# Patient Record
Sex: FEMALE | Race: WHITE | Employment: UNEMPLOYED | URBAN - METROPOLITAN AREA
[De-identification: names, ages, dates, MRNs, and addresses within clinical notes are randomized per-mention and may not be internally consistent; named-entity substitution may affect disease eponyms.]

---

## 2017-03-06 PROBLEM — E66.01 OBESITY, CLASS III, BMI 40-49.9 (MORBID OBESITY) (HCC): Status: ACTIVE | Noted: 2017-03-06

## 2017-03-06 PROBLEM — N92.1 MENORRHAGIA WITH IRREGULAR CYCLE: Status: ACTIVE | Noted: 2017-03-06

## 2017-03-06 PROBLEM — F41.9 ANXIETY: Status: ACTIVE | Noted: 2017-03-06

## 2017-03-06 PROBLEM — E66.813 OBESITY, CLASS III, BMI 40-49.9 (MORBID OBESITY) (HCC): Status: ACTIVE | Noted: 2017-03-06

## 2017-05-01 PROBLEM — F41.9 ANXIETY: Status: RESOLVED | Noted: 2017-03-06 | Resolved: 2017-05-01

## 2017-05-01 PROBLEM — F41.9 ANXIETY DISORDER: Status: ACTIVE | Noted: 2017-05-01

## 2018-03-19 ENCOUNTER — OFFICE VISIT (OUTPATIENT)
Dept: INTERNAL MEDICINE | Age: 48
End: 2018-03-19
Payer: COMMERCIAL

## 2018-03-19 VITALS
SYSTOLIC BLOOD PRESSURE: 127 MMHG | BODY MASS INDEX: 42.59 KG/M2 | RESPIRATION RATE: 14 BRPM | HEIGHT: 66 IN | WEIGHT: 265 LBS | DIASTOLIC BLOOD PRESSURE: 76 MMHG | TEMPERATURE: 97.7 F | HEART RATE: 83 BPM

## 2018-03-19 DIAGNOSIS — F41.9 ANXIETY DISORDER, UNSPECIFIED TYPE: ICD-10-CM

## 2018-03-19 DIAGNOSIS — Z12.39 SCREENING FOR BREAST CANCER: ICD-10-CM

## 2018-03-19 DIAGNOSIS — E66.01 OBESITY, CLASS III, BMI 40-49.9 (MORBID OBESITY) (HCC): ICD-10-CM

## 2018-03-19 DIAGNOSIS — R00.2 PALPITATIONS: ICD-10-CM

## 2018-03-19 DIAGNOSIS — E11.65 TYPE 2 DIABETES MELLITUS WITH HYPERGLYCEMIA, WITHOUT LONG-TERM CURRENT USE OF INSULIN (HCC): ICD-10-CM

## 2018-03-19 DIAGNOSIS — I10 ESSENTIAL HYPERTENSION: ICD-10-CM

## 2018-03-19 DIAGNOSIS — E78.2 MIXED HYPERLIPIDEMIA: Primary | ICD-10-CM

## 2018-03-19 PROCEDURE — G8427 DOCREV CUR MEDS BY ELIG CLIN: HCPCS | Performed by: INTERNAL MEDICINE

## 2018-03-19 PROCEDURE — G8482 FLU IMMUNIZE ORDER/ADMIN: HCPCS | Performed by: INTERNAL MEDICINE

## 2018-03-19 PROCEDURE — 99214 OFFICE O/P EST MOD 30 MIN: CPT | Performed by: INTERNAL MEDICINE

## 2018-03-19 PROCEDURE — 99212 OFFICE O/P EST SF 10 MIN: CPT | Performed by: INTERNAL MEDICINE

## 2018-03-19 PROCEDURE — 1036F TOBACCO NON-USER: CPT | Performed by: INTERNAL MEDICINE

## 2018-03-19 PROCEDURE — G8417 CALC BMI ABV UP PARAM F/U: HCPCS | Performed by: INTERNAL MEDICINE

## 2018-03-19 PROCEDURE — 3046F HEMOGLOBIN A1C LEVEL >9.0%: CPT | Performed by: INTERNAL MEDICINE

## 2018-03-19 RX ORDER — AMLODIPINE BESYLATE 5 MG/1
TABLET ORAL
Qty: 30 TABLET | Refills: 2 | Status: SHIPPED | OUTPATIENT
Start: 2018-03-19 | End: 2018-03-27 | Stop reason: SDUPTHER

## 2018-03-19 RX ORDER — PRAVASTATIN SODIUM 20 MG
TABLET ORAL
Qty: 30 TABLET | Refills: 2 | Status: SHIPPED | OUTPATIENT
Start: 2018-03-19 | End: 2018-06-11 | Stop reason: SDUPTHER

## 2018-03-19 RX ORDER — GLUCOSAMINE HCL/CHONDROITIN SU 500-400 MG
1 CAPSULE ORAL DAILY
Qty: 100 STRIP | Refills: 5 | Status: SHIPPED | OUTPATIENT
Start: 2018-03-19 | End: 2018-08-06 | Stop reason: SDUPTHER

## 2018-03-19 RX ORDER — CHLORAL HYDRATE 500 MG
1000 CAPSULE ORAL 2 TIMES DAILY
Qty: 180 CAPSULE | Refills: 2 | Status: SHIPPED | OUTPATIENT
Start: 2018-03-19 | End: 2018-08-06 | Stop reason: SDUPTHER

## 2018-03-19 RX ORDER — LANCETS 30 GAUGE
1 EACH MISCELLANEOUS DAILY
Qty: 100 EACH | Refills: 5 | Status: SHIPPED | OUTPATIENT
Start: 2018-03-19 | End: 2018-08-06 | Stop reason: SDUPTHER

## 2018-03-19 RX ORDER — M-VIT,TX,IRON,MINS/CALC/FOLIC 27MG-0.4MG
1 TABLET ORAL DAILY
Qty: 90 TABLET | Refills: 2 | Status: SHIPPED | OUTPATIENT
Start: 2018-03-19 | End: 2018-08-06 | Stop reason: SDUPTHER

## 2018-03-19 ASSESSMENT — ENCOUNTER SYMPTOMS
SHORTNESS OF BREATH: 0
ORTHOPNEA: 0
BLURRED VISION: 0
NAUSEA: 0
VOMITING: 0

## 2018-03-19 NOTE — PROGRESS NOTES
Psychiatric/Behavioral: The patient is nervous/anxious. Objective:  Vitals:    03/19/18 1038 03/19/18 1052   BP: (!) 161/99 (!) 153/84   Pulse: 83    Resp: 14    Temp: 97.7 °F (36.5 °C)    TempSrc: Oral    Weight: 265 lb (120.2 kg)    Height: 5' 6\" (1.676 m)       Physical Exam   Constitutional: She is oriented to person, place, and time. She appears well-developed and well-nourished. No distress. HENT:   Head: Normocephalic and atraumatic. Right Ear: Tympanic membrane normal.   Left Ear: Tympanic membrane normal.   Mouth/Throat: Uvula is midline and oropharynx is clear and moist.   Neck: Normal range of motion. Carotid bruit is not present. No thyromegaly present. Cardiovascular: Normal rate, regular rhythm, normal heart sounds and intact distal pulses. Exam reveals no gallop and no friction rub. No murmur heard. Pulmonary/Chest: Effort normal and breath sounds normal. She has no wheezes. She has no rales. Musculoskeletal: Normal range of motion. She exhibits no edema (no significant edema today on exam). Lymphadenopathy:     She has no cervical adenopathy. Neurological: She is alert and oriented to person, place, and time. Skin: She is not diaphoretic. Psychiatric: She has a normal mood and affect. Assessment/Plan:   Anna Staples was seen today for medication refill, hypertension, medication problem, other, other, other and abdominal pain. Diagnoses and all orders for this visit:    Mixed hyperlipidemia  -     pravastatin (PRAVACHOL) 20 MG tablet; TAKE ONE TABLET BY MOUTH EVERY EVENING  -     Omega-3 Fatty Acids (FISH OIL) 1000 MG CAPS; Take 1 capsule by mouth 2 times daily  -     LIPID PANEL; Future    Repeat lipid panel     Discussed re-evaluating TGs with Fish oil use     Type 2 diabetes mellitus with hyperglycemia, without long-term current use of insulin (HCC)  -     metFORMIN (GLUCOPHAGE) 1000 MG tablet;  Take 1 tablet by mouth 2 times daily (with meals)  -     Lancets MISC; Inject 1 each into the skin daily  -     Glucose Blood (BLOOD GLUCOSE TEST STRIPS) STRP; 1 each by In Vitro route daily  -     Basic Metabolic Panel; Future  -     HEMOGLOBIN A1C; Future    Repeat A1c     Essential hypertension- stable   -     amLODIPine (NORVASC) 5 MG tablet; TAKE ONE TABLET BY MOUTH EVERY DAY  -     Basic Metabolic Panel; Future    Repeat BP with resting at goal    Monitor continued    Weight loss strategies discussed    Monitor closely    Consider increasing CCB to 10 mg daily     Palpitations- recurrent     Recommend follow-up Cardiology assessment     Patient would be willing to consider ECHO at this time     Prior Holter Monitoring has been evaluated     Anxiety increased and has not followed with Behavioral Health and appt established on Thursday AM      Will continue to follow     Obesity, Class III, BMI 40-49.9 (morbid obesity) ([facility] [address]     BMI was elevated today, and weight loss plan recommended is : conventional weight loss. Consider Weight Loss referral     Anxiety disorder, unspecified type     Behavioral Health     Screening for breast cancer  -     SHASITA DIGITAL SCREEN W CAD BILATERAL; Future    Other orders  -     Multiple Vitamins-Minerals (THERAPEUTIC MULTIVITAMIN-MINERALS) tablet; Take 1 tablet by mouth daily Last dose 6/8/2016    Women's Health Exam requested by patient.

## 2018-03-22 ENCOUNTER — OFFICE VISIT (OUTPATIENT)
Dept: PSYCHOLOGY | Age: 48
End: 2018-03-22
Payer: COMMERCIAL

## 2018-03-22 DIAGNOSIS — F41.0 PANIC ATTACKS: ICD-10-CM

## 2018-03-22 DIAGNOSIS — F41.8 OTHER SPECIFIED ANXIETY DISORDERS: Primary | ICD-10-CM

## 2018-03-22 PROCEDURE — 90832 PSYTX W PT 30 MINUTES: CPT | Performed by: CLINICAL NEUROPSYCHOLOGIST

## 2018-03-22 NOTE — PATIENT INSTRUCTIONS
Improving Sleep Through Behavior Change   Stimulus Control Procedures     Go to Bed Only When You Are Sleepy   The longer you are in bed, the more the bed is associated with a place to be awake instead of asleep. Delay bedtime until sleepy. Get Out of Bed When You Cant Fall Asleep or Go Back to Sleep in About 15 Minutes   Get out of bed if you dont fall asleep fairly soon. Return to bed only when you are sleepy. When you feel sleepy, return to bed. The goal is to reconnect your bed with being asleep. Use the Bed for Sleep and Sex Only   Do not watch TV, listen to the radio, eat, or read in your bed or bedroom. Sleep Hygiene Guidelines   Caffeine   Avoid caffeine 6 to 8 hours before bedtime. Caffeine disturbs sleep. Thus, drinking caffeinated beverages should be avoided near bedtime. Nicotine   Avoid nicotine before bedtime. Nicotine can keep you awake. Avoid tobacco near bedtime and during the night. Alcohol   Avoid alcohol after dinner. Alcohol often promotes the onset of sleep, but interrupts your natural sleep pattern. Do not consume it any closer than 4 hours before going to bed. Sleeping Pills   Sleep medications are effective only temporarily. Sleep medications lose their effectiveness in about 2 to 4 weeks when taken regularly. Over time, sleeping pills actually can make sleep problems worse; withdrawal from the medication can lead to an insomnia rebound. Keep use of sleeping pills infrequent, but dont worry if you need to use one on an occasional basis. Regular Exercise   Do not exercise within 2 hours of bedtime as it may elevate nervous system activity and interfere with your ability to fall asleep   Bedroom Environment   Your bedroom should have a moderate temperature and be quiet and dark. Noises can be masked with background white noise (e.g., the noise of a fan) or with earplugs. Bedrooms may be darkened with blackout shades, or sleep masks can be worn.    Eating   A light bedtime improve sleep, and exercise within 2 hours of bedtime may elevate nervous system activity and interfere with sleep onset. Hot Baths  Spending 20 minutes in a tub of hot water an hour or two prior to bedtime may promote sleep and is strongly recommended. Bedroom Environment: Moderate Temperature, Quiet, and Dark       Extremes of heat or cold can disrupt sleep. A quiet environment is more sleep promoting than a noisy one. Noises can be masked with background white noise (such as the noise of a fan) or with earplugs. Bedrooms may be darkened with black-out shades or sleep masks can be worn. Position clocks out-of-sight since clock-watching can increase worry about the effects of lack of sleep. Be sure your mattress is not too soft or too firm and that your pillow is the right height and firmness. Eating       A light bedtime snack, such a glass of warm milk, cheese, or a bowl of cereal can promote sleep. You should avoid the following foods at bedtime:  any caffeinated foods (e.g., chocolate), peanuts, beans, most raw fruits and vegetables (since they may cause gas), and high-fat foods such as potato chips or corn chips. Avoid snacks in the middle of the nights since awakening may become associated with hunger. If you have trouble with regurgitation, be especially careful to avid heavy meals and spices in the evening. Do not go to bed too hungry or too full. It may help to elevate you head with some pillows. Avoid Naps       Avoid naps, the sleep you obtain during the day takes away from you sleep need that night resulting in lighter, more restless sleep, difficulty falling asleep or early morning awakening. If you must nap, keep it brief, and take the nap about 8 hours after arising. It is best to set an alarm to ensure you dont sleep more than 10-15 minutes.     Limit Your Time in Bed        Restrict your sleep period to the average number of hours you have actually slept per ____________________________________________________________________________    ______ Dru Dean Take a Hot Bath 1-2 Hours Prior to Bedtime. I will take a hot bath about ______ PM.    ______ Eat a Light Snack at Bedtime but Avoid Large or Problematic Foods. I will eat     __________________  or _____________________ or __________________ before bed.    ______ Avoid Naps. I try not to nap, if I must, I will limit it to _______ minutes, about 8 hours after I   awoke and will use alarm to limit my nap time. ______ Limit Time In Bed. I have been sleeping on average ______ hours per night, therefore I will   limit my time in bed to _____ hours (the same number). If Im not asleep in about 15 to 20   minutes I will get up and not return to bed until Im sleepy.    ______ Stay on a Regular Sleep Schedule  I will get up at _______ AM, 7 days a week, no matter   how poorly I slept that night. Sleep Restriction  One of the Keys to Changing Your Sleep Behavior        What is it? Sleep restriction involves restricting the amount of time you spend in bed to the amount of time that you currently spend asleep. Why would this be helpful? Research has demonstrated that sleep restriction is a powerful technique for improving sleep. Although it can be a bit of an adjustment at first, most people find that it is not much worse than their current difficulties with sleep. In general, most people notice that their sleep improves considerably within just a few weeks. Sleep restriction initially produces a mild state of sleep deprivation, which, after only a few weeks, helps people fall asleep faster, stay asleep longer and improve their overall quality of sleep. How do I do it? Example: Your usual bedtime is 10:00 PM and you get out of bed in the morning at 6:00 AM.  With this routine there is an 8-hour period during which you are in bed trying to sleep.       However, if it takes you 1 hour to fall asleep and you wake-up for 30 minutes during the middle of the night and 30 minutes before you get out of bed, you spend a total of 6 hours sleeping and 2 hours awake. Your sleep efficiency (the percent of time you are actually asleep during the time period you are trying to sleep) is 75%. Sleep Restriction in this case would mean decreasing the amount of time in bed (8 hours) to the estimated time actually spent sleeping (6 hours). In this example you would adjust either your bed-time or the time you get up in the morning so that the maximum amount of time you spend in bed is 6 hours. With this example you could go to bed at 12:00 (midnight) and get up at 6:00 AM, or continue to go to bed at 10:00 PM and get up at 4:00 AM.    After sleep efficiency reaches 85% or greater, time in bed can be increased in 15-20 minute blocks. Time in bed each week is increased if 85% sleep efficiency or greater until sleep efficiency starts to fall below 80% then time in bed is decreased by 15-20 minute blocks. This process of increasing or decreasing time in bed is done until sleep efficiency falls between 80-85% on a regular basis.

## 2018-03-22 NOTE — PROGRESS NOTES
amLODIPine (NORVASC) 5 MG tablet TAKE ONE TABLET BY MOUTH EVERY DAY 30 tablet 2    ibuprofen (ADVIL;MOTRIN) 200 MG tablet Take 400 mg by mouth every 6 hours as needed for Pain      glucose monitoring kit (FREESTYLE) monitoring kit 1 kit by Does not apply route daily as needed 1 kit 0    Misc. Devices KIT Blood pressure cuff- Ambulatory monitoring- check every other day and maintain blood pressure log 1 kit 0     No current facility-administered medications for this visit. Social History:   Social History     Social History    Marital status:      Spouse name: N/A    Number of children: N/A    Years of education: N/A     Occupational History    Not on file. Social History Main Topics    Smoking status: Former Smoker    Smokeless tobacco: Never Used      Comment: smoked for 3 yrs in early 20;s    Alcohol use No    Drug use: No    Sexual activity: Yes     Partners: Male     Other Topics Concern    Not on file     Social History Narrative    No narrative on file       TOBACCO:   reports that she has quit smoking. She has never used smokeless tobacco.  ETOH:   reports that she does not drink alcohol.     Family History:   Family History   Problem Relation Age of Onset    Rheum Arthritis Mother     Hypertension Mother    [place] Arthritis Mother     High Blood Pressure Mother     Obesity Mother     Other Father      Heart Disease    Hypertension Father     High Cholesterol Father     Thyroid Disease Father      Hyperthyroid s/p radiation    Arthritis Father     High Blood Pressure Father     Obesity Father     Obesity Sister     Cancer Paternal Grandmother      Colon Cancer    Other Paternal Grandmother      COPD    Cancer Paternal Grandfather      Lung Cancer    Obesity Maternal Aunt     High Blood Pressure Maternal Grandmother     Obesity Maternal Grandmother     Obesity Maternal Grandfather        [facility] Scores 8/22/2017 2/23/2017 1/25/2017 11/16/2016 10/19/2016   PHQ2 Score 4 2 2 3 1   PHQ9 Score 16 2 2 9 1     Interpretation of Total Score Depression Severity: 1-4 = Minimal depression, 5-9 = Mild depression, 10-14 = Moderate depression, 15-19 = Moderately severe depression, 20-27 = Severe depression    A:   Patient was on time for her scheduled appointment. Patient was last seen on October 24, 2017. She noted that since that time she has had difficulty with insurance that has prevented her from attending scheduled sessions. Today she described continued concern over her ex-'s control of their 3 daughters. She worries that his layoff may cause him to want his daughters to be with him more frequently which can reportedly help him with child support. The patient continues to use the anxiety and panic intervention strategies she has previously learned. However, the patient noted that her mind is preoccupied with worry about her daughter's which has in turn decreased her actual breath training and relaxation intervention time each day. When she uses the interventions she noted that her anxiety is lessened. We will continue to discuss her adjustment to this ongoing situation in our next session. She was apprised that this provider will be leaving this institution the 2nd week of May. She was afforded the opportunity for follow-up with this provider or referral to the community. The patient denied suicidal/homicidal ideation, plan, and/or intent. She was in no acute psychological distress today.      Diagnosis:  Other Specified Anxiety Disorder with (periodic) Panic Attacks         Patient Active Problem List   Diagnosis    Bone spur of ankle    DUB (dysfunctional uterine bleeding)    Cyst of ovary    Palpitations    Anxiety state    Essential hypertension    Abdominal pain, LLQ    Pharyngoesophageal dysphagia    Preop testing    Type 2 diabetes mellitus with hyperglycemia, without long-term current use of insulin ([facility])    Obesity, Class III, BMI 40-49.9 (morbid obesity) ([facility] [address]

## 2018-03-27 ENCOUNTER — TELEPHONE (OUTPATIENT)
Dept: INTERNAL MEDICINE CLINIC | Age: 48
End: 2018-03-27

## 2018-03-27 DIAGNOSIS — I10 ESSENTIAL HYPERTENSION: ICD-10-CM

## 2018-03-27 RX ORDER — AMLODIPINE BESYLATE 10 MG/1
10 TABLET ORAL DAILY
Qty: 30 TABLET | Refills: 2 | Status: SHIPPED | OUTPATIENT
Start: 2018-03-27 | End: 2018-06-11 | Stop reason: SDUPTHER

## 2018-03-27 NOTE — TELEPHONE ENCOUNTER
Home readings are not at goal. Increase Amlodipine to 10 mg daily and continue home monitoring. Monitor for additional symptoms. Orders adjusted.

## 2018-04-10 ENCOUNTER — OFFICE VISIT (OUTPATIENT)
Dept: PSYCHOLOGY | Age: 48
End: 2018-04-10
Payer: COMMERCIAL

## 2018-04-10 DIAGNOSIS — F41.0 PANIC ATTACKS: ICD-10-CM

## 2018-04-10 DIAGNOSIS — F41.8 OTHER SPECIFIED ANXIETY DISORDERS: Primary | ICD-10-CM

## 2018-04-10 DIAGNOSIS — F41.9 ANXIOUSNESS: ICD-10-CM

## 2018-04-10 PROCEDURE — 90832 PSYTX W PT 30 MINUTES: CPT | Performed by: CLINICAL NEUROPSYCHOLOGIST

## 2018-04-13 ENCOUNTER — OFFICE VISIT (OUTPATIENT)
Dept: PHYSICAL MEDICINE AND REHAB | Age: 48
End: 2018-04-13
Payer: COMMERCIAL

## 2018-04-13 VITALS
TEMPERATURE: 97.7 F | WEIGHT: 260 LBS | BODY MASS INDEX: 41.78 KG/M2 | DIASTOLIC BLOOD PRESSURE: 88 MMHG | SYSTOLIC BLOOD PRESSURE: 130 MMHG | HEIGHT: 66 IN

## 2018-04-13 DIAGNOSIS — G89.29 CHRONIC LEFT-SIDED LOW BACK PAIN WITHOUT SCIATICA: Primary | ICD-10-CM

## 2018-04-13 DIAGNOSIS — E66.01 OBESITY, CLASS III, BMI 40-49.9 (MORBID OBESITY) (HCC): ICD-10-CM

## 2018-04-13 DIAGNOSIS — M54.50 CHRONIC LEFT-SIDED LOW BACK PAIN WITHOUT SCIATICA: Primary | ICD-10-CM

## 2018-04-13 PROCEDURE — 99204 OFFICE O/P NEW MOD 45 MIN: CPT | Performed by: PHYSICAL MEDICINE & REHABILITATION

## 2018-04-13 PROCEDURE — 1036F TOBACCO NON-USER: CPT | Performed by: PHYSICAL MEDICINE & REHABILITATION

## 2018-04-13 PROCEDURE — G8427 DOCREV CUR MEDS BY ELIG CLIN: HCPCS | Performed by: PHYSICAL MEDICINE & REHABILITATION

## 2018-04-13 PROCEDURE — G8417 CALC BMI ABV UP PARAM F/U: HCPCS | Performed by: PHYSICAL MEDICINE & REHABILITATION

## 2018-04-19 ENCOUNTER — OFFICE VISIT (OUTPATIENT)
Dept: OBGYN | Age: 48
End: 2018-04-19
Payer: COMMERCIAL

## 2018-04-19 VITALS
HEART RATE: 84 BPM | SYSTOLIC BLOOD PRESSURE: 145 MMHG | HEIGHT: 66 IN | DIASTOLIC BLOOD PRESSURE: 81 MMHG | WEIGHT: 266 LBS | TEMPERATURE: 98.4 F | BODY MASS INDEX: 42.75 KG/M2 | RESPIRATION RATE: 16 BRPM

## 2018-04-19 DIAGNOSIS — R10.32 LEFT LOWER QUADRANT ABDOMINAL PAIN OF UNKNOWN ETIOLOGY: ICD-10-CM

## 2018-04-19 DIAGNOSIS — Z01.419 ENCNTR FOR GYN EXAM (GENERAL) (ROUTINE) W/O ABN FINDINGS: Primary | ICD-10-CM

## 2018-04-19 PROCEDURE — 99213 OFFICE O/P EST LOW 20 MIN: CPT | Performed by: OBSTETRICS & GYNECOLOGY

## 2018-04-19 PROCEDURE — 99396 PREV VISIT EST AGE 40-64: CPT | Performed by: OBSTETRICS & GYNECOLOGY

## 2018-04-24 ENCOUNTER — HOSPITAL ENCOUNTER (OUTPATIENT)
Dept: GENERAL RADIOLOGY | Age: 48
Discharge: HOME | End: 2018-04-26
Payer: COMMERCIAL

## 2018-04-24 ENCOUNTER — HOSPITAL ENCOUNTER (OUTPATIENT)
Age: 48
Discharge: HOME | End: 2018-04-26
Payer: COMMERCIAL

## 2018-04-24 DIAGNOSIS — Z12.39 SCREENING FOR BREAST CANCER: ICD-10-CM

## 2018-04-24 DIAGNOSIS — M54.50 CHRONIC LEFT-SIDED LOW BACK PAIN WITHOUT SCIATICA: ICD-10-CM

## 2018-04-24 DIAGNOSIS — G89.29 CHRONIC LEFT-SIDED LOW BACK PAIN WITHOUT SCIATICA: ICD-10-CM

## 2018-04-24 PROCEDURE — 72110 X-RAY EXAM L-2 SPINE 4/>VWS: CPT

## 2018-04-24 PROCEDURE — 77063 BREAST TOMOSYNTHESIS BI: CPT

## 2018-04-26 ENCOUNTER — OFFICE VISIT (OUTPATIENT)
Dept: PSYCHOLOGY | Age: 48
End: 2018-04-26
Payer: COMMERCIAL

## 2018-04-26 DIAGNOSIS — F41.0 PANIC ATTACKS: ICD-10-CM

## 2018-04-26 DIAGNOSIS — F32.89 OTHER SPECIFIED DEPRESSIVE EPISODES: Primary | ICD-10-CM

## 2018-04-26 PROCEDURE — 90832 PSYTX W PT 30 MINUTES: CPT | Performed by: CLINICAL NEUROPSYCHOLOGIST

## 2018-05-02 ENCOUNTER — HOSPITAL ENCOUNTER (OUTPATIENT)
Dept: ULTRASOUND IMAGING | Age: 48
Discharge: HOME | End: 2018-05-04
Payer: COMMERCIAL

## 2018-05-02 DIAGNOSIS — R10.32 LEFT LOWER QUADRANT ABDOMINAL PAIN OF UNKNOWN ETIOLOGY: ICD-10-CM

## 2018-05-02 DIAGNOSIS — Z01.419 ENCNTR FOR GYN EXAM (GENERAL) (ROUTINE) W/O ABN FINDINGS: ICD-10-CM

## 2018-05-02 PROCEDURE — 76830 TRANSVAGINAL US NON-OB: CPT

## 2018-05-03 ENCOUNTER — OFFICE VISIT (OUTPATIENT)
Dept: CARDIOLOGY CLINIC | Age: 48
End: 2018-05-03
Payer: COMMERCIAL

## 2018-05-03 VITALS
DIASTOLIC BLOOD PRESSURE: 88 MMHG | WEIGHT: 269.3 LBS | RESPIRATION RATE: 18 BRPM | HEART RATE: 84 BPM | HEIGHT: 66 IN | SYSTOLIC BLOOD PRESSURE: 134 MMHG | BODY MASS INDEX: 43.28 KG/M2

## 2018-05-03 DIAGNOSIS — R00.2 PALPITATIONS: Primary | ICD-10-CM

## 2018-05-03 DIAGNOSIS — F41.9 ANXIETY DISORDER, UNSPECIFIED TYPE: ICD-10-CM

## 2018-05-03 DIAGNOSIS — I10 ESSENTIAL HYPERTENSION: ICD-10-CM

## 2018-05-03 PROCEDURE — G8417 CALC BMI ABV UP PARAM F/U: HCPCS | Performed by: INTERNAL MEDICINE

## 2018-05-03 PROCEDURE — 1036F TOBACCO NON-USER: CPT | Performed by: INTERNAL MEDICINE

## 2018-05-03 PROCEDURE — 93000 ELECTROCARDIOGRAM COMPLETE: CPT | Performed by: INTERNAL MEDICINE

## 2018-05-03 PROCEDURE — G8427 DOCREV CUR MEDS BY ELIG CLIN: HCPCS | Performed by: INTERNAL MEDICINE

## 2018-05-03 PROCEDURE — 99214 OFFICE O/P EST MOD 30 MIN: CPT | Performed by: INTERNAL MEDICINE

## 2018-05-17 ENCOUNTER — OFFICE VISIT (OUTPATIENT)
Dept: OBGYN | Age: 48
End: 2018-05-17
Payer: COMMERCIAL

## 2018-05-17 VITALS
DIASTOLIC BLOOD PRESSURE: 85 MMHG | WEIGHT: 264 LBS | TEMPERATURE: 98.4 F | HEART RATE: 97 BPM | HEIGHT: 66 IN | RESPIRATION RATE: 18 BRPM | BODY MASS INDEX: 42.43 KG/M2 | SYSTOLIC BLOOD PRESSURE: 159 MMHG

## 2018-05-17 DIAGNOSIS — R10.32 LEFT LOWER QUADRANT ABDOMINAL PAIN OF UNKNOWN ETIOLOGY: Primary | ICD-10-CM

## 2018-05-17 PROCEDURE — G8417 CALC BMI ABV UP PARAM F/U: HCPCS | Performed by: OBSTETRICS & GYNECOLOGY

## 2018-05-17 PROCEDURE — 1036F TOBACCO NON-USER: CPT | Performed by: OBSTETRICS & GYNECOLOGY

## 2018-05-17 PROCEDURE — 99213 OFFICE O/P EST LOW 20 MIN: CPT | Performed by: OBSTETRICS & GYNECOLOGY

## 2018-05-17 PROCEDURE — 99212 OFFICE O/P EST SF 10 MIN: CPT | Performed by: OBSTETRICS & GYNECOLOGY

## 2018-05-17 PROCEDURE — G8427 DOCREV CUR MEDS BY ELIG CLIN: HCPCS | Performed by: OBSTETRICS & GYNECOLOGY

## 2018-06-09 ENCOUNTER — HOSPITAL ENCOUNTER (OUTPATIENT)
Age: 48
Discharge: HOME | End: 2018-06-09
Payer: COMMERCIAL

## 2018-06-09 DIAGNOSIS — E11.65 TYPE 2 DIABETES MELLITUS WITH HYPERGLYCEMIA, WITHOUT LONG-TERM CURRENT USE OF INSULIN (HCC): ICD-10-CM

## 2018-06-09 DIAGNOSIS — E78.2 MIXED HYPERLIPIDEMIA: ICD-10-CM

## 2018-06-09 DIAGNOSIS — I10 ESSENTIAL HYPERTENSION: ICD-10-CM

## 2018-06-09 LAB
ANION GAP SERPL CALCULATED.3IONS-SCNC: 13 MMOL/L (ref 7–16)
BUN BLDV-MCNC: 12 MG/DL (ref 6–20)
CALCIUM SERPL-MCNC: 9.2 MG/DL (ref 8.6–10.2)
CHLORIDE BLD-SCNC: 95 MMOL/L (ref 98–107)
CHOLESTEROL, TOTAL: 178 MG/DL (ref 0–199)
CO2: 26 MMOL/L (ref 22–29)
CREAT SERPL-MCNC: 0.6 MG/DL (ref 0.5–1)
GFR AFRICAN AMERICAN: >60
GFR NON-AFRICAN AMERICAN: >60 ML/MIN/1.73
GLUCOSE BLD-MCNC: 159 MG/DL (ref 74–109)
HBA1C MFR BLD: 8 % (ref 4.8–5.9)
HDLC SERPL-MCNC: 43 MG/DL
LDL CHOLESTEROL CALCULATED: 60 MG/DL (ref 0–99)
POTASSIUM SERPL-SCNC: 4.3 MMOL/L (ref 3.5–5)
SODIUM BLD-SCNC: 134 MMOL/L (ref 132–146)
TRIGL SERPL-MCNC: 376 MG/DL (ref 0–149)
VLDLC SERPL CALC-MCNC: 75 MG/DL

## 2018-06-09 PROCEDURE — 80061 LIPID PANEL: CPT

## 2018-06-09 PROCEDURE — 83036 HEMOGLOBIN GLYCOSYLATED A1C: CPT

## 2018-06-09 PROCEDURE — 80048 BASIC METABOLIC PNL TOTAL CA: CPT

## 2018-06-09 PROCEDURE — 36415 COLL VENOUS BLD VENIPUNCTURE: CPT

## 2018-06-11 ENCOUNTER — OFFICE VISIT (OUTPATIENT)
Dept: INTERNAL MEDICINE | Age: 48
End: 2018-06-11
Payer: COMMERCIAL

## 2018-06-11 VITALS
BODY MASS INDEX: 41.73 KG/M2 | HEART RATE: 89 BPM | DIASTOLIC BLOOD PRESSURE: 80 MMHG | SYSTOLIC BLOOD PRESSURE: 150 MMHG | TEMPERATURE: 98.1 F | WEIGHT: 265.9 LBS | RESPIRATION RATE: 20 BRPM | HEIGHT: 67 IN

## 2018-06-11 DIAGNOSIS — E78.2 MIXED HYPERLIPIDEMIA: ICD-10-CM

## 2018-06-11 DIAGNOSIS — R60.0 LOWER EXTREMITY EDEMA: Primary | ICD-10-CM

## 2018-06-11 DIAGNOSIS — I10 ESSENTIAL HYPERTENSION: ICD-10-CM

## 2018-06-11 DIAGNOSIS — E11.65 TYPE 2 DIABETES MELLITUS WITH HYPERGLYCEMIA, WITHOUT LONG-TERM CURRENT USE OF INSULIN (HCC): ICD-10-CM

## 2018-06-11 PROCEDURE — 99212 OFFICE O/P EST SF 10 MIN: CPT | Performed by: INTERNAL MEDICINE

## 2018-06-11 PROCEDURE — 2022F DILAT RTA XM EVC RTNOPTHY: CPT | Performed by: INTERNAL MEDICINE

## 2018-06-11 PROCEDURE — 1036F TOBACCO NON-USER: CPT | Performed by: INTERNAL MEDICINE

## 2018-06-11 PROCEDURE — 99214 OFFICE O/P EST MOD 30 MIN: CPT | Performed by: INTERNAL MEDICINE

## 2018-06-11 PROCEDURE — G8417 CALC BMI ABV UP PARAM F/U: HCPCS | Performed by: INTERNAL MEDICINE

## 2018-06-11 PROCEDURE — G8427 DOCREV CUR MEDS BY ELIG CLIN: HCPCS | Performed by: INTERNAL MEDICINE

## 2018-06-11 PROCEDURE — 3045F PR MOST RECENT HEMOGLOBIN A1C LEVEL 7.0-9.0%: CPT | Performed by: INTERNAL MEDICINE

## 2018-06-11 RX ORDER — M-VIT,TX,IRON,MINS/CALC/FOLIC 27MG-0.4MG
1 TABLET ORAL DAILY
Qty: 90 TABLET | Status: CANCELLED | OUTPATIENT
Start: 2018-06-11

## 2018-06-11 RX ORDER — AMLODIPINE BESYLATE 5 MG/1
5 TABLET ORAL DAILY
Qty: 30 TABLET | Refills: 2 | Status: SHIPPED | OUTPATIENT
Start: 2018-06-11 | End: 2018-08-06 | Stop reason: SDUPTHER

## 2018-06-11 RX ORDER — PRAVASTATIN SODIUM 20 MG
TABLET ORAL
Qty: 30 TABLET | Refills: 2 | Status: SHIPPED | OUTPATIENT
Start: 2018-06-11 | End: 2018-08-06 | Stop reason: SDUPTHER

## 2018-06-11 RX ORDER — GLUCOSAMINE HCL/CHONDROITIN SU 500-400 MG
1 CAPSULE ORAL DAILY
Qty: 100 STRIP | Status: CANCELLED | OUTPATIENT
Start: 2018-06-11

## 2018-06-11 RX ORDER — CHLORAL HYDRATE 500 MG
1000 CAPSULE ORAL 2 TIMES DAILY
Qty: 180 CAPSULE | Status: CANCELLED | OUTPATIENT
Start: 2018-06-11

## 2018-06-11 RX ORDER — LANCETS 30 GAUGE
1 EACH MISCELLANEOUS DAILY
Qty: 100 EACH | Status: CANCELLED | OUTPATIENT
Start: 2018-06-11

## 2018-06-11 ASSESSMENT — ENCOUNTER SYMPTOMS
SHORTNESS OF BREATH: 0
BLURRED VISION: 0
NAUSEA: 0
HEARTBURN: 0
ORTHOPNEA: 0
DIARRHEA: 0
VOMITING: 0
CONSTIPATION: 0
ABDOMINAL PAIN: 0
BLOOD IN STOOL: 0

## 2018-06-12 ENCOUNTER — HOSPITAL ENCOUNTER (OUTPATIENT)
Age: 48
Discharge: HOME | End: 2018-06-12
Payer: COMMERCIAL

## 2018-06-12 ENCOUNTER — TELEPHONE (OUTPATIENT)
Dept: INTERNAL MEDICINE | Age: 48
End: 2018-06-12

## 2018-06-12 DIAGNOSIS — R60.0 LOWER EXTREMITY EDEMA: ICD-10-CM

## 2018-06-12 LAB — PRO-BNP: 23 PG/ML (ref 0–125)

## 2018-06-12 PROCEDURE — 36415 COLL VENOUS BLD VENIPUNCTURE: CPT

## 2018-06-12 PROCEDURE — 83880 ASSAY OF NATRIURETIC PEPTIDE: CPT

## 2018-07-27 ENCOUNTER — HOSPITAL ENCOUNTER (OUTPATIENT)
Dept: NON INVASIVE DIAGNOSTICS | Age: 48
Discharge: HOME | End: 2018-07-27
Payer: COMMERCIAL

## 2018-07-27 DIAGNOSIS — R60.0 LOWER EXTREMITY EDEMA: ICD-10-CM

## 2018-07-27 LAB
LV EF: 65 %
LVEF MODALITY: NORMAL

## 2018-07-27 PROCEDURE — 93306 TTE W/DOPPLER COMPLETE: CPT

## 2018-08-06 ENCOUNTER — OFFICE VISIT (OUTPATIENT)
Dept: INTERNAL MEDICINE | Age: 48
End: 2018-08-06
Payer: COMMERCIAL

## 2018-08-06 VITALS
RESPIRATION RATE: 14 BRPM | HEART RATE: 80 BPM | WEIGHT: 253 LBS | TEMPERATURE: 98 F | DIASTOLIC BLOOD PRESSURE: 85 MMHG | HEIGHT: 67 IN | SYSTOLIC BLOOD PRESSURE: 131 MMHG | BODY MASS INDEX: 39.71 KG/M2

## 2018-08-06 DIAGNOSIS — E11.65 TYPE 2 DIABETES MELLITUS WITH HYPERGLYCEMIA, WITHOUT LONG-TERM CURRENT USE OF INSULIN (HCC): Primary | ICD-10-CM

## 2018-08-06 DIAGNOSIS — E78.2 MIXED HYPERLIPIDEMIA: ICD-10-CM

## 2018-08-06 DIAGNOSIS — I10 ESSENTIAL HYPERTENSION: ICD-10-CM

## 2018-08-06 DIAGNOSIS — G89.29 CHRONIC BILATERAL LOW BACK PAIN WITH LEFT-SIDED SCIATICA: ICD-10-CM

## 2018-08-06 DIAGNOSIS — M54.42 CHRONIC BILATERAL LOW BACK PAIN WITH LEFT-SIDED SCIATICA: ICD-10-CM

## 2018-08-06 PROCEDURE — 99214 OFFICE O/P EST MOD 30 MIN: CPT | Performed by: INTERNAL MEDICINE

## 2018-08-06 PROCEDURE — G8417 CALC BMI ABV UP PARAM F/U: HCPCS | Performed by: INTERNAL MEDICINE

## 2018-08-06 PROCEDURE — 3045F PR MOST RECENT HEMOGLOBIN A1C LEVEL 7.0-9.0%: CPT | Performed by: INTERNAL MEDICINE

## 2018-08-06 PROCEDURE — 99212 OFFICE O/P EST SF 10 MIN: CPT | Performed by: INTERNAL MEDICINE

## 2018-08-06 PROCEDURE — 1036F TOBACCO NON-USER: CPT | Performed by: INTERNAL MEDICINE

## 2018-08-06 PROCEDURE — 2022F DILAT RTA XM EVC RTNOPTHY: CPT | Performed by: INTERNAL MEDICINE

## 2018-08-06 PROCEDURE — G8427 DOCREV CUR MEDS BY ELIG CLIN: HCPCS | Performed by: INTERNAL MEDICINE

## 2018-08-06 RX ORDER — AMLODIPINE BESYLATE 5 MG/1
5 TABLET ORAL DAILY
Qty: 30 TABLET | Refills: 2 | Status: SHIPPED | OUTPATIENT
Start: 2018-08-06 | End: 2018-11-12 | Stop reason: SDUPTHER

## 2018-08-06 RX ORDER — M-VIT,TX,IRON,MINS/CALC/FOLIC 27MG-0.4MG
1 TABLET ORAL DAILY
Qty: 90 TABLET | Refills: 2 | Status: SHIPPED | OUTPATIENT
Start: 2018-08-06 | End: 2018-11-12 | Stop reason: SDUPTHER

## 2018-08-06 RX ORDER — LANCETS 30 GAUGE
1 EACH MISCELLANEOUS DAILY
Qty: 100 EACH | Refills: 5 | Status: SHIPPED | OUTPATIENT
Start: 2018-08-06 | End: 2018-11-12 | Stop reason: SDUPTHER

## 2018-08-06 RX ORDER — GLUCOSAMINE HCL/CHONDROITIN SU 500-400 MG
CAPSULE ORAL DAILY
Qty: 100 STRIP | Refills: 5 | Status: SHIPPED | OUTPATIENT
Start: 2018-08-06 | End: 2018-11-12 | Stop reason: SDUPTHER

## 2018-08-06 RX ORDER — CHLORAL HYDRATE 500 MG
1000 CAPSULE ORAL 2 TIMES DAILY
Qty: 180 CAPSULE | Refills: 2 | Status: SHIPPED | OUTPATIENT
Start: 2018-08-06 | End: 2018-11-12 | Stop reason: SDUPTHER

## 2018-08-06 RX ORDER — PRAVASTATIN SODIUM 20 MG
TABLET ORAL
Qty: 30 TABLET | Refills: 2 | Status: SHIPPED | OUTPATIENT
Start: 2018-08-06 | End: 2018-11-12 | Stop reason: SDUPTHER

## 2018-08-06 ASSESSMENT — ENCOUNTER SYMPTOMS
SHORTNESS OF BREATH: 0
BACK PAIN: 1
ORTHOPNEA: 0

## 2018-08-06 NOTE — PATIENT INSTRUCTIONS
tablespoon of peanut butter, ½ ounce nuts or seeds, or ¼ cup of cooked beans equals 1 ounce of meat. · Learn how to read food labels for serving sizes and ingredients. Fast-food and convenience-food meals often contain few or no fruits or vegetables. Make sure you eat some fruits and vegetables to make the meal more nutritious. · Look at your food diary. For each food group, add up what you have eaten and then divide the total by the number of days. This will give you an idea of how much you are eating from each food group. See if you can find some ways to change your diet to make it more healthy. Start small  · Do not try to make dramatic changes to your diet all at once. You might feel that you are missing out on your favorite foods and then be more likely to fail. · Start slowly, and gradually change your habits. Try some of the following:  ¨ Use whole wheat bread instead of white bread. ¨ Use nonfat or low-fat milk instead of whole milk. ¨ Eat brown rice instead of white rice, and eat whole wheat pasta instead of white-flour pasta. ¨ Try low-fat cheeses and low-fat yogurt. ¨ Add more fruits and vegetables to meals and have them for snacks. ¨ Add lettuce, tomato, cucumber, and onion to sandwiches. ¨ Add fruit to yogurt and cereal.  Enjoy food  · You can still eat your favorite foods. You just may need to eat less of them. If your favorite foods are high in fat, salt, and sugar, limit how often you eat them, but do not cut them out entirely. · Eat a wide variety of foods. Make healthy choices when eating out  · The type of restaurant you choose can help you make healthy choices. Even fast-food chains are now offering more low-fat or healthier choices on the menu. · Choose smaller portions, or take half of your meal home. · When eating out, try:  ¨ A veggie pizza with a whole wheat crust or grilled chicken (instead of sausage or pepperoni).   ¨ Pasta with roasted vegetables, grilled chicken, or marinara sauce instead of cream sauce. ¨ A vegetable wrap or grilled chicken wrap. ¨ Broiled or poached food instead of fried or breaded items. Make healthy choices easy  · Buy packaged, prewashed, ready-to-eat fresh vegetables and fruits, such as baby carrots, salad mixes, and chopped or shredded broccoli and cauliflower. · Buy packaged, presliced fruits, such as melon or pineapple. · Choose 100% fruit or vegetable juice instead of soda. Limit juice intake to 4 to 6 oz (½ to ¾ cup) a day. · Blend low-fat yogurt, fruit juice, and canned or frozen fruit to make a smoothie for breakfast or a snack. Where can you learn more? Go to https://Visual Unity.Punch Entertainment. org and sign in to your [employer]ik account. Enter W161 in the COMPS.com box to learn more about \"Eating Healthy Foods: Care Instructions. \"     If you do not have an account, please click on the \"Sign Up Now\" link. Current as of: May 12, 2017  Content Version: 11.6  © 9335-7756 [employer]. Care instructions adapted under license by [facility] ([facility]). If you have questions about a medical condition or this instruction, always ask your healthcare professional. [place] [address] any warranty or liability for your use of this information. Patient Education        How to Read a Food Label to Limit Sodium: Care Instructions  Your Care Instructions  Sodium causes your body to hold on to extra water. This can raise your blood pressure and force your heart and kidneys to work harder. In very serious cases, this could cause you to be put in the hospital. It might even be life-threatening. By limiting sodium, you will feel better and lower your risk of serious problems. Processed foods, fast food, and restaurant foods are the major sources of dietary sodium. The most common name for sodium is salt. Try to limit how much sodium you eat to less than 2,300 milligrams (mg) a day.  If you limit your sodium to 1,500 mg a the recommended amount of sodium a serving contains. The daily value for sodium is less than 2,300 mg. So if the Percent Daily Value says 50%, this means one serving is giving you half of this, or 1,150 mg. Buy low-sodium foods  · Look for foods that are made with less sodium. Watch for the following words on the label. ¨ \"Unsalted\" means there is no sodium added to the food. But there may be sodium already in the food naturally. ¨ \"Sodium-free\" means a serving has less than 5 mg of sodium. ¨ \"Very low sodium\" means a serving has 35 mg or less of sodium. ¨ \"Low-sodium\" means a serving has 140 mg or less of sodium. · \"Reduced-sodium\" means that there is 25% less sodium than what the food normally has. This is still usually too much sodium. Try not to buy foods with this on the label. · Buy fresh vegetables, or frozen vegetables without added sauces. Buy low-sodium versions of canned vegetables, soups, and other canned goods. Where can you learn more? Go to https://SearchdaimonpeCapt'nSocial.Peecho. org and sign in to your Vers[employer]is account. Enter 26 [id number] in the [facility] box to learn more about \"How to Read a Food Label to Limit Sodium: Care Instructions. \"     If you do not have an account, please click on the \"Sign Up Now\" link. Current as of: May 12, 2017  Content Version: 11.6  © 2276-6352 [employer]. Care instructions adapted under license by [facility] ([facility]). If you have questions about a medical condition or this instruction, always ask your healthcare professional. [place] [address] any warranty or liability for your use of this information.      MEDICINES AS ORDERED  GET BLOOD WORK BEFORE NEXT VISIT

## 2018-08-06 NOTE — PROGRESS NOTES
[address] [address]  Internal Medicine Residency Program  [facility] Note      SUBJECTIVE:  CC: had concerns including Diabetes; Hypertension; Medication Refill; and Back Pain (current physical therapy). Dakotah Dixon presented to the [facility] for a routine visit. Presents for follow-up appointment. No acute complaints. Lower extremity edema is significantly improved/resolved. No acute symptoms. Tolerating all medications including new medication. Diabetes   She presents for her follow-up diabetic visit. She has type 2 diabetes mellitus. Her disease course has been stable. There are no hypoglycemic associated symptoms. Associated symptoms include weight loss (intentional). Pertinent negatives for diabetes include no chest pain, no foot ulcerations, no polydipsia, no polyphagia and no polyuria. There are no hypoglycemic complications. Risk factors for coronary artery disease include obesity, hypertension and dyslipidemia. Current diabetic treatment includes oral agent (dual therapy) and diet. She is compliant with treatment most of the time. Her weight is decreasing steadily (12 lb intentional weight loss). Her breakfast blood glucose range is generally 130-140 mg/dl. Hypertension   This is a chronic problem. The problem is controlled (Significantly improved values). Associated symptoms include palpitations. Pertinent negatives include no chest pain, malaise/fatigue, orthopnea, peripheral edema (resolved), PND or shortness of breath. The current treatment provides significant improvement. Palpitations    This is a recurrent problem. The problem has been waxing and waning. The symptoms are aggravated by stress. Pertinent negatives include no chest fullness, chest pain, diaphoresis, fever, malaise/fatigue, shortness of breath or syncope. Back Pain   This is a chronic problem. The problem has been gradually improving (Therapy continued) since onset. The pain is present in the lumbar spine.  The pain -     amLODIPine (NORVASC) 5 MG tablet; Take 1 tablet by mouth daily TAKE ONE TABLET BY MOUTH EVERY DAY    Improved readings on current therapy    Continue current management     Chronic Bilateral Low Back Pain with Left-Sided Sciatica   Improved symptoms   No gait disturbance   Pain improved   Continue PT and follow   Self-management goal for continued weight loss as discussed at length. Mixed hyperlipidemia- stable   -     Omega-3 Fatty Acids (FISH OIL) 1000 MG CAPS; Take 1 capsule by mouth 2 times daily  -     pravastatin (PRAVACHOL) 20 MG tablet; TAKE ONE TABLET BY MOUTH EVERY EVENING    TGs were elevated with elevated glucose levels  Continue statins and Omega 3 Fatty Acids  Lipids will need repeated with improved glycemic control as discussed. Other orders  -     Multiple Vitamins-Minerals (THERAPEUTIC MULTIVITAMIN-MINERALS) tablet; Take 1 tablet by mouth daily Last dose 6/8/2016    Reviewed ECHO results at length including prior labs   ECHO unremarkable in nature with stable EF   Tolerating therapy at this time   NO med changes today     RTC: 3 months. I have reviewed my findings and recommendations with Jessica Gray.     Flora Garay MD, [address]   8/6/2018 11:23 AM

## 2018-11-06 ENCOUNTER — TELEPHONE (OUTPATIENT)
Dept: ADMINISTRATIVE | Age: 48
End: 2018-11-06

## 2018-11-06 NOTE — TELEPHONE ENCOUNTER
Mother-in-law passed away, not able to come to appt; will call back to reschedule. Tried calling office, not able to reach anyone so she called pre-service.

## 2018-11-12 ENCOUNTER — OFFICE VISIT (OUTPATIENT)
Dept: INTERNAL MEDICINE | Age: 48
End: 2018-11-12
Payer: COMMERCIAL

## 2018-11-12 VITALS
RESPIRATION RATE: 14 BRPM | TEMPERATURE: 98.1 F | HEART RATE: 75 BPM | WEIGHT: 252 LBS | BODY MASS INDEX: 39.55 KG/M2 | HEIGHT: 67 IN | SYSTOLIC BLOOD PRESSURE: 137 MMHG | DIASTOLIC BLOOD PRESSURE: 89 MMHG

## 2018-11-12 DIAGNOSIS — R25.2 BILATERAL LEG CRAMPS: ICD-10-CM

## 2018-11-12 DIAGNOSIS — Z23 NEED FOR INFLUENZA VACCINATION: Primary | ICD-10-CM

## 2018-11-12 DIAGNOSIS — E78.2 MIXED HYPERLIPIDEMIA: ICD-10-CM

## 2018-11-12 DIAGNOSIS — E11.65 TYPE 2 DIABETES MELLITUS WITH HYPERGLYCEMIA, WITHOUT LONG-TERM CURRENT USE OF INSULIN (HCC): ICD-10-CM

## 2018-11-12 DIAGNOSIS — I10 ESSENTIAL HYPERTENSION: ICD-10-CM

## 2018-11-12 PROCEDURE — 2022F DILAT RTA XM EVC RTNOPTHY: CPT | Performed by: INTERNAL MEDICINE

## 2018-11-12 PROCEDURE — G8482 FLU IMMUNIZE ORDER/ADMIN: HCPCS | Performed by: INTERNAL MEDICINE

## 2018-11-12 PROCEDURE — G8417 CALC BMI ABV UP PARAM F/U: HCPCS | Performed by: INTERNAL MEDICINE

## 2018-11-12 PROCEDURE — 99214 OFFICE O/P EST MOD 30 MIN: CPT | Performed by: INTERNAL MEDICINE

## 2018-11-12 PROCEDURE — 6360000002 HC RX W HCPCS

## 2018-11-12 PROCEDURE — 1036F TOBACCO NON-USER: CPT | Performed by: INTERNAL MEDICINE

## 2018-11-12 PROCEDURE — 99212 OFFICE O/P EST SF 10 MIN: CPT | Performed by: INTERNAL MEDICINE

## 2018-11-12 PROCEDURE — G0008 ADMIN INFLUENZA VIRUS VAC: HCPCS

## 2018-11-12 PROCEDURE — 3045F PR MOST RECENT HEMOGLOBIN A1C LEVEL 7.0-9.0%: CPT | Performed by: INTERNAL MEDICINE

## 2018-11-12 PROCEDURE — 90686 IIV4 VACC NO PRSV 0.5 ML IM: CPT

## 2018-11-12 PROCEDURE — G8427 DOCREV CUR MEDS BY ELIG CLIN: HCPCS | Performed by: INTERNAL MEDICINE

## 2018-11-12 RX ORDER — LANCETS 30 GAUGE
1 EACH MISCELLANEOUS DAILY
Qty: 100 EACH | Refills: 5 | Status: SHIPPED | OUTPATIENT
Start: 2018-11-12 | End: 2019-02-22 | Stop reason: SDUPTHER

## 2018-11-12 RX ORDER — M-VIT,TX,IRON,MINS/CALC/FOLIC 27MG-0.4MG
1 TABLET ORAL DAILY
Qty: 90 TABLET | Refills: 1 | Status: SHIPPED | OUTPATIENT
Start: 2018-11-12 | End: 2019-02-22 | Stop reason: SDUPTHER

## 2018-11-12 RX ORDER — CHLORAL HYDRATE 500 MG
1000 CAPSULE ORAL 2 TIMES DAILY
Qty: 180 CAPSULE | Refills: 1 | Status: SHIPPED | OUTPATIENT
Start: 2018-11-12 | End: 2019-02-22 | Stop reason: ALTCHOICE

## 2018-11-12 RX ORDER — PRAVASTATIN SODIUM 20 MG
TABLET ORAL
Qty: 30 TABLET | Refills: 2 | Status: SHIPPED | OUTPATIENT
Start: 2018-11-12 | End: 2019-02-04 | Stop reason: SDUPTHER

## 2018-11-12 RX ORDER — AMLODIPINE BESYLATE 5 MG/1
5 TABLET ORAL DAILY
Qty: 30 TABLET | Refills: 2 | Status: SHIPPED | OUTPATIENT
Start: 2018-11-12 | End: 2019-02-04 | Stop reason: SDUPTHER

## 2018-11-12 RX ORDER — GLUCOSAMINE HCL/CHONDROITIN SU 500-400 MG
CAPSULE ORAL DAILY
Qty: 100 STRIP | Refills: 5 | Status: SHIPPED | OUTPATIENT
Start: 2018-11-12 | End: 2019-02-22 | Stop reason: SDUPTHER

## 2018-11-12 ASSESSMENT — ENCOUNTER SYMPTOMS
COUGH: 0
NAUSEA: 0
DIARRHEA: 0
BLURRED VISION: 0
ORTHOPNEA: 0
SHORTNESS OF BREATH: 0
VISUAL CHANGE: 0
SINUS PAIN: 0
SINUS PRESSURE: 0
VOMITING: 0

## 2018-11-12 NOTE — PATIENT INSTRUCTIONS
you can lose your balance and fall. · Talk to your doctor if you have numbness in your feet. Preventing falls at home  · Remove raised doorway thresholds, throw rugs, and clutter. Repair loose carpet or raised areas in the floor. · Move furniture and electrical cords to keep them out of walking paths. · Use nonskid floor wax, and wipe up spills right away, especially on ceramic tile floors. · If you use a walker or cane, put rubber tips on it. If you use crutches, clean the bottoms of them regularly with an abrasive pad, such as steel wool. · Keep your house well lit, especially [facility], and outside walkways. Use night-lights in areas such as hallways and bathrooms. Add extra light switches or use remote switches (such as switches that go on or off when you clap your hands) to make it easier to turn lights on if you have to get up during the night. · Install sturdy handrails on stairways. · Move items in your cabinets so that the things you use a lot are on the lower shelves (about waist level). · Keep a cordless phone and a flashlight with new batteries by your bed. If possible, put a phone in each of the main rooms of your house, or carry a cell phone in case you fall and cannot reach a phone. Or, you can wear a device around your neck or wrist. You push a button that sends a signal for help. · Wear low-heeled shoes that fit well and give your feet good support. Use footwear with nonskid soles. Check the heels and soles of your shoes for wear. Repair or replace worn heels or soles. · Do not wear socks without shoes on wood floors. · Walk on the grass when the sidewalks are slippery. If you live in an area that gets snow and ice in the winter, sprinkle salt on slippery steps and sidewalks. Preventing falls in the bath  · Install grab bars and nonskid mats inside and outside your shower or tub and near the toilet and sinks. · Use shower chairs and bath benches.   · Use a hand-held shower head

## 2018-12-14 ENCOUNTER — HOSPITAL ENCOUNTER (OUTPATIENT)
Age: 48
Discharge: HOME | End: 2018-12-14
Payer: COMMERCIAL

## 2018-12-14 DIAGNOSIS — E11.65 TYPE 2 DIABETES MELLITUS WITH HYPERGLYCEMIA, WITHOUT LONG-TERM CURRENT USE OF INSULIN (HCC): ICD-10-CM

## 2018-12-14 DIAGNOSIS — R25.2 BILATERAL LEG CRAMPS: ICD-10-CM

## 2018-12-14 LAB
ANION GAP SERPL CALCULATED.3IONS-SCNC: 16 MMOL/L (ref 7–16)
BUN BLDV-MCNC: 15 MG/DL (ref 6–20)
CALCIUM SERPL-MCNC: 10 MG/DL (ref 8.6–10.2)
CHLORIDE BLD-SCNC: 97 MMOL/L (ref 98–107)
CO2: 24 MMOL/L (ref 22–29)
CREAT SERPL-MCNC: 0.6 MG/DL (ref 0.5–1)
CREATININE URINE: 8 MG/DL (ref 29–226)
FERRITIN: 82 NG/ML
GFR AFRICAN AMERICAN: >60
GFR NON-AFRICAN AMERICAN: >60 ML/MIN/1.73
GLUCOSE BLD-MCNC: 139 MG/DL (ref 74–99)
HBA1C MFR BLD: 7 % (ref 4–5.6)
HCT VFR BLD CALC: 47.5 % (ref 34–48)
HEMOGLOBIN: 15.4 G/DL (ref 11.5–15.5)
IRON SATURATION: 28 % (ref 15–50)
IRON: 94 MCG/DL (ref 37–145)
MAGNESIUM: 2.2 MG/DL (ref 1.6–2.6)
MCH RBC QN AUTO: 28.7 PG (ref 26–35)
MCHC RBC AUTO-ENTMCNC: 32.4 % (ref 32–34.5)
MCV RBC AUTO: 88.5 FL (ref 80–99.9)
MICROALBUMIN UR-MCNC: <12 MG/L
MICROALBUMIN/CREAT UR-RTO: ABNORMAL (ref 0–30)
PDW BLD-RTO: 12.8 FL (ref 11.5–15)
PLATELET # BLD: 415 E9/L (ref 130–450)
PMV BLD AUTO: 9.4 FL (ref 7–12)
POTASSIUM SERPL-SCNC: 4.7 MMOL/L (ref 3.5–5)
RBC # BLD: 5.37 E12/L (ref 3.5–5.5)
SODIUM BLD-SCNC: 137 MMOL/L (ref 132–146)
TOTAL IRON BINDING CAPACITY: 334 MCG/DL (ref 250–450)
WBC # BLD: 9.9 E9/L (ref 4.5–11.5)

## 2018-12-14 PROCEDURE — 82044 UR ALBUMIN SEMIQUANTITATIVE: CPT

## 2018-12-14 PROCEDURE — 83735 ASSAY OF MAGNESIUM: CPT

## 2018-12-14 PROCEDURE — 83550 IRON BINDING TEST: CPT

## 2018-12-14 PROCEDURE — 82570 ASSAY OF URINE CREATININE: CPT

## 2018-12-14 PROCEDURE — 36415 COLL VENOUS BLD VENIPUNCTURE: CPT

## 2018-12-14 PROCEDURE — 85027 COMPLETE CBC AUTOMATED: CPT

## 2018-12-14 PROCEDURE — 80048 BASIC METABOLIC PNL TOTAL CA: CPT

## 2018-12-14 PROCEDURE — 83036 HEMOGLOBIN GLYCOSYLATED A1C: CPT

## 2018-12-14 PROCEDURE — 83540 ASSAY OF IRON: CPT

## 2018-12-14 PROCEDURE — 82728 ASSAY OF FERRITIN: CPT

## 2019-01-07 ENCOUNTER — TELEPHONE (OUTPATIENT)
Dept: INTERNAL MEDICINE | Age: 49
End: 2019-01-07

## 2019-01-07 DIAGNOSIS — L98.9 LESION OF SKIN OF SCALP: Primary | ICD-10-CM

## 2019-02-04 DIAGNOSIS — E78.2 MIXED HYPERLIPIDEMIA: ICD-10-CM

## 2019-02-04 DIAGNOSIS — I10 ESSENTIAL HYPERTENSION: ICD-10-CM

## 2019-02-04 RX ORDER — PRAVASTATIN SODIUM 20 MG
TABLET ORAL
Qty: 30 TABLET | Refills: 0 | Status: SHIPPED | OUTPATIENT
Start: 2019-02-04 | End: 2019-02-22 | Stop reason: SDUPTHER

## 2019-02-04 RX ORDER — AMLODIPINE BESYLATE 5 MG/1
TABLET ORAL
Qty: 30 TABLET | Refills: 0 | Status: SHIPPED | OUTPATIENT
Start: 2019-02-04 | End: 2019-02-22 | Stop reason: SDUPTHER

## 2019-02-08 DIAGNOSIS — E11.65 TYPE 2 DIABETES MELLITUS WITH HYPERGLYCEMIA, WITHOUT LONG-TERM CURRENT USE OF INSULIN (HCC): ICD-10-CM

## 2019-02-15 ENCOUNTER — TELEPHONE (OUTPATIENT)
Dept: INTERNAL MEDICINE | Age: 49
End: 2019-02-15

## 2019-02-22 ENCOUNTER — OFFICE VISIT (OUTPATIENT)
Dept: INTERNAL MEDICINE | Age: 49
End: 2019-02-22
Payer: COMMERCIAL

## 2019-02-22 VITALS
DIASTOLIC BLOOD PRESSURE: 79 MMHG | BODY MASS INDEX: 39.55 KG/M2 | SYSTOLIC BLOOD PRESSURE: 138 MMHG | RESPIRATION RATE: 18 BRPM | TEMPERATURE: 98 F | HEIGHT: 67 IN | WEIGHT: 252 LBS | HEART RATE: 84 BPM

## 2019-02-22 DIAGNOSIS — E78.2 MIXED HYPERLIPIDEMIA: ICD-10-CM

## 2019-02-22 DIAGNOSIS — R20.2 TINGLING OF BOTH FEET: ICD-10-CM

## 2019-02-22 DIAGNOSIS — E11.65 TYPE 2 DIABETES MELLITUS WITH HYPERGLYCEMIA, WITHOUT LONG-TERM CURRENT USE OF INSULIN (HCC): Primary | ICD-10-CM

## 2019-02-22 DIAGNOSIS — I10 ESSENTIAL HYPERTENSION: ICD-10-CM

## 2019-02-22 PROCEDURE — 99212 OFFICE O/P EST SF 10 MIN: CPT | Performed by: INTERNAL MEDICINE

## 2019-02-22 PROCEDURE — G8427 DOCREV CUR MEDS BY ELIG CLIN: HCPCS | Performed by: INTERNAL MEDICINE

## 2019-02-22 PROCEDURE — G8417 CALC BMI ABV UP PARAM F/U: HCPCS | Performed by: INTERNAL MEDICINE

## 2019-02-22 PROCEDURE — G8482 FLU IMMUNIZE ORDER/ADMIN: HCPCS | Performed by: INTERNAL MEDICINE

## 2019-02-22 PROCEDURE — 2022F DILAT RTA XM EVC RTNOPTHY: CPT | Performed by: INTERNAL MEDICINE

## 2019-02-22 PROCEDURE — 1036F TOBACCO NON-USER: CPT | Performed by: INTERNAL MEDICINE

## 2019-02-22 PROCEDURE — 3046F HEMOGLOBIN A1C LEVEL >9.0%: CPT | Performed by: INTERNAL MEDICINE

## 2019-02-22 PROCEDURE — 99214 OFFICE O/P EST MOD 30 MIN: CPT | Performed by: INTERNAL MEDICINE

## 2019-02-22 RX ORDER — M-VIT,TX,IRON,MINS/CALC/FOLIC 27MG-0.4MG
1 TABLET ORAL DAILY
Qty: 90 TABLET | Refills: 1 | Status: SHIPPED | OUTPATIENT
Start: 2019-02-22 | End: 2019-09-11

## 2019-02-22 RX ORDER — FENOFIBRATE 48 MG/1
48 TABLET, COATED ORAL DAILY
Qty: 90 TABLET | Refills: 1 | Status: SHIPPED | OUTPATIENT
Start: 2019-02-22 | End: 2019-07-08 | Stop reason: SDUPTHER

## 2019-02-22 RX ORDER — CHLORAL HYDRATE 500 MG
1000 CAPSULE ORAL 2 TIMES DAILY
Qty: 180 CAPSULE | Status: CANCELLED | OUTPATIENT
Start: 2019-02-22

## 2019-02-22 RX ORDER — PRAVASTATIN SODIUM 20 MG
TABLET ORAL
Qty: 90 TABLET | Refills: 1 | Status: SHIPPED | OUTPATIENT
Start: 2019-02-22 | End: 2019-07-08 | Stop reason: SDUPTHER

## 2019-02-22 RX ORDER — LANCETS 30 GAUGE
1 EACH MISCELLANEOUS DAILY
Qty: 100 EACH | Refills: 5 | Status: SHIPPED | OUTPATIENT
Start: 2019-02-22 | End: 2019-10-14 | Stop reason: SDUPTHER

## 2019-02-22 RX ORDER — AMLODIPINE BESYLATE 5 MG/1
5 TABLET ORAL DAILY
Qty: 90 TABLET | Refills: 1 | Status: SHIPPED | OUTPATIENT
Start: 2019-02-22 | End: 2019-07-08 | Stop reason: SDUPTHER

## 2019-02-22 RX ORDER — GLUCOSAMINE HCL/CHONDROITIN SU 500-400 MG
CAPSULE ORAL DAILY
Qty: 100 STRIP | Refills: 5 | Status: SHIPPED | OUTPATIENT
Start: 2019-02-22 | End: 2019-10-14 | Stop reason: SDUPTHER

## 2019-02-22 ASSESSMENT — ENCOUNTER SYMPTOMS
ABDOMINAL PAIN: 0
WHEEZING: 0
COUGH: 0
ORTHOPNEA: 0
VOMITING: 0
TROUBLE SWALLOWING: 0
NAUSEA: 1
CONSTIPATION: 0
DIARRHEA: 0
SHORTNESS OF BREATH: 0
BLURRED VISION: 0

## 2019-02-22 ASSESSMENT — PATIENT HEALTH QUESTIONNAIRE - PHQ9
SUM OF ALL RESPONSES TO PHQ QUESTIONS 1-9: 0
2. FEELING DOWN, DEPRESSED OR HOPELESS: 0
SUM OF ALL RESPONSES TO PHQ QUESTIONS 1-9: 0
1. LITTLE INTEREST OR PLEASURE IN DOING THINGS: 0
SUM OF ALL RESPONSES TO PHQ9 QUESTIONS 1 & 2: 0

## 2019-03-04 ENCOUNTER — TELEPHONE (OUTPATIENT)
Dept: INTERNAL MEDICINE | Age: 49
End: 2019-03-04

## 2019-03-15 ENCOUNTER — TELEPHONE (OUTPATIENT)
Dept: INTERNAL MEDICINE | Age: 49
End: 2019-03-15

## 2019-03-15 ENCOUNTER — APPOINTMENT (OUTPATIENT)
Dept: CT IMAGING | Age: 49
DRG: 364 | End: 2019-03-15
Payer: COMMERCIAL

## 2019-03-15 ENCOUNTER — ANESTHESIA EVENT (OUTPATIENT)
Dept: OPERATING ROOM | Age: 49
DRG: 364 | End: 2019-03-15
Payer: COMMERCIAL

## 2019-03-15 ENCOUNTER — HOSPITAL ENCOUNTER (INPATIENT)
Age: 49
LOS: 4 days | Discharge: HOME | DRG: 364 | End: 2019-03-19
Attending: EMERGENCY MEDICINE | Admitting: STUDENT IN AN ORGANIZED HEALTH CARE EDUCATION/TRAINING PROGRAM
Payer: COMMERCIAL

## 2019-03-15 ENCOUNTER — ANESTHESIA (OUTPATIENT)
Dept: OPERATING ROOM | Age: 49
DRG: 364 | End: 2019-03-15
Payer: COMMERCIAL

## 2019-03-15 VITALS
SYSTOLIC BLOOD PRESSURE: 158 MMHG | RESPIRATION RATE: 18 BRPM | TEMPERATURE: 101.3 F | DIASTOLIC BLOOD PRESSURE: 73 MMHG | OXYGEN SATURATION: 93 %

## 2019-03-15 DIAGNOSIS — L03.119 CELLULITIS AND ABSCESS OF LEG: Primary | ICD-10-CM

## 2019-03-15 DIAGNOSIS — M79.89 NECROTIZING SOFT TISSUE INFECTION: ICD-10-CM

## 2019-03-15 DIAGNOSIS — L02.419 CELLULITIS AND ABSCESS OF LEG: Primary | ICD-10-CM

## 2019-03-15 PROBLEM — L02.416 ABSCESS OF LEFT THIGH: Status: ACTIVE | Noted: 2019-03-15

## 2019-03-15 LAB
ANION GAP SERPL CALCULATED.3IONS-SCNC: 14 MMOL/L (ref 7–16)
BACTERIA: ABNORMAL /HPF
BASOPHILS ABSOLUTE: 0.04 E9/L (ref 0–0.2)
BASOPHILS RELATIVE PERCENT: 0.2 % (ref 0–2)
BILIRUBIN URINE: NEGATIVE
BLOOD, URINE: NEGATIVE
BUN BLDV-MCNC: 10 MG/DL (ref 6–20)
CALCIUM SERPL-MCNC: 9.4 MG/DL (ref 8.6–10.2)
CHLORIDE BLD-SCNC: 93 MMOL/L (ref 98–107)
CLARITY: CLEAR
CO2: 25 MMOL/L (ref 22–29)
COLOR: YELLOW
CREAT SERPL-MCNC: 0.7 MG/DL (ref 0.5–1)
EOSINOPHILS ABSOLUTE: 0.07 E9/L (ref 0.05–0.5)
EOSINOPHILS RELATIVE PERCENT: 0.4 % (ref 0–6)
GFR AFRICAN AMERICAN: >60
GFR NON-AFRICAN AMERICAN: >60 ML/MIN/1.73
GLUCOSE BLD-MCNC: 155 MG/DL (ref 74–99)
GLUCOSE URINE: >=1000 MG/DL
HCG(URINE) PREGNANCY TEST: NEGATIVE
HCT VFR BLD CALC: 45.6 % (ref 34–48)
HEMOGLOBIN: 15 G/DL (ref 11.5–15.5)
IMMATURE GRANULOCYTES #: 0.11 E9/L
IMMATURE GRANULOCYTES %: 0.7 % (ref 0–5)
KETONES, URINE: NEGATIVE MG/DL
LACTIC ACID: 1.1 MMOL/L (ref 0.5–2.2)
LEUKOCYTE ESTERASE, URINE: NEGATIVE
LYMPHOCYTES ABSOLUTE: 1.46 E9/L (ref 1.5–4)
LYMPHOCYTES RELATIVE PERCENT: 8.7 % (ref 20–42)
MCH RBC QN AUTO: 29.1 PG (ref 26–35)
MCHC RBC AUTO-ENTMCNC: 32.9 % (ref 32–34.5)
MCV RBC AUTO: 88.4 FL (ref 80–99.9)
METER GLUCOSE: 154 MG/DL (ref 74–99)
MONOCYTES ABSOLUTE: 1.27 E9/L (ref 0.1–0.95)
MONOCYTES RELATIVE PERCENT: 7.6 % (ref 2–12)
NEUTROPHILS ABSOLUTE: 13.85 E9/L (ref 1.8–7.3)
NEUTROPHILS RELATIVE PERCENT: 82.4 % (ref 43–80)
NITRITE, URINE: NEGATIVE
PDW BLD-RTO: 13 FL (ref 11.5–15)
PH UA: 5.5 (ref 5–9)
PLATELET # BLD: 385 E9/L (ref 130–450)
PMV BLD AUTO: 9.2 FL (ref 7–12)
POTASSIUM SERPL-SCNC: 4 MMOL/L (ref 3.5–5)
PROTEIN UA: NEGATIVE MG/DL
RBC # BLD: 5.16 E12/L (ref 3.5–5.5)
RBC UA: ABNORMAL /HPF (ref 0–2)
REASON FOR REJECTION: NORMAL
REJECTED TEST: NORMAL
SODIUM BLD-SCNC: 132 MMOL/L (ref 132–146)
SPECIFIC GRAVITY UA: <=1.005 (ref 1–1.03)
UROBILINOGEN, URINE: 0.2 E.U./DL
WBC # BLD: 16.8 E9/L (ref 4.5–11.5)
WBC UA: ABNORMAL /HPF (ref 0–5)

## 2019-03-15 PROCEDURE — 6360000002 HC RX W HCPCS: Performed by: NURSE ANESTHETIST, CERTIFIED REGISTERED

## 2019-03-15 PROCEDURE — 74177 CT ABD & PELVIS W/CONTRAST: CPT

## 2019-03-15 PROCEDURE — 6360000002 HC RX W HCPCS: Performed by: STUDENT IN AN ORGANIZED HEALTH CARE EDUCATION/TRAINING PROGRAM

## 2019-03-15 PROCEDURE — 87186 SC STD MICRODIL/AGAR DIL: CPT

## 2019-03-15 PROCEDURE — 99284 EMERGENCY DEPT VISIT MOD MDM: CPT

## 2019-03-15 PROCEDURE — 87205 SMEAR GRAM STAIN: CPT

## 2019-03-15 PROCEDURE — 6360000004 HC RX CONTRAST MEDICATION: Performed by: RADIOLOGY

## 2019-03-15 PROCEDURE — 7100000001 HC PACU RECOVERY - ADDTL 15 MIN: Performed by: SURGERY

## 2019-03-15 PROCEDURE — 2500000003 HC RX 250 WO HCPCS: Performed by: STUDENT IN AN ORGANIZED HEALTH CARE EDUCATION/TRAINING PROGRAM

## 2019-03-15 PROCEDURE — 2580000003 HC RX 258: Performed by: RADIOLOGY

## 2019-03-15 PROCEDURE — 2580000003 HC RX 258: Performed by: STUDENT IN AN ORGANIZED HEALTH CARE EDUCATION/TRAINING PROGRAM

## 2019-03-15 PROCEDURE — 2700000000 HC OXYGEN THERAPY PER DAY

## 2019-03-15 PROCEDURE — 3600000013 HC SURGERY LEVEL 3 ADDTL 15MIN: Performed by: SURGERY

## 2019-03-15 PROCEDURE — 83605 ASSAY OF LACTIC ACID: CPT

## 2019-03-15 PROCEDURE — G0378 HOSPITAL OBSERVATION PER HR: HCPCS

## 2019-03-15 PROCEDURE — 2709999900 HC NON-CHARGEABLE SUPPLY: Performed by: SURGERY

## 2019-03-15 PROCEDURE — 87070 CULTURE OTHR SPECIMN AEROBIC: CPT

## 2019-03-15 PROCEDURE — 96367 TX/PROPH/DG ADDL SEQ IV INF: CPT

## 2019-03-15 PROCEDURE — 87040 BLOOD CULTURE FOR BACTERIA: CPT

## 2019-03-15 PROCEDURE — 2580000003 HC RX 258: Performed by: NURSE ANESTHETIST, CERTIFIED REGISTERED

## 2019-03-15 PROCEDURE — 2500000003 HC RX 250 WO HCPCS: Performed by: NURSE ANESTHETIST, CERTIFIED REGISTERED

## 2019-03-15 PROCEDURE — 3600000003 HC SURGERY LEVEL 3 BASE: Performed by: SURGERY

## 2019-03-15 PROCEDURE — 82962 GLUCOSE BLOOD TEST: CPT

## 2019-03-15 PROCEDURE — 1200000000 HC SEMI PRIVATE

## 2019-03-15 PROCEDURE — 87075 CULTR BACTERIA EXCEPT BLOOD: CPT

## 2019-03-15 PROCEDURE — 3700000000 HC ANESTHESIA ATTENDED CARE: Performed by: SURGERY

## 2019-03-15 PROCEDURE — 3700000001 HC ADD 15 MINUTES (ANESTHESIA): Performed by: SURGERY

## 2019-03-15 PROCEDURE — 7100000000 HC PACU RECOVERY - FIRST 15 MIN: Performed by: SURGERY

## 2019-03-15 PROCEDURE — 36415 COLL VENOUS BLD VENIPUNCTURE: CPT

## 2019-03-15 PROCEDURE — 81001 URINALYSIS AUTO W/SCOPE: CPT

## 2019-03-15 PROCEDURE — 81025 URINE PREGNANCY TEST: CPT

## 2019-03-15 PROCEDURE — 80048 BASIC METABOLIC PNL TOTAL CA: CPT

## 2019-03-15 PROCEDURE — 85025 COMPLETE CBC W/AUTO DIFF WBC: CPT

## 2019-03-15 PROCEDURE — 96365 THER/PROPH/DIAG IV INF INIT: CPT

## 2019-03-15 PROCEDURE — 87077 CULTURE AEROBIC IDENTIFY: CPT

## 2019-03-15 PROCEDURE — 0J9M0ZZ DRAINAGE OF LEFT UPPER LEG SUBCUTANEOUS TISSUE AND FASCIA, OPEN APPROACH: ICD-10-PCS | Performed by: STUDENT IN AN ORGANIZED HEALTH CARE EDUCATION/TRAINING PROGRAM

## 2019-03-15 PROCEDURE — 6370000000 HC RX 637 (ALT 250 FOR IP): Performed by: STUDENT IN AN ORGANIZED HEALTH CARE EDUCATION/TRAINING PROGRAM

## 2019-03-15 RX ORDER — CLINDAMYCIN PHOSPHATE 900 MG/50ML
900 INJECTION INTRAVENOUS EVERY 6 HOURS
Status: DISCONTINUED | OUTPATIENT
Start: 2019-03-15 | End: 2019-03-15

## 2019-03-15 RX ORDER — GLYCOPYRROLATE 0.2 MG/ML
INJECTION INTRAMUSCULAR; INTRAVENOUS PRN
Status: DISCONTINUED | OUTPATIENT
Start: 2019-03-15 | End: 2019-03-15 | Stop reason: SDUPTHER

## 2019-03-15 RX ORDER — ONDANSETRON 2 MG/ML
INJECTION INTRAMUSCULAR; INTRAVENOUS PRN
Status: DISCONTINUED | OUTPATIENT
Start: 2019-03-15 | End: 2019-03-15 | Stop reason: SDUPTHER

## 2019-03-15 RX ORDER — OXYCODONE HYDROCHLORIDE AND ACETAMINOPHEN 5; 325 MG/1; MG/1
2 TABLET ORAL EVERY 4 HOURS PRN
Status: DISCONTINUED | OUTPATIENT
Start: 2019-03-15 | End: 2019-03-19 | Stop reason: HOSPADM

## 2019-03-15 RX ORDER — GLUCOSAMINE HCL/CHONDROITIN SU 500-400 MG
1 CAPSULE ORAL DAILY
Status: DISCONTINUED | OUTPATIENT
Start: 2019-03-16 | End: 2019-03-15 | Stop reason: CLARIF

## 2019-03-15 RX ORDER — PRAVASTATIN SODIUM 20 MG
20 TABLET ORAL NIGHTLY
Status: DISCONTINUED | OUTPATIENT
Start: 2019-03-15 | End: 2019-03-19 | Stop reason: HOSPADM

## 2019-03-15 RX ORDER — MEPERIDINE HYDROCHLORIDE 25 MG/ML
12.5 INJECTION INTRAMUSCULAR; INTRAVENOUS; SUBCUTANEOUS EVERY 10 MIN PRN
Status: CANCELLED | OUTPATIENT
Start: 2019-03-15

## 2019-03-15 RX ORDER — M-VIT,TX,IRON,MINS/CALC/FOLIC 27MG-0.4MG
1 TABLET ORAL EVERY MORNING
Status: DISCONTINUED | OUTPATIENT
Start: 2019-03-16 | End: 2019-03-19 | Stop reason: HOSPADM

## 2019-03-15 RX ORDER — CLINDAMYCIN PHOSPHATE 900 MG/50ML
900 INJECTION INTRAVENOUS ONCE
Status: COMPLETED | OUTPATIENT
Start: 2019-03-15 | End: 2019-03-15

## 2019-03-15 RX ORDER — FENOFIBRATE 48 MG/1
48 TABLET, COATED ORAL NIGHTLY
Status: DISCONTINUED | OUTPATIENT
Start: 2019-03-16 | End: 2019-03-15 | Stop reason: CLARIF

## 2019-03-15 RX ORDER — SODIUM CHLORIDE 0.9 % (FLUSH) 0.9 %
10 SYRINGE (ML) INJECTION EVERY 12 HOURS SCHEDULED
Status: DISCONTINUED | OUTPATIENT
Start: 2019-03-15 | End: 2019-03-19 | Stop reason: HOSPADM

## 2019-03-15 RX ORDER — ROCURONIUM BROMIDE 10 MG/ML
INJECTION, SOLUTION INTRAVENOUS PRN
Status: DISCONTINUED | OUTPATIENT
Start: 2019-03-15 | End: 2019-03-15 | Stop reason: SDUPTHER

## 2019-03-15 RX ORDER — AMLODIPINE BESYLATE 5 MG/1
5 TABLET ORAL EVERY MORNING
Status: DISCONTINUED | OUTPATIENT
Start: 2019-03-16 | End: 2019-03-19 | Stop reason: HOSPADM

## 2019-03-15 RX ORDER — FENTANYL CITRATE 50 UG/ML
50 INJECTION, SOLUTION INTRAMUSCULAR; INTRAVENOUS EVERY 5 MIN PRN
Status: CANCELLED | OUTPATIENT
Start: 2019-03-15

## 2019-03-15 RX ORDER — SODIUM CHLORIDE, SODIUM LACTATE, POTASSIUM CHLORIDE, CALCIUM CHLORIDE 600; 310; 30; 20 MG/100ML; MG/100ML; MG/100ML; MG/100ML
INJECTION, SOLUTION INTRAVENOUS CONTINUOUS
Status: DISCONTINUED | OUTPATIENT
Start: 2019-03-15 | End: 2019-03-19

## 2019-03-15 RX ORDER — DEXAMETHASONE SODIUM PHOSPHATE 4 MG/ML
INJECTION, SOLUTION INTRA-ARTICULAR; INTRALESIONAL; INTRAMUSCULAR; INTRAVENOUS; SOFT TISSUE PRN
Status: DISCONTINUED | OUTPATIENT
Start: 2019-03-15 | End: 2019-03-15 | Stop reason: SDUPTHER

## 2019-03-15 RX ORDER — OXYCODONE HYDROCHLORIDE AND ACETAMINOPHEN 5; 325 MG/1; MG/1
1 TABLET ORAL
Status: CANCELLED | OUTPATIENT
Start: 2019-03-15 | End: 2019-03-15

## 2019-03-15 RX ORDER — SODIUM CHLORIDE 9 MG/ML
INJECTION, SOLUTION INTRAVENOUS CONTINUOUS PRN
Status: DISCONTINUED | OUTPATIENT
Start: 2019-03-15 | End: 2019-03-15 | Stop reason: SDUPTHER

## 2019-03-15 RX ORDER — SODIUM CHLORIDE 0.9 % (FLUSH) 0.9 %
10 SYRINGE (ML) INJECTION PRN
Status: DISCONTINUED | OUTPATIENT
Start: 2019-03-15 | End: 2019-03-19 | Stop reason: HOSPADM

## 2019-03-15 RX ORDER — ONDANSETRON 2 MG/ML
4 INJECTION INTRAMUSCULAR; INTRAVENOUS EVERY 6 HOURS PRN
Status: DISCONTINUED | OUTPATIENT
Start: 2019-03-15 | End: 2019-03-19 | Stop reason: HOSPADM

## 2019-03-15 RX ORDER — BLOOD-GLUCOSE METER
1 KIT MISCELLANEOUS DAILY PRN
Status: DISCONTINUED | OUTPATIENT
Start: 2019-03-15 | End: 2019-03-15 | Stop reason: CLARIF

## 2019-03-15 RX ORDER — ACETAMINOPHEN 325 MG/1
650 TABLET ORAL EVERY 4 HOURS PRN
Status: DISCONTINUED | OUTPATIENT
Start: 2019-03-15 | End: 2019-03-19 | Stop reason: HOSPADM

## 2019-03-15 RX ORDER — 0.9 % SODIUM CHLORIDE 0.9 %
1000 INTRAVENOUS SOLUTION INTRAVENOUS ONCE
Status: COMPLETED | OUTPATIENT
Start: 2019-03-15 | End: 2019-03-15

## 2019-03-15 RX ORDER — FENTANYL CITRATE 50 UG/ML
INJECTION, SOLUTION INTRAMUSCULAR; INTRAVENOUS PRN
Status: DISCONTINUED | OUTPATIENT
Start: 2019-03-15 | End: 2019-03-15 | Stop reason: SDUPTHER

## 2019-03-15 RX ORDER — MIDAZOLAM HYDROCHLORIDE 1 MG/ML
INJECTION INTRAMUSCULAR; INTRAVENOUS PRN
Status: DISCONTINUED | OUTPATIENT
Start: 2019-03-15 | End: 2019-03-15 | Stop reason: SDUPTHER

## 2019-03-15 RX ORDER — ACETAMINOPHEN 500 MG
1000 TABLET ORAL ONCE
Status: COMPLETED | OUTPATIENT
Start: 2019-03-15 | End: 2019-03-15

## 2019-03-15 RX ORDER — LANCETS 30 GAUGE
1 EACH MISCELLANEOUS DAILY
Status: DISCONTINUED | OUTPATIENT
Start: 2019-03-16 | End: 2019-03-15 | Stop reason: CLARIF

## 2019-03-15 RX ORDER — NEOSTIGMINE METHYLSULFATE 1 MG/ML
INJECTION, SOLUTION INTRAVENOUS PRN
Status: DISCONTINUED | OUTPATIENT
Start: 2019-03-15 | End: 2019-03-15 | Stop reason: SDUPTHER

## 2019-03-15 RX ORDER — PIPERACILLIN SODIUM, TAZOBACTAM SODIUM 4; .5 G/20ML; G/20ML
4.5 INJECTION, POWDER, LYOPHILIZED, FOR SOLUTION INTRAVENOUS EVERY 6 HOURS
Status: DISCONTINUED | OUTPATIENT
Start: 2019-03-15 | End: 2019-03-15

## 2019-03-15 RX ORDER — SODIUM CHLORIDE 0.9 % (FLUSH) 0.9 %
10 SYRINGE (ML) INJECTION PRN
Status: COMPLETED | OUTPATIENT
Start: 2019-03-15 | End: 2019-03-15

## 2019-03-15 RX ORDER — FENOFIBRATE 54 MG/1
54 TABLET ORAL NIGHTLY
Status: DISCONTINUED | OUTPATIENT
Start: 2019-03-16 | End: 2019-03-19 | Stop reason: HOSPADM

## 2019-03-15 RX ORDER — PROMETHAZINE HYDROCHLORIDE 25 MG/ML
6.25 INJECTION, SOLUTION INTRAMUSCULAR; INTRAVENOUS
Status: CANCELLED | OUTPATIENT
Start: 2019-03-15 | End: 2019-03-15

## 2019-03-15 RX ORDER — SODIUM CHLORIDE 0.9 % (FLUSH) 0.9 %
10 SYRINGE (ML) INJECTION EVERY 12 HOURS SCHEDULED
Status: DISCONTINUED | OUTPATIENT
Start: 2019-03-15 | End: 2019-03-15 | Stop reason: SDUPTHER

## 2019-03-15 RX ORDER — OXYCODONE HYDROCHLORIDE AND ACETAMINOPHEN 5; 325 MG/1; MG/1
1 TABLET ORAL EVERY 4 HOURS PRN
Status: DISCONTINUED | OUTPATIENT
Start: 2019-03-15 | End: 2019-03-19 | Stop reason: HOSPADM

## 2019-03-15 RX ORDER — CLINDAMYCIN PHOSPHATE 900 MG/50ML
900 INJECTION INTRAVENOUS EVERY 8 HOURS
Status: DISCONTINUED | OUTPATIENT
Start: 2019-03-16 | End: 2019-03-19

## 2019-03-15 RX ORDER — LIDOCAINE HYDROCHLORIDE 20 MG/ML
INJECTION, SOLUTION INFILTRATION; PERINEURAL PRN
Status: DISCONTINUED | OUTPATIENT
Start: 2019-03-15 | End: 2019-03-15 | Stop reason: SDUPTHER

## 2019-03-15 RX ORDER — SODIUM CHLORIDE 0.9 % (FLUSH) 0.9 %
10 SYRINGE (ML) INJECTION PRN
Status: DISCONTINUED | OUTPATIENT
Start: 2019-03-15 | End: 2019-03-15 | Stop reason: SDUPTHER

## 2019-03-15 RX ORDER — PROPOFOL 10 MG/ML
INJECTION, EMULSION INTRAVENOUS PRN
Status: DISCONTINUED | OUTPATIENT
Start: 2019-03-15 | End: 2019-03-15 | Stop reason: SDUPTHER

## 2019-03-15 RX ORDER — SUCCINYLCHOLINE/SOD CL,ISO/PF 200MG/10ML
SYRINGE (ML) INTRAVENOUS PRN
Status: DISCONTINUED | OUTPATIENT
Start: 2019-03-15 | End: 2019-03-15 | Stop reason: SDUPTHER

## 2019-03-15 RX ORDER — IBUPROFEN 400 MG/1
400 TABLET ORAL EVERY 6 HOURS PRN
Status: DISCONTINUED | OUTPATIENT
Start: 2019-03-15 | End: 2019-03-19 | Stop reason: HOSPADM

## 2019-03-15 RX ADMIN — FENTANYL CITRATE 50 MCG: 50 INJECTION, SOLUTION INTRAMUSCULAR; INTRAVENOUS at 20:51

## 2019-03-15 RX ADMIN — PIPERACILLIN AND TAZOBACTAM 4.5 G: 4; .5 INJECTION, POWDER, LYOPHILIZED, FOR SOLUTION INTRAVENOUS; PARENTERAL at 16:08

## 2019-03-15 RX ADMIN — SODIUM CHLORIDE: 9 INJECTION, SOLUTION INTRAVENOUS at 20:15

## 2019-03-15 RX ADMIN — SODIUM CHLORIDE 1000 ML: 9 INJECTION, SOLUTION INTRAVENOUS at 15:08

## 2019-03-15 RX ADMIN — Medication 100 MG: at 20:02

## 2019-03-15 RX ADMIN — FENTANYL CITRATE 50 MCG: 50 INJECTION, SOLUTION INTRAMUSCULAR; INTRAVENOUS at 20:48

## 2019-03-15 RX ADMIN — VANCOMYCIN HYDROCHLORIDE 2000 MG: 10 INJECTION, POWDER, LYOPHILIZED, FOR SOLUTION INTRAVENOUS at 18:43

## 2019-03-15 RX ADMIN — LIDOCAINE HYDROCHLORIDE 60 MG: 20 INJECTION, SOLUTION INFILTRATION; PERINEURAL at 20:02

## 2019-03-15 RX ADMIN — IOPAMIDOL 110 ML: 755 INJECTION, SOLUTION INTRAVENOUS at 14:51

## 2019-03-15 RX ADMIN — SODIUM CHLORIDE, POTASSIUM CHLORIDE, SODIUM LACTATE AND CALCIUM CHLORIDE: 600; 310; 30; 20 INJECTION, SOLUTION INTRAVENOUS at 21:02

## 2019-03-15 RX ADMIN — CLINDAMYCIN PHOSPHATE 900 MG: 900 INJECTION, SOLUTION INTRAVENOUS at 17:16

## 2019-03-15 RX ADMIN — FENTANYL CITRATE 50 MCG: 50 INJECTION, SOLUTION INTRAMUSCULAR; INTRAVENOUS at 20:09

## 2019-03-15 RX ADMIN — PROPOFOL 200 MG: 10 INJECTION, EMULSION INTRAVENOUS at 20:02

## 2019-03-15 RX ADMIN — ONDANSETRON HYDROCHLORIDE 4 MG: 2 INJECTION, SOLUTION INTRAMUSCULAR; INTRAVENOUS at 20:09

## 2019-03-15 RX ADMIN — FENTANYL CITRATE 50 MCG: 50 INJECTION, SOLUTION INTRAMUSCULAR; INTRAVENOUS at 20:02

## 2019-03-15 RX ADMIN — GLYCOPYRROLATE 0.6 MG: 0.2 INJECTION, SOLUTION INTRAMUSCULAR; INTRAVENOUS at 20:23

## 2019-03-15 RX ADMIN — DEXAMETHASONE SODIUM PHOSPHATE 10 MG: 4 INJECTION, SOLUTION INTRAMUSCULAR; INTRAVENOUS at 20:09

## 2019-03-15 RX ADMIN — Medication 10 ML: at 14:48

## 2019-03-15 RX ADMIN — HYDROMORPHONE HYDROCHLORIDE 0.5 MG: 1 INJECTION, SOLUTION INTRAMUSCULAR; INTRAVENOUS; SUBCUTANEOUS at 21:21

## 2019-03-15 RX ADMIN — FENTANYL CITRATE 50 MCG: 50 INJECTION, SOLUTION INTRAMUSCULAR; INTRAVENOUS at 20:44

## 2019-03-15 RX ADMIN — MIDAZOLAM HYDROCHLORIDE 2 MG: 1 INJECTION, SOLUTION INTRAMUSCULAR; INTRAVENOUS at 19:49

## 2019-03-15 RX ADMIN — ACETAMINOPHEN 1000 MG: 500 TABLET ORAL at 13:21

## 2019-03-15 RX ADMIN — Medication 3 MG: at 20:23

## 2019-03-15 RX ADMIN — ROCURONIUM BROMIDE 10 MG: 10 SOLUTION INTRAVENOUS at 20:07

## 2019-03-15 RX ADMIN — SODIUM CHLORIDE 1000 ML: 9 INJECTION, SOLUTION INTRAVENOUS at 13:15

## 2019-03-15 ASSESSMENT — PAIN SCALES - GENERAL
PAINLEVEL_OUTOF10: 3
PAINLEVEL_OUTOF10: 2
PAINLEVEL_OUTOF10: 8
PAINLEVEL_OUTOF10: 7
PAINLEVEL_OUTOF10: 8
PAINLEVEL_OUTOF10: 3
PAINLEVEL_OUTOF10: 8

## 2019-03-15 ASSESSMENT — PULMONARY FUNCTION TESTS
PIF_VALUE: 31
PIF_VALUE: 44
PIF_VALUE: 35
PIF_VALUE: 30
PIF_VALUE: 30
PIF_VALUE: 39
PIF_VALUE: 1
PIF_VALUE: 41
PIF_VALUE: 14
PIF_VALUE: 39
PIF_VALUE: 31
PIF_VALUE: 40
PIF_VALUE: 33
PIF_VALUE: 32
PIF_VALUE: 33
PIF_VALUE: 31
PIF_VALUE: 28
PIF_VALUE: 1
PIF_VALUE: 49
PIF_VALUE: 37
PIF_VALUE: 32
PIF_VALUE: 38
PIF_VALUE: 25
PIF_VALUE: 41
PIF_VALUE: 33
PIF_VALUE: 36
PIF_VALUE: 39
PIF_VALUE: 30
PIF_VALUE: 40
PIF_VALUE: 40
PIF_VALUE: 0
PIF_VALUE: 1
PIF_VALUE: 39
PIF_VALUE: 42
PIF_VALUE: 0
PIF_VALUE: 1
PIF_VALUE: 16
PIF_VALUE: 2
PIF_VALUE: 42
PIF_VALUE: 4
PIF_VALUE: 39
PIF_VALUE: 36
PIF_VALUE: 4
PIF_VALUE: 2
PIF_VALUE: 37
PIF_VALUE: 38
PIF_VALUE: 0

## 2019-03-15 ASSESSMENT — PAIN DESCRIPTION - PAIN TYPE
TYPE: SURGICAL PAIN
TYPE: ACUTE PAIN
TYPE: ACUTE PAIN
TYPE: SURGICAL PAIN
TYPE: SURGICAL PAIN;ACUTE PAIN

## 2019-03-15 ASSESSMENT — PAIN DESCRIPTION - ORIENTATION
ORIENTATION: LEFT

## 2019-03-15 ASSESSMENT — PAIN DESCRIPTION - DESCRIPTORS
DESCRIPTORS: ACHING;BURNING
DESCRIPTORS: ACHING
DESCRIPTORS: DISCOMFORT;BURNING;ACHING

## 2019-03-15 ASSESSMENT — ENCOUNTER SYMPTOMS
EYE REDNESS: 0
ABDOMINAL PAIN: 0
CHEST TIGHTNESS: 0
TROUBLE SWALLOWING: 0
SHORTNESS OF BREATH: 0
RHINORRHEA: 0
SORE THROAT: 0
COUGH: 0
BACK PAIN: 0
DIARRHEA: 0
ABDOMINAL DISTENTION: 0
BLOOD IN STOOL: 0
PHOTOPHOBIA: 0
WHEEZING: 0
NAUSEA: 0
EYE PAIN: 0
CONSTIPATION: 0
VOMITING: 0
SINUS PRESSURE: 0
ROS SKIN COMMENTS: ABSCESS

## 2019-03-15 ASSESSMENT — PAIN DESCRIPTION - ONSET: ONSET: GRADUAL

## 2019-03-15 ASSESSMENT — PAIN DESCRIPTION - FREQUENCY: FREQUENCY: CONTINUOUS

## 2019-03-15 ASSESSMENT — PAIN DESCRIPTION - LOCATION
LOCATION: GROIN
LOCATION: LABIA
LOCATION: GROIN
LOCATION: PERINEUM
LOCATION: GROIN;HEAD

## 2019-03-15 ASSESSMENT — PAIN - FUNCTIONAL ASSESSMENT
PAIN_FUNCTIONAL_ASSESSMENT: PREVENTS OR INTERFERES SOME ACTIVE ACTIVITIES AND ADLS
PAIN_FUNCTIONAL_ASSESSMENT: 0-10

## 2019-03-15 ASSESSMENT — LIFESTYLE VARIABLES: SMOKING_STATUS: 0

## 2019-03-15 NOTE — ED PROVIDER NOTES
Patient is a 66-year-old female who presented to ED for evaluation of abscess to vaginal area. Patient with associated headache, and chills. Patient locates the abscess to the left proximal inner thigh/buttock. Patient notes onset of symptoms 2 nights prior to arrival, increasing in size during interim. Patient notes associated fever, chills. Denies associated nausea, vomiting, or shortness of breath. Patient with significant past history of non-insulin-dependent diabetes mellitus-- most recent A1c 7.0 (12/2018). Review of Systems   Constitutional: Positive for appetite change, chills and fever. Negative for diaphoresis and fatigue. HENT: Negative for congestion, ear pain, rhinorrhea, sinus pressure, sneezing, sore throat and trouble swallowing. Eyes: Negative for photophobia, pain and redness. Respiratory: Negative for cough, chest tightness, shortness of breath and wheezing. Cardiovascular: Negative for chest pain and palpitations. Gastrointestinal: Negative for abdominal distention, abdominal pain, blood in stool, constipation, diarrhea, nausea and vomiting. Endocrine: Negative for polydipsia and polyuria. Genitourinary: Negative for difficulty urinating, dysuria, flank pain, hematuria and urgency. Musculoskeletal: Negative for arthralgias, back pain, myalgias and neck pain. Skin: Positive for rash. Negative for wound. abscess   Neurological: Negative for weakness, light-headedness, numbness and headaches. Psychiatric/Behavioral: Negative for agitation and confusion. The patient is not nervous/anxious and is not hyperactive. Physical Exam   Constitutional: She is oriented to person, place, and time. She appears well-developed and well-nourished. No distress. HENT:   Head: Normocephalic and atraumatic. Right Ear: External ear normal.   Left Ear: External ear normal.   Mouth/Throat: Oropharynx is clear and moist. No oropharyngeal exudate.    Eyes: Pupils are equal, 10:58 AM  ED Bed Assignment:  13/13    The nursing notes within the ED encounter and vital signs as below have been reviewed. Patient Vitals for the past 24 hrs:   BP Temp Temp src Pulse Resp SpO2 Height Weight   03/15/19 1719 (!) 105/59 -- -- 102 16 95 % -- --   03/15/19 1508 (!) 123/55 99.2 °F (37.3 °C) -- 108 16 96 % -- --   03/15/19 1315 120/61 100.7 °F (38.2 °C) -- 106 -- 97 % -- --   03/15/19 1057 -- 99 °F (37.2 °C) Oral -- -- -- -- --   03/15/19 1056 139/78 -- -- 112 -- 98 % 5' 7\" (1.702 m) 252 lb (114.3 kg)       Oxygen Saturation Interpretation: Normal    ------------------------------------------ PROGRESS NOTES ------------------------------------------  Re-evaluation(s):  Time: 5:55 PM  Patients symptoms show no change  Repeat physical examination is not changed    Counseling:  I have spoken with the patient and discussed todays results, in addition to providing specific details for the plan of care and counseling regarding the diagnosis and prognosis. Their questions are answered at this time and they are agreeable with the plan of admission.    --------------------------------- ADDITIONAL PROVIDER NOTES ---------------------------------  Consultations:  Time: 5:55 PM. Spoke with Dr. Carlota Riedel. Discussed case. They will take patient to the OR. This patient's ED course included: a personal history and physicial examination, re-evaluation prior to disposition, multiple bedside re-evaluations, IV medications, cardiac monitoring and continuous pulse oximetry    This patient has remained hemodynamically stable during their ED course. Diagnosis:  1. Cellulitis and abscess of leg    2. Necrotizing soft tissue infection        Disposition:  Patient's disposition: Admit to OR  Patient's condition is stable.            Erick Paulson DO  Resident  03/15/19 0637

## 2019-03-15 NOTE — H&P
GENERAL SURGERY  HISTORY AND PHYSICAL  3/15/2019    Chief Complaint   Patient presents with    Abscess     in vaginal area    Headache    Chills       HPI  Boone Leon is a 50 y.o. female who presents for evaluation of left labial swelling and pain for the last 3 days worsening quite a bit over the last night with fevers up to 101. She states she's had smaller abscesses that have spontaneously drained with warm compresses at home before but never anything that has required drainage. She does have a history of diabetes that is well controlled with pills and no insulin. Her last hemoglobin A1c was 7.7. She has no sick contacts, no trauma to the area, no drainage, change in bowel function blood in the urine or stool. She has no antibiotic allergies. She still has intact movement and sensation of the left leg and thigh with no pain out of proportion to exam.      Past Medical History:   Diagnosis Date    Abdominal pain     Anxiety state, unspecified 3/8/2016    Asthma     no inhalers    Breast fibrocystic disorder     Diabetes mellitus ([address]     Essential hypertension 2016    Hyperlipidemia     Irritable bowel syndrome     Left ovarian cyst     Morbid obesity ([address] 2016    Osteoarthritis     Pharyngoesophageal dysphagia 2016       Past Surgical History:   Procedure Laterality Date    APPENDECTOMY      Age 15     SECTION  2012    3 total    COLONOSCOPY  16    [facility]    COLONOSCOPY  2016    TONSILLECTOMY      Age 5    TUBAL LIGATION      UPPER GASTROINTESTINAL ENDOSCOPY      Negative for Hpylori    UPPER GASTROINTESTINAL ENDOSCOPY  16    [facility]    UPPER GASTROINTESTINAL ENDOSCOPY  2016       Prior to Admission medications    Medication Sig Start Date End Date Taking?  Authorizing Provider   pravastatin (PRAVACHOL) 20 MG tablet TAKE ONE TABLET BY MOUTH EVERY DAY IN THE EVENING  Patient taking differently: Take 20 mg by mouth nightly 2/22/19  Yes Shonna Aviles MD   amLODIPine (NORVASC) 5 MG tablet Take 1 tablet by mouth daily  Patient taking differently: Take 5 mg by mouth every morning  2/22/19  Yes Shonna Aviles MD   Multiple Vitamins-Minerals (THERAPEUTIC MULTIVITAMIN-MINERALS) tablet Take 1 tablet by mouth daily Last dose 6/8/2016  Patient taking differently: Take 1 tablet by mouth every morning  2/22/19  Yes Shonna Aviles MD   metFORMIN (GLUCOPHAGE) 1000 MG tablet Take 1 tablet by mouth 2 times daily (with meals) 2/22/19  Yes Shonna Aviles MD   fenofibrate (TRICOR) 48 MG tablet Take 1 tablet by mouth daily  Patient taking differently: Take 48 mg by mouth nightly  2/22/19  Yes Shonna Aviles MD   empagliflozin (JARDIANCE) 10 MG tablet Take 1 tablet by mouth daily  Patient taking differently: Take 10 mg by mouth every morning  2/8/19  Yes Shonna Aviles MD   ibuprofen (ADVIL;MOTRIN) 200 MG tablet Take 400 mg by mouth every 6 hours as needed for Pain   Yes Historical Provider, MD   Lancets MISC Inject 1 each into the skin daily 2/22/19   Shonna Aviles MD   blood glucose monitor strips by Other route daily 2/22/19   Shonna Aviles MD   glucose monitoring kit (FREESTYLE) monitoring kit 1 kit by Does not apply route daily as needed 4/26/16   MD Samson Talavera.  Devices KIT Blood pressure cuff- Ambulatory monitoring- check every other day and maintain blood pressure log 4/25/16   Shonna Aviles MD       No Known Allergies    Family History   Problem Relation Age of Onset    Rheum Arthritis Mother     Hypertension Mother    [facility] Arthritis Mother     High Blood Pressure Mother     Obesity Mother     Other Father         Heart Disease    Hypertension Father     High Cholesterol Father     Thyroid Disease Father         Hyperthyroid s/p radiation    Arthritis Father     High Blood Pressure Father     Obesity Father     Obesity Sister     Cancer Paternal Grandmother Colon Cancer    Other Paternal Grandmother         COPD    Cancer Paternal Grandfather         Lung Cancer    Obesity Maternal Aunt     High Blood Pressure Maternal Grandmother     Obesity Maternal Grandmother     Obesity Maternal Grandfather        Social History     Tobacco Use    Smoking status: Former Smoker    Smokeless tobacco: Never Used    Tobacco comment: smoked for 3 yrs in early 20;s   Substance Use Topics    Alcohol use: No     Alcohol/week: 0.0 oz    Drug use: No           General ROS: positive for  - fever, malaise and night sweats  Hematological and Lymphatic ROS: negative  Endocrine ROS: negative  Respiratory ROS: no cough, shortness of breath, or wheezing  Cardiovascular ROS: no chest pain or dyspnea on exertion  Gastrointestinal ROS: no abdominal pain, change in bowel habits, or black or bloody stools  Genito-Urinary ROS: no dysuria, trouble voiding, or hematuria  Musculoskeletal ROS: negative  Neurological ROS: no TIA or stroke symptoms  Dermatological ROS: positive for - skin lesion changes      PHYSICAL EXAM:    Vitals:    03/15/19 1719   BP: (!) 105/59   Pulse: 102   Resp: 16   Temp:    SpO2: 95%       PHYSICAL EXAM  General: No apparent distress, comfortable   HEENT: Trachea midline, no masses, Pupils equal round   Chest: Respiratory effort was normal with no retractions or use of accessory muscles. Cardiovascular: Heart sounds were normal with a regular rate and rhythm. Abdomen:  Soft and non distended. No tenderness, guarding, rebound, or rigidity. Extremities: Moves all 4 extremeties,           LABS:  CBC  Recent Labs     03/15/19  1259   WBC 16.8*   HGB 15.0   HCT 45.6        BMP  Recent Labs     03/15/19  1259      K 4.0   CL 93*   CO2 25   BUN 10   CREATININE 0.7   CALCIUM 9.4     Liver Function  No results for input(s): AMYLASE, LIPASE, BILITOT, BILIDIR, AST, ALT, ALKPHOS, PROT, LABALBU in the last 72 hours.   No results for input(s): LACTATE in the last 72 hours. No results for input(s): INR, PTT in the last 72 hours. Invalid input(s): PT    RADIOLOGY  Ct Abdomen Pelvis W Iv Contrast Additional Contrast? None    Result Date: 3/15/2019  Patient MRN: [id number] : 1970 Age:  50 years Gender: Female Order Date: 3/15/2019 11:45 AM Exam: CT ABDOMEN PELVIS W IV CONTRAST Number of Images: 771 views Indication:   ro necrotizing infection  Comparison: None. Findings: Scans are obtained from domes of diaphragms through pubic symphysis during infusion 110 mL Isovue. In the visualized chest, no acute process evident. There is mild fatty infiltration of the liver. No hepatic mass or ductal dilatation identified. The gallbladder is normal. Spleen, pancreas, adrenals, and kidneys are normal. No retroperitoneal mass or adenopathy identified. No free fluid evident. Imaging through the pelvis demonstrates no intrapelvic mass, adenopathy, or fluid. Below the pelvis in the medial subcutaneous region of the uppermost left thigh there is a 4.2 x 10 mm gas collection with some associated inflammatory change likely representing abscess. This does not appear to communicate to any pelvic structures. There is no bony involvement. No other inflammatory changes evident.      Subcutaneous upper medial left thigh inflammatory changes as noted but does not appear to communicate with any adjacent structures         ASSESSMENT:  50 y.o. female with a left labial wound    PLAN:  -Continue antibiotics  -OR tonight for debridement  -IV fluids  -Nothing by mouth for procedure, may have diet postprocedure  -Will need home health care for wound care    Discussed with Dr. Ayala Lockett       Electronically signed by Harlan Lowe MD on 3/15/19 at 7:02 PM

## 2019-03-15 NOTE — ED NOTES
Radiology Procedure Waiver   Name: Treasure Schirmer  : 1970  MRN: [id number]    Date:  3/15/19    Time: 1:57 PM    Benefits of immediately proceeding with Radiology exam(s) without pre-testing outweigh the risks or are not indicated as specified below and therefore the following is/are being waived:    [x] Pregnancy test   [] Patients LMP on-time and regular. [x] Patient had Tubal Ligation or has other Contraception Device. [] Patient  is Menopausal or Premenarcheal.    [] Patient had Full or Partial Hysterectomy. [] Protocol for Iodine allergy    [] MRI Questionnaire     [] BUN/Creatinine   [] Patient age w/no hx of renal dysfunction. [] Patient on Dialysis. [] Recent Normal Labs.   Electronically signed by Antoinette Nice DO on 3/15/19 at 1:57 PM               Antoinette Nice DO  Resident  03/15/19 2342

## 2019-03-16 LAB
ALBUMIN SERPL-MCNC: 3.4 G/DL (ref 3.5–5.2)
ALP BLD-CCNC: 72 U/L (ref 35–104)
ALT SERPL-CCNC: 16 U/L (ref 0–32)
ANION GAP SERPL CALCULATED.3IONS-SCNC: 15 MMOL/L (ref 7–16)
AST SERPL-CCNC: 10 U/L (ref 0–31)
BASOPHILS ABSOLUTE: 0.03 E9/L (ref 0–0.2)
BASOPHILS RELATIVE PERCENT: 0.2 % (ref 0–2)
BILIRUB SERPL-MCNC: 0.4 MG/DL (ref 0–1.2)
BUN BLDV-MCNC: 10 MG/DL (ref 6–20)
CALCIUM SERPL-MCNC: 9.1 MG/DL (ref 8.6–10.2)
CHLORIDE BLD-SCNC: 99 MMOL/L (ref 98–107)
CO2: 21 MMOL/L (ref 22–29)
CREAT SERPL-MCNC: 0.7 MG/DL (ref 0.5–1)
EOSINOPHILS ABSOLUTE: 0 E9/L (ref 0.05–0.5)
EOSINOPHILS RELATIVE PERCENT: 0 % (ref 0–6)
GFR AFRICAN AMERICAN: >60
GFR NON-AFRICAN AMERICAN: >60 ML/MIN/1.73
GLUCOSE BLD-MCNC: 227 MG/DL (ref 74–99)
HCT VFR BLD CALC: 43.6 % (ref 34–48)
HEMOGLOBIN: 14.5 G/DL (ref 11.5–15.5)
IMMATURE GRANULOCYTES #: 0.25 E9/L
IMMATURE GRANULOCYTES %: 1.5 % (ref 0–5)
LYMPHOCYTES ABSOLUTE: 0.84 E9/L (ref 1.5–4)
LYMPHOCYTES RELATIVE PERCENT: 4.9 % (ref 20–42)
MCH RBC QN AUTO: 29.8 PG (ref 26–35)
MCHC RBC AUTO-ENTMCNC: 33.3 % (ref 32–34.5)
MCV RBC AUTO: 89.7 FL (ref 80–99.9)
METER GLUCOSE: 207 MG/DL (ref 74–99)
METER GLUCOSE: 207 MG/DL (ref 74–99)
METER GLUCOSE: 254 MG/DL (ref 74–99)
METER GLUCOSE: 274 MG/DL (ref 74–99)
MONOCYTES ABSOLUTE: 0.32 E9/L (ref 0.1–0.95)
MONOCYTES RELATIVE PERCENT: 1.9 % (ref 2–12)
NEUTROPHILS ABSOLUTE: 15.8 E9/L (ref 1.8–7.3)
NEUTROPHILS RELATIVE PERCENT: 91.5 % (ref 43–80)
PDW BLD-RTO: 13.2 FL (ref 11.5–15)
PLATELET # BLD: 379 E9/L (ref 130–450)
PMV BLD AUTO: 9.4 FL (ref 7–12)
POTASSIUM REFLEX MAGNESIUM: 3.9 MMOL/L (ref 3.5–5)
RBC # BLD: 4.86 E12/L (ref 3.5–5.5)
SODIUM BLD-SCNC: 135 MMOL/L (ref 132–146)
TOTAL PROTEIN: 6.8 G/DL (ref 6.4–8.3)
WBC # BLD: 17.2 E9/L (ref 4.5–11.5)

## 2019-03-16 PROCEDURE — 2580000003 HC RX 258: Performed by: STUDENT IN AN ORGANIZED HEALTH CARE EDUCATION/TRAINING PROGRAM

## 2019-03-16 PROCEDURE — 82962 GLUCOSE BLOOD TEST: CPT

## 2019-03-16 PROCEDURE — 80053 COMPREHEN METABOLIC PANEL: CPT

## 2019-03-16 PROCEDURE — 85025 COMPLETE CBC W/AUTO DIFF WBC: CPT

## 2019-03-16 PROCEDURE — 2580000003 HC RX 258: Performed by: SURGERY

## 2019-03-16 PROCEDURE — 2500000003 HC RX 250 WO HCPCS: Performed by: STUDENT IN AN ORGANIZED HEALTH CARE EDUCATION/TRAINING PROGRAM

## 2019-03-16 PROCEDURE — 6370000000 HC RX 637 (ALT 250 FOR IP): Performed by: SURGERY

## 2019-03-16 PROCEDURE — 6360000002 HC RX W HCPCS: Performed by: SURGERY

## 2019-03-16 PROCEDURE — 6360000002 HC RX W HCPCS: Performed by: STUDENT IN AN ORGANIZED HEALTH CARE EDUCATION/TRAINING PROGRAM

## 2019-03-16 PROCEDURE — 36415 COLL VENOUS BLD VENIPUNCTURE: CPT

## 2019-03-16 PROCEDURE — 1200000000 HC SEMI PRIVATE

## 2019-03-16 PROCEDURE — 2700000000 HC OXYGEN THERAPY PER DAY

## 2019-03-16 RX ADMIN — FENOFIBRATE 54 MG: 54 TABLET ORAL at 20:19

## 2019-03-16 RX ADMIN — Medication 10 ML: at 10:59

## 2019-03-16 RX ADMIN — OXYCODONE AND ACETAMINOPHEN 2 TABLET: 5; 325 TABLET ORAL at 13:23

## 2019-03-16 RX ADMIN — FENOFIBRATE 54 MG: 54 TABLET ORAL at 00:46

## 2019-03-16 RX ADMIN — Medication 10 ML: at 09:21

## 2019-03-16 RX ADMIN — Medication 10 ML: at 00:28

## 2019-03-16 RX ADMIN — PIPERACILLIN SODIUM AND TAZOBACTAM SODIUM 3.38 G: 3; .375 INJECTION, POWDER, LYOPHILIZED, FOR SOLUTION INTRAVENOUS at 16:43

## 2019-03-16 RX ADMIN — PRAVASTATIN SODIUM 20 MG: 20 TABLET ORAL at 00:28

## 2019-03-16 RX ADMIN — CLINDAMYCIN IN 5 PERCENT DEXTROSE 900 MG: 18 INJECTION, SOLUTION INTRAVENOUS at 00:28

## 2019-03-16 RX ADMIN — OXYCODONE AND ACETAMINOPHEN 2 TABLET: 5; 325 TABLET ORAL at 09:21

## 2019-03-16 RX ADMIN — CLINDAMYCIN IN 5 PERCENT DEXTROSE 900 MG: 18 INJECTION, SOLUTION INTRAVENOUS at 16:46

## 2019-03-16 RX ADMIN — HYDROMORPHONE HYDROCHLORIDE 0.5 MG: 1 INJECTION, SOLUTION INTRAMUSCULAR; INTRAVENOUS; SUBCUTANEOUS at 04:32

## 2019-03-16 RX ADMIN — CLINDAMYCIN IN 5 PERCENT DEXTROSE 900 MG: 18 INJECTION, SOLUTION INTRAVENOUS at 09:18

## 2019-03-16 RX ADMIN — VANCOMYCIN HYDROCHLORIDE 1750 MG: 1 INJECTION, POWDER, LYOPHILIZED, FOR SOLUTION INTRAVENOUS at 18:23

## 2019-03-16 RX ADMIN — PRAVASTATIN SODIUM 20 MG: 20 TABLET ORAL at 20:19

## 2019-03-16 RX ADMIN — SODIUM CHLORIDE, POTASSIUM CHLORIDE, SODIUM LACTATE AND CALCIUM CHLORIDE: 600; 310; 30; 20 INJECTION, SOLUTION INTRAVENOUS at 19:50

## 2019-03-16 RX ADMIN — OXYCODONE AND ACETAMINOPHEN 2 TABLET: 5; 325 TABLET ORAL at 22:49

## 2019-03-16 RX ADMIN — Medication 1 TABLET: at 09:21

## 2019-03-16 RX ADMIN — ENOXAPARIN SODIUM 40 MG: 40 INJECTION SUBCUTANEOUS at 09:22

## 2019-03-16 RX ADMIN — PIPERACILLIN SODIUM AND TAZOBACTAM SODIUM 3.38 G: 3; .375 INJECTION, POWDER, LYOPHILIZED, FOR SOLUTION INTRAVENOUS at 23:39

## 2019-03-16 RX ADMIN — SODIUM CHLORIDE, POTASSIUM CHLORIDE, SODIUM LACTATE AND CALCIUM CHLORIDE: 600; 310; 30; 20 INJECTION, SOLUTION INTRAVENOUS at 11:36

## 2019-03-16 RX ADMIN — PIPERACILLIN SODIUM AND TAZOBACTAM SODIUM 3.38 G: 3; .375 INJECTION, POWDER, LYOPHILIZED, FOR SOLUTION INTRAVENOUS at 10:17

## 2019-03-16 RX ADMIN — VANCOMYCIN HYDROCHLORIDE 1750 MG: 1 INJECTION, POWDER, LYOPHILIZED, FOR SOLUTION INTRAVENOUS at 06:14

## 2019-03-16 RX ADMIN — PIPERACILLIN SODIUM AND TAZOBACTAM SODIUM 3.38 G: 3; .375 INJECTION, POWDER, LYOPHILIZED, FOR SOLUTION INTRAVENOUS at 01:32

## 2019-03-16 RX ADMIN — AMLODIPINE BESYLATE 5 MG: 5 TABLET ORAL at 09:21

## 2019-03-16 ASSESSMENT — PAIN SCALES - GENERAL
PAINLEVEL_OUTOF10: 0
PAINLEVEL_OUTOF10: 3
PAINLEVEL_OUTOF10: 7
PAINLEVEL_OUTOF10: 5
PAINLEVEL_OUTOF10: 4
PAINLEVEL_OUTOF10: 4
PAINLEVEL_OUTOF10: 8
PAINLEVEL_OUTOF10: 8
PAINLEVEL_OUTOF10: 3

## 2019-03-16 ASSESSMENT — PAIN DESCRIPTION - FREQUENCY
FREQUENCY: CONTINUOUS
FREQUENCY: CONTINUOUS

## 2019-03-16 ASSESSMENT — PAIN DESCRIPTION - ORIENTATION
ORIENTATION: LEFT

## 2019-03-16 ASSESSMENT — PAIN DESCRIPTION - PAIN TYPE
TYPE: SURGICAL PAIN
TYPE: ACUTE PAIN;SURGICAL PAIN
TYPE: SURGICAL PAIN
TYPE: SURGICAL PAIN

## 2019-03-16 ASSESSMENT — PAIN DESCRIPTION - PROGRESSION
CLINICAL_PROGRESSION: NOT CHANGED

## 2019-03-16 ASSESSMENT — PAIN DESCRIPTION - LOCATION
LOCATION: LABIA
LOCATION: LABIA

## 2019-03-16 ASSESSMENT — PAIN DESCRIPTION - DESCRIPTORS
DESCRIPTORS: ACHING;DISCOMFORT;BURNING
DESCRIPTORS: ACHING;DISCOMFORT;BURNING
DESCRIPTORS: ACHING;DISCOMFORT
DESCRIPTORS: ACHING;BURNING;DISCOMFORT

## 2019-03-16 ASSESSMENT — PAIN - FUNCTIONAL ASSESSMENT
PAIN_FUNCTIONAL_ASSESSMENT: PREVENTS OR INTERFERES WITH MANY ACTIVE NOT PASSIVE ACTIVITIES
PAIN_FUNCTIONAL_ASSESSMENT: PREVENTS OR INTERFERES SOME ACTIVE ACTIVITIES AND ADLS

## 2019-03-16 ASSESSMENT — PAIN DESCRIPTION - ONSET
ONSET: GRADUAL

## 2019-03-16 NOTE — PROGRESS NOTES
Pharmacy Consultation Note  (Antibiotic Dosing and Monitoring)    Initial consult date: 3/15/19  Consulting physician: Dr. Ever Desai  Drug(s): Vancomycin  Indication: Cellulitis/abscess    Ht Readings from Last 1 Encounters:   03/15/19 5' 7\" (1.702 m)       Age/Gender IBW DW  Allergy Information   50 y.o.  female 61.6kg 80.7kg  Patient has no known allergies. Date  WBC BUN/sCr UOP (mL/kg/hr) Drug/Dose Time   Given Level(s)   (Time) Comments   3/15  (#1) 16.8 10/0.7  Vancomycin 2g IV x1 1843       (#2)            (#3)            (#4)            (#5)            (#6)            Other Antibiotics/Antifungals/Antivirals Start Date Stop Date Comments   Zosyn 4.5g IV x1, then zosyn 3.375g IV q8h 3/15     Clindamycin 900mg IV x1,   then clindamycin 900mg IV Q8h 3/15                     Estimated CrCL: 100-120 mL/min      Intake/Output Summary (Last 24 hours) at 3/15/2019 2128  Last data filed at 3/15/2019 204  Gross per 24 hour   Intake 2750 ml   Output 110 ml   Net 2640 ml       Temp max: Temp (24hrs), Av.1 °F (38.4 °C), Min:99 °F (37.2 °C), Max:101.5 °F (38.6 °C)      Cultures:    Culture Date Result    Blood Cxs x2 3/15 Pending                        Assessment:  · AV is a 50year old female starting on empiric antibiotics due to concern for labial abscess/cellulitis/nec fasc, now s/p I&D   · Consulted by Dr. Ever Desai to dose/monitor vancomycin  · Goal trough level:  15-20 mcg/mL  · Patient received vancomycin 2g IV x1, clindamycin 900mg IV x1 and zosyn 4.5g IV x1 in the ED    Plan:  · Will start vancomycin 1.75g IV Q12h  · Will consider checking a vancomycin trough level prior to the 3rd/4th dose pending clinical course/culture data  · Pharmacist will follow and monitor/adjust dosing as necessary    Thank you for this consult, please page with questions.     Jerrod Ave, PharmD, BCPS, BCCCP 3/15/2019 9:28 PM  Pager: [phone]

## 2019-03-16 NOTE — PROGRESS NOTES
Patient has received IV contrast. Metformin will be held for 48 hours and restarted if serum creatinine is within FDA approved guidelines. If patient is discharged prior to 48 hours, MetFORMIN will need to be addressed as part of the med reconciliation process.

## 2019-03-16 NOTE — PROGRESS NOTES
General Surgery Progress Note    Surgeon:  Dr. Elis Spencer  Subjective:   Pain:    Improved since admission  Diet:    Tolerated    /67   Pulse 78   Temp 98.1 °F (36.7 °C) (Oral)   Resp 18   Ht 5' 6\" (1.676 m)   Wt 254 lb (115.2 kg)   SpO2 93%   BMI 41.00 kg/m²      General:  no acute distress  Lungs:  non-labored breathing  Heart:   RRR  Abdomen:  soft, NT, ND  Extremities: Left inner upper thigh wounds: packing removed, no further purulence expressed, less TTP than yesterday. Loop drain intact. ASSESSMENT / PLAN:   · POD1 I&D left thigh abscess  · Change packing daily. Await cultures. Continue antibiotics.     Breanna Giles MD  3/16/2019  1:23 PM

## 2019-03-16 NOTE — OP NOTE
Operative Note    Preop Dx: Left upper medial thigh abscess    Postop Dx: same    Procedure: Incision and drainage of left upper medial thigh abscess with loop drain placement    Surgeon: Aron Marcus MD    EBL: 5 mL    IVF: 500 mL    UOP: 064 mL    Complications: None    Findings: muscle and fascia intact    Disposition: PACU, stable    Indications:    50year old female with history, physical, laboratory and imaging findings consistent with left thigh abscess. Incision and drainage was recommended. Risks, benefits, alternatives (and risks of alternatives) of surgery were discussed with the patient, which include but are not limited to, bleeding, infection, risk of further debridement, risk of cosmetic deformity, nerve injury, risk of anesthesia, blood clots, pneumonia, heart attack and stroke. Patient understands these risk and agrees to proceed. Details of Procedure:    Patient was taken to the operating room and placed in the lithotomy position. General anesthesia was induced. Left thigh and genitalia were prepped and draped in the sterile fashion. A 3 cm incision was made at the area of induration in the left upper medial thigh. Minimal purulence was expressed in this area, however did track anteriorly towards the left groin where an area of purulence was identified. A 3 cm counterincision was made at this area and a moderate amount purulence was expressed and sent for culture. This area was then explored with my finger and was found to undermine further cranially but fascia and muscle were intact and viable. Next the wound was irrigated with sterile saline until the irrigation being suction was clear. Next a Penrose loop drain was placed and was secured with a 2-0 nylon suture. Hemostasis was checked and was excellent. Both wound openings were then packed with Aquacel packing and a sterile dressing was applied. All sponge, needle and instrumentation counts were correct at the end of the procedure.

## 2019-03-16 NOTE — PLAN OF CARE
Problem: Pain:  Goal: Pain level will decrease  Description  Pain level will decrease  Outcome: Met This Shift     Problem: Pain:  Goal: Control of acute pain  Description  Control of acute pain  Outcome: Met This Shift

## 2019-03-17 LAB
ALBUMIN SERPL-MCNC: 3.4 G/DL (ref 3.5–5.2)
ALP BLD-CCNC: 65 U/L (ref 35–104)
ALT SERPL-CCNC: 15 U/L (ref 0–32)
ANION GAP SERPL CALCULATED.3IONS-SCNC: 10 MMOL/L (ref 7–16)
AST SERPL-CCNC: 8 U/L (ref 0–31)
BASOPHILS ABSOLUTE: 0.04 E9/L (ref 0–0.2)
BASOPHILS RELATIVE PERCENT: 0.3 % (ref 0–2)
BILIRUB SERPL-MCNC: <0.2 MG/DL (ref 0–1.2)
BUN BLDV-MCNC: 13 MG/DL (ref 6–20)
CALCIUM SERPL-MCNC: 9.1 MG/DL (ref 8.6–10.2)
CHLORIDE BLD-SCNC: 101 MMOL/L (ref 98–107)
CO2: 26 MMOL/L (ref 22–29)
CREAT SERPL-MCNC: 0.8 MG/DL (ref 0.5–1)
EOSINOPHILS ABSOLUTE: 0.08 E9/L (ref 0.05–0.5)
EOSINOPHILS RELATIVE PERCENT: 0.5 % (ref 0–6)
GFR AFRICAN AMERICAN: >60
GFR NON-AFRICAN AMERICAN: >60 ML/MIN/1.73
GLUCOSE BLD-MCNC: 162 MG/DL (ref 74–99)
GRAM STAIN ORDERABLE: NORMAL
HCT VFR BLD CALC: 40.4 % (ref 34–48)
HEMOGLOBIN: 13.1 G/DL (ref 11.5–15.5)
IMMATURE GRANULOCYTES #: 0.14 E9/L
IMMATURE GRANULOCYTES %: 0.9 % (ref 0–5)
LYMPHOCYTES ABSOLUTE: 2.62 E9/L (ref 1.5–4)
LYMPHOCYTES RELATIVE PERCENT: 16.6 % (ref 20–42)
MAGNESIUM: 2.3 MG/DL (ref 1.6–2.6)
MCH RBC QN AUTO: 29 PG (ref 26–35)
MCHC RBC AUTO-ENTMCNC: 32.4 % (ref 32–34.5)
MCV RBC AUTO: 89.6 FL (ref 80–99.9)
METER GLUCOSE: 149 MG/DL (ref 74–99)
METER GLUCOSE: 165 MG/DL (ref 74–99)
METER GLUCOSE: 171 MG/DL (ref 74–99)
MONOCYTES ABSOLUTE: 0.79 E9/L (ref 0.1–0.95)
MONOCYTES RELATIVE PERCENT: 5 % (ref 2–12)
NEUTROPHILS ABSOLUTE: 12.11 E9/L (ref 1.8–7.3)
NEUTROPHILS RELATIVE PERCENT: 76.7 % (ref 43–80)
PDW BLD-RTO: 13.1 FL (ref 11.5–15)
PLATELET # BLD: 431 E9/L (ref 130–450)
PMV BLD AUTO: 9.5 FL (ref 7–12)
POTASSIUM REFLEX MAGNESIUM: 3.5 MMOL/L (ref 3.5–5)
RBC # BLD: 4.51 E12/L (ref 3.5–5.5)
SODIUM BLD-SCNC: 137 MMOL/L (ref 132–146)
TOTAL PROTEIN: 6.4 G/DL (ref 6.4–8.3)
VANCOMYCIN TROUGH: 10.7 MCG/ML (ref 5–16)
WBC # BLD: 15.8 E9/L (ref 4.5–11.5)

## 2019-03-17 PROCEDURE — 85025 COMPLETE CBC W/AUTO DIFF WBC: CPT

## 2019-03-17 PROCEDURE — 2500000003 HC RX 250 WO HCPCS: Performed by: STUDENT IN AN ORGANIZED HEALTH CARE EDUCATION/TRAINING PROGRAM

## 2019-03-17 PROCEDURE — 2580000003 HC RX 258: Performed by: SURGERY

## 2019-03-17 PROCEDURE — 82962 GLUCOSE BLOOD TEST: CPT

## 2019-03-17 PROCEDURE — 6360000002 HC RX W HCPCS: Performed by: SURGERY

## 2019-03-17 PROCEDURE — 6360000002 HC RX W HCPCS: Performed by: STUDENT IN AN ORGANIZED HEALTH CARE EDUCATION/TRAINING PROGRAM

## 2019-03-17 PROCEDURE — 6370000000 HC RX 637 (ALT 250 FOR IP): Performed by: SURGERY

## 2019-03-17 PROCEDURE — 1200000000 HC SEMI PRIVATE

## 2019-03-17 PROCEDURE — 2580000003 HC RX 258: Performed by: STUDENT IN AN ORGANIZED HEALTH CARE EDUCATION/TRAINING PROGRAM

## 2019-03-17 PROCEDURE — 80202 ASSAY OF VANCOMYCIN: CPT

## 2019-03-17 PROCEDURE — 83735 ASSAY OF MAGNESIUM: CPT

## 2019-03-17 PROCEDURE — 36415 COLL VENOUS BLD VENIPUNCTURE: CPT

## 2019-03-17 PROCEDURE — 80053 COMPREHEN METABOLIC PANEL: CPT

## 2019-03-17 RX ADMIN — ENOXAPARIN SODIUM 40 MG: 40 INJECTION SUBCUTANEOUS at 08:48

## 2019-03-17 RX ADMIN — PIPERACILLIN SODIUM AND TAZOBACTAM SODIUM 3.38 G: 3; .375 INJECTION, POWDER, LYOPHILIZED, FOR SOLUTION INTRAVENOUS at 10:18

## 2019-03-17 RX ADMIN — Medication 10 ML: at 10:19

## 2019-03-17 RX ADMIN — SODIUM CHLORIDE, POTASSIUM CHLORIDE, SODIUM LACTATE AND CALCIUM CHLORIDE: 600; 310; 30; 20 INJECTION, SOLUTION INTRAVENOUS at 12:42

## 2019-03-17 RX ADMIN — OXYCODONE AND ACETAMINOPHEN 2 TABLET: 5; 325 TABLET ORAL at 15:40

## 2019-03-17 RX ADMIN — OXYCODONE AND ACETAMINOPHEN 2 TABLET: 5; 325 TABLET ORAL at 08:49

## 2019-03-17 RX ADMIN — SODIUM CHLORIDE, POTASSIUM CHLORIDE, SODIUM LACTATE AND CALCIUM CHLORIDE: 600; 310; 30; 20 INJECTION, SOLUTION INTRAVENOUS at 03:04

## 2019-03-17 RX ADMIN — FENOFIBRATE 54 MG: 54 TABLET ORAL at 20:57

## 2019-03-17 RX ADMIN — CLINDAMYCIN IN 5 PERCENT DEXTROSE 900 MG: 18 INJECTION, SOLUTION INTRAVENOUS at 17:16

## 2019-03-17 RX ADMIN — OXYCODONE AND ACETAMINOPHEN 1 TABLET: 5; 325 TABLET ORAL at 20:57

## 2019-03-17 RX ADMIN — AMLODIPINE BESYLATE 5 MG: 5 TABLET ORAL at 08:48

## 2019-03-17 RX ADMIN — VANCOMYCIN HYDROCHLORIDE 2000 MG: 10 INJECTION, POWDER, LYOPHILIZED, FOR SOLUTION INTRAVENOUS at 18:03

## 2019-03-17 RX ADMIN — Medication 10 ML: at 20:58

## 2019-03-17 RX ADMIN — VANCOMYCIN HYDROCHLORIDE 1750 MG: 1 INJECTION, POWDER, LYOPHILIZED, FOR SOLUTION INTRAVENOUS at 06:27

## 2019-03-17 RX ADMIN — PIPERACILLIN SODIUM AND TAZOBACTAM SODIUM 3.38 G: 3; .375 INJECTION, POWDER, LYOPHILIZED, FOR SOLUTION INTRAVENOUS at 20:37

## 2019-03-17 RX ADMIN — METFORMIN HYDROCHLORIDE 1000 MG: 1000 TABLET ORAL at 18:01

## 2019-03-17 RX ADMIN — CLINDAMYCIN IN 5 PERCENT DEXTROSE 900 MG: 18 INJECTION, SOLUTION INTRAVENOUS at 00:42

## 2019-03-17 RX ADMIN — CLINDAMYCIN IN 5 PERCENT DEXTROSE 900 MG: 18 INJECTION, SOLUTION INTRAVENOUS at 09:29

## 2019-03-17 RX ADMIN — PRAVASTATIN SODIUM 20 MG: 20 TABLET ORAL at 20:58

## 2019-03-17 RX ADMIN — Medication 1 TABLET: at 08:48

## 2019-03-17 ASSESSMENT — PAIN DESCRIPTION - FREQUENCY
FREQUENCY: CONTINUOUS
FREQUENCY: CONTINUOUS

## 2019-03-17 ASSESSMENT — PAIN DESCRIPTION - DESCRIPTORS
DESCRIPTORS: ACHING;DISCOMFORT;SORE
DESCRIPTORS: ACHING;DISCOMFORT

## 2019-03-17 ASSESSMENT — PAIN SCALES - GENERAL
PAINLEVEL_OUTOF10: 6
PAINLEVEL_OUTOF10: 7
PAINLEVEL_OUTOF10: 6
PAINLEVEL_OUTOF10: 7
PAINLEVEL_OUTOF10: 5
PAINLEVEL_OUTOF10: 0
PAINLEVEL_OUTOF10: 4

## 2019-03-17 ASSESSMENT — PAIN DESCRIPTION - ORIENTATION
ORIENTATION: LEFT
ORIENTATION: LEFT

## 2019-03-17 ASSESSMENT — PAIN DESCRIPTION - LOCATION
LOCATION: LABIA
LOCATION: LABIA

## 2019-03-17 ASSESSMENT — PAIN DESCRIPTION - PAIN TYPE
TYPE: SURGICAL PAIN
TYPE: SURGICAL PAIN

## 2019-03-17 ASSESSMENT — PAIN DESCRIPTION - ONSET
ONSET: ON-GOING
ONSET: GRADUAL

## 2019-03-17 ASSESSMENT — PAIN DESCRIPTION - PROGRESSION
CLINICAL_PROGRESSION: NOT CHANGED
CLINICAL_PROGRESSION: GRADUALLY IMPROVING

## 2019-03-17 ASSESSMENT — PAIN - FUNCTIONAL ASSESSMENT
PAIN_FUNCTIONAL_ASSESSMENT: PREVENTS OR INTERFERES SOME ACTIVE ACTIVITIES AND ADLS
PAIN_FUNCTIONAL_ASSESSMENT: ACTIVITIES ARE NOT PREVENTED

## 2019-03-17 NOTE — PROGRESS NOTES
Pharmacy Consultation Note  (Antibiotic Dosing and Monitoring)    Initial consult date: 3/15/19  Consulting physician: Dr. Ever Desai  Drug(s): Vancomycin  Indication: Cellulitis/abscess    Ht Readings from Last 1 Encounters:   03/15/19 5' 6\" (1.676 m)       Age/Gender IBW DW  Allergy Information   50 y.o.  female 61.6kg 80.7kg  Patient has no known allergies.                Date  WBC BUN/sCr UOP (mL/kg/hr) Drug/Dose Time   Given Level(s)   (Time) Comments   3/15  (#1) 16.8 10/0.7  Vancomycin 2g IV x1 1843     3/16  (#2) 17.2 10/0.7  Vancomycin 1750 mg Q12h 0614  1823     3/17  (#3) 15.2 13/0.8  Vancomycin 1750 mg   Vancomycin 2000 mg 0627  <1800> 10.7 (0530)      (#4)            (#5)            (#6)            Other Antibiotics/Antifungals/Antivirals Start Date Stop Date Comments   Zosyn 4.5g IV x1, then zosyn 3.375g IV q8h 3/15     Clindamycin 900mg IV x1,   then clindamycin 900mg IV Q8h 3/15                     Estimated CrCL: 100-120 mL/min      Intake/Output Summary (Last 24 hours) at 3/17/2019 1028  Last data filed at 3/17/2019 0542  Gross per 24 hour   Intake 1702.86 ml   Output 1000 ml   Net 702.86 ml       Temp max: Temp (24hrs), Av.1 °F (36.7 °C), Min:97.5 °F (36.4 °C), Max:99 °F (37.2 °C)      Cultures:    Culture Date Result    Blood Cxs x2 3/15 NGTD   Anaerobic culture 3/15 Pending   Surgical culture 3/15 Abundant Gram negative rods   Abundant Gram positive rods   Abundant Gram positive cocci            Assessment:  · AV is a 50year old female starting on empiric antibiotics due to concern for labial abscess/cellulitis/nec fasc, now s/p I&D   · Consulted by Dr. Ever Desai to dose/monitor vancomycin  · Trough drawn before 4th dose = 10.7 mcg/mL  · Goal trough level: 10-20 mcg/mL   · Patient received vancomycin 2g IV x1, clindamycin 900mg IV x1 and zosyn 4.5g IV x1 in the ED    Plan:  · Increase dose to Vancomycin 2000 mg Q12h  · Also receiving Zosyn 3.375 Q8H and Clindamycin 900 mg Q8H  · Pharmacist will follow and monitor/adjust dosing as necessary    Thank you for this consult, please page with questions.     Tushar Randhawa, PharmD Candidate 2019  3/17/2019 10:28 AM

## 2019-03-17 NOTE — PROGRESS NOTES
Surgery Progress Note POD2    Surgeon:  Dr. Consuelo Fuentes  Subjective:   Pain:    Improved  Diet:    Tolerated    BP (!) 116/58   Pulse 80   Temp 99 °F (37.2 °C) (Oral)   Resp 18   Ht 5' 6\" (1.676 m)   Wt 253 lb 7 oz (115 kg)   SpO2 94%   BMI 40.91 kg/m²      General:  no acute distress  Lungs:  non-labored  Extremities: Left upper inner thigh: wounds packed, drain in place, erythema much improved    ASSESSMENT / PLAN:   · S/p I&D left thigh abscess  · Await culture and sensitivity  · Consult  for home health care    Argentina Riley MD  3/17/2019  1:50 PM

## 2019-03-17 NOTE — PLAN OF CARE
Problem: Pain:  Goal: Pain level will decrease  Description  Pain level will decrease  Outcome: Met This Shift     Problem: Falls - Risk of:  Goal: Absence of physical injury  Description  Absence of physical injury  Outcome: Met This Shift

## 2019-03-17 NOTE — PROGRESS NOTES
Patient has received IV contrast within the last 48 hours. Metformin has been restarted based on serum creatinine per package insert.

## 2019-03-17 NOTE — PLAN OF CARE
Problem: Pain:  Goal: Pain level will decrease  Description  Pain level will decrease  3/17/2019 1859 by Pako Mahoney RN  Outcome: Met This Shift  3/17/2019 1849 by Pako Mahoney RN  Outcome: Met This Shift  Goal: Control of acute pain  Description  Control of acute pain  3/17/2019 1849 by Pako Mahoney RN  Outcome: Met This Shift  3/17/2019 0642 by Bria Mckenzie RN  Outcome: Met This Shift  Goal: Control of chronic pain  Description  Control of chronic pain  Outcome: Met This Shift     Problem: Falls - Risk of:  Goal: Will remain free from falls  Description  Will remain free from falls  3/17/2019 1859 by Pako Mahoney RN  Outcome: Met This Shift  3/17/2019 0642 by Bria Mckenzie RN  Outcome: Met This Shift

## 2019-03-18 LAB
ALBUMIN SERPL-MCNC: 3.5 G/DL (ref 3.5–5.2)
ALP BLD-CCNC: 70 U/L (ref 35–104)
ALT SERPL-CCNC: 17 U/L (ref 0–32)
ANAEROBIC CULTURE: NORMAL
ANION GAP SERPL CALCULATED.3IONS-SCNC: 11 MMOL/L (ref 7–16)
AST SERPL-CCNC: 12 U/L (ref 0–31)
BASOPHILS ABSOLUTE: 0.08 E9/L (ref 0–0.2)
BASOPHILS RELATIVE PERCENT: 0.8 % (ref 0–2)
BILIRUB SERPL-MCNC: 0.2 MG/DL (ref 0–1.2)
BUN BLDV-MCNC: 12 MG/DL (ref 6–20)
CALCIUM SERPL-MCNC: 9 MG/DL (ref 8.6–10.2)
CHLORIDE BLD-SCNC: 101 MMOL/L (ref 98–107)
CO2: 25 MMOL/L (ref 22–29)
CREAT SERPL-MCNC: 0.7 MG/DL (ref 0.5–1)
EOSINOPHILS ABSOLUTE: 0.33 E9/L (ref 0.05–0.5)
EOSINOPHILS RELATIVE PERCENT: 3.2 % (ref 0–6)
GFR AFRICAN AMERICAN: >60
GFR NON-AFRICAN AMERICAN: >60 ML/MIN/1.73
GLUCOSE BLD-MCNC: 140 MG/DL (ref 74–99)
HCT VFR BLD CALC: 42.7 % (ref 34–48)
HEMOGLOBIN: 13.5 G/DL (ref 11.5–15.5)
IMMATURE GRANULOCYTES #: 0.18 E9/L
IMMATURE GRANULOCYTES %: 1.7 % (ref 0–5)
LYMPHOCYTES ABSOLUTE: 2.82 E9/L (ref 1.5–4)
LYMPHOCYTES RELATIVE PERCENT: 27.1 % (ref 20–42)
MCH RBC QN AUTO: 28.8 PG (ref 26–35)
MCHC RBC AUTO-ENTMCNC: 31.6 % (ref 32–34.5)
MCV RBC AUTO: 91 FL (ref 80–99.9)
METER GLUCOSE: 105 MG/DL (ref 74–99)
METER GLUCOSE: 131 MG/DL (ref 74–99)
METER GLUCOSE: 139 MG/DL (ref 74–99)
MONOCYTES ABSOLUTE: 0.71 E9/L (ref 0.1–0.95)
MONOCYTES RELATIVE PERCENT: 6.8 % (ref 2–12)
NEUTROPHILS ABSOLUTE: 6.27 E9/L (ref 1.8–7.3)
NEUTROPHILS RELATIVE PERCENT: 60.4 % (ref 43–80)
PDW BLD-RTO: 13.3 FL (ref 11.5–15)
PLATELET # BLD: 530 E9/L (ref 130–450)
PMV BLD AUTO: 9 FL (ref 7–12)
POTASSIUM REFLEX MAGNESIUM: 3.8 MMOL/L (ref 3.5–5)
RBC # BLD: 4.69 E12/L (ref 3.5–5.5)
SODIUM BLD-SCNC: 137 MMOL/L (ref 132–146)
TOTAL PROTEIN: 6.9 G/DL (ref 6.4–8.3)
WBC # BLD: 10.4 E9/L (ref 4.5–11.5)

## 2019-03-18 PROCEDURE — 85025 COMPLETE CBC W/AUTO DIFF WBC: CPT

## 2019-03-18 PROCEDURE — 80053 COMPREHEN METABOLIC PANEL: CPT

## 2019-03-18 PROCEDURE — 6360000002 HC RX W HCPCS: Performed by: SURGERY

## 2019-03-18 PROCEDURE — 2580000003 HC RX 258: Performed by: STUDENT IN AN ORGANIZED HEALTH CARE EDUCATION/TRAINING PROGRAM

## 2019-03-18 PROCEDURE — 2500000003 HC RX 250 WO HCPCS: Performed by: STUDENT IN AN ORGANIZED HEALTH CARE EDUCATION/TRAINING PROGRAM

## 2019-03-18 PROCEDURE — 82962 GLUCOSE BLOOD TEST: CPT

## 2019-03-18 PROCEDURE — 6370000000 HC RX 637 (ALT 250 FOR IP): Performed by: SURGERY

## 2019-03-18 PROCEDURE — 36415 COLL VENOUS BLD VENIPUNCTURE: CPT

## 2019-03-18 PROCEDURE — 2580000003 HC RX 258: Performed by: SURGERY

## 2019-03-18 PROCEDURE — 6360000002 HC RX W HCPCS: Performed by: STUDENT IN AN ORGANIZED HEALTH CARE EDUCATION/TRAINING PROGRAM

## 2019-03-18 PROCEDURE — 1200000000 HC SEMI PRIVATE

## 2019-03-18 RX ORDER — ONDANSETRON 4 MG/1
4 TABLET, FILM COATED ORAL DAILY PRN
Qty: 12 TABLET | Refills: 0 | Status: SHIPPED | OUTPATIENT
Start: 2019-03-18 | End: 2019-03-30

## 2019-03-18 RX ORDER — DOCUSATE SODIUM 100 MG/1
100 CAPSULE, LIQUID FILLED ORAL 2 TIMES DAILY
Qty: 50 CAPSULE | Refills: 0 | Status: SHIPPED | OUTPATIENT
Start: 2019-03-18 | End: 2019-10-14

## 2019-03-18 RX ORDER — OXYCODONE HYDROCHLORIDE AND ACETAMINOPHEN 5; 325 MG/1; MG/1
1 TABLET ORAL EVERY 6 HOURS PRN
Qty: 28 TABLET | Refills: 0 | Status: SHIPPED | OUTPATIENT
Start: 2019-03-18 | End: 2019-03-25

## 2019-03-18 RX ADMIN — CLINDAMYCIN IN 5 PERCENT DEXTROSE 900 MG: 18 INJECTION, SOLUTION INTRAVENOUS at 20:20

## 2019-03-18 RX ADMIN — PRAVASTATIN SODIUM 20 MG: 20 TABLET ORAL at 20:20

## 2019-03-18 RX ADMIN — PIPERACILLIN SODIUM AND TAZOBACTAM SODIUM 3.38 G: 3; .375 INJECTION, POWDER, LYOPHILIZED, FOR SOLUTION INTRAVENOUS at 04:31

## 2019-03-18 RX ADMIN — HYDROMORPHONE HYDROCHLORIDE 0.5 MG: 1 INJECTION, SOLUTION INTRAMUSCULAR; INTRAVENOUS; SUBCUTANEOUS at 21:41

## 2019-03-18 RX ADMIN — OXYCODONE AND ACETAMINOPHEN 1 TABLET: 5; 325 TABLET ORAL at 11:54

## 2019-03-18 RX ADMIN — CLINDAMYCIN IN 5 PERCENT DEXTROSE 900 MG: 18 INJECTION, SOLUTION INTRAVENOUS at 13:54

## 2019-03-18 RX ADMIN — FENOFIBRATE 54 MG: 54 TABLET ORAL at 21:41

## 2019-03-18 RX ADMIN — CLINDAMYCIN IN 5 PERCENT DEXTROSE 900 MG: 18 INJECTION, SOLUTION INTRAVENOUS at 01:20

## 2019-03-18 RX ADMIN — OXYCODONE AND ACETAMINOPHEN 1 TABLET: 5; 325 TABLET ORAL at 17:10

## 2019-03-18 RX ADMIN — Medication 10 ML: at 20:21

## 2019-03-18 RX ADMIN — Medication 10 ML: at 09:29

## 2019-03-18 RX ADMIN — OXYCODONE AND ACETAMINOPHEN 1 TABLET: 5; 325 TABLET ORAL at 01:20

## 2019-03-18 RX ADMIN — VANCOMYCIN HYDROCHLORIDE 2000 MG: 10 INJECTION, POWDER, LYOPHILIZED, FOR SOLUTION INTRAVENOUS at 20:20

## 2019-03-18 RX ADMIN — METFORMIN HYDROCHLORIDE 1000 MG: 1000 TABLET ORAL at 09:29

## 2019-03-18 RX ADMIN — VANCOMYCIN HYDROCHLORIDE 2000 MG: 10 INJECTION, POWDER, LYOPHILIZED, FOR SOLUTION INTRAVENOUS at 09:28

## 2019-03-18 RX ADMIN — SODIUM CHLORIDE, POTASSIUM CHLORIDE, SODIUM LACTATE AND CALCIUM CHLORIDE: 600; 310; 30; 20 INJECTION, SOLUTION INTRAVENOUS at 01:22

## 2019-03-18 RX ADMIN — AMLODIPINE BESYLATE 5 MG: 5 TABLET ORAL at 09:29

## 2019-03-18 RX ADMIN — Medication 1 TABLET: at 09:28

## 2019-03-18 RX ADMIN — PIPERACILLIN SODIUM AND TAZOBACTAM SODIUM 3.38 G: 3; .375 INJECTION, POWDER, LYOPHILIZED, FOR SOLUTION INTRAVENOUS at 15:21

## 2019-03-18 RX ADMIN — HYDROMORPHONE HYDROCHLORIDE 0.5 MG: 1 INJECTION, SOLUTION INTRAMUSCULAR; INTRAVENOUS; SUBCUTANEOUS at 13:54

## 2019-03-18 RX ADMIN — METFORMIN HYDROCHLORIDE 1000 MG: 1000 TABLET ORAL at 17:10

## 2019-03-18 RX ADMIN — OXYCODONE AND ACETAMINOPHEN 2 TABLET: 5; 325 TABLET ORAL at 05:55

## 2019-03-18 ASSESSMENT — PAIN DESCRIPTION - FREQUENCY
FREQUENCY: CONTINUOUS

## 2019-03-18 ASSESSMENT — PAIN - FUNCTIONAL ASSESSMENT
PAIN_FUNCTIONAL_ASSESSMENT: PREVENTS OR INTERFERES SOME ACTIVE ACTIVITIES AND ADLS

## 2019-03-18 ASSESSMENT — PAIN DESCRIPTION - PROGRESSION
CLINICAL_PROGRESSION: NOT CHANGED

## 2019-03-18 ASSESSMENT — PAIN DESCRIPTION - PAIN TYPE
TYPE: SURGICAL PAIN

## 2019-03-18 ASSESSMENT — PAIN DESCRIPTION - DESCRIPTORS
DESCRIPTORS: ACHING;DISCOMFORT;SORE
DESCRIPTORS: ACHING;DISCOMFORT;SORE
DESCRIPTORS: BURNING;DISCOMFORT
DESCRIPTORS: BURNING;DISCOMFORT;THROBBING

## 2019-03-18 ASSESSMENT — PAIN DESCRIPTION - ORIENTATION
ORIENTATION: LEFT

## 2019-03-18 ASSESSMENT — PAIN SCALES - GENERAL
PAINLEVEL_OUTOF10: 5
PAINLEVEL_OUTOF10: 3
PAINLEVEL_OUTOF10: 3
PAINLEVEL_OUTOF10: 7
PAINLEVEL_OUTOF10: 7
PAINLEVEL_OUTOF10: 6
PAINLEVEL_OUTOF10: 0
PAINLEVEL_OUTOF10: 2
PAINLEVEL_OUTOF10: 3
PAINLEVEL_OUTOF10: 6
PAINLEVEL_OUTOF10: 8

## 2019-03-18 ASSESSMENT — PAIN DESCRIPTION - LOCATION
LOCATION: LABIA

## 2019-03-18 ASSESSMENT — PAIN DESCRIPTION - ONSET
ONSET: ON-GOING

## 2019-03-18 NOTE — PROGRESS NOTES
Pharmacy Consultation Note  (Antibiotic Dosing and Monitoring)    Initial consult date: 3/15/19  Consulting physician: Dr. Talisha Ramirez  Drug(s): Vancomycin  Indication: Cellulitis/abscess    Ht Readings from Last 1 Encounters:   03/15/19 5' 6\" (1.676 m)       Age/Gender IBW DW  Allergy Information   50 y.o.  female 61.6kg 80.7kg  Patient has no known allergies.                Date  WBC BUN/sCr UOP (mL/kg/hr) Drug/Dose Time   Given Level(s)   (Time) Comments   3/15  (#1) 16.8 10/0.7  Vancomycin 2g IV x1 1843     3/16  (#2) 17.2 10/0.7  Vancomycin 1750 mg Q12h 0614  1823     3/17  (#3) 15.2 13/0.8  Vancomycin 1750 mg q12h  ------------   Vancomycin 2000 mg q12h 0627  -------  1803 10.7 (0530)    3/18  (#4) 10.4 12/0.7  Vancomycin 2000mg q12h 0928  <2030>       (#5)            (#6)            Other Antibiotics/Antifungals/Antivirals Start Date Stop Date Comments   Zosyn 4.5g IV x1, then zosyn 3.375g IV q8h 3/15     Clindamycin 900mg IV x1,   then clindamycin 900mg IV Q8h 3/15                     Estimated CrCL: 100-120 mL/min      Intake/Output Summary (Last 24 hours) at 3/18/2019 1324  Last data filed at 3/17/2019 1415  Gross per 24 hour   Intake 1100 ml   Output --   Net 1100 ml       Temp max: Temp (24hrs), Av.9 °F (36.6 °C), Min:97 °F (36.1 °C), Max:98.4 °F (36.9 °C)      Cultures:    Culture Date Result    Blood Cxs x2 3/15 NGTD   Anaerobic culture 3/15 Anaerobes not isolated   Surgical culture 3/15 Abundant Gram negative rods   Abundant Gram positive rods   Abundant Gram positive cocci            Assessment:  · AV is a 50year old female starting on empiric antibiotics due to concern for labial abscess/cellulitis/nec fasc, now s/p I&D   · Consulted by Dr. Talisha Ramirez to dose/monitor vancomycin  · Trough drawn before 4th dose = 10.7 mcg/mL  · Goal trough level: 10-20 mcg/mL   · Patient received vancomycin 2g IV x1, clindamycin 900mg IV x1 and zosyn 4.5g IV x1 in the ED followed by Vanco 1750mg IV q12h, Zosyn 3.375 q8, clinda 900mg q8h  · Vanco increased to 2000mg q12h on 3/17. Plan:  · Continue Vancomycin 2000 mg Q12h  · Will recheck a vancomycin trough when steady state attained if vanco continues. · Also receiving Zosyn 3.375 Q8H and Clindamycin 900 mg Q8H  · Pharmacist will follow and monitor/adjust dosing as necessary    Thank you for this consult, please page with questions.     El Mendoza, PharmD 3/18/2019 1:30 PM

## 2019-03-18 NOTE — PLAN OF CARE
Problem: Pain:  Goal: Pain level will decrease  Description  Pain level will decrease  3/18/2019 1304 by Lyubov Perez RN  Outcome: Met This Shift  3/18/2019 0008 by Riya Garcia RN  Outcome: Ongoing     Problem: Falls - Risk of:  Goal: Will remain free from falls  Description  Will remain free from falls  3/18/2019 1304 by Lyubov Perez RN  Outcome: Met This Shift  3/18/2019 0008 by Riya Garcia RN  Outcome: Ongoing  Goal: Absence of physical injury  Description  Absence of physical injury  3/18/2019 1304 by Lyubov Perez RN  Outcome: Met This Shift  3/18/2019 0008 by Riya Garcia RN  Outcome: Ongoing     Problem: Skin Integrity:  Goal: Will show no infection signs and symptoms  Description  Will show no infection signs and symptoms  3/18/2019 1304 by Lyubov Perez RN  Outcome: Met This Shift  3/18/2019 0008 by Riya Garcia RN  Outcome: Ongoing  Goal: Absence of new skin breakdown  Description  Absence of new skin breakdown  3/18/2019 1304 by Lyubov Perez RN  Outcome: Met This Shift  3/18/2019 0008 by Riya Garcia RN  Outcome: Ongoing

## 2019-03-18 NOTE — CONSULTS
Inpatient consult to Infectious Diseases  Consult performed by: Wiliam Harding MD  Consult ordered by: Oly Arroyo MD        [address] [place] [address] [facility]  [facility]  Consultation Note     Admit Date: 3/15/2019 10:58 AM    Reason for Consult:   Antibiotic recommendations home-going    Attending Physician:  Aristides Villarreal MD    HISTORY OF PRESENT ILLNESS:             The history is obtained from extensive review of available past medical records. The patient is a 50 y.o. female who presents on 3/15/19 with the wound a history of colon over the left peroneum. Associated to this she has had shaking chills and fever of over 101°F. She called her primary care physician who recommended she come to the ED because she was diabetic. She had elevated white count and was slightly tachycardic in the ED. She was afebrile. She was admitted with a diagnosis of cellulitis and abscess of leg and necrotizing soft tissue infection. He was admitted by general surgery and treated empirically with Vancomycin, Zosyn and Clindamycin. Pharmacy was asked to dose antibiotics. The patient underwent incision and drainage on 3/16/19. ID was asked to give recommendations on home-going antibiotics. Cultures are not completed yet.     Past Medical History:        Diagnosis Date    Abdominal pain     Anxiety state, unspecified 3/8/2016    Asthma     no inhalers    Breast fibrocystic disorder     Diabetes mellitus ([address]     Essential hypertension 2016    Hyperlipidemia     Irritable bowel syndrome     Left ovarian cyst     Morbid obesity ([address] 2016    Osteoarthritis     Pharyngoesophageal dysphagia 2016     Past Surgical History:        Procedure Laterality Date    APPENDECTOMY      Age 15     SECTION  2012    3 total    COLONOSCOPY  16    [facility]-SEB    COLONOSCOPY  2016    TONSILLECTOMY      Age 9    TUBAL LIGATION      UPPER GASTROINTESTINAL ENDOSCOPY      Negative partner violence:     Fear of current or ex partner: None     Emotionally abused: None     Physically abused: None     Forced sexual activity: None   Other Topics Concern    None   Social History Narrative    None      Family History:       Problem Relation Age of Onset    Rheum Arthritis Mother     Hypertension Mother     Arthritis Mother     High Blood Pressure Mother     Obesity Mother     Other Father         Heart Disease    Hypertension Father     High Cholesterol Father     Thyroid Disease Father         Hyperthyroid s/p radiation    Arthritis Father     High Blood Pressure Father     Obesity Father     Obesity Sister     Cancer Paternal Grandmother         Colon Cancer    Other Paternal Grandmother         COPD    Cancer Paternal Grandfather         Lung Cancer    Obesity Maternal Aunt     High Blood Pressure Maternal Grandmother     Obesity Maternal Grandmother     Obesity Maternal Grandfather    . Otherwise non-pertinent to the chief complaint. REVIEW OF SYSTEMS:    Constitutional: Positive for fevers, chills, diaphoresis  Neurologic: Negative   Psychiatric: Negative  Rheumatologic: Negative   Endocrine: Diabetes  Hematologic: Negative  Immunologic: Negative  ENT: Negative  Respiratory: Negative   Cardiovascular: Negative  GI: Negative  : Negative  Musculoskeletal: Negative  Skin: No rashes. PHYSICAL EXAM:    Vitals:   /85   Pulse 75   Temp 98.1 °F (36.7 °C) (Oral)   Resp 16   Ht 5' 6\" (1.676 m)   Wt 260 lb 6.4 oz (118.1 kg)   SpO2 93%   BMI 42.03 kg/m²   Constitutional: The patient is awake, alert, and oriented. Skin: Warm and dry. No rashes were noted. HEENT: Eyes show round, and reactive pupils. No jaundice. Moist mucous membranes, no ulcerations, no thrush. Neck: Supple to movements. No lymphadenopathy. Chest: No use of accessory muscles to breathe. Symmetrical expansion. Auscultation reveals no wheezing, crackles, or rhonchi.    Cardiovascular: S1 and results for input(s): ORG in the last 72 hours. Recent Labs     03/15/19  1257   BLOODCULT2 24 Hours- no growth     No results for input(s): STREPNEUMAGU in the last 72 hours. No results for input(s): LP1UAG in the last 72 hours. No results for input(s): ASO in the last 72 hours. No results for input(s): CULTRESP in the last 72 hours. No results for input(s): PROCAL in the last 72 hours. Assessment:  · Left perineal abscess. Status post incision and drainage. Cultures growing mixed gram-positive cocci  · Fever with leukocytosis associated to abscess, resolving    Plan:    · Await for final results of cultures and sensitivities. Once the cultures are out you can better decide on which antibiotics he can choose. She can probably be discharged on oral antibiotics  · I will be signing off the case. Thank you for having us see this patient in consultation.   Kirsten Vieira  8:51 AM  3/18/2019

## 2019-03-19 VITALS
HEIGHT: 66 IN | HEART RATE: 80 BPM | DIASTOLIC BLOOD PRESSURE: 73 MMHG | BODY MASS INDEX: 42.19 KG/M2 | RESPIRATION RATE: 14 BRPM | TEMPERATURE: 98.6 F | WEIGHT: 262.5 LBS | OXYGEN SATURATION: 97 % | SYSTOLIC BLOOD PRESSURE: 143 MMHG

## 2019-03-19 LAB
CULTURE SURGICAL: NORMAL
GRAM STAIN RESULT: NORMAL
METER GLUCOSE: 168 MG/DL (ref 74–99)

## 2019-03-19 PROCEDURE — 2580000003 HC RX 258: Performed by: SURGERY

## 2019-03-19 PROCEDURE — 2500000003 HC RX 250 WO HCPCS: Performed by: STUDENT IN AN ORGANIZED HEALTH CARE EDUCATION/TRAINING PROGRAM

## 2019-03-19 PROCEDURE — 6360000002 HC RX W HCPCS: Performed by: SURGERY

## 2019-03-19 PROCEDURE — 36415 COLL VENOUS BLD VENIPUNCTURE: CPT

## 2019-03-19 PROCEDURE — 82962 GLUCOSE BLOOD TEST: CPT

## 2019-03-19 PROCEDURE — 6370000000 HC RX 637 (ALT 250 FOR IP): Performed by: SURGERY

## 2019-03-19 PROCEDURE — 6370000000 HC RX 637 (ALT 250 FOR IP): Performed by: STUDENT IN AN ORGANIZED HEALTH CARE EDUCATION/TRAINING PROGRAM

## 2019-03-19 RX ORDER — SULFAMETHOXAZOLE AND TRIMETHOPRIM 800; 160 MG/1; MG/1
1 TABLET ORAL EVERY 12 HOURS SCHEDULED
Qty: 20 TABLET | Refills: 0 | Status: SHIPPED | OUTPATIENT
Start: 2019-03-19 | End: 2019-03-26 | Stop reason: SINTOL

## 2019-03-19 RX ORDER — SULFAMETHOXAZOLE AND TRIMETHOPRIM 800; 160 MG/1; MG/1
1 TABLET ORAL EVERY 12 HOURS SCHEDULED
Status: DISCONTINUED | OUTPATIENT
Start: 2019-03-19 | End: 2019-03-19 | Stop reason: HOSPADM

## 2019-03-19 RX ADMIN — PIPERACILLIN SODIUM AND TAZOBACTAM SODIUM 3.38 G: 3; .375 INJECTION, POWDER, LYOPHILIZED, FOR SOLUTION INTRAVENOUS at 00:30

## 2019-03-19 RX ADMIN — METFORMIN HYDROCHLORIDE 1000 MG: 1000 TABLET ORAL at 09:54

## 2019-03-19 RX ADMIN — AMLODIPINE BESYLATE 5 MG: 5 TABLET ORAL at 09:54

## 2019-03-19 RX ADMIN — CLINDAMYCIN IN 5 PERCENT DEXTROSE 900 MG: 18 INJECTION, SOLUTION INTRAVENOUS at 01:55

## 2019-03-19 RX ADMIN — SODIUM CHLORIDE, POTASSIUM CHLORIDE, SODIUM LACTATE AND CALCIUM CHLORIDE: 600; 310; 30; 20 INJECTION, SOLUTION INTRAVENOUS at 00:30

## 2019-03-19 RX ADMIN — Medication 1 TABLET: at 09:54

## 2019-03-19 RX ADMIN — SULFAMETHOXAZOLE AND TRIMETHOPRIM 1 TABLET: 800; 160 TABLET ORAL at 09:54

## 2019-03-19 RX ADMIN — Medication 10 ML: at 09:57

## 2019-03-19 RX ADMIN — HYDROMORPHONE HYDROCHLORIDE 0.5 MG: 1 INJECTION, SOLUTION INTRAMUSCULAR; INTRAVENOUS; SUBCUTANEOUS at 09:53

## 2019-03-19 RX ADMIN — OXYCODONE AND ACETAMINOPHEN 1 TABLET: 5; 325 TABLET ORAL at 04:50

## 2019-03-19 ASSESSMENT — PAIN DESCRIPTION - PAIN TYPE: TYPE: SURGICAL PAIN

## 2019-03-19 ASSESSMENT — PAIN DESCRIPTION - DESCRIPTORS: DESCRIPTORS: BURNING;DISCOMFORT

## 2019-03-19 ASSESSMENT — PAIN DESCRIPTION - FREQUENCY: FREQUENCY: CONTINUOUS

## 2019-03-19 ASSESSMENT — PAIN SCALES - GENERAL
PAINLEVEL_OUTOF10: 6
PAINLEVEL_OUTOF10: 0
PAINLEVEL_OUTOF10: 6
PAINLEVEL_OUTOF10: 3

## 2019-03-19 ASSESSMENT — PAIN DESCRIPTION - LOCATION: LOCATION: LABIA

## 2019-03-19 ASSESSMENT — PAIN DESCRIPTION - ORIENTATION: ORIENTATION: LEFT

## 2019-03-19 ASSESSMENT — PAIN DESCRIPTION - PROGRESSION: CLINICAL_PROGRESSION: NOT CHANGED

## 2019-03-19 ASSESSMENT — PAIN - FUNCTIONAL ASSESSMENT: PAIN_FUNCTIONAL_ASSESSMENT: PREVENTS OR INTERFERES SOME ACTIVE ACTIVITIES AND ADLS

## 2019-03-19 ASSESSMENT — PAIN DESCRIPTION - ONSET: ONSET: ON-GOING

## 2019-03-19 NOTE — PROGRESS NOTES
GENERAL SURGERY  DAILY PROGRESS NOTE  3/19/2019    Subjective:   no nausea or vomiting, tolerating diet, ambulating, +BM--loose stool, afebrile, pain well controlled with meds, c/o malar rash    Objective:  BP (!) 155/73   Pulse 80   Temp 97.9 °F (36.6 °C) (Oral)   Resp 16   Ht 5' 6\" (1.676 m)   Wt 262 lb 8 oz (119.1 kg)   SpO2 95%   BMI 42.37 kg/m²     GENERAL:  Laying in bed, no apparent distress  LUNGS:  No increased work of breathing, no cyanosis  CARDIOVASCULAR:  Extremities warm and well perfused,   ABDOMEN:  Soft, no tenderness, non distended  SKIN: Warm and dry, cellulitis resolved, serosang penrose drainage    Assessment/Plan:  50 y.o. female with NSTI sp debridement    -continue abx, await cx  -continue dressing changes  -pain control PRN  -HHC ordered  -DC planning    Electronically signed by Meeta Owens MD on 3/19/2019 at 5:45 AM       Attending Note:    Patient seen/examined. Agree with above assessment and plan. Cultures grew mixed sandra, coag neg staph. Ok to d/c on oral abx. Follow up in 1 week in my office.

## 2019-03-19 NOTE — PLAN OF CARE
Problem: Falls - Risk of:  Goal: Will remain free from falls  Description  Will remain free from falls  3/18/2019 1304 by Elliott Bianchi RN  Outcome: Met This Shift

## 2019-03-19 NOTE — PROGRESS NOTES
Pharmacy Consultation Note  (Antibiotic Dosing and Monitoring)    Initial consult date: 3/15/19  Consulting physician: Dr. Romero Posadas  Drug(s): Vancomycin    Note vancomycin discontinued. Clinical pharmacy will sign-off. Please re-consult if we can be of further assistance.     Thanks for this consult,  Katerine Gonzalez [facility], PharmD  3/19/2019  8:33 AM

## 2019-03-20 ENCOUNTER — TELEPHONE (OUTPATIENT)
Dept: INTERNAL MEDICINE | Age: 49
End: 2019-03-20

## 2019-03-20 LAB
BLOOD CULTURE, ROUTINE: NORMAL
CULTURE, BLOOD 2: NORMAL

## 2019-03-22 ENCOUNTER — TELEPHONE (OUTPATIENT)
Dept: INTERNAL MEDICINE | Age: 49
End: 2019-03-22

## 2019-03-23 NOTE — DISCHARGE SUMMARY
Physician Discharge Summary     Ofilia Sample  [id number]    Admit date: 3/15/2019    Discharge date and time: 3/19/2019 12:00 PM    Admitting Physician: Deana Jorgensen MD     Admission Diagnoses:   Patient Active Problem List   Diagnosis    Bone spur of ankle    DUB (dysfunctional uterine bleeding)    Cyst of ovary    Essential hypertension    Abdominal pain, LLQ    Pharyngoesophageal dysphagia    Type 2 diabetes mellitus with hyperglycemia, without long-term current use of insulin (HCC)    Obesity, Class III, BMI 40-49.9 (morbid obesity) ([address]    Menorrhagia with irregular cycle    Anxiety disorder    Mixed hyperlipidemia    Necrotizing soft tissue infection    Abscess of left thigh       Discharge Diagnoses:   Patient Active Problem List   Diagnosis    Bone spur of ankle    DUB (dysfunctional uterine bleeding)    Cyst of ovary    Essential hypertension    Abdominal pain, LLQ    Pharyngoesophageal dysphagia    Type 2 diabetes mellitus with hyperglycemia, without long-term current use of insulin (HCC)    Obesity, Class III, BMI 40-49.9 (morbid obesity) ([address]    Menorrhagia with irregular cycle    Anxiety disorder    Mixed hyperlipidemia    Necrotizing soft tissue infection    Abscess of left thigh         Hospital Course: Dominic Quinteros is a 50 y.o. female who presented for evaluation of a NSTI of her Left groin 3/15/2018. She had an otherwise uneventful course and progressed well. Pain was well controlled. She was tolerating a regular diet with no nausea or vomiting, was ambulating well, and was in a suitable condition for discharge to home.      Lab Results   Component Value Date    WBC 10.4 03/18/2019    HGB 13.5 03/18/2019     03/18/2019     03/18/2019     03/18/2019    K 3.8 03/18/2019    BUN 12 03/18/2019    CREATININE 0.7 03/18/2019    GLUCOSE 140 03/18/2019    GLUCOSE 105 05/18/2012    LABGLOM >60 03/18/2019    PROTIME 11.3 07/11/2011    INR 1.2 07/11/2011 instructions     therapeutic multivitamin-minerals tablet  Take 1 tablet by mouth daily Last dose 6/8/2016  What changed:    · when to take this  · additional instructions        CONTINUE taking these medications    blood glucose test strips  by Other route daily     glucose monitoring kit monitoring kit  1 kit by Does not apply route daily as needed     ibuprofen 200 MG tablet  Commonly known as:  ADVIL;MOTRIN     Lancets Misc  Inject 1 each into the skin daily     metFORMIN 1000 MG tablet  Commonly known as:  GLUCOPHAGE  Take 1 tablet by mouth 2 times daily (with meals)     Misc. Devices Kit  Blood pressure cuff- Ambulatory monitoring- check every other day and maintain blood pressure log           Where to Get Your Medications      These medications were sent to [facility], [address] [facility] [phone]  [place] [address], [address] [facility]    Phone:  [phone]   · docusate sodium 100 MG capsule  · ondansetron 4 MG tablet  · sulfamethoxazole-trimethoprim 800-160 MG per tablet     You can get these medications from any pharmacy    Bring a paper prescription for each of these medications  · oxyCODONE-acetaminophen 5-325 MG per tablet         Patient Instructions: Activity: activity as tolerated   Diet: regular diet  Wound Care: shower normally and leave the incisions open to air     Follow up:   With Dr. Rachel Ruvalcaba in 2 weeks    Signed:  Stephanie Mo  3/23/2019  5:59 PM

## 2019-03-26 ENCOUNTER — OFFICE VISIT (OUTPATIENT)
Dept: INTERNAL MEDICINE | Age: 49
End: 2019-03-26
Payer: COMMERCIAL

## 2019-03-26 VITALS
TEMPERATURE: 98.2 F | BODY MASS INDEX: 39.53 KG/M2 | WEIGHT: 246 LBS | RESPIRATION RATE: 12 BRPM | SYSTOLIC BLOOD PRESSURE: 139 MMHG | DIASTOLIC BLOOD PRESSURE: 72 MMHG | HEART RATE: 71 BPM | HEIGHT: 66 IN

## 2019-03-26 DIAGNOSIS — L27.0 DRUG RASH: ICD-10-CM

## 2019-03-26 DIAGNOSIS — B37.31 VAGINAL YEAST INFECTION: Primary | ICD-10-CM

## 2019-03-26 DIAGNOSIS — N76.4 LEFT GENITAL LABIAL ABSCESS: ICD-10-CM

## 2019-03-26 PROCEDURE — G8427 DOCREV CUR MEDS BY ELIG CLIN: HCPCS | Performed by: INTERNAL MEDICINE

## 2019-03-26 PROCEDURE — 1111F DSCHRG MED/CURRENT MED MERGE: CPT | Performed by: INTERNAL MEDICINE

## 2019-03-26 PROCEDURE — 1036F TOBACCO NON-USER: CPT | Performed by: INTERNAL MEDICINE

## 2019-03-26 PROCEDURE — G8417 CALC BMI ABV UP PARAM F/U: HCPCS | Performed by: INTERNAL MEDICINE

## 2019-03-26 PROCEDURE — 99213 OFFICE O/P EST LOW 20 MIN: CPT | Performed by: INTERNAL MEDICINE

## 2019-03-26 PROCEDURE — G8482 FLU IMMUNIZE ORDER/ADMIN: HCPCS | Performed by: INTERNAL MEDICINE

## 2019-03-26 RX ORDER — CLOBETASOL PROPIONATE 0.46 MG/ML
SOLUTION TOPICAL
Refills: 1 | COMMUNITY
Start: 2019-02-23 | End: 2022-03-03 | Stop reason: ALTCHOICE

## 2019-03-26 RX ORDER — FLUCONAZOLE 150 MG/1
150 TABLET ORAL ONCE
Qty: 1 TABLET | Refills: 1 | Status: SHIPPED | OUTPATIENT
Start: 2019-03-26 | End: 2019-04-08 | Stop reason: SDUPTHER

## 2019-03-26 ASSESSMENT — ENCOUNTER SYMPTOMS
SHORTNESS OF BREATH: 0
COUGH: 0
ABDOMINAL PAIN: 0
VOMITING: 0
CONSTIPATION: 0
NAUSEA: 0
DIARRHEA: 0

## 2019-04-08 ENCOUNTER — TELEPHONE (OUTPATIENT)
Dept: INTERNAL MEDICINE | Age: 49
End: 2019-04-08

## 2019-04-08 DIAGNOSIS — B35.6 TINEA CRURIS: Primary | ICD-10-CM

## 2019-04-08 DIAGNOSIS — B37.31 VAGINAL YEAST INFECTION: ICD-10-CM

## 2019-04-08 RX ORDER — FLUCONAZOLE 150 MG/1
150 TABLET ORAL WEEKLY
Qty: 1 TABLET | Refills: 1 | Status: SHIPPED | OUTPATIENT
Start: 2019-04-08 | End: 2019-04-16

## 2019-05-06 ENCOUNTER — TELEPHONE (OUTPATIENT)
Dept: INTERNAL MEDICINE | Age: 49
End: 2019-05-06

## 2019-05-06 NOTE — TELEPHONE ENCOUNTER
Received fax refill request from pharmacy. Pharmacy asking for a refill on lancets Spoke with pharmacy staff to confirm they have script on file from 02/22/2019 States script is on file and no new order needed.

## 2019-05-15 ENCOUNTER — OFFICE VISIT (OUTPATIENT)
Dept: CARDIOLOGY CLINIC | Age: 49
End: 2019-05-15
Payer: COMMERCIAL

## 2019-05-15 VITALS
HEIGHT: 66 IN | SYSTOLIC BLOOD PRESSURE: 128 MMHG | DIASTOLIC BLOOD PRESSURE: 84 MMHG | BODY MASS INDEX: 40.98 KG/M2 | RESPIRATION RATE: 16 BRPM | HEART RATE: 71 BPM | WEIGHT: 255 LBS

## 2019-05-15 DIAGNOSIS — R00.2 PALPITATIONS: Primary | ICD-10-CM

## 2019-05-15 DIAGNOSIS — F41.9 ANXIETY DISORDER, UNSPECIFIED TYPE: ICD-10-CM

## 2019-05-15 DIAGNOSIS — I10 ESSENTIAL HYPERTENSION: ICD-10-CM

## 2019-05-15 PROCEDURE — 99214 OFFICE O/P EST MOD 30 MIN: CPT | Performed by: INTERNAL MEDICINE

## 2019-05-15 PROCEDURE — 93000 ELECTROCARDIOGRAM COMPLETE: CPT | Performed by: INTERNAL MEDICINE

## 2019-05-15 PROCEDURE — 1036F TOBACCO NON-USER: CPT | Performed by: INTERNAL MEDICINE

## 2019-05-15 PROCEDURE — G8417 CALC BMI ABV UP PARAM F/U: HCPCS | Performed by: INTERNAL MEDICINE

## 2019-05-15 PROCEDURE — G8427 DOCREV CUR MEDS BY ELIG CLIN: HCPCS | Performed by: INTERNAL MEDICINE

## 2019-05-15 NOTE — PROGRESS NOTES
OUTPATIENT CARDIOLOGY FOLLOW-UP    Name: Isa aBr    Age: 52 y.o. Date of Service: 5/15/2019    Chief Complaint: Follow-up for palpitations, HTN    Referring Physician: Davon Walker MD    History of Present Illness:  Ms. Mona Singletary is a 52 y.o. female who presents today for follow-up evaluation of palpitations. Her PMH includes HTN, obesity, and remote tobacco abuse (x 3 years, quit > 20 years ago). She denies a history of CAD (negative exercise nuclear stress test with normal EF on 11/10/15). At the time of her last office visit, she reported a > 2 year history of palpitations -- occur multiple days/week, episodes last seconds, typically occur in the setting of social stressors, +associated atypical chest pain (no history of exertional chest pain), no history of syncope. She states that she's under considerable stress (chronic) at home stemming from a divorce and child custody issues (\"there are major issues going on with my family\" re: 2 older daughters/ex-spouse). Still with increased social stressors. Palpitations less frequent recently (\"doing better than before\"). She reports a long-standing history (\"years\") of dyspnea on exertion with moderate levels of activity (stable/improved recently). She denies a history of syncope, dizziness, PND, or orthopnea. She drinks 2-3 small cups of coffee per day. She is currently with no active cardiac complaints at rest. SR on EKG.     Review of Systems:  Cardiac: As per HPI  General: No fever, chills  Pulmonary: As per HPI  HEENT: No visual disturbances, difficult swallowing  GI: No nausea, vomiting, abdominal pain, GERD  Endocrine: No thyroid disease or DM  Musculoskeletal: WRAY x 4, no focal motor deficits  Skin: Intact, no rashes  Neuro/Psych: No headache or seizures    Past Medical History:  Past Medical History:   Diagnosis Date    Abdominal pain     Anxiety state, unspecified 3/8/2016    Asthma     no inhalers    Breast fibrocystic disorder  Diabetes mellitus ([facility] [address]     Essential hypertension 2016    Hyperlipidemia     Irritable bowel syndrome     Left ovarian cyst     Morbid obesity ([facility] [address] 2016    Osteoarthritis     Pharyngoesophageal dysphagia 2016       Past Surgical History:  Past Surgical History:   Procedure Laterality Date    ABDOMEN SURGERY Left 3/15/2019    INCISION AND DRAINAGE LEFT LABIAL ABSCESS performed by Katie Auhmada MD at [address]      Age 15     SECTION  2012    3 total    COLONOSCOPY  16    [facility]    COLONOSCOPY  2016    TONSILLECTOMY      Age 11    TUBAL LIGATION      UPPER GASTROINTESTINAL ENDOSCOPY      Negative for Hpylori    UPPER GASTROINTESTINAL ENDOSCOPY  16    [facility]    UPPER GASTROINTESTINAL ENDOSCOPY  2016       Family History:  Family History   Problem Relation Age of Onset    Rheum Arthritis Mother     Hypertension Mother     Arthritis Mother     High Blood Pressure Mother     Obesity Mother     Other Father         Heart Disease    Hypertension Father     High Cholesterol Father     Thyroid Disease Father         Hyperthyroid s/p radiation    Arthritis Father     High Blood Pressure Father     Obesity Father     Obesity Sister     Cancer Paternal Grandmother         Colon Cancer    Other Paternal Grandmother         COPD    Cancer Paternal Grandfather         Lung Cancer    Obesity Maternal Aunt     High Blood Pressure Maternal Grandmother     Obesity Maternal Grandmother     Obesity Maternal Grandfather        Social History:  Social History     Socioeconomic History    Marital status:      Spouse name: Not on file    Number of children: Not on file    Years of education: Not on file    Highest education level: Not on file   Occupational History    Not on file   Social Needs    Financial resource strain: Not on file    Food insecurity:     Worry: Not on file     Inability: Not on file   Root Transportation needs:     Medical: Not on file     Non-medical: Not on file   Tobacco Use    Smoking status: Former Smoker    Smokeless tobacco: Never Used    Tobacco comment: smoked for 3 yrs in early 20;s   Substance and Sexual Activity    Alcohol use: No     Alcohol/week: 0.0 oz    Drug use: No    Sexual activity: Yes     Partners: Male   Lifestyle    Physical activity:     Days per week: Not on file     Minutes per session: Not on file    Stress: Not on file   Relationships    Social connections:     Talks on phone: Not on file     Gets together: Not on file     Attends [employer] service: Not on file     Active member of club or organization: Not on file     Attends meetings of clubs or organizations: Not on file     Relationship status: Not on file    Intimate partner violence:     Fear of current or ex partner: Not on file     Emotionally abused: Not on file     Physically abused: Not on file     Forced sexual activity: Not on file   Other Topics Concern    Not on file   Social History Narrative    Not on file       Allergies:   Allergies   Allergen Reactions    Bactrim [Sulfamethoxazole-Trimethoprim] Rash       Current Medications:  Current Outpatient Medications   Medication Sig Dispense Refill    pravastatin (PRAVACHOL) 20 MG tablet TAKE ONE TABLET BY MOUTH EVERY DAY IN THE EVENING (Patient taking differently: Take 20 mg by mouth nightly ) 90 tablet 1    amLODIPine (NORVASC) 5 MG tablet Take 1 tablet by mouth daily (Patient taking differently: Take 5 mg by mouth every morning ) 90 tablet 1    Multiple Vitamins-Minerals (THERAPEUTIC MULTIVITAMIN-MINERALS) tablet Take 1 tablet by mouth daily Last dose 6/8/2016 (Patient taking differently: Take 1 tablet by mouth every morning ) 90 tablet 1    metFORMIN (GLUCOPHAGE) 1000 MG tablet Take 1 tablet by mouth 2 times daily (with meals) 180 tablet 1    Lancets MISC Inject 1 each into the skin daily 100 each 5    blood glucose monitor strips by Other route daily 100 strip 5    fenofibrate (TRICOR) 48 MG tablet Take 1 tablet by mouth daily (Patient taking differently: Take 48 mg by mouth nightly ) 90 tablet 1    empagliflozin (JARDIANCE) 10 MG tablet Take 1 tablet by mouth daily (Patient taking differently: Take 10 mg by mouth every morning ) 30 tablet 3    ibuprofen (ADVIL;MOTRIN) 200 MG tablet Take 400 mg by mouth every 6 hours as needed for Pain      glucose monitoring kit (FREESTYLE) monitoring kit 1 kit by Does not apply route daily as needed 1 kit 0    Misc. Devices KIT Blood pressure cuff- Ambulatory monitoring- check every other day and maintain blood pressure log 1 kit 0    clobetasol (TEMOVATE) 0.05 % external solution APPLY DAILY AS DIRECTED  1    docusate sodium (COLACE) 100 MG capsule Take 1 capsule by mouth 2 times daily 50 capsule 0     No current facility-administered medications for this visit. Physical Exam:  /84   Pulse 71   Resp 16   Ht 5' 6\" (1.676 m)   Wt 255 lb (115.7 kg)   BMI 41.16 kg/m²   Wt Readings from Last 3 Encounters:   05/15/19 255 lb (115.7 kg)   03/26/19 246 lb (111.6 kg)   03/19/19 262 lb 8 oz (119.1 kg)     Appearance: Awake, alert, no acute respiratory distress  Skin: Intact, no rash  Head: Normocephalic, atraumatic  Eyes: EOMI, no conjunctival erythema  ENMT: No pharyngeal erythema, MMM, no rhinorrhea  Neck: Supple, no elevated JVP, no carotid bruits  Lungs: Clear to auscultation bilaterally. No wheezes, rales, or rhonchi.   Cardiac: Regular rate and rhythm, +S1S2, no murmurs apparent  Abdomen: Soft, nontender, +bowel sounds  Extremities: Moves all extremities x 4, no lower extremity edema  Neurologic: No focal motor deficits apparent, normal mood and affect    Laboratory Tests:  Lab Results   Component Value Date    CREATININE 0.7 03/18/2019    BUN 12 03/18/2019     03/18/2019    K 3.8 03/18/2019     03/18/2019    CO2 25 03/18/2019     Lab Results   Component Value Date    WBC 10.4 03/18/2019    RBC 4.69 03/18/2019    HGB 13.5 03/18/2019    HCT 42.7 03/18/2019    MCV 91.0 03/18/2019    MCH 28.8 03/18/2019    MCHC 31.6 03/18/2019    RDW 13.3 03/18/2019     03/18/2019    MPV 9.0 03/18/2019     Lab Results   Component Value Date    PROTIME 11.3 07/11/2011    INR 1.2 07/11/2011     Lab Results   Component Value Date    TSH 1.710 06/29/2016     Lab Results   Component Value Date    LABA1C 7.0 (H) 12/14/2018     Lab Results   Component Value Date    CHOL 178 06/09/2018    CHOL 173 06/12/2017    CHOL 162 06/10/2016     Lab Results   Component Value Date    TRIG 376 (H) 06/09/2018    TRIG 297 (H) 06/12/2017    TRIG 264 (H) 06/10/2016     Lab Results   Component Value Date    HDL 43 06/09/2018    HDL 41 06/12/2017    HDL 38 06/10/2016     Lab Results   Component Value Date    LDLCALC 60 06/09/2018    [address] 73 06/12/2017    LDLCALC 71 06/10/2016     Lab Results   Component Value Date    LABVLDL 75 06/09/2018    LABVLDL 59 06/12/2017    LABVLDL 53 06/10/2016     Cardiac Tests:  ECG: SR, rate 71, PRWP, leftward axis    Echocardiogram: 7/27/18 (Dr. Tevin Varner)   Ejection fraction is visually estimated at 65%.   No regional wall motion abnormalities seen.   Normal left ventricular diastolic filling pattern for age.  Bushnell Goon right ventricle structure and function.   Trace aortic valve regurgitation.   Physiologic and/or trace mitral regurgitation is present. ASSESSMENT / PLAN:  1. Palpitations (in the setting of #2) -- normal TSH, improved recently  2. Significant social stressors / anxiety  3. HTN -- 's at prior office visits, BP today 128/84  4. Long-standing dyspnea on exertion  5. Obesity - BMI 44.9 --> 43.5 --> 41.2  6. Remote tobacco abuse (x 3 years, quit > 20 years ago)  7. Exercise nuclear stress test (11/10/15) - negative for stress induced ischemia, normal EF  8.  Holter monitoring (48 hours, 10/2015) - avg HR 91 BPM, no atrial atrial fibrillation, no ventricular ectopy, rare PAC's (3 isolated PAC's), \"palpitations\" did not correlate with any arrhythmias    - Results of 7/2018 echocardiogram reviewed with the patient today  - I, again, had a discussion with the patient regarding possible etiologies, diagnostic work-up, and treatment options for palpitations. Her palpitations typically correlate with her stressors at home/anxiety. Results of prior stress test and 48 hour Holter monitoring reviewed with the patient today. Options for further cardiac work-up discussed (including repeat cardiac monitoring) -- decision made to hold off with advanced cardiac work-up at this time given recent clinical improvement.  Will reassess for worsening of symptoms.  - Home BP monitoring --> continue current regimen  - Keep K > 4 and Mg > 2  - Aggressive risk factor modifications / continued weight loss  - Consider performing a sleep study if no prior study    Yolanda Ortega MD  [facility]) Cardiology

## 2019-06-19 RX ORDER — MULTIVIT,CALC,MINS/IRON/FOLIC 9MG-400MCG
TABLET ORAL
Qty: 30 TABLET | Refills: 2 | Status: SHIPPED | OUTPATIENT
Start: 2019-06-19 | End: 2019-07-08 | Stop reason: SDUPTHER

## 2019-07-05 ENCOUNTER — HOSPITAL ENCOUNTER (OUTPATIENT)
Age: 49
Discharge: HOME | End: 2019-07-05
Payer: COMMERCIAL

## 2019-07-05 DIAGNOSIS — R20.2 TINGLING OF BOTH FEET: ICD-10-CM

## 2019-07-05 DIAGNOSIS — E11.65 TYPE 2 DIABETES MELLITUS WITH HYPERGLYCEMIA, WITHOUT LONG-TERM CURRENT USE OF INSULIN (HCC): ICD-10-CM

## 2019-07-05 DIAGNOSIS — E78.2 MIXED HYPERLIPIDEMIA: ICD-10-CM

## 2019-07-05 LAB
CHOLESTEROL, TOTAL: 169 MG/DL (ref 0–199)
HBA1C MFR BLD: 7.4 % (ref 4–5.6)
HDLC SERPL-MCNC: 42 MG/DL
LDL CHOLESTEROL CALCULATED: 78 MG/DL (ref 0–99)
TRIGL SERPL-MCNC: 244 MG/DL (ref 0–149)
VITAMIN B-12: 181 PG/ML (ref 211–946)
VLDLC SERPL CALC-MCNC: 49 MG/DL

## 2019-07-05 PROCEDURE — 36415 COLL VENOUS BLD VENIPUNCTURE: CPT

## 2019-07-05 PROCEDURE — 80061 LIPID PANEL: CPT

## 2019-07-05 PROCEDURE — 83036 HEMOGLOBIN GLYCOSYLATED A1C: CPT

## 2019-07-05 PROCEDURE — 82607 VITAMIN B-12: CPT

## 2019-07-08 ENCOUNTER — OFFICE VISIT (OUTPATIENT)
Dept: INTERNAL MEDICINE | Age: 49
End: 2019-07-08
Payer: COMMERCIAL

## 2019-07-08 VITALS
BODY MASS INDEX: 41.3 KG/M2 | SYSTOLIC BLOOD PRESSURE: 132 MMHG | RESPIRATION RATE: 12 BRPM | HEART RATE: 78 BPM | WEIGHT: 257 LBS | HEIGHT: 66 IN | TEMPERATURE: 98.4 F | DIASTOLIC BLOOD PRESSURE: 75 MMHG

## 2019-07-08 DIAGNOSIS — R25.2 LEG CRAMPS: ICD-10-CM

## 2019-07-08 DIAGNOSIS — E11.65 TYPE 2 DIABETES MELLITUS WITH HYPERGLYCEMIA, WITHOUT LONG-TERM CURRENT USE OF INSULIN (HCC): ICD-10-CM

## 2019-07-08 DIAGNOSIS — E53.8 VITAMIN B12 DEFICIENCY: ICD-10-CM

## 2019-07-08 DIAGNOSIS — Z12.39 SCREENING FOR BREAST CANCER: Primary | ICD-10-CM

## 2019-07-08 DIAGNOSIS — I10 ESSENTIAL HYPERTENSION: ICD-10-CM

## 2019-07-08 DIAGNOSIS — E78.2 MIXED HYPERLIPIDEMIA: ICD-10-CM

## 2019-07-08 PROCEDURE — 2022F DILAT RTA XM EVC RTNOPTHY: CPT | Performed by: INTERNAL MEDICINE

## 2019-07-08 PROCEDURE — 1036F TOBACCO NON-USER: CPT | Performed by: INTERNAL MEDICINE

## 2019-07-08 PROCEDURE — 99212 OFFICE O/P EST SF 10 MIN: CPT | Performed by: INTERNAL MEDICINE

## 2019-07-08 PROCEDURE — G8417 CALC BMI ABV UP PARAM F/U: HCPCS | Performed by: INTERNAL MEDICINE

## 2019-07-08 PROCEDURE — 99214 OFFICE O/P EST MOD 30 MIN: CPT | Performed by: INTERNAL MEDICINE

## 2019-07-08 PROCEDURE — 3045F PR MOST RECENT HEMOGLOBIN A1C LEVEL 7.0-9.0%: CPT | Performed by: INTERNAL MEDICINE

## 2019-07-08 PROCEDURE — G8427 DOCREV CUR MEDS BY ELIG CLIN: HCPCS | Performed by: INTERNAL MEDICINE

## 2019-07-08 RX ORDER — MULTIVIT,CALC,MINS/IRON/FOLIC 9MG-400MCG
TABLET ORAL
Qty: 90 TABLET | Refills: 1 | Status: SHIPPED | OUTPATIENT
Start: 2019-07-08 | End: 2019-09-11

## 2019-07-08 RX ORDER — PRAVASTATIN SODIUM 20 MG
TABLET ORAL
Qty: 90 TABLET | Refills: 1 | Status: SHIPPED | OUTPATIENT
Start: 2019-07-08 | End: 2019-10-14 | Stop reason: SDUPTHER

## 2019-07-08 RX ORDER — AMLODIPINE BESYLATE 5 MG/1
5 TABLET ORAL DAILY
Qty: 90 TABLET | Refills: 1 | Status: SHIPPED | OUTPATIENT
Start: 2019-07-08 | End: 2019-10-14 | Stop reason: SDUPTHER

## 2019-07-08 RX ORDER — FENOFIBRATE 48 MG/1
48 TABLET, COATED ORAL DAILY
Qty: 90 TABLET | Refills: 1 | Status: SHIPPED | OUTPATIENT
Start: 2019-07-08 | End: 2019-10-14 | Stop reason: SDUPTHER

## 2019-07-08 ASSESSMENT — ENCOUNTER SYMPTOMS
NAUSEA: 0
BLURRED VISION: 0
COUGH: 0
VOMITING: 0
BLOOD IN STOOL: 0
ORTHOPNEA: 0
SHORTNESS OF BREATH: 0
DIARRHEA: 0
ABDOMINAL PAIN: 0
WHEEZING: 0
CONSTIPATION: 0
ABDOMINAL DISTENTION: 0

## 2019-07-08 NOTE — PATIENT INSTRUCTIONS
Patient Education        Preventing Falls: Care Instructions  Your Care Instructions    Getting around your home safely can be a challenge if you have injuries or health problems that make it easy for you to fall. Loose rugs and furniture in walkways are among the dangers for many older people who have problems walking or who have poor eyesight. People who have conditions such as arthritis, osteoporosis, or dementia also have to be careful not to fall. You can make your home safer with a few simple measures. Follow-up care is a key part of your treatment and safety. Be sure to make and go to all appointments, and call your doctor if you are having problems. It's also a good idea to know your test results and keep a list of the medicines you take. How can you care for yourself at home? Taking care of yourself  · You may get dizzy if you do not drink enough water. To prevent dehydration, drink plenty of fluids, enough so that your urine is light yellow or clear like water. Choose water and other caffeine-free clear liquids. If you have kidney, heart, or liver disease and have to limit fluids, talk with your doctor before you increase the amount of fluids you drink. · Exercise regularly to improve your strength, muscle tone, and balance. Walk if you can. Swimming may be a good choice if you cannot walk easily. · Have your vision and hearing checked each year or any time you notice a change. If you have trouble seeing and hearing, you might not be able to avoid objects and could lose your balance. · Know the side effects of the medicines you take. Ask your doctor or pharmacist whether the medicines you take can affect your balance. Sleeping pills or sedatives can affect your balance. · Limit the amount of alcohol you drink. Alcohol can impair your balance and other senses. · Ask your doctor whether calluses or corns on your feet need to be removed.  If you wear loose-fitting shoes because of calluses or corns, that will allow you to sit while showering. · Get into a tub or shower by putting the weaker leg in first. Get out of a tub or shower with your strong side first.  · Repair loose toilet seats and consider installing a raised toilet seat to make getting on and off the toilet easier. · Keep your bathroom door unlocked while you are in the shower. Where can you learn more? Go to https://AntavopeTaggoeb.Vidiowiki. org and sign in to your [employer] account. Enter [phone] [id number] in the [employer] box to learn more about \"Preventing Falls: Care Instructions. \"     If you do not have an account, please click on the \"Sign Up Now\" link. Current as of: November 7, 2018  Content Version: 12.0  © 3355-9877 Healthwise, Incorporated. Care instructions adapted under license by [facility] ([facility]). If you have questions about a medical condition or this instruction, always ask your healthcare professional. [address] any warranty or liability for your use of this information.        get blood work in 8 weeks  Mammogram ordered

## 2019-07-08 NOTE — PROGRESS NOTES
[facility]  InternalMedicine Residency Program  ACC Note      SUBJECTIVE:  CC: had concerns including Hypertension; Diabetes; Medication Refill; and Other (needs mammogram and PAP order). Carlene Gaxiola presented to the [facility] for a routine visit. Presents for follow-up appointment. No acute complaints since last visit. Abscess is resolved. No noted recurrent yeast infections. No noted UTI symptoms. Weights stable. Labs reviewed and discussed after completion on 7/5. Tolerating all medications. External yeast infection is resolved. Seen by Cardiology in May 2019- no new changes; Palpitations controlled. Hypertension   This is a chronic problem. The problem is controlled. Pertinent negatives include no blurred vision, chest pain, headaches, orthopnea, palpitations, peripheral edema, PND or shortness of breath. The current treatment provides moderate improvement. Diabetes   She presents for her follow-up diabetic visit. She has type 2 diabetes mellitus. Her disease course has been stable (A1c 7.4%; no weight change ). Pertinent negatives for hypoglycemia include no dizziness or headaches. Pertinent negatives for diabetes include no blurred vision, no chest pain, no polydipsia, no polyphagia and no polyuria. Review of Systems   Constitutional: Negative for chills and fever. Eyes: Negative for blurred vision. Respiratory: Negative for cough, shortness of breath and wheezing. Cardiovascular: Negative for chest pain, palpitations, orthopnea, leg swelling and PND. Gastrointestinal: Negative for abdominal distention, abdominal pain, blood in stool, constipation, diarrhea, nausea and vomiting. Endocrine: Negative for polydipsia, polyphagia and polyuria. Genitourinary: Negative for decreased urine volume, difficulty urinating, dysuria, urgency, vaginal bleeding and vaginal discharge.         No noted yeast infections or UTIs   Musculoskeletal: Positive for myalgias

## 2019-07-17 DIAGNOSIS — E11.65 TYPE 2 DIABETES MELLITUS WITH HYPERGLYCEMIA, WITHOUT LONG-TERM CURRENT USE OF INSULIN (HCC): ICD-10-CM

## 2019-07-19 ENCOUNTER — HOSPITAL ENCOUNTER (OUTPATIENT)
Dept: GENERAL RADIOLOGY | Age: 49
Discharge: HOME | End: 2019-07-21
Payer: COMMERCIAL

## 2019-07-19 DIAGNOSIS — Z12.39 SCREENING FOR BREAST CANCER: ICD-10-CM

## 2019-07-19 PROCEDURE — 77063 BREAST TOMOSYNTHESIS BI: CPT

## 2019-08-16 ENCOUNTER — TELEPHONE (OUTPATIENT)
Dept: INTERNAL MEDICINE | Age: 49
End: 2019-08-16

## 2019-08-16 ENCOUNTER — TELEPHONE (OUTPATIENT)
Dept: ADMINISTRATIVE | Age: 49
End: 2019-08-16

## 2019-08-16 NOTE — TELEPHONE ENCOUNTER
Pt called into [facility], states that her script for Ines Parkinson was sent to [facility], but was told that they could NOT fill script until PCP gets authorization from insurance company. Pt did not want to leave message on voice mail, states she has left 3messages this week. Pt states that she is totally out of her medication. Informed her that message will be forwarded to clinical staff.      .Electronically signed by Shira Sam on 8/16/19 at 3:36 PM

## 2019-08-26 ENCOUNTER — TELEPHONE (OUTPATIENT)
Dept: INTERNAL MEDICINE | Age: 49
End: 2019-08-26

## 2019-08-26 DIAGNOSIS — E11.65 TYPE 2 DIABETES MELLITUS WITH HYPERGLYCEMIA, WITHOUT LONG-TERM CURRENT USE OF INSULIN (HCC): Primary | ICD-10-CM

## 2019-08-26 NOTE — TELEPHONE ENCOUNTER
[facility] day trial of steglatro or a clinical reason why this trial would not be appropriate

## 2019-09-09 ENCOUNTER — HOSPITAL ENCOUNTER (OUTPATIENT)
Age: 49
Discharge: HOME | End: 2019-09-09
Payer: COMMERCIAL

## 2019-09-09 DIAGNOSIS — R25.2 LEG CRAMPS: ICD-10-CM

## 2019-09-09 DIAGNOSIS — E11.65 TYPE 2 DIABETES MELLITUS WITH HYPERGLYCEMIA, WITHOUT LONG-TERM CURRENT USE OF INSULIN (HCC): ICD-10-CM

## 2019-09-09 DIAGNOSIS — E53.8 VITAMIN B12 DEFICIENCY: ICD-10-CM

## 2019-09-09 LAB
BASOPHILS ABSOLUTE: 0.06 E9/L (ref 0–0.2)
BASOPHILS RELATIVE PERCENT: 0.8 % (ref 0–2)
CREATININE URINE: 12 MG/DL (ref 29–226)
EOSINOPHILS ABSOLUTE: 0.48 E9/L (ref 0.05–0.5)
EOSINOPHILS RELATIVE PERCENT: 6.2 % (ref 0–6)
HCT VFR BLD CALC: 45.3 % (ref 34–48)
HEMOGLOBIN: 15 G/DL (ref 11.5–15.5)
IMMATURE GRANULOCYTES #: 0.05 E9/L
IMMATURE GRANULOCYTES %: 0.7 % (ref 0–5)
LYMPHOCYTES ABSOLUTE: 2.56 E9/L (ref 1.5–4)
LYMPHOCYTES RELATIVE PERCENT: 33.3 % (ref 20–42)
MCH RBC QN AUTO: 29.2 PG (ref 26–35)
MCHC RBC AUTO-ENTMCNC: 33.1 % (ref 32–34.5)
MCV RBC AUTO: 88.1 FL (ref 80–99.9)
MICROALBUMIN UR-MCNC: <12 MG/L
MICROALBUMIN/CREAT UR-RTO: ABNORMAL (ref 0–30)
MONOCYTES ABSOLUTE: 0.76 E9/L (ref 0.1–0.95)
MONOCYTES RELATIVE PERCENT: 9.9 % (ref 2–12)
NEUTROPHILS ABSOLUTE: 3.78 E9/L (ref 1.8–7.3)
NEUTROPHILS RELATIVE PERCENT: 49.1 % (ref 43–80)
PDW BLD-RTO: 12.8 FL (ref 11.5–15)
PLATELET # BLD: 458 E9/L (ref 130–450)
PMV BLD AUTO: 9.8 FL (ref 7–12)
RBC # BLD: 5.14 E12/L (ref 3.5–5.5)
TOTAL CK: 127 U/L (ref 20–180)
VITAMIN B-12: 376 PG/ML (ref 211–946)
WBC # BLD: 7.7 E9/L (ref 4.5–11.5)

## 2019-09-09 PROCEDURE — 82570 ASSAY OF URINE CREATININE: CPT

## 2019-09-09 PROCEDURE — 82550 ASSAY OF CK (CPK): CPT

## 2019-09-09 PROCEDURE — 82607 VITAMIN B-12: CPT

## 2019-09-09 PROCEDURE — 82044 UR ALBUMIN SEMIQUANTITATIVE: CPT

## 2019-09-09 PROCEDURE — 85025 COMPLETE CBC W/AUTO DIFF WBC: CPT

## 2019-09-09 PROCEDURE — 36415 COLL VENOUS BLD VENIPUNCTURE: CPT

## 2019-09-11 RX ORDER — MULTIVIT,CALC,MINS/IRON/FOLIC 9MG-400MCG
TABLET ORAL
Qty: 30 TABLET | Refills: 1 | Status: SHIPPED | OUTPATIENT
Start: 2019-09-11 | End: 2019-10-14 | Stop reason: SDUPTHER

## 2019-10-14 ENCOUNTER — OFFICE VISIT (OUTPATIENT)
Dept: INTERNAL MEDICINE | Age: 49
End: 2019-10-14
Payer: COMMERCIAL

## 2019-10-14 ENCOUNTER — CARE COORDINATION (OUTPATIENT)
Dept: INTERNAL MEDICINE | Age: 49
End: 2019-10-14

## 2019-10-14 VITALS
RESPIRATION RATE: 18 BRPM | HEIGHT: 66 IN | SYSTOLIC BLOOD PRESSURE: 132 MMHG | HEART RATE: 74 BPM | DIASTOLIC BLOOD PRESSURE: 64 MMHG | BODY MASS INDEX: 41.33 KG/M2 | OXYGEN SATURATION: 98 % | WEIGHT: 257.2 LBS | TEMPERATURE: 98.3 F

## 2019-10-14 DIAGNOSIS — Z23 INFLUENZA VACCINE NEEDED: ICD-10-CM

## 2019-10-14 DIAGNOSIS — R25.2 LEG CRAMPS: Primary | ICD-10-CM

## 2019-10-14 DIAGNOSIS — E53.8 VITAMIN B12 DEFICIENCY: ICD-10-CM

## 2019-10-14 DIAGNOSIS — E11.65 TYPE 2 DIABETES MELLITUS WITH HYPERGLYCEMIA, WITHOUT LONG-TERM CURRENT USE OF INSULIN (HCC): ICD-10-CM

## 2019-10-14 DIAGNOSIS — D75.839 THROMBOCYTOSIS: ICD-10-CM

## 2019-10-14 DIAGNOSIS — I10 ESSENTIAL HYPERTENSION: ICD-10-CM

## 2019-10-14 DIAGNOSIS — E78.2 MIXED HYPERLIPIDEMIA: ICD-10-CM

## 2019-10-14 LAB — HBA1C MFR BLD: 7.4 %

## 2019-10-14 PROCEDURE — 99212 OFFICE O/P EST SF 10 MIN: CPT | Performed by: INTERNAL MEDICINE

## 2019-10-14 PROCEDURE — G8417 CALC BMI ABV UP PARAM F/U: HCPCS | Performed by: INTERNAL MEDICINE

## 2019-10-14 PROCEDURE — G0008 ADMIN INFLUENZA VIRUS VAC: HCPCS

## 2019-10-14 PROCEDURE — 2022F DILAT RTA XM EVC RTNOPTHY: CPT | Performed by: INTERNAL MEDICINE

## 2019-10-14 PROCEDURE — G8427 DOCREV CUR MEDS BY ELIG CLIN: HCPCS | Performed by: INTERNAL MEDICINE

## 2019-10-14 PROCEDURE — 99214 OFFICE O/P EST MOD 30 MIN: CPT | Performed by: INTERNAL MEDICINE

## 2019-10-14 PROCEDURE — 1036F TOBACCO NON-USER: CPT | Performed by: INTERNAL MEDICINE

## 2019-10-14 PROCEDURE — 6360000002 HC RX W HCPCS

## 2019-10-14 PROCEDURE — G8482 FLU IMMUNIZE ORDER/ADMIN: HCPCS | Performed by: INTERNAL MEDICINE

## 2019-10-14 PROCEDURE — 90686 IIV4 VACC NO PRSV 0.5 ML IM: CPT

## 2019-10-14 PROCEDURE — 83036 HEMOGLOBIN GLYCOSYLATED A1C: CPT | Performed by: INTERNAL MEDICINE

## 2019-10-14 RX ORDER — AMLODIPINE BESYLATE 5 MG/1
5 TABLET ORAL DAILY
Qty: 90 TABLET | Refills: 1 | Status: SHIPPED
Start: 2019-10-14 | End: 2020-06-22 | Stop reason: SDUPTHER

## 2019-10-14 RX ORDER — PRAVASTATIN SODIUM 20 MG
TABLET ORAL
Qty: 90 TABLET | Refills: 1 | Status: SHIPPED
Start: 2019-10-14 | End: 2020-06-22 | Stop reason: SDUPTHER

## 2019-10-14 RX ORDER — LANCETS 30 GAUGE
1 EACH MISCELLANEOUS DAILY
Qty: 100 EACH | Refills: 5 | Status: SHIPPED
Start: 2019-10-14 | End: 2020-09-28 | Stop reason: SDUPTHER

## 2019-10-14 RX ORDER — GLUCOSAMINE HCL/CHONDROITIN SU 500-400 MG
CAPSULE ORAL DAILY
Qty: 100 STRIP | Refills: 5 | Status: SHIPPED
Start: 2019-10-14 | End: 2020-06-22 | Stop reason: SDUPTHER

## 2019-10-14 RX ORDER — FENOFIBRATE 48 MG/1
48 TABLET, COATED ORAL DAILY
Qty: 90 TABLET | Refills: 1 | Status: SHIPPED
Start: 2019-10-14 | End: 2020-06-22 | Stop reason: SDUPTHER

## 2019-10-14 RX ORDER — CLOBETASOL PROPIONATE 0.46 MG/ML
SOLUTION TOPICAL
Refills: 1 | Status: CANCELLED | OUTPATIENT
Start: 2019-10-14

## 2019-10-14 RX ORDER — MULTIVIT,CALC,MINS/IRON/FOLIC 9MG-400MCG
1 TABLET ORAL DAILY
Qty: 90 TABLET | Refills: 1 | Status: SHIPPED | OUTPATIENT
Start: 2019-10-14 | End: 2019-11-11

## 2019-10-14 ASSESSMENT — ENCOUNTER SYMPTOMS
VOMITING: 0
WHEEZING: 0
CONSTIPATION: 0
ORTHOPNEA: 0
SHORTNESS OF BREATH: 0
BLURRED VISION: 0
COUGH: 0
DIARRHEA: 0
VISUAL CHANGE: 0
NAUSEA: 0

## 2019-11-05 ENCOUNTER — OFFICE VISIT (OUTPATIENT)
Dept: OBGYN | Age: 49
End: 2019-11-05
Payer: COMMERCIAL

## 2019-11-05 ENCOUNTER — HOSPITAL ENCOUNTER (OUTPATIENT)
Age: 49
Discharge: HOME | End: 2019-11-07
Payer: COMMERCIAL

## 2019-11-05 VITALS
TEMPERATURE: 98.4 F | RESPIRATION RATE: 16 BRPM | DIASTOLIC BLOOD PRESSURE: 84 MMHG | BODY MASS INDEX: 42.49 KG/M2 | HEART RATE: 82 BPM | WEIGHT: 255 LBS | HEIGHT: 65 IN | SYSTOLIC BLOOD PRESSURE: 148 MMHG

## 2019-11-05 DIAGNOSIS — Z12.4 PAP SMEAR FOR CERVICAL CANCER SCREENING: ICD-10-CM

## 2019-11-05 DIAGNOSIS — Z01.419 WOMEN'S ANNUAL ROUTINE GYNECOLOGICAL EXAMINATION: Primary | ICD-10-CM

## 2019-11-05 PROCEDURE — 99396 PREV VISIT EST AGE 40-64: CPT | Performed by: OBSTETRICS & GYNECOLOGY

## 2019-11-05 PROCEDURE — 99213 OFFICE O/P EST LOW 20 MIN: CPT | Performed by: OBSTETRICS & GYNECOLOGY

## 2019-11-05 PROCEDURE — G8482 FLU IMMUNIZE ORDER/ADMIN: HCPCS | Performed by: OBSTETRICS & GYNECOLOGY

## 2019-11-05 PROCEDURE — 88175 CYTOPATH C/V AUTO FLUID REDO: CPT

## 2019-11-09 DIAGNOSIS — E53.8 VITAMIN B12 DEFICIENCY: ICD-10-CM

## 2019-11-11 RX ORDER — LANOLIN ALCOHOL/MO/W.PET/CERES
CREAM (GRAM) TOPICAL
Qty: 30 TABLET | Refills: 2 | Status: SHIPPED | OUTPATIENT
Start: 2019-11-11 | End: 2020-01-27 | Stop reason: SDUPTHER

## 2019-11-11 RX ORDER — MULTIVIT,CALC,MINS/IRON/FOLIC 9MG-400MCG
TABLET ORAL
Qty: 30 TABLET | Refills: 2 | Status: SHIPPED | OUTPATIENT
Start: 2019-11-11 | End: 2020-01-27 | Stop reason: SDUPTHER

## 2019-11-21 ENCOUNTER — TELEPHONE (OUTPATIENT)
Dept: OBGYN | Age: 49
End: 2019-11-21

## 2019-12-10 DIAGNOSIS — R25.2 LEG CRAMPS: ICD-10-CM

## 2019-12-10 RX ORDER — MAGNESIUM CHLORIDE 71.5 MG
TABLET, DELAYED RELEASE (ENTERIC COATED) ORAL
Qty: 60 TABLET | Refills: 1 | Status: SHIPPED | OUTPATIENT
Start: 2019-12-10 | End: 2020-01-16

## 2019-12-18 ENCOUNTER — TELEPHONE (OUTPATIENT)
Dept: INTERNAL MEDICINE | Age: 49
End: 2019-12-18

## 2019-12-18 DIAGNOSIS — E11.65 TYPE 2 DIABETES MELLITUS WITH HYPERGLYCEMIA, WITHOUT LONG-TERM CURRENT USE OF INSULIN (HCC): ICD-10-CM

## 2019-12-21 ENCOUNTER — HOSPITAL ENCOUNTER (OUTPATIENT)
Age: 49
Discharge: HOME | End: 2019-12-21
Payer: COMMERCIAL

## 2019-12-21 DIAGNOSIS — D75.839 THROMBOCYTOSIS: ICD-10-CM

## 2019-12-21 DIAGNOSIS — E11.65 TYPE 2 DIABETES MELLITUS WITH HYPERGLYCEMIA, WITHOUT LONG-TERM CURRENT USE OF INSULIN (HCC): ICD-10-CM

## 2019-12-21 DIAGNOSIS — R25.2 LEG CRAMPS: ICD-10-CM

## 2019-12-21 LAB
ANION GAP SERPL CALCULATED.3IONS-SCNC: 11 MMOL/L (ref 7–16)
BASOPHILS ABSOLUTE: 0.03 E9/L (ref 0–0.2)
BASOPHILS RELATIVE PERCENT: 0.4 % (ref 0–2)
BUN BLDV-MCNC: 17 MG/DL (ref 6–20)
CALCIUM SERPL-MCNC: 9.6 MG/DL (ref 8.6–10.2)
CHLORIDE BLD-SCNC: 97 MMOL/L (ref 98–107)
CO2: 27 MMOL/L (ref 22–29)
CREAT SERPL-MCNC: 0.7 MG/DL (ref 0.5–1)
EOSINOPHILS ABSOLUTE: 0.39 E9/L (ref 0.05–0.5)
EOSINOPHILS RELATIVE PERCENT: 5.3 % (ref 0–6)
FERRITIN: 100 NG/ML
GFR AFRICAN AMERICAN: >60
GFR NON-AFRICAN AMERICAN: >60 ML/MIN/1.73
GLUCOSE BLD-MCNC: 143 MG/DL (ref 74–99)
HCT VFR BLD CALC: 46.9 % (ref 34–48)
HEMOGLOBIN: 15.5 G/DL (ref 11.5–15.5)
IMMATURE GRANULOCYTES #: 0.02 E9/L
IMMATURE GRANULOCYTES %: 0.3 % (ref 0–5)
IRON: 76 MCG/DL (ref 37–145)
LYMPHOCYTES ABSOLUTE: 2.44 E9/L (ref 1.5–4)
LYMPHOCYTES RELATIVE PERCENT: 32.9 % (ref 20–42)
MCH RBC QN AUTO: 30 PG (ref 26–35)
MCHC RBC AUTO-ENTMCNC: 33 % (ref 32–34.5)
MCV RBC AUTO: 90.7 FL (ref 80–99.9)
MONOCYTES ABSOLUTE: 0.57 E9/L (ref 0.1–0.95)
MONOCYTES RELATIVE PERCENT: 7.7 % (ref 2–12)
NEUTROPHILS ABSOLUTE: 3.96 E9/L (ref 1.8–7.3)
NEUTROPHILS RELATIVE PERCENT: 53.4 % (ref 43–80)
PDW BLD-RTO: 12.8 FL (ref 11.5–15)
PLATELET # BLD: 439 E9/L (ref 130–450)
PMV BLD AUTO: 9.5 FL (ref 7–12)
POTASSIUM SERPL-SCNC: 4.5 MMOL/L (ref 3.5–5)
RBC # BLD: 5.17 E12/L (ref 3.5–5.5)
SODIUM BLD-SCNC: 135 MMOL/L (ref 132–146)
WBC # BLD: 7.4 E9/L (ref 4.5–11.5)

## 2019-12-21 PROCEDURE — 80048 BASIC METABOLIC PNL TOTAL CA: CPT

## 2019-12-21 PROCEDURE — 82728 ASSAY OF FERRITIN: CPT

## 2019-12-21 PROCEDURE — 85025 COMPLETE CBC W/AUTO DIFF WBC: CPT

## 2019-12-21 PROCEDURE — 83540 ASSAY OF IRON: CPT

## 2019-12-21 PROCEDURE — 36415 COLL VENOUS BLD VENIPUNCTURE: CPT

## 2020-01-21 RX ORDER — MAGNESIUM CHLORIDE 71.5 MG
TABLET, DELAYED RELEASE (ENTERIC COATED) ORAL
Qty: 60 TABLET | Refills: 0 | Status: SHIPPED | OUTPATIENT
Start: 2020-01-21 | End: 2020-01-27 | Stop reason: SDUPTHER

## 2020-01-27 ENCOUNTER — OFFICE VISIT (OUTPATIENT)
Dept: INTERNAL MEDICINE | Age: 50
End: 2020-01-27
Payer: COMMERCIAL

## 2020-01-27 ENCOUNTER — CARE COORDINATION (OUTPATIENT)
Dept: INTERNAL MEDICINE | Age: 50
End: 2020-01-27

## 2020-01-27 VITALS
RESPIRATION RATE: 12 BRPM | BODY MASS INDEX: 40.98 KG/M2 | WEIGHT: 255 LBS | SYSTOLIC BLOOD PRESSURE: 134 MMHG | HEIGHT: 66 IN | HEART RATE: 75 BPM | DIASTOLIC BLOOD PRESSURE: 82 MMHG | TEMPERATURE: 98.1 F

## 2020-01-27 LAB — HBA1C MFR BLD: 7.3 %

## 2020-01-27 PROCEDURE — G8427 DOCREV CUR MEDS BY ELIG CLIN: HCPCS | Performed by: INTERNAL MEDICINE

## 2020-01-27 PROCEDURE — 99213 OFFICE O/P EST LOW 20 MIN: CPT | Performed by: INTERNAL MEDICINE

## 2020-01-27 PROCEDURE — G8482 FLU IMMUNIZE ORDER/ADMIN: HCPCS | Performed by: INTERNAL MEDICINE

## 2020-01-27 PROCEDURE — G8417 CALC BMI ABV UP PARAM F/U: HCPCS | Performed by: INTERNAL MEDICINE

## 2020-01-27 PROCEDURE — 2022F DILAT RTA XM EVC RTNOPTHY: CPT | Performed by: INTERNAL MEDICINE

## 2020-01-27 PROCEDURE — 83036 HEMOGLOBIN GLYCOSYLATED A1C: CPT | Performed by: INTERNAL MEDICINE

## 2020-01-27 PROCEDURE — 99214 OFFICE O/P EST MOD 30 MIN: CPT | Performed by: INTERNAL MEDICINE

## 2020-01-27 PROCEDURE — 1036F TOBACCO NON-USER: CPT | Performed by: INTERNAL MEDICINE

## 2020-01-27 RX ORDER — LANOLIN ALCOHOL/MO/W.PET/CERES
CREAM (GRAM) TOPICAL
Qty: 90 TABLET | Refills: 1 | Status: SHIPPED
Start: 2020-01-27 | End: 2020-06-22

## 2020-01-27 RX ORDER — MULTIVIT,CALC,MINS/IRON/FOLIC 9MG-400MCG
TABLET ORAL
Qty: 90 TABLET | Refills: 1 | Status: SHIPPED | OUTPATIENT
Start: 2020-01-27 | End: 2020-02-04

## 2020-01-27 SDOH — ECONOMIC STABILITY: FOOD INSECURITY: WITHIN THE PAST 12 MONTHS, THE FOOD YOU BOUGHT JUST DIDN'T LAST AND YOU DIDN'T HAVE MONEY TO GET MORE.: NEVER TRUE

## 2020-01-27 SDOH — ECONOMIC STABILITY: FOOD INSECURITY: WITHIN THE PAST 12 MONTHS, YOU WORRIED THAT YOUR FOOD WOULD RUN OUT BEFORE YOU GOT MONEY TO BUY MORE.: NEVER TRUE

## 2020-01-27 SDOH — ECONOMIC STABILITY: INCOME INSECURITY: HOW HARD IS IT FOR YOU TO PAY FOR THE VERY BASICS LIKE FOOD, HOUSING, MEDICAL CARE, AND HEATING?: NOT HARD AT ALL

## 2020-01-27 ASSESSMENT — ENCOUNTER SYMPTOMS
BOWEL INCONTINENCE: 0
BLURRED VISION: 0
VISUAL CHANGE: 0
NAUSEA: 0
WHEEZING: 0
ORTHOPNEA: 0
CONSTIPATION: 0
ABDOMINAL PAIN: 0
DIARRHEA: 0
SHORTNESS OF BREATH: 0
BACK PAIN: 1

## 2020-01-27 NOTE — CARE COORDINATION
Ambulatory Care Coordination Note  1/27/2020  CM Risk Score: 5  Charlson 10 Year Mortality Risk Score: 4%     ACC: Giovana Gupta RN    Summary Note:   Lab Results   Component Value Date    LABA1C 7.3 01/27/2020    LABA1C 7.4 10/14/2019    LABA1C 7.4 (H) 07/05/2019     Met with Chris Robert who admits she eats on the run and when stressed. She is aware that weight loss would be beneficial. . We discussed meal planning and meal prepping with cutting up vegetable to grab on the go. We reviewed cleaning out the cupboards and not having foods around that are unhealthy exp quick brownies and have healthy alternatively available. She has good understanding of carbohydrates and portion control. We reviewed the benefits of exercise. Goals Addressed    None         Prior to Admission medications    Medication Sig Start Date End Date Taking?  Authorizing Provider   magnesium cl-calcium carbonate (NU-MAG) 71.5-119 MG TBEC tablet TAKE TWO TABLETS BY MOUTH EVERY DAY 1/27/20   Metta Lesches, MD   vitamin B-12 (CYANOCOBALAMIN) 1000 MCG tablet TAKE ONE TABLET BY MOUTH DAILY 1/27/20   Metta Lesches, MD   Multiple Vitamins-Minerals (THEREMS-M) TABS TAKE ONE TABLET BY MOUTH EVERY DAY 1/27/20   Metta Lesches, MD   empagliflozin (JARDIANCE) 10 MG tablet Take 1 tablet by mouth daily 1/27/20   Metta Lesches, MD   pravastatin (PRAVACHOL) 20 MG tablet TAKE ONE TABLET BY MOUTH EVERY DAY IN THE EVENING 10/14/19   Metta Lesches, MD   amLODIPine (NORVASC) 5 MG tablet Take 1 tablet by mouth daily 10/14/19   Metta Lesches, MD   metFORMIN (GLUCOPHAGE) 1000 MG tablet Take 1 tablet by mouth 2 times daily (with meals) 10/14/19   Metta Lesches, MD   fenofibrate (TRICOR) 48 MG tablet Take 1 tablet by mouth daily 10/14/19   Metta Lesches, MD   Lancets MISC Inject 1 each into the skin daily 10/14/19   Metta Lesches, MD   blood glucose monitor strips by Other route daily 10/14/19   Jordyn  MD Shweta   clobetasol (TEMOVATE) 0.05 % external solution APPLY DAILY AS DIRECTED 2/23/19   Historical Provider, MD   ibuprofen (ADVIL;MOTRIN) 200 MG tablet Take 400 mg by mouth every 6 hours as needed for Pain    Historical Provider, MD   glucose monitoring kit (FREESTYLE) monitoring kit 1 kit by Does not apply route daily as needed 4/26/16   Alondra Tolliver MD   [facility]. Devices KIT Blood pressure cuff- Ambulatory monitoring- check every other day and maintain blood pressure log 4/25/16   Alondra Tolliver MD       Future Appointments   Date Time Provider Diane Florentino   4/27/2020  9:30 AM Alondra Tolliver MD [facility]      and   Diabetes Assessment    Medic Alert ID:  No  Meal Planning:  Avoidance of concentrated sweets   How often do you test your blood sugar?:  Daily (Comment: varies)   Do you have barriers with adherence to non-pharmacologic self-management interventions?  (Nutrition/Exercise/Self-Monitoring):  No   Have you ever had to go to the ED for symptoms of low blood sugar?:  No       No patient-reported symptoms

## 2020-01-27 NOTE — PATIENT INSTRUCTIONS
that will allow you to sit while showering. · Get into a tub or shower by putting the weaker leg in first. Get out of a tub or shower with your strong side first.  · Repair loose toilet seats and consider installing a raised toilet seat to make getting on and off the toilet easier. · Keep your bathroom door unlocked while you are in the shower. Where can you learn more? Go to https://SahareypepicFeideeeb.adflyer. org and sign in to your [employer] account. Enter [phone] in the [employer] box to learn more about \"Preventing Falls: Care Instructions. \"     If you do not have an account, please click on the \"Sign Up Now\" link. Current as of: August 6, 2019  Content Version: 12.3  © 1421-9171 Healthwise, Incorporated. Care instructions adapted under license by [facility] ([facility]). If you have questions about a medical condition or this instruction, always ask your healthcare professional. [address] any warranty or liability for your use of this information.        Podiatry referral

## 2020-01-27 NOTE — PROGRESS NOTES
[facility]  InternalMedicine Residency Program  ACC Note      SUBJECTIVE:  CC: had concerns including Back Pain (unchanged); Abdominal Pain (unchanged); Diabetes; Hypertension; and Medication Refill. Daniela Dempsey presented to the [facility] for a routine visit. Presents for follow-up appointment. Hx of chronic low back pain- unchanged in nature, chronic intermittent left lower quadrant pain thought to be related to fibroid disease- unchanged, DM, and HTN. Labs reviewed and discussed. No acute complaints today. Medication refills needed. Back Pain   This is a chronic problem. The problem is unchanged. The pain is present in the lumbar spine. The pain does not radiate. Associated symptoms include numbness (at feet bilaterally- unchanged ). Pertinent negatives include no abdominal pain, bladder incontinence, bowel incontinence, chest pain, dysuria, fever, headaches, perianal numbness, tingling or weight loss. Diabetes   She presents for her follow-up diabetic visit. She has type 2 diabetes mellitus. Her disease course has been stable (A1c 7.3% ). There are no hypoglycemic associated symptoms. Pertinent negatives for hypoglycemia include no dizziness or headaches. Associated symptoms include foot paresthesias (unchanged ). Pertinent negatives for diabetes include no blurred vision, no chest pain, no foot ulcerations, no polydipsia, no polyphagia, no polyuria, no visual change and no weight loss. Current diabetic treatment includes oral agent (dual therapy) and diet. She is compliant with treatment most of the time. Her weight is stable. Hypertension   This is a chronic problem. The problem is controlled. Associated symptoms include palpitations (unchanged) and peripheral edema (intermittent ). Pertinent negatives include no blurred vision, chest pain, headaches, neck pain, orthopnea, PND or shortness of breath.            Review of Systems   Constitutional: Negative for activity change, appetite change, chills, diaphoresis, fever and weight loss. Eyes: Negative for blurred vision. Respiratory: Negative for shortness of breath and wheezing. Cardiovascular: Positive for palpitations (unchanged). Negative for chest pain, orthopnea and PND. Gastrointestinal: Negative for abdominal pain, bowel incontinence, constipation, diarrhea and nausea. Endocrine: Negative for polydipsia, polyphagia and polyuria. Genitourinary: Negative for bladder incontinence, dysuria and hematuria. Musculoskeletal: Positive for back pain (unchanged- prn NSAIDs control symptoms). Negative for neck pain. Neurological: Positive for numbness (at feet bilaterally- unchanged ). Negative for dizziness, tingling, syncope, light-headedness and headaches. Hematological: Negative for adenopathy. Does not bruise/bleed easily.        Current Outpatient Medications on File Prior to Visit   Medication Sig Dispense Refill    NU-MAG 71.5-119 MG TBEC tablet TAKE TWO TABLETS BY MOUTH EVERY DAY 60 tablet 0    Ertugliflozin L-PyroglutamicAc (STEGLATRO) 15 MG TABS Take 15 mg by mouth daily 30 tablet 1    vitamin B-12 (CYANOCOBALAMIN) 1000 MCG tablet TAKE ONE TABLET BY MOUTH DAILY 30 tablet 2    Multiple Vitamins-Minerals (THEREMS-M) TABS TAKE ONE TABLET BY MOUTH EVERY DAY 30 tablet 2    pravastatin (PRAVACHOL) 20 MG tablet TAKE ONE TABLET BY MOUTH EVERY DAY IN THE EVENING 90 tablet 1    amLODIPine (NORVASC) 5 MG tablet Take 1 tablet by mouth daily 90 tablet 1    metFORMIN (GLUCOPHAGE) 1000 MG tablet Take 1 tablet by mouth 2 times daily (with meals) 180 tablet 1    fenofibrate (TRICOR) 48 MG tablet Take 1 tablet by mouth daily 90 tablet 1    Lancets MISC Inject 1 each into the skin daily 100 each 5    blood glucose monitor strips by Other route daily 100 strip 5    clobetasol (TEMOVATE) 0.05 % external solution APPLY DAILY AS DIRECTED  1    ibuprofen (ADVIL;MOTRIN) 200 MG tablet Take 400 mg by mouth every 6 hours as needed for Pain      glucose monitoring kit (FREESTYLE) monitoring kit 1 kit by Does not apply route daily as needed 1 kit 0    Misc. Devices KIT Blood pressure cuff- Ambulatory monitoring- check every other day and maintain blood pressure log 1 kit 0     No current facility-administered medications on file prior to visit. I have reviewed all pertinent PMHx, PSHx, FamHx, SocialHx,medications, and allergies and updated history as appropriate. OBJECTIVE:    VS: /82   Pulse 75   Temp 98.1 °F (36.7 °C) (Oral)   Resp 12   Ht 5' 5.5\" (1.664 m)   Wt 255 lb (115.7 kg)   BMI 41.79 kg/m²   Physical Exam  Vitals signs reviewed. Constitutional:       Appearance: Normal appearance. She is obese. She is not ill-appearing. HENT:      Head: Normocephalic and atraumatic. Mouth/Throat:      Mouth: Mucous membranes are moist.   Eyes:      General: No scleral icterus. Cardiovascular:      Rate and Rhythm: Normal rate and regular rhythm. Pulses: Normal pulses. Dorsalis pedis pulses are 2+ on the right side and 2+ on the left side. Heart sounds: Normal heart sounds. No murmur. Pulmonary:      Effort: Pulmonary effort is normal.      Breath sounds: No wheezing, rhonchi or rales. Abdominal:      Palpations: Abdomen is soft. Tenderness: There is no abdominal tenderness (none appreciated including LLQ today ). There is no guarding or rebound. Comments: obese   Musculoskeletal:      Lumbar back: She exhibits no bony tenderness. Right foot: Normal range of motion. No deformity or foot drop. Left foot: Normal range of motion. No deformity or foot drop. Feet:      Right foot:      Protective Sensation: 10 sites tested. 10 sites sensed. Skin integrity: Dry skin and fissure (plantar surface right 1st toe ) present. No ulcer. Left foot:      Protective Sensation: 10 sites tested. 10 sites sensed. Skin integrity: Dry skin present. No ulcer.       Comments: Sensation was noted but decreased in all fields; change in sensation to improved at ankle level bilaterally consistent with stocking/glove distribution   Skin:     General: Skin is warm and dry. Capillary Refill: Capillary refill takes less than 2 seconds. Neurological:      General: No focal deficit present. Mental Status: She is alert. Psychiatric:         Mood and Affect: Mood normal.         ASSESSMENT/PLAN:  Sarath Masterson was seen today for back pain, abdominal pain, diabetes, hypertension and medication refill. Diagnoses and all orders for this visit:    Neuropathy  -     External Referral To Podiatry    Consistent with Stocking/glove- ? Related to DM   No other microvascular disease noted- could be related but need further evaluation   Discussed options including EMG/NCT and patient electing for Podiatry assessment first     Will follow       Type 2 diabetes mellitus with hyperglycemia, without long-term current use of insulin (HCC)  -     Multiple Vitamins-Minerals (THEREMS-M) TABS; TAKE ONE TABLET BY MOUTH EVERY DAY  -     Cancel: POCT glycosylated hemoglobin (Hb A1C)  -     empagliflozin (JARDIANCE) 10 MG tablet; Take 1 tablet by mouth daily  -     POCT glycosylated hemoglobin (Hb A1C)    A1c 7.3%- will change to jardiance again due to prescription coverage. Will likely increase jardiance if continued toleration to 25 mg as discussed.      Await approval and follow    Encouraged to re-establish with [facility]     Leg cramps- improved   -     magnesium cl-calcium carbonate (NU-MAG) 71.5-119 MG TBEC tablet; TAKE TWO TABLETS BY MOUTH EVERY DAY    Vitamin B12 deficiency- stable   -     vitamin B-12 (CYANOCOBALAMIN) 1000 MCG tablet; TAKE ONE TABLET BY MOUTH DAILY    Essential hypertension- stable   Continue current management     Mixed hyperlipidemia- stable   Continue current management     Thrombocytosis ([facility] [address]- resolved    Discussed previously possible consideration of Hematology assessment- prior work-up unremarkable and Plts stable   Follow for now as discussed         RTC: 3 months     I have reviewed my findings and recommendations with Pierce Quintana.      nAa Tapia MD, Malorie Arboleda   1/27/2020 9:03 AM

## 2020-02-04 RX ORDER — MULTIVIT,CALC,MINS/IRON/FOLIC 9MG-400MCG
TABLET ORAL
Qty: 30 TABLET | Refills: 5 | Status: SHIPPED
Start: 2020-02-04 | End: 2020-12-28

## 2020-02-25 RX ORDER — EMPAGLIFLOZIN 10 MG/1
TABLET, FILM COATED ORAL
Qty: 30 TABLET | Refills: 1 | Status: SHIPPED
Start: 2020-02-25 | End: 2020-05-04

## 2020-04-21 ENCOUNTER — TELEPHONE (OUTPATIENT)
Dept: INTERNAL MEDICINE | Age: 50
End: 2020-04-21

## 2020-05-04 RX ORDER — EMPAGLIFLOZIN 10 MG/1
TABLET, FILM COATED ORAL
Qty: 30 TABLET | Refills: 1 | Status: SHIPPED
Start: 2020-05-04 | End: 2020-06-22 | Stop reason: SDUPTHER

## 2020-06-19 PROBLEM — L02.416 ABSCESS OF LEFT THIGH: Status: RESOLVED | Noted: 2019-03-15 | Resolved: 2020-06-19

## 2020-06-19 PROBLEM — G62.9 NEUROPATHY: Status: ACTIVE | Noted: 2020-06-19

## 2020-06-19 PROBLEM — M79.89 NECROTIZING SOFT TISSUE INFECTION: Status: RESOLVED | Noted: 2019-03-15 | Resolved: 2020-06-19

## 2020-06-19 PROBLEM — N92.1 MENORRHAGIA WITH IRREGULAR CYCLE: Status: RESOLVED | Noted: 2017-03-06 | Resolved: 2020-06-19

## 2020-06-19 NOTE — PROGRESS NOTES
day refills at this time. Diabetes   She presents for her follow-up diabetic visit. She has type 2 diabetes mellitus. Disease course: Last A1c 7.3% in January  There are no hypoglycemic associated symptoms. Pertinent negatives for hypoglycemia include no dizziness or headaches. Pertinent negatives for diabetes include no blurred vision, no chest pain, no foot ulcerations, no polydipsia, no polyphagia and no polyuria. There are no hypoglycemic complications. Current diabetic treatment includes diet and oral agent (dual therapy). She is compliant with treatment most of the time. Her weight is stable. Her breakfast blood glucose range is generally 130-140 mg/dl. Hypertension   This is a chronic problem. The problem is controlled. Pertinent negatives include no blurred vision, chest pain, headaches, palpitations, peripheral edema (denies any recurrence), PND or shortness of breath. The current treatment provides significant improvement. Review of Systems   Constitutional: Negative for activity change, appetite change, chills, diaphoresis and fever. Eyes: Negative for blurred vision. Respiratory: Negative for cough, shortness of breath and wheezing. Cardiovascular: Negative for chest pain, palpitations, leg swelling and PND. Gastrointestinal: Negative for abdominal pain, blood in stool, constipation, diarrhea, nausea and vomiting. Endocrine: Negative for polydipsia, polyphagia and polyuria. Genitourinary: Negative for difficulty urinating, dysuria and hematuria. Neurological: Negative for dizziness, light-headedness and headaches. Psychiatric/Behavioral: Negative for dysphoric mood. Prior to Visit Medications    Medication Sig Taking?  Authorizing Provider   JARDIANCE 10 MG tablet TAKE ONE TABLET BY MOUTH DAILY  Edwin Hutchinson MD   metFORMIN (GLUCOPHAGE) 1000 MG tablet TAKE ONE TABLET BY MOUTH TWICE A DAY WITH MEALS  Edwin Hutchinson MD   Multiple Vitamins-Minerals (THEREMS-M) TABS TAKE ONE TABLET BY MOUTH EVERY DAY  Guerita Guerrero MD   magnesium cl-calcium carbonate (NU-MAG) 71.5-119 MG TBEC tablet TAKE TWO TABLETS BY MOUTH EVERY DAY  Guerita Guerrero MD   vitamin B-12 (CYANOCOBALAMIN) 1000 MCG tablet TAKE ONE TABLET BY MOUTH DAILY  Guerita Guerrero MD   pravastatin (PRAVACHOL) 20 MG tablet TAKE ONE TABLET BY MOUTH EVERY DAY IN THE EVENING  Guerita Guerrero MD   amLODIPine (NORVASC) 5 MG tablet Take 1 tablet by mouth daily  Guerita Guerrero MD   fenofibrate (TRICOR) 48 MG tablet Take 1 tablet by mouth daily  Guerita Guerrero MD   Lancets MISC Inject 1 each into the skin daily  Guerita Guerrero MD   blood glucose monitor strips by Other route daily  Guerita Guerrero MD   clobetasol (TEMOVATE) 0.05 % external solution APPLY DAILY AS DIRECTED  Historical Provider, MD   ibuprofen (ADVIL;MOTRIN) 200 MG tablet Take 400 mg by mouth every 6 hours as needed for Pain  Historical Provider, MD   glucose monitoring kit (FREESTYLE) monitoring kit 1 kit by Does not apply route daily as needed  Guerita Guerrero MD   Misc.  Devices KIT Blood pressure cuff- Ambulatory monitoring- check every other day and maintain blood pressure log  Guerita Guerrero MD       Social History     Tobacco Use    Smoking status: Former Smoker    Smokeless tobacco: Never Used    Tobacco comment: smoked for 3 yrs in early 20;s   Substance Use Topics    Alcohol use: No     Alcohol/week: 0.0 standard drinks    Drug use: No          PHYSICAL EXAMINATION:  [ INSTRUCTIONS:  \"[x]\" Indicates a positive item  \"[]\" Indicates a negative item  -- DELETE ALL ITEMS NOT EXAMINED]  Vital Signs: (As obtained by patient/caregiver or practitioner observation)    Blood pressure- 134/83 Heart rate- 92   Respiratory rate-    Temperature- 97.0 Pulse oximetry-    REPORTED VALUES AT HOME   LIMITED VIDEO FEED DURING ENCOUNTER DESPITE MULTIPLE ATTEMPTS- ABLE TO ASSESS AT THE BEGINNING OF THE evaluation of the following organ systems was limited: Vitals/Constitutional/EENT/Resp/CV/GI//MS/Neuro/Skin/Heme-Lymph-Imm. Pursuant to the emergency declaration under the [facility], [address] [place] [address] authority and the [employer] Act, this Virtual Visit was conducted with patient's (and/or legal guardian's) consent, to reduce the patient's risk of exposure to COVID-19 and provide necessary medical care. The patient (and/or legal guardian) has also been advised to contact this office for worsening conditions or problems, and seek emergency medical treatment and/or call 911 if deemed necessary. Patient's location: home address in [facility]  Physician  location other address in [facility] other people involved in call- /daughter present during initial encounter for video phone set up. Time spent: Greater than 15    This visit was completed virtually using [email or web address] me

## 2020-06-22 ENCOUNTER — VIRTUAL VISIT (OUTPATIENT)
Dept: INTERNAL MEDICINE | Age: 50
End: 2020-06-22
Payer: COMMERCIAL

## 2020-06-22 PROCEDURE — G8427 DOCREV CUR MEDS BY ELIG CLIN: HCPCS | Performed by: INTERNAL MEDICINE

## 2020-06-22 PROCEDURE — 2022F DILAT RTA XM EVC RTNOPTHY: CPT | Performed by: INTERNAL MEDICINE

## 2020-06-22 PROCEDURE — 3051F HG A1C>EQUAL 7.0%<8.0%: CPT | Performed by: INTERNAL MEDICINE

## 2020-06-22 PROCEDURE — 99213 OFFICE O/P EST LOW 20 MIN: CPT | Performed by: INTERNAL MEDICINE

## 2020-06-22 PROCEDURE — 3017F COLORECTAL CA SCREEN DOC REV: CPT | Performed by: INTERNAL MEDICINE

## 2020-06-22 RX ORDER — EMPAGLIFLOZIN 10 MG/1
TABLET, FILM COATED ORAL
Qty: 90 TABLET | Refills: 1 | Status: SHIPPED
Start: 2020-06-22 | End: 2020-10-20

## 2020-06-22 RX ORDER — ZOSTER VACCINE RECOMBINANT, ADJUVANTED 50 MCG/0.5
0.5 KIT INTRAMUSCULAR SEE ADMIN INSTRUCTIONS
Qty: 0.5 ML | Refills: 0 | Status: SHIPPED | OUTPATIENT
Start: 2020-06-22 | End: 2020-12-19

## 2020-06-22 RX ORDER — PRAVASTATIN SODIUM 20 MG
TABLET ORAL
Qty: 90 TABLET | Refills: 1 | Status: SHIPPED
Start: 2020-06-22 | End: 2020-12-23

## 2020-06-22 RX ORDER — CHOLECALCIFEROL (VITAMIN D3) 125 MCG
500 CAPSULE ORAL DAILY
Qty: 90 TABLET | Refills: 1 | Status: SHIPPED
Start: 2020-06-22 | End: 2020-12-28

## 2020-06-22 RX ORDER — GLUCOSAMINE HCL/CHONDROITIN SU 500-400 MG
CAPSULE ORAL DAILY
Qty: 100 STRIP | Refills: 5 | Status: SHIPPED
Start: 2020-06-22 | End: 2020-09-28 | Stop reason: SDUPTHER

## 2020-06-22 RX ORDER — MAGNESIUM CHLORIDE 71.5 MG
TABLET, DELAYED RELEASE (ENTERIC COATED) ORAL
Qty: 180 TABLET | Refills: 1 | Status: SHIPPED
Start: 2020-06-22 | End: 2020-12-28 | Stop reason: SDUPTHER

## 2020-06-22 RX ORDER — AMLODIPINE BESYLATE 5 MG/1
5 TABLET ORAL DAILY
Qty: 90 TABLET | Refills: 1 | Status: SHIPPED
Start: 2020-06-22 | End: 2020-12-23

## 2020-06-22 RX ORDER — MULTIVIT,CALC,MINS/IRON/FOLIC 9MG-400MCG
1 TABLET ORAL DAILY
Qty: 90 TABLET | Refills: 1 | Status: CANCELLED | OUTPATIENT
Start: 2020-06-22

## 2020-06-22 RX ORDER — FENOFIBRATE 48 MG/1
48 TABLET, COATED ORAL DAILY
Qty: 90 TABLET | Refills: 1 | Status: SHIPPED
Start: 2020-06-22 | End: 2020-12-23

## 2020-06-22 RX ORDER — VITAMIN B COMPLEX
1 CAPSULE ORAL DAILY
COMMUNITY

## 2020-06-22 RX ORDER — LANOLIN ALCOHOL/MO/W.PET/CERES
CREAM (GRAM) TOPICAL
Qty: 90 TABLET | Refills: 1 | Status: CANCELLED | OUTPATIENT
Start: 2020-06-22

## 2020-06-22 ASSESSMENT — ENCOUNTER SYMPTOMS
NAUSEA: 0
DIARRHEA: 0
ABDOMINAL PAIN: 0
BLOOD IN STOOL: 0
CONSTIPATION: 0
VOMITING: 0
COUGH: 0
BLURRED VISION: 0
SHORTNESS OF BREATH: 0
WHEEZING: 0

## 2020-06-22 ASSESSMENT — PATIENT HEALTH QUESTIONNAIRE - PHQ9
SUM OF ALL RESPONSES TO PHQ QUESTIONS 1-9: 0
2. FEELING DOWN, DEPRESSED OR HOPELESS: 0
1. LITTLE INTEREST OR PLEASURE IN DOING THINGS: 0
SUM OF ALL RESPONSES TO PHQ QUESTIONS 1-9: 0
SUM OF ALL RESPONSES TO PHQ9 QUESTIONS 1 & 2: 0

## 2020-09-26 ENCOUNTER — HOSPITAL ENCOUNTER (OUTPATIENT)
Age: 50
Discharge: HOME | End: 2020-09-26
Payer: COMMERCIAL

## 2020-09-26 LAB
ALBUMIN SERPL-MCNC: 4.4 G/DL (ref 3.5–5.2)
ALP BLD-CCNC: 66 U/L (ref 35–104)
ALT SERPL-CCNC: 28 U/L (ref 0–32)
ANION GAP SERPL CALCULATED.3IONS-SCNC: 14 MMOL/L (ref 7–16)
AST SERPL-CCNC: 19 U/L (ref 0–31)
BILIRUB SERPL-MCNC: 0.3 MG/DL (ref 0–1.2)
BUN BLDV-MCNC: 14 MG/DL (ref 6–20)
CALCIUM SERPL-MCNC: 9.9 MG/DL (ref 8.6–10.2)
CHLORIDE BLD-SCNC: 100 MMOL/L (ref 98–107)
CHOLESTEROL, TOTAL: 163 MG/DL (ref 0–199)
CO2: 23 MMOL/L (ref 22–29)
CREAT SERPL-MCNC: 0.8 MG/DL (ref 0.5–1)
CREATININE URINE: 29 MG/DL (ref 29–226)
GFR AFRICAN AMERICAN: >60
GFR NON-AFRICAN AMERICAN: >60 ML/MIN/1.73
GLUCOSE BLD-MCNC: 162 MG/DL (ref 74–99)
HBA1C MFR BLD: 7.2 % (ref 4–5.6)
HDLC SERPL-MCNC: 42 MG/DL
LDL CHOLESTEROL CALCULATED: 72 MG/DL (ref 0–99)
MICROALBUMIN UR-MCNC: <12 MG/L
MICROALBUMIN/CREAT UR-RTO: ABNORMAL (ref 0–30)
POTASSIUM SERPL-SCNC: 4 MMOL/L (ref 3.5–5)
SODIUM BLD-SCNC: 137 MMOL/L (ref 132–146)
TOTAL PROTEIN: 7.1 G/DL (ref 6.4–8.3)
TRIGL SERPL-MCNC: 245 MG/DL (ref 0–149)
VLDLC SERPL CALC-MCNC: 49 MG/DL

## 2020-09-26 PROCEDURE — 82044 UR ALBUMIN SEMIQUANTITATIVE: CPT

## 2020-09-26 PROCEDURE — 80061 LIPID PANEL: CPT

## 2020-09-26 PROCEDURE — 83036 HEMOGLOBIN GLYCOSYLATED A1C: CPT

## 2020-09-26 PROCEDURE — 36415 COLL VENOUS BLD VENIPUNCTURE: CPT

## 2020-09-26 PROCEDURE — 80053 COMPREHEN METABOLIC PANEL: CPT

## 2020-09-26 PROCEDURE — 82570 ASSAY OF URINE CREATININE: CPT

## 2020-09-28 ENCOUNTER — VIRTUAL VISIT (OUTPATIENT)
Dept: INTERNAL MEDICINE | Age: 50
End: 2020-09-28
Payer: COMMERCIAL

## 2020-09-28 PROCEDURE — 99213 OFFICE O/P EST LOW 20 MIN: CPT | Performed by: INTERNAL MEDICINE

## 2020-09-28 RX ORDER — CLOBETASOL PROPIONATE 0.46 MG/ML
SOLUTION TOPICAL
Refills: 1 | Status: CANCELLED | OUTPATIENT
Start: 2020-09-28

## 2020-09-28 RX ORDER — GLUCOSAMINE HCL/CHONDROITIN SU 500-400 MG
CAPSULE ORAL DAILY
Qty: 100 STRIP | Refills: 5 | Status: SHIPPED
Start: 2020-09-28 | End: 2020-12-28 | Stop reason: SDUPTHER

## 2020-09-28 RX ORDER — LANCETS 30 GAUGE
1 EACH MISCELLANEOUS DAILY
Qty: 100 EACH | Refills: 5 | Status: SHIPPED
Start: 2020-09-28 | End: 2020-12-28 | Stop reason: SDUPTHER

## 2020-09-28 ASSESSMENT — ENCOUNTER SYMPTOMS
EYE DISCHARGE: 0
COUGH: 0
BLOOD IN STOOL: 0
EYE ITCHING: 0
NAUSEA: 0
BACK PAIN: 1
SHORTNESS OF BREATH: 0
EYE REDNESS: 0
WHEEZING: 0
CONSTIPATION: 0
DIARRHEA: 0
VOMITING: 0
PHOTOPHOBIA: 0
EYE PAIN: 0

## 2020-09-28 NOTE — PATIENT INSTRUCTIONS
Patient Education        dulaglutide  Pronunciation:  DOO la GLOO tide  Brand:  Trulicity Pen  What is the most important information I should know about dulaglutide? You should not use dulaglutide if you have Multiple Endocrine Neoplasia syndrome type 2 (MEN 2), or a personal or family history of medullary thyroid carcinoma (a type of thyroid cancer). Do not use dulaglutide if you are in a state of diabetic ketoacidosis (call your doctor for treatment). In animal studies, dulaglutide caused thyroid tumors or thyroid cancer. It is not known whether these effects would occur in people using regular doses. Ask your doctor about your risk. Call your doctor at once if you have signs of a thyroid tumor, such as swelling or a lump in your neck, trouble swallowing, a hoarse voice, or shortness of breath. What is dulaglutide? Dulaglutide is an injectable diabetes medicine that helps control blood sugar levels. Dulaglutide is used together with diet and exercise to improve blood sugar control in adults with type 2 diabetes mellitus. Dulaglutide is usually given after other diabetes medicines have been tried without success. This medicine is not for treating type 1 diabetes. Dulaglutide may also be used for purposes not listed in this medication guide. What should I discuss with my healthcare provider before using dulaglutide? You should not use dulaglutide if you are allergic to it, or if you have:  · multiple endocrine neoplasia type 2 (tumors in your glands);  · a personal or family history of medullary thyroid carcinoma (a type of thyroid cancer); or  · diabetic ketoacidosis (call your doctor for treatment). Tell your doctor if you have ever had:  · pancreatitis;  · a stomach or intestinal disorder;  · gastroesophageal reflux disease (GERD) or slow digestion;  · liver or kidney disease;  · if you also use insulin or oral diabetes medicine; or  · if you have been sick with vomiting or diarrhea.   In animal studies, dulaglutide caused thyroid tumors or thyroid cancer. It is not known whether these effects would occur in people using regular doses. Ask your doctor about your risk. It is not known whether this medicine will harm an unborn baby. Tell your doctor if you are pregnant or plan to become pregnant. It may not be safe to breast-feed while using this medicine. Ask your doctor about any risk. Dulaglutide is not approved for use by anyone younger than 25years old. How should I use dulaglutide? Follow all directions on your prescription label and read all medication guides or instruction sheets. Use the medicine exactly as directed. Dulaglutide is injected under the skin once per week. Use dulaglutide on the same day each week at the same time of day. If you change your dosing day, allow at least 3 days to pass between doses. You may use dulaglutide with or without food. Read and carefully follow any Instructions for Use provided with your medicine. Do not use dulaglutide if you don't understand all instructions for proper use. Ask your doctor or pharmacist if you have questions. Your care provider will show you where on your body to inject dulaglutide. Use a different place each time you give an injection. Do not inject into the same place two times in a row. Low blood sugar (hypoglycemia) can happen to everyone who has diabetes. Symptoms include headache, hunger, sweating, irritability, dizziness, nausea, fast heart rate, and feeling anxious or shaky. To quickly treat low blood sugar, always keep a fast-acting source of sugar with you such as fruit juice, hard candy, crackers, raisins, or non-diet soda. Your doctor can prescribe a glucagon emergency injection kit to use in case you have severe hypoglycemia and cannot eat or drink. Be sure your family and close friends know how to give you this injection in an emergency.   Also watch for signs of high blood sugar (hyperglycemia) such as increased thirst or urination, blurred vision, headache, and tiredness. Blood sugar levels can be affected by stress, illness, surgery, exercise, alcohol use, or skipping meals. Ask your doctor before changing your dose or medication schedule. Each injection pen or prefilled syringe is for one use only. Throw away after one use, even if there is still medicine left inside. Use a puncture-proof \"sharps\" container. Follow state or local laws about how to dispose of this container. Keep it out of the reach of children and pets. Store dulaglutide in the refrigerator, protected from light. Do not use past the expiration date on the medicine label. Do not freeze dulaglutide, and throw away the medicine if it has become frozen. You may also store dulaglutide at room temperature for up to 14 days before use. What happens if I miss a dose? Use the medicine as soon as you can, but skip the missed dose if your next dose is due in less than 3 days. Do not use two doses at one time. Do not use this medicine twice within a 72-hour period. What happens if I overdose? Seek emergency medical attention or call the Poison Help line at 1-790.203.1097. What should I avoid while using dulaglutide? Never share an injection pen or prefilled syringe with another person, even if the needle has been changed. Sharing these devices can allow infections or disease to pass from one person to another. What are the possible side effects of dulaglutide? Stop using dulaglutide and get emergency medical help if you have signs of an allergic reaction: hives; difficult breathing; feeling light-headed; swelling of your face, lips, tongue, or throat.   Call your doctor at once if you have:  · pancreatitis --severe pain in your upper stomach spreading to your back, nausea and vomiting;  · signs of a thyroid tumor --swelling or a lump in your neck, trouble swallowing, a hoarse voice, or if you feel short of breath;  · low blood sugar --headache, hunger, licensed healthcare practitioners in caring for their patients and/or to serve consumers viewing this service as a supplement to, and not a substitute for, the expertise, skill, knowledge and judgment of healthcare practitioners. The absence of a warning for a given drug or drug combination in no way should be construed to indicate that the drug or drug combination is safe, effective or appropriate for any given patient. [facility] does not assume any responsibility for any aspect of healthcare administered with the aid of information [facility] provides. The information contained herein is not intended to cover all possible uses, directions, precautions, warnings, drug interactions, allergic reactions, or adverse effects. If you have questions about the drugs you are taking, check with your doctor, nurse or pharmacist.  Copyright 3587-8636 [address]: 2.02. Revision date: 7/15/2019. Care instructions adapted under license by [facility] ([facility]). If you have questions about a medical condition or this instruction, always ask your healthcare professional. [place] [address] any warranty or liability for your use of this information. Patient Education        icosapent  Pronunciation:  eye KOE sa pent  Brand:  Vascepa  What is the most important information I should know about icosapent? Follow all directions on your medicine label and package. Tell each of your healthcare providers about all your medical conditions, allergies, and all medicines you use. What is icosapent? Icosapent is used together with other medicines (such as statins) to reduce the risk of heart attack, stroke, and some types of heart problems that need hospitalization in adults. Icosapent is used together with a low-fat and low-cholesterol diet to reduce triglycerides (fats) in adults with severely high triglycerides.   It is not known whether icosapent will lower your risk of pancreatitis (inflammation of your pancreas). Icosapent may also be used for purposes not listed in this medication guide. What should I discuss with my healthcare provider before taking icosapent? You should not use icosapent if you are allergic to it. Tell your doctor if you have ever had:  · liver disease;  · diabetes;  · a thyroid disorder;  · problems with your pancreas;  · a bleeding or blood-clotting disorder; or  · if you are allergic to fish or shellfish. It is not known whether this medicine will harm an unborn baby. Tell your doctor if you are pregnant or plan to become pregnant. Icosapent can pass into breast milk, and may harm your baby. Tell your doctor if you are breastfeeding. Icosapent is not approved for use by anyone younger than 25years old. How should I take icosapent? Follow all directions on your prescription label and read all medication guides or instruction sheets. Use the medicine exactly as directed. Take with food. Swallow the capsule whole and do not crush, chew, break, or open it. You will need frequent blood tests. Icosapent is only part of a complete treatment program that may also include diet, exercise, and weight control. Follow your diet, medication, and exercise routines very closely. Store at room temperature away from moisture and heat. What happens if I miss a dose? Take the medicine as soon as you can, but skip the missed dose if it is almost time for your next dose. Do not take two doses at one time. What happens if I overdose? Seek emergency medical attention or call the Poison Help line at 1-911.406.5612. What should I avoid while taking icosapent? Avoid eating foods that are high in fat or cholesterol. Icosapent will not be as effective in lowering your cholesterol if you do not follow a cholesterol lowering diet plan. What are the possible side effects of icosapent?   Get emergency medical help if you have signs of an allergic reaction: hives; difficult breathing; swelling of your their patients and/or to serve consumers viewing this service as a supplement to, and not a substitute for, the expertise, skill, knowledge and judgment of healthcare practitioners. The absence of a warning for a given drug or drug combination in no way should be construed to indicate that the drug or drug combination is safe, effective or appropriate for any given patient. [facility] does not assume any responsibility for any aspect of healthcare administered with the aid of information [facility] provides. The information contained herein is not intended to cover all possible uses, directions, precautions, warnings, drug interactions, allergic reactions, or adverse effects. If you have questions about the drugs you are taking, check with your doctor, nurse or pharmacist.  Copyright 4673-4601 [address] [place] [address] Dr LONG. Version: 3.01. Revision date: 2/13/2020. Care instructions adapted under license by [facility] ([facility]). If you have questions about a medical condition or this instruction, always ask your healthcare professional. [place] [address] any warranty or liability for your use of this information. 1) Please review both medications. We would need likely prior auth for both meds if willing to consider. 2) For Vascepa if accepted- we would likely stop Tricor and continue pravastatin and Vascepa. 3) For Trulicity if accepted- we would likely stop Jardiacne and trial Trulicity     4) We will follow the leg cramps for further ways to improve symptoms since still persistent despite Slow Mg use.      5) Please call with any questions

## 2020-09-28 NOTE — PROGRESS NOTES
TeleMedicine Video Visit    This visit was performed as a virtual video visit using a synchronous, two-way, audio-video telehealth technology platform. Patient identification was verified at the start of the visit, including the patient's telephone number and physical location. I discussed with the patient the nature of our telehealth visits, that:     1. Due to the nature of an audio- video modality, the only components of a physical exam that could be done are the elements supported by direct observation. 2. I would evaluate the patient and recommend diagnostics and treatments based on my assessment. 3. If it was felt that the patient should be evaluated in clinic or an emergency room setting, then they would be directed there. 4. Our sessions are not being recorded and that personal health information is protected. 5. Our team would provide follow up care in person if/when the patient needs it. Patient does agree to proceed with telemedicine consultation. 2020    TELEHEALTH EVALUATION -- Audio/Visual (During SBISV-62 public health emergency)    HPI:    Gill Olivierows (:  1970) has requested an audio/video evaluation for the following concern(s):      Presents for virtual visit follow-up visit. Only complaint is noted blood blister left plantar surface- states related to wearing older foot wear- small area- no noted drainage; no erythema, no additional findings. Follow-up appt with Podiatry is needed and she plans on scheduling an appt appropriately. No additional complaints at this time- connectivity concerns with video but able to complete encounter. Denies any recurrent internal vaginal yeast infections or recurrent UTIs on jardiance currently. Diabetes   She presents for her follow-up diabetic visit. She has type 2 diabetes mellitus. Her disease course has been stable (A1c 7.2% ). There are no hypoglycemic associated symptoms.  Pertinent negatives for hypoglycemia include no dizziness. Associated symptoms include foot paresthesias (unchanged- following with Podiatry ). Pertinent negatives for diabetes include no chest pain, no foot ulcerations (+ blister- no ulceration ), no polydipsia, no polyphagia, no polyuria, no weakness and no weight loss. There are no hypoglycemic complications. Risk factors for coronary artery disease include obesity, hypertension, dyslipidemia and diabetes mellitus. Current diabetic treatment includes diet and oral agent (dual therapy). She is compliant with treatment most of the time. Her weight is stable. There is no change in her home blood glucose trend. She sees a podiatrist (Need follow-up as discussed). Review of Systems   Constitutional: Negative for chills, fever and weight loss. Eyes: Negative for photophobia, pain, discharge, redness, itching and visual disturbance. Respiratory: Negative for cough, shortness of breath and wheezing. Cardiovascular: Negative for chest pain, palpitations and leg swelling. Gastrointestinal: Negative for blood in stool, constipation, diarrhea, nausea and vomiting. Endocrine: Negative for polydipsia, polyphagia and polyuria. Genitourinary: Negative for difficulty urinating, dysuria, genital sores, menstrual problem, vaginal bleeding, vaginal discharge and vaginal pain. Musculoskeletal: Positive for back pain (stable). Neurological: Negative for dizziness, weakness and light-headedness. Hematological: Negative for adenopathy. Does not bruise/bleed easily. Psychiatric/Behavioral: Negative for dysphoric mood. Prior to Visit Medications    Medication Sig Taking?  Authorizing Provider   metFORMIN (GLUCOPHAGE) 1000 MG tablet TAKE ONE TABLET BY MOUTH TWICE A DAY WITH MEALS Yes Rajni Orellana MD   Alpha-Lipoic Acid 600 MG TABS Take 600 mg by mouth daily Yes Historical Provider, MD   b complex vitamins capsule Take 1 capsule by mouth daily Yes Historical Provider, MD   empagliflozin (JARDIANCE) 10 MG positive item  \"[]\" Indicates a negative item     Vital Signs: (As obtained by patient/caregiver or practitioner observation) These were noted and reviewed as documented    Blood pressure- 135/83 Heart rate- 75    Temperature- 97.3    Blood glucose- 146 mg/dl     Constitutional: [x] Appears well-developed and well-nourished [x] No apparent distress      Mental status  [] Alert and awake  [x] Oriented to person/place/time [x]Able to follow commands      Eyes:  Sclera  [x]  Normal  [] Abnormal -        HENT:   [x] Normocephalic, atraumatic. [][x] Mouth/Throat: Mucous membranes are moist.         Neck: [x] No visualized mass     Pulmonary/Chest: [x] Respiratory effort normal.  [x] No visualized signs of difficulty breathing or respiratory distress     Neurological:        [x] No Facial Asymmetry (Cranial nerve 7 motor function) (limited exam to video visit)              Psychiatric:       [x] Normal Affect       Other pertinent observable physical exam findings-     ASSESSMENT/PLAN:  1. Type 2 diabetes mellitus with hyperglycemia, without long-term current use of insulin ([facility] [address]  Discussed options at length- concerns regarding continued use of SGLT2 which has been documented previously. No recurrent internal vaginal infections- but concern. Recommend GLP1 trial and stop SGLT2    Information given as patient remains pre-contemplative    Trulicity is Tier 2 on insurance and will require prior auth- await response     Weight loss goals reinforced     - blood glucose monitor strips; by Other route daily  Dispense: 100 strip; Refill: 5  - Lancets MISC; Inject 1 each into the skin daily  Dispense: 100 each; Refill: 5    2. Leg cramps  Stable but no improvement on Slow mag  Look toward other options for management     3. Essential hypertension- stable     4.  Mixed hyperlipidemia- stable    Discussed TGs remain high despite statin/fibrate therapy   Information given on Vascepa   If approved- would stop Fibrate and continue statin and Vascepa trial given recent trials for TG management     5. Neuropathy   Ongoing symptoms   Follow-up with Podiatry     6. Obesity, Class III, BMI 40-49.9 (morbid obesity) ([facility])   Weight loss goals     7. Foot blister- Podiatry follow-up   Anticipatory guidance discussed   Any signs of infection or changes- seek medical attention   Healing per patient but follow-up needed   Encouraged proper footwear at all times and daily foot checks       Return in about 3 months (around 12/28/2020). Earl Cortes is a 48 y.o. female being evaluated by a Virtual Visit (video visit) encounter to address concerns as mentioned above. A caregiver was present when appropriate. Due to this being a TeleHealth encounter (During [facility]- public health emergency), evaluation of the following organ systems was limited: Vitals/Constitutional/EENT/Resp/CV/GI//MS/Neuro/Skin/Heme-Lymph-Imm. Pursuant to the emergency declaration under the [address] [place] [address] authority and the [employer] Act, this Virtual Visit was conducted with patient's (and/or legal guardian's) consent, to reduce the patient's risk of exposure to COVID-19 and provide necessary medical care. The patient (and/or legal guardian) has also been advised to contact this office for worsening conditions or problems, and seek emergency medical treatment and/or call 911 if deemed necessary. Patient's location: home address in [facility]  Physician  location other address in [facility] other people involved in call- none. Time spent: Greater than 20    This visit was completed virtually using Doxy. me

## 2020-10-20 ENCOUNTER — TELEPHONE (OUTPATIENT)
Dept: INTERNAL MEDICINE | Age: 50
End: 2020-10-20

## 2020-10-20 NOTE — TELEPHONE ENCOUNTER
Erum Laird called to notify in agreement with discussed  medications changes and request mammogram screen (already has outstanding mammogram order Edita Parrish fax to [facility]).

## 2020-10-30 LAB — DIABETIC RETINOPATHY: NEGATIVE

## 2020-12-23 RX ORDER — FENOFIBRATE 48 MG/1
TABLET, COATED ORAL
Qty: 90 TABLET | Refills: 0 | Status: SHIPPED
Start: 2020-12-23 | End: 2021-04-28 | Stop reason: SDUPTHER

## 2020-12-23 RX ORDER — PRAVASTATIN SODIUM 20 MG
TABLET ORAL
Qty: 90 TABLET | Refills: 0 | Status: SHIPPED
Start: 2020-12-23 | End: 2020-12-28

## 2020-12-23 RX ORDER — AMLODIPINE BESYLATE 5 MG/1
TABLET ORAL
Qty: 90 TABLET | Refills: 0 | Status: SHIPPED
Start: 2020-12-23 | End: 2021-04-12

## 2020-12-28 ENCOUNTER — VIRTUAL VISIT (OUTPATIENT)
Dept: INTERNAL MEDICINE | Age: 50
End: 2020-12-28
Payer: COMMERCIAL

## 2020-12-28 PROCEDURE — 99213 OFFICE O/P EST LOW 20 MIN: CPT | Performed by: INTERNAL MEDICINE

## 2020-12-28 RX ORDER — MAGNESIUM CHLORIDE 71.5 MG
TABLET, DELAYED RELEASE (ENTERIC COATED) ORAL
Qty: 180 TABLET | Refills: 1 | Status: SHIPPED
Start: 2020-12-28 | End: 2021-12-02 | Stop reason: SDUPTHER

## 2020-12-28 RX ORDER — GLUCOSAMINE HCL/CHONDROITIN SU 500-400 MG
CAPSULE ORAL DAILY
Qty: 100 STRIP | Refills: 5 | Status: SHIPPED
Start: 2020-12-28 | End: 2022-09-29 | Stop reason: SDUPTHER

## 2020-12-28 RX ORDER — PRAVASTATIN SODIUM 40 MG
TABLET ORAL
Qty: 90 TABLET | Refills: 0 | Status: SHIPPED
Start: 2020-12-28 | End: 2021-04-03 | Stop reason: SDUPTHER

## 2020-12-28 RX ORDER — LANCETS 30 GAUGE
1 EACH MISCELLANEOUS DAILY
Qty: 100 EACH | Refills: 5 | Status: SHIPPED
Start: 2020-12-28 | End: 2021-12-02 | Stop reason: SDUPTHER

## 2020-12-28 ASSESSMENT — ENCOUNTER SYMPTOMS
CONSTIPATION: 0
WHEEZING: 0
VOMITING: 0
NAUSEA: 0
DIARRHEA: 0
ABDOMINAL PAIN: 0
BLOOD IN STOOL: 0
SHORTNESS OF BREATH: 0

## 2020-12-28 NOTE — PROGRESS NOTES
TeleMedicine Video Visit    This visit was performed as a virtual video visit using a synchronous, two-way, audio-video telehealth technology platform. Patient identification was verified at the start of the visit, including the patient's telephone number and physical location. I discussed with the patient the nature of our telehealth visits, that:     1. Due to the nature of an audio- video modality, the only components of a physical exam that could be done are the elements supported by direct observation. 2. I would evaluate the patient and recommend diagnostics and treatments based on my assessment. 3. If it was felt that the patient should be evaluated in clinic or an emergency room setting, then they would be directed there. 4. Our sessions are not being recorded and that personal health information is protected. 5. Our team would provide follow up care in person if/when the patient needs it. Patient does agree to proceed with telemedicine consultation.     2020    TELEHEALTH EVALUATION -- Audio/Visual (During ICTDJ-23 public health emergency)    HPI:    Judy Walsh (:  1970) has requested an audio/video evaluation for the following concern(s): Presents for follow-up virtual visit via doxy. me with  present. Last seen on 9/28. At that visit, patient was stopped on jardiance and started on trulicity due to F3R above 7% in the presence of recurrent UTI/vaginal yeast infections with SGLT2 therapy. Since initiating jardiance, patient feeling significantly improved. Improved glucose readings- no significant hypoglycemia but some numbers in 80's initially feeling symptoms of hypoglycemia. States no available scale at home but feels clothes are \"looser\" fitting. No noted side effects on therapy. Also noted, despite statin/fibrate therapy- TGs are still not at goal. Diaz Armenshayna was discussed but will not be approved at this time. Will adjust statin and follow to ensure no noted significant myalgias. Will follow FLP and A1c given improvement in values which also may improve TG levels. Patient does note receiving influenza vaccine at pharmacy and will need record as this is not listed as completed. Diabetes  She presents for her follow-up diabetic visit. She has type 2 diabetes mellitus. Pertinent negatives for hypoglycemia include no dizziness. Pertinent negatives for diabetes include no chest pain, no polydipsia and no polyphagia. Her weight is decreasing steadily (Appears improved fitting clothing). Her breakfast blood glucose range is generally  mg/dl. Her overall blood glucose range is 110-130 mg/dl. Review of Systems   Constitutional: Negative for chills, diaphoresis and fever. Eyes: Negative for visual disturbance. Respiratory: Negative for shortness of breath and wheezing. Cardiovascular: Negative for chest pain, palpitations and leg swelling. Gastrointestinal: Negative for abdominal pain, blood in stool, constipation, diarrhea, nausea and vomiting. Endocrine: Negative for polydipsia and polyphagia. Genitourinary: Negative for dysuria, hematuria and vaginal discharge. Neurological: Negative for dizziness and light-headedness. Prior to Visit Medications    Medication Sig Taking? Authorizing Provider   pravastatin (PRAVACHOL) 20 MG tablet TAKE 1 TABLET BY MOUTH EVERY DAY IN THE EVENING Yes Ahmet Márquez MD   amLODIPine (NORVASC) 5 MG tablet TAKE ONE TABLET BY MOUTH EVERY DAY Yes Ahmet Márquez MD   fenofibrate (TRICOR) 48 MG tablet TAKE ONE TABLET BY MOUTH DAILY Yes Ahmet Márquez MD   Dulaglutide 0.75 MG/0.5ML SOPN Inject 0.75 mg into the skin once a week Yes Ahmet Márquez MD   metFORMIN (GLUCOPHAGE) 1000 MG tablet TAKE ONE TABLET BY MOUTH TWICE A DAY WITH MEALS Yes Suyapa Sheffield MD   Alpha-Lipoic Acid 600 MG TABS Take 600 mg by mouth daily Yes Historical Provider, MD   b complex vitamins capsule Take 1 capsule by mouth daily Yes Historical Provider, MD   magnesium cl-calcium carbonate (NU-MAG) 71.5-119 MG TBEC tablet TAKE TWO TABLETS BY MOUTH EVERY DAY Yes Ahmet Márquez MD   clobetasol (TEMOVATE) 0.05 % external solution APPLY DAILY AS DIRECTED Yes Historical Provider, MD   ibuprofen (ADVIL;MOTRIN) 200 MG tablet Take 400 mg by mouth every 6 hours as needed for Pain Yes Historical Provider, MD   blood glucose monitor strips by Other route daily  Ahmet Márquez MD   Lancets MISC Inject 1 each into the skin daily  Ahmet Márquez MD   vitamin B-12 (CYANOCOBALAMIN) 500 MCG tablet Take 1 tablet by mouth daily  Patient not taking: Reported on 9/28/2020  Ahmet Márquez MD   Multiple Vitamins-Minerals (THEREMS-M) TABS TAKE ONE TABLET BY MOUTH EVERY DAY  Patient not taking: Reported on 9/28/2020  Ahmet Márquez MD   glucose monitoring kit (FREESTYLE) monitoring kit 1 kit by Does not apply route daily as needed  Ahmet Márquez MD   Misc.  Devices KIT Blood pressure cuff- Ambulatory monitoring- check every other day and maintain blood pressure log  Ahmet Márquez MD       Social History     Tobacco Use  Smoking status: Former Smoker    Smokeless tobacco: Never Used    Tobacco comment: smoked for 3 yrs in early 20;s   Substance Use Topics    Alcohol use: No     Alcohol/week: 0.0 standard drinks    Drug use: No          PHYSICAL EXAMINATION:    Vital Signs: (As obtained by patient/caregiver or practitioner observation)    Blood pressure- 132/82 Heart rate- 81   Temperature- 97.2     Constitutional: [x] Appears well-developed and well-nourished [x] No apparent distress        Mental status  [x] Alert and awake  [x] Oriented to person/place/time [x]Able to follow commands        HENT:   [x] Normocephalic, atraumatic. [x] Mouth/Throat: Mucous membranes are moist.     Neck: [x] No visualized mass     Pulmonary/Chest: [x] Respiratory effort normal.  [x] No visualized signs of difficulty breathing or respiratory distress       Neurological:        [x] No Facial Asymmetry (Cranial nerve 7 motor function) (limited exam to video visit)     Psychiatric:       [x] Normal Affect        ASSESSMENT/PLAN:  1. Type 2 diabetes mellitus with hyperglycemia, without long-term current use of insulin (HCC)  Clinically improving   Influenza vaccine up to date per patient  Podiatry appt up to date due to peripheral neuropathy and stable  No microalbuminuria noted  Retinopathy exam is up to date  Will repeat A1c  Consider increasing GLP to full dose if A1c not at goal  Continue lifestyle changes given significant improvement in readings  Check FLP as TGs may improve with further improvement in glucose readings   - Dulaglutide 0.75 MG/0.5ML SOPN; Inject 0.75 mg into the skin once a week  Dispense: 4 pen; Refill: 2  - metFORMIN (GLUCOPHAGE) 1000 MG tablet; TAKE ONE TABLET BY MOUTH TWICE A DAY WITH MEALS  Dispense: 180 tablet; Refill: 0  - Lancets MISC; Inject 1 each into the skin daily  Dispense: 100 each; Refill: 5  - blood glucose monitor strips; by Other route daily  Dispense: 100 strip;  Refill: 5  - HEMOGLOBIN A1C; Future - Lipid Panel; Future    2. Leg cramps  stable  - magnesium cl-calcium carbonate (NU-MAG) 71.5-119 MG TBEC tablet; TAKE TWO TABLETS BY MOUTH EVERY DAY  Dispense: 180 tablet; Refill: 1    3. Mixed hyperlipidemia  Increase pravastatin as currently tolerating to 40 mg  Continue tricor  Follow-up FLP when able  - Lipid Panel; Future  - pravastatin (PRAVACHOL) 40 MG tablet; TAKE 1 TABLET BY MOUTH EVERY DAY IN THE EVENING  Dispense: 90 tablet; Refill: 0    Anticipatory counseling regarding the COVID 19 vaccine was completed including benefits to vaccination, current risk assessment, and future availability. Encouraged to follow ACIP and CDC recommendations at this time, as well as stay up to date to determine availability of vaccine for community dwellers in 2021. All questions answered. Return in about 3 months (around 3/28/2021). Messi Sherwood is a 48 y.o. female being evaluated by a Virtual Visit (video visit) encounter to address concerns as mentioned above. A caregiver was present when appropriate. Due to this being a TeleHealth encounter (During [facility] public health emergency), evaluation of the following organ systems was limited: Vitals/Constitutional/EENT/Resp/CV/GI//MS/Neuro/Skin/Heme-Lymph-Imm. Pursuant to the emergency declaration under the [address] [place] [address] authority and the [employer] General Act, this Virtual Visit was conducted with patient's (and/or legal guardian's) consent, to reduce the patient's risk of exposure to COVID-19 and provide necessary medical care. The patient (and/or legal guardian) has also been advised to contact this office for worsening conditions or problems, and seek emergency medical treatment and/or call 911 if deemed necessary.

## 2021-03-08 ENCOUNTER — TELEPHONE (OUTPATIENT)
Dept: INTERNAL MEDICINE | Age: 51
End: 2021-03-08

## 2021-03-08 NOTE — TELEPHONE ENCOUNTER
Patient called in at 3:30. She would like to know if it safe for her to receive the COVID vaccine? [facility] may [facility] next week. She stated this was not urgent to get back to her when yo have a minute.

## 2021-03-09 NOTE — TELEPHONE ENCOUNTER
Called patient and discussed vaccination eligibility for patient and . NO hx of severe allergies. Medications reviewed and no concerns noted. Encourage to progress with vaccination. [address] changes noted to eligible patients 48 and older as of this Thursday (3/11). All questions answered.

## 2021-03-16 ENCOUNTER — TELEPHONE (OUTPATIENT)
Dept: INTERNAL MEDICINE | Age: 51
End: 2021-03-16

## 2021-03-16 NOTE — TELEPHONE ENCOUNTER
Appt changed to 4/28/21 per [facility]. Spoke with patient and advised to hold dose today then resume normal dose tomorrow of 10 mg of Friday, 7 mg on Tuesday, and 5 mg the rest of the week. Repeat INR on 1/3/2018.

## 2021-03-16 NOTE — TELEPHONE ENCOUNTER
Called patient this morning to discuss follow-up appt needs. Patient has not had in-office appointment in 12 months. Discussed will need in-office appt and cannot proceed with TeleHealth until seen in office per guidelines. . Discussed options. Patient is scheduled for COVID vaccine 1 of 2 this Thursday. She would like to wait until 2 weeks after 2nd vaccine. She will received labwork this week prior to [place] vaccination. We will follow labs and refill meds as appropriate. [facility] follow-up appt the last week of April. Please cancel Monday 3/22 appt and reschedule for last week in April- Wednesday, April 28th at 8 am.    All questions answered.

## 2021-03-17 ENCOUNTER — HOSPITAL ENCOUNTER (OUTPATIENT)
Age: 51
Discharge: HOME | End: 2021-03-17
Payer: COMMERCIAL

## 2021-03-17 DIAGNOSIS — E11.65 TYPE 2 DIABETES MELLITUS WITH HYPERGLYCEMIA, WITHOUT LONG-TERM CURRENT USE OF INSULIN (HCC): ICD-10-CM

## 2021-03-17 DIAGNOSIS — E78.2 MIXED HYPERLIPIDEMIA: ICD-10-CM

## 2021-03-17 LAB
CHOLESTEROL, TOTAL: 159 MG/DL (ref 0–199)
HBA1C MFR BLD: 5.9 % (ref 4–5.6)
HDLC SERPL-MCNC: 46 MG/DL
LDL CHOLESTEROL CALCULATED: 64 MG/DL (ref 0–99)
TRIGL SERPL-MCNC: 246 MG/DL (ref 0–149)
VLDLC SERPL CALC-MCNC: 49 MG/DL

## 2021-03-17 PROCEDURE — 80061 LIPID PANEL: CPT

## 2021-03-17 PROCEDURE — 36415 COLL VENOUS BLD VENIPUNCTURE: CPT

## 2021-03-17 PROCEDURE — 83036 HEMOGLOBIN GLYCOSYLATED A1C: CPT

## 2021-03-18 ENCOUNTER — IMMUNIZATION (OUTPATIENT)
Dept: PRIMARY CARE CLINIC | Age: 51
End: 2021-03-18
Payer: COMMERCIAL

## 2021-03-18 PROCEDURE — 0011A COVID-19, MODERNA VACCINE 100MCG/0.5ML DOSE: CPT | Performed by: PHYSICIAN ASSISTANT

## 2021-03-18 PROCEDURE — 91301 COVID-19, MODERNA VACCINE 100MCG/0.5ML DOSE: CPT | Performed by: PHYSICIAN ASSISTANT

## 2021-04-02 ENCOUNTER — TELEPHONE (OUTPATIENT)
Dept: INTERNAL MEDICINE | Age: 51
End: 2021-04-02

## 2021-04-02 DIAGNOSIS — E78.2 MIXED HYPERLIPIDEMIA: ICD-10-CM

## 2021-04-03 RX ORDER — PRAVASTATIN SODIUM 40 MG
TABLET ORAL
Qty: 90 TABLET | Refills: 0 | Status: SHIPPED
Start: 2021-04-03 | End: 2021-04-12

## 2021-04-06 DIAGNOSIS — E11.65 TYPE 2 DIABETES MELLITUS WITH HYPERGLYCEMIA, WITHOUT LONG-TERM CURRENT USE OF INSULIN (HCC): ICD-10-CM

## 2021-04-06 RX ORDER — DULAGLUTIDE 0.75 MG/.5ML
INJECTION, SOLUTION SUBCUTANEOUS
Qty: 2 ML | Refills: 0 | Status: SHIPPED
Start: 2021-04-06 | End: 2021-04-28 | Stop reason: SDUPTHER

## 2021-04-06 NOTE — TELEPHONE ENCOUNTER
Last Appointment:  12/28/2020  Future Appointments   Date Time Provider Diane Florentino   4/15/2021  8:10 AM MHYX N JOSE J CHRISTIANSON [facility], MODERNA 28 DAY SECOND DOSE N [place] COVID [facility]   4/28/2021  8:00 AM Amalia Beavers MD [facility]

## 2021-04-12 DIAGNOSIS — E78.2 MIXED HYPERLIPIDEMIA: ICD-10-CM

## 2021-04-12 DIAGNOSIS — I10 ESSENTIAL HYPERTENSION: ICD-10-CM

## 2021-04-12 RX ORDER — AMLODIPINE BESYLATE 5 MG/1
TABLET ORAL
Qty: 90 TABLET | Refills: 0 | Status: SHIPPED
Start: 2021-04-12 | End: 2021-04-28 | Stop reason: SDUPTHER

## 2021-04-12 RX ORDER — PRAVASTATIN SODIUM 40 MG
TABLET ORAL
Qty: 90 TABLET | Refills: 0 | Status: SHIPPED
Start: 2021-04-12 | End: 2021-04-28 | Stop reason: SDUPTHER

## 2021-04-12 NOTE — TELEPHONE ENCOUNTER
Last Appointment:  12/28/2020  Future Appointments   Date Time Provider Diane Florentino   4/15/2021  8:10 AM MHYX N JOSE J CHRISTIANSON [facility], MODER[place] 28 DAY SECOND DOSE N [place] [facility]   4/28/2021  8:00 AM Mert Ortiz MD [facility]      Pt does not need refill on pravastatin

## 2021-04-15 ENCOUNTER — IMMUNIZATION (OUTPATIENT)
Dept: PRIMARY CARE CLINIC | Age: 51
End: 2021-04-15
Payer: COMMERCIAL

## 2021-04-15 PROCEDURE — 0012A COVID-19, MODERNA VACCINE 100MCG/0.5ML DOSE: CPT | Performed by: PHYSICIAN ASSISTANT

## 2021-04-15 PROCEDURE — 91301 COVID-19, MODERNA VACCINE 100MCG/0.5ML DOSE: CPT | Performed by: PHYSICIAN ASSISTANT

## 2021-04-28 ENCOUNTER — OFFICE VISIT (OUTPATIENT)
Dept: INTERNAL MEDICINE | Age: 51
End: 2021-04-28
Payer: COMMERCIAL

## 2021-04-28 ENCOUNTER — TELEPHONE (OUTPATIENT)
Dept: ADMINISTRATIVE | Age: 51
End: 2021-04-28

## 2021-04-28 VITALS
SYSTOLIC BLOOD PRESSURE: 132 MMHG | HEIGHT: 66 IN | DIASTOLIC BLOOD PRESSURE: 78 MMHG | WEIGHT: 238 LBS | OXYGEN SATURATION: 98 % | RESPIRATION RATE: 16 BRPM | TEMPERATURE: 98.4 F | BODY MASS INDEX: 38.25 KG/M2 | HEART RATE: 79 BPM

## 2021-04-28 DIAGNOSIS — G62.9 NEUROPATHY: ICD-10-CM

## 2021-04-28 DIAGNOSIS — E11.65 TYPE 2 DIABETES MELLITUS WITH HYPERGLYCEMIA, WITHOUT LONG-TERM CURRENT USE OF INSULIN (HCC): Primary | ICD-10-CM

## 2021-04-28 DIAGNOSIS — I10 ESSENTIAL HYPERTENSION: ICD-10-CM

## 2021-04-28 DIAGNOSIS — E78.2 MIXED HYPERLIPIDEMIA: ICD-10-CM

## 2021-04-28 PROCEDURE — 99214 OFFICE O/P EST MOD 30 MIN: CPT | Performed by: INTERNAL MEDICINE

## 2021-04-28 PROCEDURE — 99213 OFFICE O/P EST LOW 20 MIN: CPT | Performed by: INTERNAL MEDICINE

## 2021-04-28 PROCEDURE — G8427 DOCREV CUR MEDS BY ELIG CLIN: HCPCS | Performed by: INTERNAL MEDICINE

## 2021-04-28 PROCEDURE — G8417 CALC BMI ABV UP PARAM F/U: HCPCS | Performed by: INTERNAL MEDICINE

## 2021-04-28 PROCEDURE — 3017F COLORECTAL CA SCREEN DOC REV: CPT | Performed by: INTERNAL MEDICINE

## 2021-04-28 PROCEDURE — 3044F HG A1C LEVEL LT 7.0%: CPT | Performed by: INTERNAL MEDICINE

## 2021-04-28 PROCEDURE — 1036F TOBACCO NON-USER: CPT | Performed by: INTERNAL MEDICINE

## 2021-04-28 PROCEDURE — 2022F DILAT RTA XM EVC RTNOPTHY: CPT | Performed by: INTERNAL MEDICINE

## 2021-04-28 RX ORDER — AMLODIPINE BESYLATE 5 MG/1
5 TABLET ORAL DAILY
Qty: 90 TABLET | Refills: 0 | Status: SHIPPED
Start: 2021-04-28 | End: 2021-07-20 | Stop reason: SDUPTHER

## 2021-04-28 RX ORDER — FENOFIBRATE 48 MG/1
TABLET, COATED ORAL
Qty: 90 TABLET | Refills: 0 | Status: SHIPPED
Start: 2021-04-28 | End: 2021-07-20 | Stop reason: SDUPTHER

## 2021-04-28 RX ORDER — PRAVASTATIN SODIUM 80 MG/1
TABLET ORAL
Qty: 90 TABLET | Refills: 0 | Status: SHIPPED
Start: 2021-04-28 | End: 2021-07-20 | Stop reason: SDUPTHER

## 2021-04-28 RX ORDER — DULAGLUTIDE 0.75 MG/.5ML
INJECTION, SOLUTION SUBCUTANEOUS
Qty: 2 ML | Refills: 0 | Status: SHIPPED
Start: 2021-04-28 | End: 2021-05-24 | Stop reason: SDUPTHER

## 2021-04-28 RX ORDER — GLUCOSAMINE HCL/CHONDROITIN SU 500-400 MG
CAPSULE ORAL
Qty: 100 STRIP | Refills: 5 | Status: SHIPPED
Start: 2021-04-28 | End: 2021-12-02 | Stop reason: SDUPTHER

## 2021-04-28 RX ORDER — LANCETS 30 GAUGE
1 EACH MISCELLANEOUS 2 TIMES DAILY
Qty: 300 EACH | Refills: 1 | Status: SHIPPED | OUTPATIENT
Start: 2021-04-28

## 2021-04-28 ASSESSMENT — ENCOUNTER SYMPTOMS
VISUAL CHANGE: 0
NAUSEA: 0
DIARRHEA: 0
SHORTNESS OF BREATH: 0
WHEEZING: 0
VOMITING: 0
BLOOD IN STOOL: 0

## 2021-04-28 NOTE — PROGRESS NOTES
[place] [facility]      SUBJECTIVE:  Vani Nguyen (:  1970) is a 48 y.o. female here for evaluation of the following chief complaint(s):  Hyperlipidemia (was told med was to be adjusted today. ), Medication Refill (will need new meter and testing supplies), Results, and Discuss Medications (mag cost went up. now buying OTC)    Presents for follow-up appointment- in office appointment. Denies any new complaints today. Feels doing well overall. Tolerating GLP1 therapy and noted significant reduction in weight and A1c results since last appt. Has not had any recurrent  infections since stopping SGLT2. Needs new glucometer at this time per insurance coverage. Plans on following with both Cardiology- repeat appt (previously cancelled) and Mammogram follow-up. Also requesting 2nd opinion of Podiatry assessment. Hyperlipidemia  This is a chronic problem. Lipid results: TGs remain uncontrolled. Exacerbating diseases include diabetes and obesity. Pertinent negatives include no chest pain, leg pain, myalgias or shortness of breath. Current antihyperlipidemic treatment includes statins and fibric acid derivatives. The current treatment provides moderate improvement of lipids. Diabetes  She presents for her follow-up diabetic visit. She has type 2 diabetes mellitus. Her disease course has been improving (A1c significantly improved). There are no hypoglycemic associated symptoms. Pertinent negatives for hypoglycemia include no dizziness. Associated symptoms include weight loss (intentional). Pertinent negatives for diabetes include no chest pain, no foot ulcerations, no polydipsia, no polyphagia, no polyuria and no visual change. There are no hypoglycemic complications. Current diabetic treatment includes oral agent (dual therapy). Her weight is decreasing steadily. Home blood sugar record trend: Glucose log reviewed.  Her overall blood glucose range is 110-130 mg/dl. She does not see a podiatrist (requesting 2nd opinion). Eye exam is current. Review of Systems   Constitutional: Positive for weight loss (intentional). Negative for chills, diaphoresis and fever. Respiratory: Negative for shortness of breath and wheezing. Cardiovascular: Negative for chest pain, palpitations and leg swelling. Gastrointestinal: Negative for blood in stool, diarrhea, nausea and vomiting. Endocrine: Negative for polydipsia, polyphagia and polyuria. Genitourinary: Negative for difficulty urinating and hematuria. Musculoskeletal: Negative for myalgias. Neurological: Negative for dizziness and light-headedness. Current Outpatient Medications on File Prior to Visit   Medication Sig Dispense Refill    amLODIPine (NORVASC) 5 MG tablet TAKE ONE TABLET BY MOUTH EVERY DAY 90 tablet 0    pravastatin (PRAVACHOL) 40 MG tablet TAKE ONE TABLET BY MOUTH EVERY DAY IN THE EVENING 90 tablet 0    TRULICITY 4.30 BR/5.9ZW SOPN INJECT 0.75MG SUBCUTANEOUSLY ONCE A WEEK 2 mL 0    metFORMIN (GLUCOPHAGE) 1000 MG tablet TAKE ONE TABLET BY MOUTH TWICE A DAY WITH MEALS 180 tablet 0    magnesium cl-calcium carbonate (NU-MAG) 71.5-119 MG TBEC tablet TAKE TWO TABLETS BY MOUTH EVERY  tablet 1    Lancets MISC Inject 1 each into the skin daily 100 each 5    blood glucose monitor strips by Other route daily 100 strip 5    fenofibrate (TRICOR) 48 MG tablet TAKE ONE TABLET BY MOUTH DAILY 90 tablet 0    Alpha-Lipoic Acid 600 MG TABS Take 600 mg by mouth daily      b complex vitamins capsule Take 1 capsule by mouth daily      clobetasol (TEMOVATE) 0.05 % external solution APPLY DAILY AS DIRECTED  1    ibuprofen (ADVIL;MOTRIN) 200 MG tablet Take 400 mg by mouth every 6 hours as needed for Pain      glucose monitoring kit (FREESTYLE) monitoring kit 1 kit by Does not apply route daily as needed 1 kit 0    Misc.  Devices KIT Blood pressure cuff- Ambulatory monitoring- check every other day and maintain blood pressure log 1 kit 0     No current facility-administered medications on file prior to visit. OBJECTIVE:    VS:   Vitals:    04/28/21 0824   BP: 132/78   Site: Right Upper Arm   Position: Sitting   Cuff Size: Medium Adult   Pulse: 79   Resp: 16   Temp: 98.4 °F (36.9 °C)   TempSrc: Oral   SpO2: 98%   Weight: 238 lb (108 kg)   Height: 5' 6\" (1.676 m)     Physical Exam  Vitals signs reviewed. Constitutional:       Appearance: Normal appearance. She is obese. She is not ill-appearing. HENT:      Head: Normocephalic and atraumatic. Eyes:      General: No scleral icterus. Neck:      Vascular: No carotid bruit. Cardiovascular:      Rate and Rhythm: Normal rate and regular rhythm. Pulses: Normal pulses. Dorsalis pedis pulses are 2+ on the right side and 2+ on the left side. Heart sounds: Normal heart sounds. No murmur. Pulmonary:      Effort: Pulmonary effort is normal.      Breath sounds: No wheezing, rhonchi or rales. Abdominal:      General: Bowel sounds are normal.      Palpations: Abdomen is soft. Tenderness: There is no abdominal tenderness. There is no guarding. Musculoskeletal:      Right lower leg: No edema. Left lower leg: No edema. Right foot: No foot drop. Left foot: No foot drop. Feet:    Feet:      Right foot:      Protective Sensation: 10 sites tested. 7 sites sensed. Skin integrity: Skin integrity normal. No ulcer. Left foot:      Protective Sensation: 10 sites tested. 7 sites sensed. Skin integrity: Skin integrity normal. No ulcer. Comments: Decreased sensation worse at 1st toe bilaterally and toes- improved at midfoot and heel. Normal proprioception  Skin:     General: Skin is warm and dry. Neurological:      General: No focal deficit present. Mental Status: She is alert. ASSESSMENT/PLAN:  1.  Type 2 diabetes mellitus with hyperglycemia, without long-term current use of insulin ([address]  Assessment & Plan:  A1c significantly improving   Continue metformin and GLP-1  Lifestyle modifications continued  Weight loss goals continued   Orders:  -      DIABETES FOOT EXAM  -     metFORMIN (GLUCOPHAGE) 1000 MG tablet; TAKE ONE TABLET BY MOUTH TWICE A DAY WITH MEALS, Disp-180 tablet, R-0Normal  -     Dulaglutide (TRULICITY) 5.63 IF/0.2DL SOPN; INJECT 0.75MG SUBCUTANEOUSLY ONCE A WEEK, Disp-2 mL, R-0Normal  -     Jeremias Encinas, DEAN, Podiatry, [facility]  -     Blood Glucose Monitoring Suppl (TRUE METRIX METER) w/Device KIT; 1 Device by Does not apply route daily, Disp-1 kit, R-0Normal  -     blood glucose monitor strips; Test 2 times a day & as needed for symptoms of irregular blood glucose. Dispense sufficient amount for indicated testing frequency plus additional to accommodate PRN testing needs. , Disp-100 strip, R-5, Normal  -     Lancets MISC; 2 TIMES DAILY Starting Wed 4/28/2021, Disp-300 each, R-1, Normal  -     Lipid Panel; Future  -     COMPREHENSIVE METABOLIC PANEL; Future  2. Essential hypertension  Assessment & Plan:  BP at goal  Continue current management   Orders:  -     amLODIPine (NORVASC) 5 MG tablet; Take 1 tablet by mouth daily, Disp-90 tablet, R-0Normal  -     COMPREHENSIVE METABOLIC PANEL; Future  3. Mixed hyperlipidemia  Assessment & Plan:  LDL and HDL at goal   However, continued elevated TGs despite combination statin/fibrate therapy  Will increase statin to full dose and monitor toleration   If still not at goal, will then trial Vascepa   Continue fibrate for now   Orders:  -     pravastatin (PRAVACHOL) 80 MG tablet; TAKE ONE TABLET BY MOUTH EVERY DAY IN THE EVENING, Disp-90 tablet, R-0Normal  -     fenofibrate (TRICOR) 48 MG tablet; TAKE ONE TABLET BY MOUTH DAILY, Disp-90 tablet, R-0Normal  -     Lipid Panel; Future  4.  Neuropathy  Assessment & Plan:  Foot exam re demonstrating significant neuropathy  Recommend Repeat podiatry follow-up  All questions answered  Orders:  -     Félix Torres DPM, Podiatry, [facility]       MAMMOGRAM ORDERED  CARDIOLOGY FOLLOW-UP  PODIATRY REFERRAL FOR 2nd OPINION    RTC:3 months    I have reviewed my findings and recommendations with Roslyn Strauss.   Aislinn Salomon MD   4/28/2021 8:34 AM

## 2021-04-28 NOTE — ASSESSMENT & PLAN NOTE
LDL and HDL at goal   However, continued elevated TGs despite combination statin/fibrate therapy  Will increase statin to full dose and monitor toleration   If still not at goal, will then trial Vascepa   Continue fibrate for now

## 2021-04-28 NOTE — TELEPHONE ENCOUNTER
Pt called to RS appt that the office had cancelled for Dr Glenn Harris, no availability on dr's schedule, please advise pt [phone]

## 2021-04-28 NOTE — ASSESSMENT & PLAN NOTE
A1c significantly improving   Continue metformin and GLP-1  Lifestyle modifications continued  Weight loss goals continued

## 2021-04-28 NOTE — PROGRESS NOTES
Discharge instructions reviewed with patient per Dr. Shree Gamez. Discharge instructions also reviewed with patient per nursing. Patient verbalizes understanding.  Pt given lab scripts and AVS.

## 2021-04-28 NOTE — PATIENT INSTRUCTIONS
Patient Education        icosapent  Pronunciation:  eye KOEDER gilman taiwo  Brand:  Vascepa  What is the most important information I should know about icosapent? Follow all directions on your medicine label and package. Tell each of your healthcare providers about all your medical conditions, allergies, and all medicines you use. What is icosapent? Icosapent is used together with other medicines (such as statins) to reduce the risk of heart attack, stroke, and some types of heart problems that need hospitalization in adults. Icosapent is used together with a low-fat and low-cholesterol diet to reduce triglycerides (fats) in adults with severely high triglycerides. It is not known whether icosapent will lower your risk of pancreatitis (inflammation of your pancreas). Icosapent may also be used for purposes not listed in this medication guide. What should I discuss with my healthcare provider before taking icosapent? You should not use icosapent if you are allergic to it. Tell your doctor if you have ever had:  · liver disease;  · diabetes;  · a thyroid disorder;  · problems with your pancreas;  · a bleeding or blood-clotting disorder; or  · if you are allergic to fish or shellfish. It is not known whether this medicine will harm an unborn baby. Tell your doctor if you are pregnant or plan to become pregnant. Icosapent can pass into breast milk, and may harm your baby. Tell your doctor if you are breastfeeding. Icosapent is not approved for use by anyone younger than 25years old. How should I take icosapent? Follow all directions on your prescription label and read all medication guides or instruction sheets. Use the medicine exactly as directed. Take with food. Swallow the capsule whole and do not crush, chew, break, or open it. You will need frequent blood tests. Icosapent is only part of a complete treatment program that may also include diet, exercise, and weight control.  Follow your diet, medication, and exercise routines very closely. Store at room temperature away from moisture and heat. What happens if I miss a dose? Take the medicine as soon as you can, but skip the missed dose if it is almost time for your next dose. Do not take two doses at one time. What happens if I overdose? Seek emergency medical attention or call the Poison Help line at 1-849.761.6560. What should I avoid while taking icosapent? Avoid eating foods that are high in fat or cholesterol. Icosapent will not be as effective in lowering your cholesterol if you do not follow a cholesterol lowering diet plan. What are the possible side effects of icosapent? Get emergency medical help if you have signs of an allergic reaction: hives; difficult breathing; swelling of your face, lips, tongue, or throat. Call your doctor at once if you have:  · fast or irregular heartbeats, dizziness, shortness of breath, chest discomfort, fainting, or if you feel light-headed; or  · serious bleeding. Common side effects may include:  · muscle or joint pain;  · constipation;  · swelling of the hands, legs, or feet. · gout; or  · chest pressure, racing or pounding heartbeats, fluttering in your chest, feeling short of breath. This is not a complete list of side effects and others may occur. Call your doctor for medical advice about side effects. You may report side effects to FDA at 0-935-UUY-2195. What other drugs will affect icosapent? Tell your doctor about all your other medicines, especially:  · a blood thinner --warfarin, Coumadin, Jantoven. This list is not complete. Other drugs may affect icosapent, including prescription and over-the-counter medicines, vitamins, and herbal products. Not all possible drug interactions are listed here. Where can I get more information? Your pharmacist can provide more information about icosapent.   Remember, keep this and all other medicines out of the reach of children, never share your medicines with others, and use this medication only for the indication prescribed. Every effort has been made to ensure that the information provided by [address] is accurate, up-to-date, and complete, but no guarantee is made to that effect. Drug information contained herein may be time sensitive. [facility] information has been compiled for use by healthcare practitioners and consumers in the [place] and therefore [facility] does not warrant that uses outside of the [place] are appropriate, unless specifically indicated otherwise. [facility]'s drug information does not endorse drugs, diagnose patients or recommend therapy. [facility]'s drug information is an informational resource designed to assist licensed healthcare practitioners in caring for their patients and/or to serve consumers viewing this service as a supplement to, and not a substitute for, the expertise, skill, knowledge and judgment of healthcare practitioners. The absence of a warning for a given drug or drug combination in no way should be construed to indicate that the drug or drug combination is safe, effective or appropriate for any given patient. [facility] does not assume any responsibility for any aspect of healthcare administered with the aid of information [facility] provides. The information contained herein is not intended to cover all possible uses, directions, precautions, warnings, drug interactions, allergic reactions, or adverse effects. If you have questions about the drugs you are taking, check with your doctor, nurse or pharmacist.  Copyright 9152-5902 [address]. Version: 3.01. Revision date: 2/13/2020. Care instructions adapted under license by [facility] ([facility] and the [facility]). If you have questions about a medical condition or this instruction, always ask your healthcare professional. [place] [address] any warranty or liability for your use of this information. 1) We plan to increase your statin to 80 mg. Determine if able to tolerate at this time. Continue tricor and will repeat lipid panel in 3 months- if not at goal- will discuss Vascepa trial.    2) New glucometer and test strips ordered. 3) Please reschedule with Cardiology as discussed. 4) Podiatry referral placed for 2nd opinion. 5) Please call with any questions.

## 2021-04-28 NOTE — ASSESSMENT & PLAN NOTE
Foot exam re demonstrating significant neuropathy  Recommend Repeat podiatry follow-up  All questions answered

## 2021-04-29 ENCOUNTER — HOSPITAL ENCOUNTER (OUTPATIENT)
Dept: GENERAL RADIOLOGY | Age: 51
Discharge: HOME | End: 2021-05-01
Payer: COMMERCIAL

## 2021-04-29 DIAGNOSIS — Z12.31 ENCOUNTER FOR SCREENING MAMMOGRAM FOR BREAST CANCER: ICD-10-CM

## 2021-04-29 PROCEDURE — 77063 BREAST TOMOSYNTHESIS BI: CPT

## 2021-05-14 ENCOUNTER — OFFICE VISIT (OUTPATIENT)
Dept: PODIATRY | Age: 51
End: 2021-05-14
Payer: COMMERCIAL

## 2021-05-14 VITALS — BODY MASS INDEX: 38.25 KG/M2 | WEIGHT: 238 LBS | HEIGHT: 66 IN

## 2021-05-14 DIAGNOSIS — L84 CORNS AND CALLOSITIES: ICD-10-CM

## 2021-05-14 DIAGNOSIS — G60.8 HEREDITARY SENSORY NEUROPATHY: ICD-10-CM

## 2021-05-14 DIAGNOSIS — M20.42 HAMMER TOES OF BOTH FEET: ICD-10-CM

## 2021-05-14 DIAGNOSIS — M20.41 HAMMER TOES OF BOTH FEET: ICD-10-CM

## 2021-05-14 DIAGNOSIS — L85.3 XEROSIS CUTIS: ICD-10-CM

## 2021-05-14 DIAGNOSIS — E11.9 TYPE 2 DIABETES MELLITUS WITHOUT COMPLICATION, UNSPECIFIED WHETHER LONG TERM INSULIN USE (HCC): ICD-10-CM

## 2021-05-14 DIAGNOSIS — B35.1 TINEA UNGUIUM: Primary | ICD-10-CM

## 2021-05-14 PROCEDURE — 1036F TOBACCO NON-USER: CPT | Performed by: PODIATRIST

## 2021-05-14 PROCEDURE — 99203 OFFICE O/P NEW LOW 30 MIN: CPT | Performed by: PODIATRIST

## 2021-05-14 PROCEDURE — G8417 CALC BMI ABV UP PARAM F/U: HCPCS | Performed by: PODIATRIST

## 2021-05-14 PROCEDURE — 11721 DEBRIDE NAIL 6 OR MORE: CPT | Performed by: PODIATRIST

## 2021-05-14 PROCEDURE — G8427 DOCREV CUR MEDS BY ELIG CLIN: HCPCS | Performed by: PODIATRIST

## 2021-05-14 PROCEDURE — 3044F HG A1C LEVEL LT 7.0%: CPT | Performed by: PODIATRIST

## 2021-05-14 PROCEDURE — 2022F DILAT RTA XM EVC RTNOPTHY: CPT | Performed by: PODIATRIST

## 2021-05-14 PROCEDURE — 3017F COLORECTAL CA SCREEN DOC REV: CPT | Performed by: PODIATRIST

## 2021-05-14 PROCEDURE — 11056 PARNG/CUTG B9 HYPRKR LES 2-4: CPT | Performed by: PODIATRIST

## 2021-05-14 RX ORDER — AMMONIUM LACTATE 12 G/100G
LOTION TOPICAL
Qty: 400 G | Refills: 2 | Status: SHIPPED
Start: 2021-05-14 | End: 2021-12-02 | Stop reason: SDUPTHER

## 2021-05-14 NOTE — LETTER
Davina Ramírez, [address] [place] [address]     Patient: Daphney Payan  YOB: 1970  Date of Visit: 5/14/2021     Dear Davina Ramírez MD    Thank you for referring Daphney Payan to me for evaluation. I did see Lino Pizarro today diabetic foot ankle exam.  I did perform formal debridement toenails and calluses today. Did recommend custom diabetic shoes and inserts. I will follow-up 3 months to monitor progression. I did also recommend ammonium lactate 12% lotion on bottom both feet. Once again, thank you very much for this kind referral and allowing me to participate in the care of this patient. If you have questions, please do not hesitate to call me.     Sincerely,        SONAL Calhoun [place] [id number]  Phone: [phone]  Fax: [phone]

## 2021-05-14 NOTE — PROGRESS NOTES
Emani Ward is here today for a diabetic foot exam. her PCP is Abelardo Santamaria MD last OV was 21. Emani Ward : 1970 Sex: female  Age: 46 y.o. Patient was referred by Abelardo Santamaria MD    CC:   Diabetic foot exam and painful elongated toenails 1-5 right and left    HPI:   This pleasant 55-year-old female diabetic patient referred me today diabetic foot ankle exam.  Does have callus tissue on the inside and bottom both great toes. Does have previous history from her other foot and ankle provider of having diabetic shoes in the past.  Does not have diabetic inserts or orthotics in today. Denies any history of open wounds or skin lesions. Denies any current calf pain. Does follow-up with primary care physician Dr. Quita Jean. No recent injuries or trauma foot or ankle related. Does continue oral metformin. Here today once again for diabetic foot exam and painful elongated toenails 1-5 right and left. No additional pedal complaints at this time. ROS:  Const: Denies constitutional symptoms  Musculo: Denies symptoms other than stated above  Skin: Denies symptoms other than stated above      Current Outpatient Medications:     ammonium lactate (LAC-HYDRIN) 12 % lotion, Apply topically twice daily, Disp: 400 g, Rfl: 2    metFORMIN (GLUCOPHAGE) 1000 MG tablet, TAKE ONE TABLET BY MOUTH TWICE A DAY WITH MEALS, Disp: 180 tablet, Rfl: 0    amLODIPine (NORVASC) 5 MG tablet, Take 1 tablet by mouth daily, Disp: 90 tablet, Rfl: 0    pravastatin (PRAVACHOL) 80 MG tablet, TAKE ONE TABLET BY MOUTH EVERY DAY IN THE EVENING, Disp: 90 tablet, Rfl: 0    Dulaglutide (TRULICITY) 0.37 MABEL/4.7FZ SOPN, INJECT 0.75MG SUBCUTANEOUSLY ONCE A WEEK, Disp: 2 mL, Rfl: 0    Blood Glucose Monitoring Suppl (TRUE METRIX METER) w/Device KIT, 1 Device by Does not apply route daily, Disp: 1 kit, Rfl: 0    blood glucose monitor strips, Test 2 times a day & as needed for symptoms of irregular blood glucose. tibialis palpable bilateral.    Capillary Refill Time:  Immediate return  Hair growth:  Symmetrical and bilateral   Skin:  Not atrophic  Edema: Mild edema bilateral feet. Mild edema bilateral ankles. Neurologic:  Light touch diminished bilateral.  Warm to coolness bilateral distal toes  Decreased epicritic sensation     Musculoskeletal/ Orthopedic examination:    Equinis: present bilateral  Dorsiflexion, plantarflexion, inversion, eversion bilateral 5 out of 5 muscle strength  Wiggling toes  Negative Homans  Flexible contracted hammertoes digits 2 through 5 bilateral    Dermatology examination:    Toenails 1 through 5 bilateral thickened, elongated, dystrophic, mycotic with subungual debris. Web spaces 1 through 4 bilateral clean dry and intact. Hyperkeratotic tissue noted medial IPJ great toe bilateral.  There is also hyperkeratotic tissue plantar first metatarsal head bilateral.  No open skin lesions or abrasions. Dry skin plantar heels bilateral.                Assessment and Plan:  Madison Khan was seen today for diabetes. Diagnoses and all orders for this visit:    Tinea unguium    Type 2 diabetes mellitus without complication, unspecified whether long term insulin use ([facility] [address]  -     DME Order for (Specify) as OP    Corns and callosities    Xerosis cutis    Hereditary sensory neuropathy    Hammer toes of both feet  -     DME Order for (Specify) as OP    Other orders  -     ammonium lactate (LAC-HYDRIN) 12 % lotion; Apply topically twice daily          Diabetic foot and ankle examination performed today  Formal debridement toenails 1 through 5 right and left with manual debridement for thickness and overall length. Paring hyperkeratotic tissue with a #15 blade plantar first metatarsal bilateral  I did also pare hyperkeratotic tissue medial IPJ great toe bilateral.  No open skin lesions. Ammonium lactate 12% twice daily both feet. Did write a DME order for diabetic shoes and inserts.   Did recommend [employer] for diabetic shoes and inserts. Avoid barefoot. Follow-up 3 months    Return in about 3 months (around 8/14/2021). Seen By:  Nikki Jordan DPM      Document was created using voice recognition software. Note was reviewed however may contain grammatical errors.

## 2021-05-24 ENCOUNTER — TELEPHONE (OUTPATIENT)
Dept: INTERNAL MEDICINE | Age: 51
End: 2021-05-24

## 2021-05-24 DIAGNOSIS — E11.65 TYPE 2 DIABETES MELLITUS WITH HYPERGLYCEMIA, WITHOUT LONG-TERM CURRENT USE OF INSULIN (HCC): ICD-10-CM

## 2021-05-24 RX ORDER — DULAGLUTIDE 0.75 MG/.5ML
INJECTION, SOLUTION SUBCUTANEOUS
Qty: 2 ML | Refills: 0 | Status: SHIPPED
Start: 2021-05-24 | End: 2021-07-06

## 2021-06-17 ENCOUNTER — OFFICE VISIT (OUTPATIENT)
Dept: CARDIOLOGY CLINIC | Age: 51
End: 2021-06-17
Payer: COMMERCIAL

## 2021-06-17 VITALS
SYSTOLIC BLOOD PRESSURE: 118 MMHG | WEIGHT: 239 LBS | HEIGHT: 66 IN | BODY MASS INDEX: 38.41 KG/M2 | RESPIRATION RATE: 14 BRPM | HEART RATE: 71 BPM | DIASTOLIC BLOOD PRESSURE: 62 MMHG

## 2021-06-17 DIAGNOSIS — E78.2 MIXED HYPERLIPIDEMIA: ICD-10-CM

## 2021-06-17 DIAGNOSIS — I10 ESSENTIAL HYPERTENSION: ICD-10-CM

## 2021-06-17 DIAGNOSIS — R00.2 PALPITATION: Primary | ICD-10-CM

## 2021-06-17 DIAGNOSIS — R07.2 PRECORDIAL PAIN: ICD-10-CM

## 2021-06-17 PROCEDURE — 3017F COLORECTAL CA SCREEN DOC REV: CPT | Performed by: INTERNAL MEDICINE

## 2021-06-17 PROCEDURE — 1036F TOBACCO NON-USER: CPT | Performed by: INTERNAL MEDICINE

## 2021-06-17 PROCEDURE — 93000 ELECTROCARDIOGRAM COMPLETE: CPT | Performed by: INTERNAL MEDICINE

## 2021-06-17 PROCEDURE — G8427 DOCREV CUR MEDS BY ELIG CLIN: HCPCS | Performed by: INTERNAL MEDICINE

## 2021-06-17 PROCEDURE — 99214 OFFICE O/P EST MOD 30 MIN: CPT | Performed by: INTERNAL MEDICINE

## 2021-06-17 PROCEDURE — G8417 CALC BMI ABV UP PARAM F/U: HCPCS | Performed by: INTERNAL MEDICINE

## 2021-06-17 NOTE — PROGRESS NOTES
OUTPATIENT CARDIOLOGY FOLLOW-UP    Name: Jorge Dupont    Age: 46 y.o. Date of Service: 6/17/2021    Chief Complaint: Follow-up for palpitations, HTN    Referring Physician: Noa Dinh MD    History of Present Illness:  Ms. Alem Ortega is a 46 y.o. female who presents today for follow-up evaluation of palpitations. Her PMH includes HTN, obesity, and remote tobacco abuse (x 3 years, quit > 20 years ago). She denies a history of CAD (negative exercise nuclear stress test with normal EF on 11/10/15). At the time of her prior office visit, she reported a > 2 year history of palpitations -- occur multiple days/week, episodes last seconds, typically occur in the setting of social stressors, +associated atypical chest pain (no history of exertional chest pain), no history of syncope. She states that she's under considerable stress (chronic) at home stemming from a divorce and child custody issues (\"there are major issues going on with my family\" re: 2 older daughters/ex-spouse). Still with increased social stressors. Palpitations less frequent recently (\"doing better than before\"). She reports a long-standing history (\"years\") of dyspnea on exertion with moderate levels of activity (stable/improved recently). She denies a history of syncope, dizziness, PND, or orthopnea. She drinks 2-3 small cups of coffee per day. +recent intermittent chest pain (\"burning\", mid-sternal, no relationship to exertion). She is currently with no active cardiac complaints at rest. SR on EKG.     Review of Systems:  Cardiac: As per HPI  General: No fever, chills  Pulmonary: As per HPI  HEENT: No visual disturbances, difficult swallowing  GI: No nausea, vomiting  : No dysuria, hematuria  Endocrine: No thyroid disease or DM  Musculoskeletal: WRAY x 4, no focal motor deficits  Skin: Intact, no rashes  Neuro: No headache, seizures  Psych: Currently with no depression, anxiety    Past Medical History:  Past Medical History: file   Tobacco Use    Smoking status: Former Smoker    Smokeless tobacco: Never Used    Tobacco comment: smoked for 3 yrs in early 20;s   Vaping Use    Vaping Use: Never used   Substance and Sexual Activity    Alcohol use: No     Alcohol/week: 0.0 standard drinks    Drug use: No    Sexual activity: Yes     Partners: Male   Other Topics Concern    Not on file   Social History Narrative    Not on file     Social Determinants of Health     Financial Resource Strain:     Difficulty of Paying Living Expenses:    Food Insecurity:     Worried About Running Out of Food in the Last Year:     Ran Out of Food in the Last Year:    Transportation Needs:     Lack of Transportation (Medical):  Lack of Transportation (Non-Medical):    Physical Activity:     Days of Exercise per Week:     Minutes of Exercise per Session:    Stress:     Feeling of Stress :    Social Connections:     Frequency of Communication with Friends and Family:     Frequency of Social Gatherings with Friends and Family:     Attends Islam Services:     Active Member of Clubs or Organizations:     Attends Club or Organization Meetings:     Marital Status:    Intimate Partner Violence:     Fear of Current or Ex-Partner:     Emotionally Abused:     Physically Abused:     Sexually Abused: Allergies:   Allergies   Allergen Reactions    Bactrim [Sulfamethoxazole-Trimethoprim] Rash       Current Medications:  Current Outpatient Medications   Medication Sig Dispense Refill    Dulaglutide (TRULICITY) 9.39 UB/1.9WW SOPN INJECT 0.75MG SUBCUTANEOUSLY ONCE A WEEK 2 mL 0    ammonium lactate (LAC-HYDRIN) 12 % lotion Apply topically twice daily 400 g 2    metFORMIN (GLUCOPHAGE) 1000 MG tablet TAKE ONE TABLET BY MOUTH TWICE A DAY WITH MEALS 180 tablet 0    amLODIPine (NORVASC) 5 MG tablet Take 1 tablet by mouth daily 90 tablet 0    pravastatin (PRAVACHOL) 80 MG tablet TAKE ONE TABLET BY MOUTH EVERY DAY IN THE EVENING 90 tablet 0    Blood Glucose Monitoring Suppl (TRUE METRIX METER) w/Device KIT 1 Device by Does not apply route daily 1 kit 0    blood glucose monitor strips Test 2 times a day & as needed for symptoms of irregular blood glucose. Dispense sufficient amount for indicated testing frequency plus additional to accommodate PRN testing needs. 100 strip 5    fenofibrate (TRICOR) 48 MG tablet TAKE ONE TABLET BY MOUTH DAILY 90 tablet 0    magnesium cl-calcium carbonate (NU-MAG) 71.5-119 MG TBEC tablet TAKE TWO TABLETS BY MOUTH EVERY  tablet 1    Lancets MISC Inject 1 each into the skin daily 100 each 5    blood glucose monitor strips by Other route daily 100 strip 5    Alpha-Lipoic Acid 600 MG TABS Take 600 mg by mouth daily      b complex vitamins capsule Take 1 capsule by mouth daily      clobetasol (TEMOVATE) 0.05 % external solution APPLY DAILY AS DIRECTED  1    ibuprofen (ADVIL;MOTRIN) 200 MG tablet Take 400 mg by mouth every 6 hours as needed for Pain      glucose monitoring kit (FREESTYLE) monitoring kit 1 kit by Does not apply route daily as needed 1 kit 0    Misc. Devices KIT Blood pressure cuff- Ambulatory monitoring- check every other day and maintain blood pressure log 1 kit 0    Lancets MISC 1 each by Does not apply route 2 times daily (Patient not taking: Reported on 6/17/2021) 300 each 1     No current facility-administered medications for this visit. Physical Exam:  /62   Pulse 71   Resp 14   Ht 5' 5.5\" (1.664 m)   Wt 239 lb (108.4 kg)   BMI 39.17 kg/m²   Wt Readings from Last 3 Encounters:   06/17/21 239 lb (108.4 kg)   05/14/21 238 lb (108 kg)   04/28/21 238 lb (108 kg)     Appearance: Awake, alert, no acute respiratory distress  Skin: Intact, no rash  Head: Normocephalic, atraumatic  Eyes: EOMI, no conjunctival erythema  ENMT: No pharyngeal erythema, MMM, no rhinorrhea  Neck: Supple, no elevated JVP, no carotid bruits  Lungs: Clear to auscultation bilaterally.  No wheezes, rales, or rhonchi. Cardiac: Regular rate and rhythm, +S1S2, no murmurs apparent  Abdomen: Soft, nontender, +bowel sounds  Extremities: Moves all extremities x 4, no lower extremity edema  Neurologic: No focal motor deficits apparent, normal mood and affect    Laboratory Tests:  Lab Results   Component Value Date    CREATININE 0.8 09/26/2020    BUN 14 09/26/2020     09/26/2020    K 4.0 09/26/2020     09/26/2020    CO2 23 09/26/2020     Lab Results   Component Value Date    WBC 7.4 12/21/2019    RBC 5.17 12/21/2019    HGB 15.5 12/21/2019    HCT 46.9 12/21/2019    MCV 90.7 12/21/2019    MCH 30.0 12/21/2019    MCHC 33.0 12/21/2019    RDW 12.8 12/21/2019     12/21/2019    MPV 9.5 12/21/2019     Lab Results   Component Value Date    PROTIME 11.3 07/11/2011    INR 1.2 07/11/2011     Lab Results   Component Value Date    TSH 1.710 06/29/2016     Lab Results   Component Value Date    LABA1C 5.9 (H) 03/17/2021     Lab Results   Component Value Date    CHOL 159 03/17/2021    CHOL 163 09/26/2020    CHOL 169 07/05/2019     Lab Results   Component Value Date    TRIG 246 (H) 03/17/2021    TRIG 245 (H) 09/26/2020    TRIG 244 (H) 07/05/2019     Lab Results   Component Value Date    HDL 46 03/17/2021    HDL 42 09/26/2020    HDL 42 07/05/2019     Lab Results   Component Value Date    LDLCALC 64 03/17/2021    LDLCALC 72 09/26/2020    LDLCALC 78 07/05/2019     Lab Results   Component Value Date    LABVLDL 49 03/17/2021    LABVLDL 49 09/26/2020    LABVLDL 49 07/05/2019     Cardiac Tests:  ECG: SR, rate 71, NSSTT changes    Echocardiogram: 7/27/18 (Dr. River Jensen)   Ejection fraction is visually estimated at 65%.   No regional wall motion abnormalities seen.   Normal left ventricular diastolic filling pattern for age.  Bernice Shook right ventricle structure and function.   Trace aortic valve regurgitation.   Physiologic and/or trace mitral regurgitation is present. ASSESSMENT / PLAN:  1.  Palpitations (in the setting of #2) -- normal TSH, improved recently  2. Significant social stressors / anxiety  3. HTN -- 's at prior office visits, BP today 118/62  4. Long-standing dyspnea on exertion  5. Obesity - BMI 44.9 --> 43.5 --> 41.2 --> 39.2  6. Remote tobacco abuse (x 3 years, quit > 20 years ago)  7. Exercise nuclear stress test (11/10/15) - negative for stress induced ischemia, normal EF  8. Holter monitoring (48 hours, 10/2015) - avg HR 91 BPM, no atrial atrial fibrillation, no ventricular ectopy, rare PAC's (3 isolated PAC's), \"palpitations\" did not correlate with any arrhythmias  9. Chest pain    - Recommended stress testing if chest pain episodes persist (she elects to monitor symptoms for now)  - I previously had a discussion with the patient regarding possible etiologies, diagnostic work-up, and treatment options for palpitations. Her palpitations typically correlate with her stressors at home/anxiety. Results of prior stress test and 48 hour Holter monitoring reviewed with the patient. Options for further cardiac work-up discussed (including repeat cardiac monitoring) -- decision made to hold off with advanced cardiac work-up at this time given recent clinical improvement.  Will reassess for worsening of symptoms.  - Home BP monitoring --> continue current regimen  - Keep K > 4 and Mg > 2  - Aggressive risk factor modifications / continued weight loss  - Consider performing a sleep study if no prior study    Lora Riley MD  [facility]) Cardiology

## 2021-07-03 DIAGNOSIS — E11.65 TYPE 2 DIABETES MELLITUS WITH HYPERGLYCEMIA, WITHOUT LONG-TERM CURRENT USE OF INSULIN (HCC): ICD-10-CM

## 2021-07-06 RX ORDER — DULAGLUTIDE 0.75 MG/.5ML
INJECTION, SOLUTION SUBCUTANEOUS
Qty: 2 ML | Refills: 0 | Status: SHIPPED
Start: 2021-07-06 | End: 2021-07-20 | Stop reason: SDUPTHER

## 2021-07-06 NOTE — TELEPHONE ENCOUNTER
Last Appointment:  4/28/2021  Future Appointments   Date Time Provider Diane Lizi   7/23/2021  8:00 AM Brian Ivory DPM Col Podiatry [facility]   8/12/2021  2:15 PM Farhat Tee MD [facility]

## 2021-07-09 DIAGNOSIS — E11.65 TYPE 2 DIABETES MELLITUS WITH HYPERGLYCEMIA, WITHOUT LONG-TERM CURRENT USE OF INSULIN (HCC): ICD-10-CM

## 2021-07-20 ENCOUNTER — TELEPHONE (OUTPATIENT)
Dept: INTERNAL MEDICINE | Age: 51
End: 2021-07-20

## 2021-07-20 DIAGNOSIS — I10 ESSENTIAL HYPERTENSION: ICD-10-CM

## 2021-07-20 DIAGNOSIS — E78.2 MIXED HYPERLIPIDEMIA: ICD-10-CM

## 2021-07-20 DIAGNOSIS — E11.65 TYPE 2 DIABETES MELLITUS WITH HYPERGLYCEMIA, WITHOUT LONG-TERM CURRENT USE OF INSULIN (HCC): ICD-10-CM

## 2021-07-20 RX ORDER — DULAGLUTIDE 0.75 MG/.5ML
INJECTION, SOLUTION SUBCUTANEOUS
Qty: 2 ML | Refills: 0 | Status: SHIPPED
Start: 2021-07-20 | End: 2021-08-12 | Stop reason: SDUPTHER

## 2021-07-20 RX ORDER — AMLODIPINE BESYLATE 5 MG/1
5 TABLET ORAL DAILY
Qty: 90 TABLET | Refills: 0 | Status: SHIPPED
Start: 2021-07-20 | End: 2021-08-12 | Stop reason: SDUPTHER

## 2021-07-20 RX ORDER — PRAVASTATIN SODIUM 80 MG/1
TABLET ORAL
Qty: 90 TABLET | Refills: 0 | Status: SHIPPED
Start: 2021-07-20 | End: 2021-08-12 | Stop reason: SDUPTHER

## 2021-07-20 RX ORDER — FENOFIBRATE 48 MG/1
TABLET, COATED ORAL
Qty: 90 TABLET | Refills: 0 | Status: SHIPPED
Start: 2021-07-20 | End: 2021-08-12 | Stop reason: SDUPTHER

## 2021-07-20 NOTE — TELEPHONE ENCOUNTER
Pt called and left message on need for some refills. Called and spoke with pharmacy to confirm no refills available for Trulcity, Norvasc, Pravastatin & tricor. States pt in need of refills for all.    Last Appointment:  4/28/2021  Future Appointments   Date Time Provider Diane Florentino   7/23/2021  8:00 AM Tony Poon DPM Col Podiatry [facility]   8/12/2021  2:15 PM Atul Singletary MD [facility]

## 2021-07-23 ENCOUNTER — OFFICE VISIT (OUTPATIENT)
Dept: PODIATRY | Age: 51
End: 2021-07-23
Payer: COMMERCIAL

## 2021-07-23 VITALS — WEIGHT: 239 LBS | BODY MASS INDEX: 38.41 KG/M2 | HEIGHT: 66 IN

## 2021-07-23 DIAGNOSIS — E11.9 TYPE 2 DIABETES MELLITUS WITHOUT COMPLICATION, UNSPECIFIED WHETHER LONG TERM INSULIN USE (HCC): ICD-10-CM

## 2021-07-23 DIAGNOSIS — L85.3 XEROSIS CUTIS: ICD-10-CM

## 2021-07-23 DIAGNOSIS — B35.1 TINEA UNGUIUM: Primary | ICD-10-CM

## 2021-07-23 DIAGNOSIS — G60.8 HEREDITARY SENSORY NEUROPATHY: ICD-10-CM

## 2021-07-23 DIAGNOSIS — M20.41 HAMMER TOES OF BOTH FEET: ICD-10-CM

## 2021-07-23 DIAGNOSIS — L84 CORNS AND CALLOSITIES: ICD-10-CM

## 2021-07-23 DIAGNOSIS — M20.42 HAMMER TOES OF BOTH FEET: ICD-10-CM

## 2021-07-23 PROCEDURE — 11056 PARNG/CUTG B9 HYPRKR LES 2-4: CPT | Performed by: PODIATRIST

## 2021-07-23 PROCEDURE — 11721 DEBRIDE NAIL 6 OR MORE: CPT | Performed by: PODIATRIST

## 2021-07-23 NOTE — PROGRESS NOTES
Allen Scott is here today for a diabetic foot exam and nail care. her PCP is Arcadio Kimble MD last OV was 21. Allen Scott : 1970 Sex: female  Age: 46 y.o. Patient was referred by Arcadio Kimble MD    CC:   Diabetic foot exam and painful elongated toenails 1-5 right and left    HPI:   Follow-up diabetic exam  Follow-up elongated toenails 1 through 5 right and left. No open skin lesion abrasions. Does have ammonium lactate 12% has not been using it every day but does have several refills. Pleased with overall progression. Did lose her prescription for diabetic shoes and inserts. No additional pedal complaints at this time. ROS:  Const: Denies constitutional symptoms  Musculo: Denies symptoms other than stated above  Skin: Denies symptoms other than stated above      Current Outpatient Medications:     pravastatin (PRAVACHOL) 80 MG tablet, TAKE ONE TABLET BY MOUTH EVERY DAY IN THE EVENING, Disp: 90 tablet, Rfl: 0    fenofibrate (TRICOR) 48 MG tablet, TAKE ONE TABLET BY MOUTH DAILY, Disp: 90 tablet, Rfl: 0    amLODIPine (NORVASC) 5 MG tablet, Take 1 tablet by mouth daily, Disp: 90 tablet, Rfl: 0    Dulaglutide (TRULICITY) 2.60 BU/8.3EL SOPN, INJECT 0.75MG SUBCUTANEOUSLY ONCE A WEEK, Disp: 2 mL, Rfl: 0    metFORMIN (GLUCOPHAGE) 1000 MG tablet, TAKE ONE TABLET BY MOUTH TWICE A DAY WITH MEALS, Disp: 60 tablet, Rfl: 0    ammonium lactate (LAC-HYDRIN) 12 % lotion, Apply topically twice daily, Disp: 400 g, Rfl: 2    Blood Glucose Monitoring Suppl (TRUE METRIX METER) w/Device KIT, 1 Device by Does not apply route daily, Disp: 1 kit, Rfl: 0    blood glucose monitor strips, Test 2 times a day & as needed for symptoms of irregular blood glucose. Dispense sufficient amount for indicated testing frequency plus additional to accommodate PRN testing needs. , Disp: 100 strip, Rfl: 5    Lancets MISC, 1 each by Does not apply route 2 times daily (Patient not taking: Reported on 6/17/2021), Disp: 300 each, Rfl: 1    magnesium cl-calcium carbonate (NU-MAG) 71.5-119 MG TBEC tablet, TAKE TWO TABLETS BY MOUTH EVERY DAY, Disp: 180 tablet, Rfl: 1    Lancets MISC, Inject 1 each into the skin daily, Disp: 100 each, Rfl: 5    blood glucose monitor strips, by Other route daily, Disp: 100 strip, Rfl: 5    Alpha-Lipoic Acid 600 MG TABS, Take 600 mg by mouth daily, Disp: , Rfl:     b complex vitamins capsule, Take 1 capsule by mouth daily, Disp: , Rfl:     clobetasol (TEMOVATE) 0.05 % external solution, APPLY DAILY AS DIRECTED, Disp: , Rfl: 1    ibuprofen (ADVIL;MOTRIN) 200 MG tablet, Take 400 mg by mouth every 6 hours as needed for Pain, Disp: , Rfl:     glucose monitoring kit (FREESTYLE) monitoring kit, 1 kit by Does not apply route daily as needed, Disp: 1 kit, Rfl: 0    Misc. Devices KIT, Blood pressure cuff- Ambulatory monitoring- check every other day and maintain blood pressure log, Disp: 1 kit, Rfl: 0  Allergies   Allergen Reactions    Bactrim [Sulfamethoxazole-Trimethoprim] Rash       Past Medical History:   Diagnosis Date    Abdominal pain     Anxiety state, unspecified 3/8/2016    Asthma     no inhalers    Breast fibrocystic disorder     Diabetes mellitus ([facility] [address]     Essential hypertension 4/25/2016    Hyperlipidemia     Irritable bowel syndrome     Left ovarian cyst     Morbid obesity ([facility] [address] 4/25/2016    Osteoarthritis     Pharyngoesophageal dysphagia 6/14/2016       There were no vitals filed for this visit. Work History/Social History: Foot and ankle history:     Focused Lower Extremity Physical Exam:      Neurovascular examination:    Dorsalis Pedis palpable bilateral.  Posterior tibialis palpable bilateral.    Capillary Refill Time:  Immediate return  Hair growth:  Symmetrical and bilateral   Skin:  Not atrophic  Edema: Mild edema bilateral feet. Mild edema bilateral ankles.   Neurologic:  Light touch diminished bilateral.  Warm to coolness bilateral distal toes  Decreased epicritic sensation     Musculoskeletal/ Orthopedic examination:    Equinis: present bilateral  Dorsiflexion, plantarflexion, inversion, eversion bilateral 5 out of 5 muscle strength  Wiggling toes  Negative Homans  Flexible contracted hammertoes digits 2 through 5 bilateral    Dermatology examination:    Toenails 1 through 5 bilateral thickened, elongated, dystrophic, mycotic with subungual debris. Web spaces 1 through 4 bilateral clean dry and intact. Hyperkeratotic tissue noted medial IPJ great toe bilateral.  There is also hyperkeratotic tissue plantar first metatarsal head bilateral.   Dry skin plantar heels. No open skin lesion abrasions. Assessment and Plan:  Stephen Dobson was seen today for callouses, nail problem and diabetes. Diagnoses and all orders for this visit:    Tinea unguium    Type 2 diabetes mellitus without complication, unspecified whether long term insulin use (HCC)    Corns and callosities    Xerosis cutis    Hereditary sensory neuropathy    Hammer toes of both feet          Follow-up diabetic exam    Formal debridement toenails 1 through 5 right and left with manual debridement for thickness and overall length. Paring hyperkeratotic tissue with a #15 blade plantar first metatarsal bilateral  Paring hyperkeratotic tissue IPJ great toe bilateral.  No open skin lesion abrasions. I did write a new order for [employer] for diabetic shoes and inserts. Importance of ammonium lactate 12% twice daily both feet. Avoid barefoot. Follow-up 2 months for diabetic exam.      No follow-ups on file. Seen By:  Joel Connolly DPM      Document was created using voice recognition software. Note was reviewed however may contain grammatical errors.

## 2021-08-11 ENCOUNTER — HOSPITAL ENCOUNTER (OUTPATIENT)
Age: 51
Discharge: HOME | End: 2021-08-11
Payer: COMMERCIAL

## 2021-08-11 DIAGNOSIS — I10 ESSENTIAL HYPERTENSION: ICD-10-CM

## 2021-08-11 DIAGNOSIS — E11.65 TYPE 2 DIABETES MELLITUS WITH HYPERGLYCEMIA, WITHOUT LONG-TERM CURRENT USE OF INSULIN (HCC): ICD-10-CM

## 2021-08-11 DIAGNOSIS — E78.2 MIXED HYPERLIPIDEMIA: ICD-10-CM

## 2021-08-11 LAB
ALBUMIN SERPL-MCNC: 4.2 G/DL (ref 3.5–5.2)
ALP BLD-CCNC: 54 U/L (ref 35–104)
ALT SERPL-CCNC: 23 U/L (ref 0–32)
ANION GAP SERPL CALCULATED.3IONS-SCNC: 9 MMOL/L (ref 7–16)
AST SERPL-CCNC: 16 U/L (ref 0–31)
BILIRUB SERPL-MCNC: 0.2 MG/DL (ref 0–1.2)
BUN BLDV-MCNC: 11 MG/DL (ref 6–20)
CALCIUM SERPL-MCNC: 9.2 MG/DL (ref 8.6–10.2)
CHLORIDE BLD-SCNC: 101 MMOL/L (ref 98–107)
CHOLESTEROL, TOTAL: 155 MG/DL (ref 0–199)
CO2: 27 MMOL/L (ref 22–29)
CREAT SERPL-MCNC: 0.9 MG/DL (ref 0.5–1)
GFR AFRICAN AMERICAN: >60
GFR NON-AFRICAN AMERICAN: >60 ML/MIN/1.73
GLUCOSE BLD-MCNC: 137 MG/DL (ref 74–99)
HDLC SERPL-MCNC: 44 MG/DL
LDL CHOLESTEROL CALCULATED: 77 MG/DL (ref 0–99)
POTASSIUM SERPL-SCNC: 4.3 MMOL/L (ref 3.5–5)
SODIUM BLD-SCNC: 137 MMOL/L (ref 132–146)
TOTAL PROTEIN: 6.8 G/DL (ref 6.4–8.3)
TRIGL SERPL-MCNC: 171 MG/DL (ref 0–149)
VLDLC SERPL CALC-MCNC: 34 MG/DL

## 2021-08-11 PROCEDURE — 36415 COLL VENOUS BLD VENIPUNCTURE: CPT

## 2021-08-11 PROCEDURE — 80061 LIPID PANEL: CPT

## 2021-08-11 PROCEDURE — 80053 COMPREHEN METABOLIC PANEL: CPT

## 2021-08-12 ENCOUNTER — HOSPITAL ENCOUNTER (OUTPATIENT)
Dept: GENERAL RADIOLOGY | Age: 51
Discharge: HOME | End: 2021-08-14
Payer: COMMERCIAL

## 2021-08-12 ENCOUNTER — OFFICE VISIT (OUTPATIENT)
Dept: INTERNAL MEDICINE | Age: 51
End: 2021-08-12
Payer: COMMERCIAL

## 2021-08-12 ENCOUNTER — HOSPITAL ENCOUNTER (OUTPATIENT)
Age: 51
Discharge: HOME | End: 2021-08-14
Payer: COMMERCIAL

## 2021-08-12 VITALS
DIASTOLIC BLOOD PRESSURE: 76 MMHG | SYSTOLIC BLOOD PRESSURE: 130 MMHG | HEART RATE: 76 BPM | HEIGHT: 66 IN | OXYGEN SATURATION: 98 % | WEIGHT: 247.9 LBS | TEMPERATURE: 97 F | RESPIRATION RATE: 18 BRPM | BODY MASS INDEX: 39.84 KG/M2

## 2021-08-12 DIAGNOSIS — M25.461 EFFUSION OF RIGHT KNEE: ICD-10-CM

## 2021-08-12 DIAGNOSIS — R00.2 PALPITATIONS: Primary | ICD-10-CM

## 2021-08-12 DIAGNOSIS — E11.65 TYPE 2 DIABETES MELLITUS WITH HYPERGLYCEMIA, WITHOUT LONG-TERM CURRENT USE OF INSULIN (HCC): ICD-10-CM

## 2021-08-12 DIAGNOSIS — I10 ESSENTIAL HYPERTENSION: ICD-10-CM

## 2021-08-12 DIAGNOSIS — E78.2 MIXED HYPERLIPIDEMIA: ICD-10-CM

## 2021-08-12 PROCEDURE — 99213 OFFICE O/P EST LOW 20 MIN: CPT | Performed by: INTERNAL MEDICINE

## 2021-08-12 PROCEDURE — G8417 CALC BMI ABV UP PARAM F/U: HCPCS | Performed by: INTERNAL MEDICINE

## 2021-08-12 PROCEDURE — 2022F DILAT RTA XM EVC RTNOPTHY: CPT | Performed by: INTERNAL MEDICINE

## 2021-08-12 PROCEDURE — 73562 X-RAY EXAM OF KNEE 3: CPT

## 2021-08-12 PROCEDURE — 93010 ELECTROCARDIOGRAM REPORT: CPT | Performed by: INTERNAL MEDICINE

## 2021-08-12 PROCEDURE — 3044F HG A1C LEVEL LT 7.0%: CPT | Performed by: INTERNAL MEDICINE

## 2021-08-12 PROCEDURE — 93005 ELECTROCARDIOGRAM TRACING: CPT | Performed by: INTERNAL MEDICINE

## 2021-08-12 PROCEDURE — G8427 DOCREV CUR MEDS BY ELIG CLIN: HCPCS | Performed by: INTERNAL MEDICINE

## 2021-08-12 PROCEDURE — 1036F TOBACCO NON-USER: CPT | Performed by: INTERNAL MEDICINE

## 2021-08-12 PROCEDURE — 3017F COLORECTAL CA SCREEN DOC REV: CPT | Performed by: INTERNAL MEDICINE

## 2021-08-12 RX ORDER — DULAGLUTIDE 0.75 MG/.5ML
INJECTION, SOLUTION SUBCUTANEOUS
Qty: 12 PEN | Refills: 0 | Status: SHIPPED
Start: 2021-08-12 | End: 2021-11-12

## 2021-08-12 RX ORDER — AMLODIPINE BESYLATE 5 MG/1
5 TABLET ORAL DAILY
Qty: 90 TABLET | Refills: 0 | Status: SHIPPED
Start: 2021-08-12 | End: 2021-12-02 | Stop reason: SDUPTHER

## 2021-08-12 RX ORDER — PRAVASTATIN SODIUM 80 MG/1
TABLET ORAL
Qty: 90 TABLET | Refills: 0 | Status: SHIPPED
Start: 2021-08-12 | End: 2021-12-02 | Stop reason: SDUPTHER

## 2021-08-12 RX ORDER — FENOFIBRATE 48 MG/1
TABLET, COATED ORAL
Qty: 90 TABLET | Refills: 0 | Status: SHIPPED
Start: 2021-08-12 | End: 2021-12-02 | Stop reason: SDUPTHER

## 2021-08-12 SDOH — ECONOMIC STABILITY: TRANSPORTATION INSECURITY
IN THE PAST 12 MONTHS, HAS LACK OF TRANSPORTATION KEPT YOU FROM MEETINGS, WORK, OR FROM GETTING THINGS NEEDED FOR DAILY LIVING?: NO

## 2021-08-12 SDOH — ECONOMIC STABILITY: TRANSPORTATION INSECURITY
IN THE PAST 12 MONTHS, HAS THE LACK OF TRANSPORTATION KEPT YOU FROM MEDICAL APPOINTMENTS OR FROM GETTING MEDICATIONS?: NO

## 2021-08-12 SDOH — ECONOMIC STABILITY: FOOD INSECURITY: WITHIN THE PAST 12 MONTHS, YOU WORRIED THAT YOUR FOOD WOULD RUN OUT BEFORE YOU GOT MONEY TO BUY MORE.: NEVER TRUE

## 2021-08-12 SDOH — ECONOMIC STABILITY: FOOD INSECURITY: WITHIN THE PAST 12 MONTHS, THE FOOD YOU BOUGHT JUST DIDN'T LAST AND YOU DIDN'T HAVE MONEY TO GET MORE.: NEVER TRUE

## 2021-08-12 ASSESSMENT — PATIENT HEALTH QUESTIONNAIRE - PHQ9
2. FEELING DOWN, DEPRESSED OR HOPELESS: 0
SUM OF ALL RESPONSES TO PHQ QUESTIONS 1-9: 0
1. LITTLE INTEREST OR PLEASURE IN DOING THINGS: 0
SUM OF ALL RESPONSES TO PHQ QUESTIONS 1-9: 0
SUM OF ALL RESPONSES TO PHQ QUESTIONS 1-9: 0
SUM OF ALL RESPONSES TO PHQ9 QUESTIONS 1 & 2: 0

## 2021-08-12 ASSESSMENT — ENCOUNTER SYMPTOMS
SHORTNESS OF BREATH: 0
NAUSEA: 0
VOMITING: 0

## 2021-08-12 ASSESSMENT — SOCIAL DETERMINANTS OF HEALTH (SDOH): HOW HARD IS IT FOR YOU TO PAY FOR THE VERY BASICS LIKE FOOD, HOUSING, MEDICAL CARE, AND HEATING?: NOT HARD AT ALL

## 2021-08-12 ASSESSMENT — LIFESTYLE VARIABLES: HOW OFTEN DO YOU HAVE A DRINK CONTAINING ALCOHOL: NEVER

## 2021-08-12 NOTE — PROGRESS NOTES
[facility]  InternalMedicine Residency Program  ACC Note      SUBJECTIVE:  CC: had concerns including Discuss Labs (discuss labs ), Knee Pain (right knee ), and Palpitations (happens through the day more with anxiety ). Carolyn Teran presented to the [facility] for a routine visit. Presents for follow-up with 2 acute concerns:     Knee Pain   There was no injury mechanism. The pain is present in the right knee. The pain is moderate. The pain has been constant since onset. Associated symptoms include an inability to bear weight (difficulty standing and walking- rotation of foot outward due to pain at the knee). Pertinent negatives include no loss of sensation, numbness or tingling. She has tried nothing for the symptoms. Palpitations   This is a recurrent problem. Episode onset: acute onset after episode of outside in the heat- improved with hydration, but remains persistent at this time and recurrent with prior work-up noted. The problem has been gradually improving (but remains persistent ). Associated symptoms include an irregular heartbeat. Pertinent negatives include no chest fullness, chest pain, diaphoresis, dizziness, fever, malaise/fatigue, nausea, near-syncope, numbness, shortness of breath, syncope or vomiting. Treatments tried: hydration  The treatment provided mild relief. There is no history of anemia, hyperthyroidism or a valve disorder. Review of Systems   Constitutional: Negative for diaphoresis, fever and malaise/fatigue. Respiratory: Negative for shortness of breath. Cardiovascular: Positive for palpitations. Negative for chest pain, syncope and near-syncope. Gastrointestinal: Negative for nausea and vomiting. Neurological: Negative for dizziness, tingling and numbness.        Current Outpatient Medications on File Prior to Visit   Medication Sig Dispense Refill    pravastatin (PRAVACHOL) 80 MG tablet TAKE ONE TABLET BY MOUTH EVERY DAY IN THE EVENING 90 tablet 0    fenofibrate (TRICOR) 48 MG tablet TAKE ONE TABLET BY MOUTH DAILY 90 tablet 0    amLODIPine (NORVASC) 5 MG tablet Take 1 tablet by mouth daily 90 tablet 0    Dulaglutide (TRULICITY) 2.41 GE/7.8OB SOPN INJECT 0.75MG SUBCUTANEOUSLY ONCE A WEEK 2 mL 0    metFORMIN (GLUCOPHAGE) 1000 MG tablet TAKE ONE TABLET BY MOUTH TWICE A DAY WITH MEALS 60 tablet 0    ammonium lactate (LAC-HYDRIN) 12 % lotion Apply topically twice daily 400 g 2    Blood Glucose Monitoring Suppl (TRUE METRIX METER) w/Device KIT 1 Device by Does not apply route daily 1 kit 0    blood glucose monitor strips Test 2 times a day & as needed for symptoms of irregular blood glucose. Dispense sufficient amount for indicated testing frequency plus additional to accommodate PRN testing needs. 100 strip 5    Lancets MISC 1 each by Does not apply route 2 times daily 300 each 1    magnesium cl-calcium carbonate (NU-MAG) 71.5-119 MG TBEC tablet TAKE TWO TABLETS BY MOUTH EVERY  tablet 1    Lancets MISC Inject 1 each into the skin daily 100 each 5    blood glucose monitor strips by Other route daily 100 strip 5    Alpha-Lipoic Acid 600 MG TABS Take 600 mg by mouth daily      b complex vitamins capsule Take 1 capsule by mouth daily      clobetasol (TEMOVATE) 0.05 % external solution APPLY DAILY AS DIRECTED  1    ibuprofen (ADVIL;MOTRIN) 200 MG tablet Take 400 mg by mouth every 6 hours as needed for Pain      glucose monitoring kit (FREESTYLE) monitoring kit 1 kit by Does not apply route daily as needed 1 kit 0    Misc. Devices KIT Blood pressure cuff- Ambulatory monitoring- check every other day and maintain blood pressure log 1 kit 0     No current facility-administered medications on file prior to visit. I have reviewed all pertinent PMHx, PSHx, FamHx, SocialHx,medications, and allergies and updated history as appropriate.     OBJECTIVE:    VS: /76   Pulse 76   Temp 97 °F (36.1 °C)   Resp 18   Ht 5' 5.5\" (1.664 m) Wt 247 lb 14.4 oz (112.4 kg)   SpO2 98%   Breastfeeding No   BMI 40.63 kg/m²   Physical Exam  Constitutional:       Appearance: She is obese. She is not ill-appearing. HENT:      Head: Normocephalic and atraumatic. Mouth/Throat:      Mouth: Mucous membranes are moist.   Cardiovascular:      Rate and Rhythm: Normal rate and regular rhythm. Pulses: Normal pulses. Pulmonary:      Effort: Pulmonary effort is normal.      Breath sounds: Normal breath sounds. No wheezing, rhonchi or rales. Musculoskeletal:      Right knee: Swelling and effusion present. No erythema. Decreased range of motion (secondary to pain). Tenderness (medial joint line) present. Right lower leg: Edema (pitting edema above ankle) present. Comments: Negative Alyce's Sign on right; no tenderness to palpation of calf   Skin:     General: Skin is warm and dry. Neurological:      General: No focal deficit present. Mental Status: She is alert. ASSESSMENT/PLAN:  Ino Geiger was seen today for discuss labs, knee pain and palpitations. Diagnoses and all orders for this visit:    Palpitations  -     EKG 12 Lead  Recurrent palpitations   Seen by Cards in the past  Prior Holter monitoring and ECHO completed  Encouraged repeat appt  EKG- NSR currently no ST-T changes; no PVCs noted   No significant changes on electrolytes on recent BMP     Type 2 diabetes mellitus with hyperglycemia, without long-term current use of insulin (HCC)- stable   -     metFORMIN (GLUCOPHAGE) 1000 MG tablet; TAKE ONE TABLET BY MOUTH TWICE A DAY WITH MEALS  -     Dulaglutide (TRULICITY) 6.34 LW/5.1DN SOPN; INJECT 0.75MG SUBCUTANEOUSLY ONCE A WEEK    Mixed hyperlipidemia- stable   -     pravastatin (PRAVACHOL) 80 MG tablet; TAKE ONE TABLET BY MOUTH EVERY DAY IN THE EVENING  -     fenofibrate (TRICOR) 48 MG tablet; TAKE ONE TABLET BY MOUTH DAILY    Essential hypertension- stable   -     amLODIPine (NORVASC) 5 MG tablet;  Take 1 tablet by mouth daily    Effusion of right knee- moderate  -     [facility] Marielena Hernandez MD, Orthopaedics, [facility]  -     XR KNEE RIGHT (3 VIEWS); Future   ? Meniscal injury   LE edema is likely from knee effusion   Low likelihood for concern for DVT    Pain is at knee effusion and causing pain to weight bear   Xray ordered   Ortho assessment  Follow recs. RTC: 3 months     I have reviewed my findings and recommendations with Concepción Peacock.      Kerry Casiano MD, MD, FACP   8/12/2021 3:22 PM

## 2021-08-12 NOTE — PROGRESS NOTES
Discharge instructions reviewed with patient. Patient verbalizes understanding. Xray script and AVS given to patient.

## 2021-08-17 ENCOUNTER — OFFICE VISIT (OUTPATIENT)
Dept: ORTHOPEDIC SURGERY | Age: 51
End: 2021-08-17
Payer: COMMERCIAL

## 2021-08-17 VITALS — BODY MASS INDEX: 38.57 KG/M2 | WEIGHT: 240 LBS | HEIGHT: 66 IN

## 2021-08-17 DIAGNOSIS — M25.561 RIGHT KNEE PAIN, UNSPECIFIED CHRONICITY: Primary | ICD-10-CM

## 2021-08-17 PROCEDURE — G8427 DOCREV CUR MEDS BY ELIG CLIN: HCPCS | Performed by: ORTHOPAEDIC SURGERY

## 2021-08-17 PROCEDURE — 99203 OFFICE O/P NEW LOW 30 MIN: CPT | Performed by: ORTHOPAEDIC SURGERY

## 2021-08-17 PROCEDURE — G8417 CALC BMI ABV UP PARAM F/U: HCPCS | Performed by: ORTHOPAEDIC SURGERY

## 2021-08-17 PROCEDURE — 1036F TOBACCO NON-USER: CPT | Performed by: ORTHOPAEDIC SURGERY

## 2021-08-17 PROCEDURE — 3017F COLORECTAL CA SCREEN DOC REV: CPT | Performed by: ORTHOPAEDIC SURGERY

## 2021-08-17 NOTE — PATIENT INSTRUCTIONS
Patient Education        Knee: Exercises  Introduction  Here are some examples of exercises for you to try. The exercises may be suggested for a condition or for rehabilitation. Start each exercise slowly. Ease off the exercises if you start to have pain. You will be told when to start these exercises and which ones will work best for you. How to do the exercises  Quad sets   1. Sit with your leg straight and supported on the floor or a firm bed. (If you feel discomfort in the front or back of your knee, place a small towel roll under your knee.)  2. Tighten the muscles on top of your thigh by pressing the back of your knee flat down to the floor. (If you feel discomfort under your kneecap, place a small towel roll under your knee.)  3. Hold for about 6 seconds, then rest for up to 10 seconds. 4. Do 8 to 12 repetitions several times a day. Straight-leg raises to the front   1. Lie on your back with your good knee bent so that your foot rests flat on the floor. Your injured leg should be straight. Make sure that your low back has a normal curve. You should be able to slip your flat hand in between the floor and the small of your back, with your palm touching the floor and your back touching the back of your hand. 2. Tighten the thigh muscles in the injured leg by pressing the back of your knee flat down to the floor. Hold your knee straight. 3. Keeping the thigh muscles tight, lift your injured leg up so that your heel is about 12 inches off the floor. Hold for about 6 seconds and then lower slowly. 4. Do 8 to 12 repetitions, 3 times a day. Straight-leg raises to the outside   1. Lie on your side, with your injured leg on top. 2. Tighten the front thigh muscles of your injured leg to keep your knee straight. 3. Keep your hip and your leg straight in line with the rest of your body, and keep your knee pointing forward. Do not drop your hip back.   4. Lift your injured leg straight up toward the ceiling, about 12 inches off the floor. Hold for about 6 seconds, then slowly lower your leg. 5. Do 8 to 12 repetitions. Straight-leg raises to the back   1. Lie on your stomach, and lift your leg straight up behind you (toward the ceiling). 2. Lift your toes about 6 inches off the floor, hold for about 6 seconds, then lower slowly. 3. Do 8 to 12 repetitions. Straight-leg raises to the inside   1. Lie on the side of your body with the injured leg. 2. You can either prop your other (good) leg up on a chair, or you can bend your good knee and put that foot in front of your injured knee. Do not drop your hip back. 3. Tighten the muscles on the front of your thigh to straighten your injured knee. 4. Keep your kneecap pointing forward, and lift your whole leg up toward the ceiling about 6 inches. Hold for about 6 seconds, then lower slowly. 5. Do 8 to 12 repetitions. Heel dig bridging   1. Lie on your back with both knees bent and your ankles bent so that only your heels are digging into the floor. Your knees should be bent about 90 degrees. 2. Then push your heels into the floor, squeeze your buttocks, and lift your hips off the floor until your shoulders, hips, and knees are all in a straight line. 3. Hold for about 6 seconds as you continue to breathe normally, and then slowly lower your hips back down to the floor and rest for up to 10 seconds. 4. Do 8 to 12 repetitions. Hamstring curls   1. Lie on your stomach with your knees straight. If your kneecap is uncomfortable, roll up a washcloth and put it under your leg just above your kneecap. 2. Lift the foot of your injured leg by bending the knee so that you bring the foot up toward your buttock. If this motion hurts, try it without bending your knee quite as far. This may help you avoid any painful motion. 3. Slowly lower your leg back to the floor. 4. Do 8 to 12 repetitions.   5. With permission from your doctor or physical therapist, you may also want to add a cuff weight to your ankle (not more than 5 pounds). With weight, you do not have to lift your leg more than 12 inches to get a hamstring workout. Shallow standing knee bends   Do this exercise only if you have very little pain; if you have no clicking, locking, or giving way if you have an injured knee; and if it does not hurt while you are doing 8 to 12 repetitions. 1. Stand with your hands lightly resting on a counter or chair in front of you. Put your feet shoulder-width apart. 2. Slowly bend your knees so that you squat down like you are going to sit in a chair. Make sure your knees do not go in front of your toes. 3. Lower yourself about 6 inches. Your heels should remain on the floor at all times. 4. Rise slowly to a standing position. Heel raises   1. Stand with your feet a few inches apart, with your hands lightly resting on a counter or chair in front of you. 2. Slowly raise your heels off the floor while keeping your knees straight. 3. Hold for about 6 seconds, then slowly lower your heels to the floor. 4. Do 8 to 12 repetitions several times during the day. Follow-up care is a key part of your treatment and safety. Be sure to make and go to all appointments, and call your doctor if you are having problems. It's also a good idea to know your test results and keep a list of the medicines you take. Where can you learn more? Go to https://[employer]gate5. org and sign in to your [employer] account. Enter D101 in the [facility] box to learn more about \"Knee: Exercises. \"     If you do not have an account, please click on the \"Sign Up Now\" link. Current as of: November 16, 2020               Content Version: 12.9  © 6708-1332 [employer]. Care instructions adapted under license by [facility] ([facility]).  If you have questions about a medical condition or this instruction, always ask your healthcare professional. [address] any warranty or liability for your use of this information.

## 2021-08-17 NOTE — PROGRESS NOTES
New Knee Patient     Referring Provider:   MD Chelsea Cutler 70  [place],  [address]    CHIEF COMPLAINT:   Chief Complaint   Patient presents with    X-ray      Obtained today     Knee Pain     (R) knee pain x 2 weeks. No injury. States getting up from a seated postion is difficult, steps are bothersome. C/o patella pain. Cracking/popping. No medial or lateral pain. States it feels unstable. HPI:    Zeinab Worrell is a 46y.o. year old female who is seen today for evaluation of right knee pain. She reports the pain has been ongoing for the past 2 weeks. She does not recall a specific injury which started the pain. Denies history of right knee pain. She reports the pain is worse with weightbearing, better with rest.  The patient does not have mechanical symptoms. She denies a feeling of instability. The prior treatments have been none. Reports that symptoms have been gradually improving. The patient is not working.      PAST MEDICAL HISTORY  Past Medical History:   Diagnosis Date    Abdominal pain     Anxiety state, unspecified 3/8/2016    Asthma     no inhalers    Breast fibrocystic disorder     Diabetes mellitus ([address]     Essential hypertension 2016    Hyperlipidemia     Irritable bowel syndrome     Left ovarian cyst     Morbid obesity ([address] 2016    Osteoarthritis     Pharyngoesophageal dysphagia 2016       PAST SURGICAL HISTORY  Past Surgical History:   Procedure Laterality Date    ABDOMEN SURGERY Left 3/15/2019    INCISION AND DRAINAGE LEFT LABIAL ABSCESS performed by Socorro Beckford MD at [address]      Age 15     SECTION  2012    3 total    COLONOSCOPY  16    [facility]    COLONOSCOPY  2016    TONSILLECTOMY      Age 9    TUBAL LIGATION      UPPER GASTROINTESTINAL ENDOSCOPY      Negative for Hpylori    UPPER GASTROINTESTINAL ENDOSCOPY  16    [facility]    UPPER GASTROINTESTINAL ENDOSCOPY 06/14/2016         FAMILY HISTORY   Family History   Problem Relation Age of Onset    Rheum Arthritis Mother     Hypertension Mother    [facility] Arthritis Mother     High Blood Pressure Mother     Obesity Mother     Other Father         Heart Disease    Hypertension Father     High Cholesterol Father     Thyroid Disease Father         Hyperthyroid s/p radiation    Arthritis Father     High Blood Pressure Father     Obesity Father     Obesity Sister     Cancer Paternal Grandmother         Colon Cancer    Other Paternal Grandmother         COPD    Cancer Paternal Grandfather         Lung Cancer    Obesity Maternal Aunt     High Blood Pressure Maternal Grandmother     Obesity Maternal Grandmother     Obesity Maternal Grandfather        SOCIAL HISTORY  Social History     Socioeconomic History    Marital status:      Spouse name: Not on file    Number of children: Not on file    Years of education: Not on file    Highest education level: Not on file   Occupational History    Not on file   Tobacco Use    Smoking status: Former Smoker     Years: 3.00    Smokeless tobacco: Never Used    Tobacco comment: smoked for 3 yrs in early 20;s   Vaping Use    Vaping Use: Never used   Substance and Sexual Activity    Alcohol use: No     Alcohol/week: 0.0 standard drinks    Drug use: No    Sexual activity: Yes     Partners: Male   Other Topics Concern    Not on file   Social History Narrative    Not on file     Social Determinants of Health     Financial Resource Strain: Low Risk     Difficulty of Paying Living Expenses: Not hard at all   Food Insecurity: No Food Insecurity    Worried About Running Out of Food in the Last Year: Never true    Ramonita of Food in the Last Year: Never true   Transportation Needs: No Transportation Needs    Lack of Transportation (Medical): No    Lack of Transportation (Non-Medical):  No   Physical Activity:     Days of Exercise per Week:     Minutes of Exercise per Session:    Stress:     Feeling of Stress :    Social Connections:     Frequency of Communication with Friends and Family:     Frequency of Social Gatherings with Friends and Family:     Attends Mandaeism Services:     Active Member of Clubs or Organizations:     Attends Club or Organization Meetings:     Marital Status:    Intimate Partner Violence:     Fear of Current or Ex-Partner:     Emotionally Abused:     Physically Abused:     Sexually Abused:      Social History     Occupational History    Not on file   Tobacco Use    Smoking status: Former Smoker     Years: 3.00    Smokeless tobacco: Never Used    Tobacco comment: smoked for 3 yrs in early 20;s   Vaping Use    Vaping Use: Never used   Substance and Sexual Activity    Alcohol use: No     Alcohol/week: 0.0 standard drinks    Drug use: No    Sexual activity: Yes     Partners: Male       CURRENT MEDICATIONS     Current Outpatient Medications:     metFORMIN (GLUCOPHAGE) 1000 MG tablet, TAKE ONE TABLET BY MOUTH TWICE A DAY WITH MEALS, Disp: 180 tablet, Rfl: 0    pravastatin (PRAVACHOL) 80 MG tablet, TAKE ONE TABLET BY MOUTH EVERY DAY IN THE EVENING, Disp: 90 tablet, Rfl: 0    amLODIPine (NORVASC) 5 MG tablet, Take 1 tablet by mouth daily, Disp: 90 tablet, Rfl: 0    fenofibrate (TRICOR) 48 MG tablet, TAKE ONE TABLET BY MOUTH DAILY, Disp: 90 tablet, Rfl: 0    Dulaglutide (TRULICITY) 8.40 AA/3.2HV SOPN, INJECT 0.75MG SUBCUTANEOUSLY ONCE A WEEK, Disp: 12 pen, Rfl: 0    ammonium lactate (LAC-HYDRIN) 12 % lotion, Apply topically twice daily, Disp: 400 g, Rfl: 2    Blood Glucose Monitoring Suppl (TRUE METRIX METER) w/Device KIT, 1 Device by Does not apply route daily, Disp: 1 kit, Rfl: 0    blood glucose monitor strips, Test 2 times a day & as needed for symptoms of irregular blood glucose. Dispense sufficient amount for indicated testing frequency plus additional to accommodate PRN testing needs. , Disp: 100 strip, Rfl: 5    Lancets MISC, 1 each by Does not apply route 2 times daily, Disp: 300 each, Rfl: 1    magnesium cl-calcium carbonate (NU-MAG) 71.5-119 MG TBEC tablet, TAKE TWO TABLETS BY MOUTH EVERY DAY, Disp: 180 tablet, Rfl: 1    Lancets MISC, Inject 1 each into the skin daily, Disp: 100 each, Rfl: 5    blood glucose monitor strips, by Other route daily, Disp: 100 strip, Rfl: 5    Alpha-Lipoic Acid 600 MG TABS, Take 600 mg by mouth daily, Disp: , Rfl:     b complex vitamins capsule, Take 1 capsule by mouth daily, Disp: , Rfl:     clobetasol (TEMOVATE) 0.05 % external solution, APPLY DAILY AS DIRECTED, Disp: , Rfl: 1    ibuprofen (ADVIL;MOTRIN) 200 MG tablet, Take 400 mg by mouth every 6 hours as needed for Pain, Disp: , Rfl:     glucose monitoring kit (FREESTYLE) monitoring kit, 1 kit by Does not apply route daily as needed, Disp: 1 kit, Rfl: 0    Misc. Devices KIT, Blood pressure cuff- Ambulatory monitoring- check every other day and maintain blood pressure log, Disp: 1 kit, Rfl: 0    ALLERGIES  Allergies   Allergen Reactions    Bactrim [Sulfamethoxazole-Trimethoprim] Rash       Controlled Substances Monitoring:          REVIEW OF SYSTEMS:     Constitutional:  Negative for weight loss, fevers, chills, fatigue  Cardiovascular: Negative for chest pain, palpitations  Pulmonary: Negative for shortness of breath, labored breathing, cough  GI: negative for abdominal pain, nausea, vomitting   MSK: per HPI  Skin: negative for rash, open wounds    All other systems reviewed and are negative         PHYSICAL EXAM     There were no vitals filed for this visit. Height:    Weight: [unfilled]  BMI:  There is no height or weight on file to calculate BMI. General: The patient is alert and oriented x 3, appears to be stated age and in no distress. HEENT: head is normocephalic, atraumatic. EOMI. Neck: supple, trachea midline, no thyromegaly   Cardiovascular: peripheral pulses palpable.   Normal Capillary refill   Respiratory: breathing unlabored, chest expansion symmetric   Skin: no rash, no open wounds, no erythema  Psych: normal affect; mood stable  Neurologic: gait normal, sensation grossly intact in extremities  MSK:        Lower Extremity:   Ipsilateral hip exam shows normal range of motion without pain with impingement testing. Right knee exam range of motion 0-120 with mild stiffness present. Medial joint line tenderness present on palpation. Mild swelling with small palpable effusion present on palpation. Stable patella tracking midline with mild crepitus. Valgus varus exams were intact at 0 and 30 degrees. IMAGING:    XR: 4 views of the right bilateral knee show mild degenerative changes mainly to the patellofemoral compartment    Impression: Patellofemoral osteoarthritis    ASSESSMENT  Right knee patellofemoral osteoarthritis    PLAN  Today we discussed her right knee. Reported onset of symptoms about 2 weeks ago without known injury. On exam patient has mild stiffness and a small effusion present. Imaging was reviewed with patient today that shows mild degenerative changes mainly to the patellofemoral compartment. Patient states that symptoms have been gradually improving on their own the last several days. We discussed continuing to observe at this time. We discussed conservative treatment options including physical therapy, over-the-counter medications, cortisone injection. Patient verbalized understanding and will follow up if symptoms worsen. Kyle Navarro, [address] [facility]  Orthopedic Surgery   08/17/21  10:41 AM    Staff Addendum    I have seen and evaluated the patient and agree with the assessment and plan as documented by Kyle Navarro CNP. I have performed the key components of the history and physical examination and concur with the findings and plan, and have made changes where appropriate/necessary.         Jayde Ayala MD  [email or web address]

## 2021-08-27 ENCOUNTER — TELEPHONE (OUTPATIENT)
Dept: CARDIOLOGY CLINIC | Age: 51
End: 2021-08-27

## 2021-08-27 NOTE — TELEPHONE ENCOUNTER
Patient called stating about (3) weeks ago she had an episode where she got overheated and starting having skipped beats. She was seen by Dr. Nona Briseno and per patient EKG was normal. Symptoms have settled down but patient was advised by Dr. Nona Briseno to let Dr. Xavier Atkinson know. Please advise.

## 2021-08-30 NOTE — TELEPHONE ENCOUNTER
Patient notified of Dr. Sam Navarro recommendation. Patient states she is improved since that one episode. She will call with any recurrence in symptoms.

## 2021-09-09 ENCOUNTER — TELEPHONE (OUTPATIENT)
Dept: INTERNAL MEDICINE | Age: 51
End: 2021-09-09

## 2021-09-09 ENCOUNTER — TELEPHONE (OUTPATIENT)
Dept: CARDIOLOGY CLINIC | Age: 51
End: 2021-09-09

## 2021-09-09 ENCOUNTER — OFFICE VISIT (OUTPATIENT)
Dept: ORTHOPEDIC SURGERY | Age: 51
End: 2021-09-09
Payer: COMMERCIAL

## 2021-09-09 VITALS — HEIGHT: 66 IN | BODY MASS INDEX: 38.57 KG/M2 | WEIGHT: 240 LBS

## 2021-09-09 DIAGNOSIS — R22.41 LOCALIZED SWELLING OF RIGHT LOWER EXTREMITY: Primary | ICD-10-CM

## 2021-09-09 DIAGNOSIS — R00.2 PALPITATIONS: Primary | ICD-10-CM

## 2021-09-09 DIAGNOSIS — M79.89 LEG SWELLING: ICD-10-CM

## 2021-09-09 DIAGNOSIS — M25.561 RIGHT KNEE PAIN, UNSPECIFIED CHRONICITY: Primary | ICD-10-CM

## 2021-09-09 PROCEDURE — 20610 DRAIN/INJ JOINT/BURSA W/O US: CPT | Performed by: NURSE PRACTITIONER

## 2021-09-09 RX ORDER — LIDOCAINE HYDROCHLORIDE 10 MG/ML
4 INJECTION, SOLUTION INFILTRATION; PERINEURAL ONCE
Status: COMPLETED | OUTPATIENT
Start: 2021-09-09 | End: 2021-09-09

## 2021-09-09 RX ORDER — TRIAMCINOLONE ACETONIDE 40 MG/ML
40 INJECTION, SUSPENSION INTRA-ARTICULAR; INTRAMUSCULAR ONCE
Status: COMPLETED | OUTPATIENT
Start: 2021-09-09 | End: 2021-09-09

## 2021-09-09 RX ADMIN — LIDOCAINE HYDROCHLORIDE 4 ML: 10 INJECTION, SOLUTION INFILTRATION; PERINEURAL at 13:36

## 2021-09-09 RX ADMIN — TRIAMCINOLONE ACETONIDE 40 MG: 40 INJECTION, SUSPENSION INTRA-ARTICULAR; INTRAMUSCULAR at 13:37

## 2021-09-09 NOTE — PROGRESS NOTES
Follow Up Visit     Ousmane Andrews returns today for follow up visit regarding right knee patellofemoral osteoarthritis. Treatment thus far has included over the counter anti-inflammatories, ice and elevation with no improvement. She reports symptoms are worsened. REVIEW OF SYSTEMS:     Constitutional:  Negative for weight loss, fevers, chills, fatigue  Cardiovascular: Negative for chest pain, palpitations  Pulmonary: Negative for shortness of breath, labored breathing, cough  GI: negative for abdominal pain, nausea, vomitting   MSK: per HPI  Skin: negative for rash, open wounds    All other systems reviewed and are negative       Physical Exam:     Height: 5' 6\" (1.676 m), Weight: 240 lb (108.9 kg)    General: Alert and oriented x3, no acute distress  Cardiovascular/pulmonary: No labored breathing, peripheral perfusion intact  Musculoskeletal:    Right knee exam moderate swelling with large effusion present, tenderness on palpation. Valgus varus exams are intact. Range of motion with stiffness was 0-90, stable patella tracked midline with crepitus. Stable knee on exam    Controlled Substances Monitoring:      Imaging:  No new imaging obtained today. Previous imaging reviewed. Procedure Note: Knee Aspirtation and Cortisone Injection    The right knee was identified as the injection site. The risk and benefits of a aspiration and cortisone injection were explained, the patient consented to both. Under sterile conditions, the joint was entered from a superolateral location with an 18g needle and 20 cc of yellow fluid was aspirated without difficulty. The fluid was discarded. The knee was injected with a mixture of 40 mg of Kenalog and 4 mL of 1% Lidocaine without complication. A sterile bandage was applied. The patient tolerated well.     Administrations This Visit     lidocaine 1 % injection 4 mL     Admin Date  09/09/2021 Action  Given Dose  4 mL Route  Intra-articular Administered By  Lesly Alexandre MA triamcinolone acetonide (KENALOG-40) injection 40 mg     Admin Date  09/09/2021 Action  Given Dose  40 mg Route  Intra-articular Administered By  Radha Mascorro MA                    Assessment: Right knee osteoarthritis with effusion      Plan: Today we discussed her right knee. Patient reports that symptoms have been worsening since previous visit about 3 weeks ago. On exam today she had a moderate sized effusion and joint line tenderness present. She was offered a cortisone injection and knee aspiration which she tolerated well. X-rays reviewed today show degenerative changes consistent with patellofemoral osteoarthritis. She will continue with home medications and activity modification for symptom relief. We will follow-up in 3 months. Patient verbalized understanding.         Eliza Bah, [address] [facility]  Orthopedic Surgery   09/09/21  1:41 PM

## 2021-09-09 NOTE — TELEPHONE ENCOUNTER
Called patient back regarding Orthopedic findings and recs. S/p re-evaluation and planned arthrocentesis due to large effusion seen- which was completed today. Advised given unilateral edema ankle/foot- new over last 3 days- U/S needed of RLE to rule out DVT. From there, if negative- we will discuss other measures including if med related ([facility]) vs. Need for element of diuresis. Patient has had swelling in the past improved with flozin therapy.

## 2021-09-09 NOTE — TELEPHONE ENCOUNTER
Patient seen today by Dr. Veronica Cast today. Had knee surgery and has 3(+) pitting edema. Dr. Miko Blanco does not believe it is ortho related. Please advise.

## 2021-09-10 ENCOUNTER — OFFICE VISIT (OUTPATIENT)
Dept: PODIATRY | Age: 51
End: 2021-09-10
Payer: COMMERCIAL

## 2021-09-10 ENCOUNTER — HOSPITAL ENCOUNTER (OUTPATIENT)
Dept: ULTRASOUND IMAGING | Age: 51
Discharge: HOME | End: 2021-09-12
Payer: COMMERCIAL

## 2021-09-10 ENCOUNTER — TELEPHONE (OUTPATIENT)
Dept: CARDIOLOGY CLINIC | Age: 51
End: 2021-09-10

## 2021-09-10 ENCOUNTER — HOSPITAL ENCOUNTER (OUTPATIENT)
Age: 51
Discharge: HOME | End: 2021-09-12
Payer: COMMERCIAL

## 2021-09-10 VITALS — HEIGHT: 66 IN | BODY MASS INDEX: 38.57 KG/M2 | WEIGHT: 240 LBS

## 2021-09-10 DIAGNOSIS — R22.41 LOCALIZED SWELLING OF RIGHT LOWER EXTREMITY: ICD-10-CM

## 2021-09-10 DIAGNOSIS — E11.621 TYPE 2 DIABETES MELLITUS WITH FOOT ULCER, UNSPECIFIED WHETHER LONG TERM INSULIN USE (HCC): ICD-10-CM

## 2021-09-10 DIAGNOSIS — M79.671 RIGHT FOOT PAIN: Primary | ICD-10-CM

## 2021-09-10 DIAGNOSIS — S90.851A FOREIGN BODY IN RIGHT FOOT, INITIAL ENCOUNTER: ICD-10-CM

## 2021-09-10 DIAGNOSIS — L03.115 CELLULITIS OF RIGHT FOOT: ICD-10-CM

## 2021-09-10 DIAGNOSIS — L97.509 TYPE 2 DIABETES MELLITUS WITH FOOT ULCER, UNSPECIFIED WHETHER LONG TERM INSULIN USE (HCC): ICD-10-CM

## 2021-09-10 PROCEDURE — 93971 EXTREMITY STUDY: CPT

## 2021-09-10 PROCEDURE — G8417 CALC BMI ABV UP PARAM F/U: HCPCS | Performed by: PODIATRIST

## 2021-09-10 PROCEDURE — 3017F COLORECTAL CA SCREEN DOC REV: CPT | Performed by: PODIATRIST

## 2021-09-10 PROCEDURE — 1036F TOBACCO NON-USER: CPT | Performed by: PODIATRIST

## 2021-09-10 PROCEDURE — 99214 OFFICE O/P EST MOD 30 MIN: CPT | Performed by: PODIATRIST

## 2021-09-10 PROCEDURE — 2022F DILAT RTA XM EVC RTNOPTHY: CPT | Performed by: PODIATRIST

## 2021-09-10 PROCEDURE — 3044F HG A1C LEVEL LT 7.0%: CPT | Performed by: PODIATRIST

## 2021-09-10 PROCEDURE — 10120 INC&RMVL FB SUBQ TISS SMPL: CPT | Performed by: PODIATRIST

## 2021-09-10 PROCEDURE — G8427 DOCREV CUR MEDS BY ELIG CLIN: HCPCS | Performed by: PODIATRIST

## 2021-09-10 RX ORDER — DOXYCYCLINE HYCLATE 100 MG/1
100 CAPSULE ORAL 2 TIMES DAILY
Qty: 14 CAPSULE | Refills: 0 | Status: SHIPPED | OUTPATIENT
Start: 2021-09-10 | End: 2021-09-17

## 2021-09-10 NOTE — TELEPHONE ENCOUNTER
Patient called stating Dr. Flaca Romero ordered an echo due to LE extremity edema. However, she saw Dr. Frank Singh because she developed blisters on her right great toe. She was found to have an injury with cellulitis. Per patient. Dr. Frank Singh feels this is the cause of the LE edema. Patient asking if she still needs echo. Please advise.

## 2021-09-10 NOTE — TELEPHONE ENCOUNTER
Patient notified (via voicemail) of Dr. Delvis Canales recommendation. Echo order cancelled for now. Will re-order if necessary.

## 2021-09-10 NOTE — PROGRESS NOTES
Patient in office with c/o edema and blisters to right great toe. Noticed blisters a few days ago. Ginger Medina : 1970 Sex: female  Age: 46 y.o. Patient was referred by Gloria Mantilla MD    CC:   Open wound right great toe    HPI:   Elizabeth Guerrero presents today open wound right great toe. History of diabetes. States [place] walking [place] and since then she has had some swelling and blister on the bottom of the right great toe with an open wound. Denies stepping on anything she is aware of but does have open wound on the bottom right great toe. Did have radiographs today. Denies current nausea vomiting fever chills shortness of breath. No current use of antibiotics. Was seen by her orthopedic nurse practitioner and did have her right knee drained on 2021. Still having some redness and swelling right foot.   Denies any calf pain      ROS:  Const: Denies constitutional symptoms  Musculo: Denies symptoms other than stated above  Skin: Denies symptoms other than stated above      Current Outpatient Medications:     doxycycline hyclate (VIBRAMYCIN) 100 MG capsule, Take 1 capsule by mouth 2 times daily for 7 days, Disp: 14 capsule, Rfl: 0    metFORMIN (GLUCOPHAGE) 1000 MG tablet, TAKE ONE TABLET BY MOUTH TWICE A DAY WITH MEALS, Disp: 180 tablet, Rfl: 0    pravastatin (PRAVACHOL) 80 MG tablet, TAKE ONE TABLET BY MOUTH EVERY DAY IN THE EVENING, Disp: 90 tablet, Rfl: 0    amLODIPine (NORVASC) 5 MG tablet, Take 1 tablet by mouth daily, Disp: 90 tablet, Rfl: 0    fenofibrate (TRICOR) 48 MG tablet, TAKE ONE TABLET BY MOUTH DAILY, Disp: 90 tablet, Rfl: 0    Dulaglutide (TRULICITY) 5.69 CW/8.9VG SOPN, INJECT 0.75MG SUBCUTANEOUSLY ONCE A WEEK, Disp: 12 pen, Rfl: 0    ammonium lactate (LAC-HYDRIN) 12 % lotion, Apply topically twice daily, Disp: 400 g, Rfl: 2    Blood Glucose Monitoring Suppl (TRUE METRIX METER) w/Device KIT, 1 Device by Does not apply route daily, Disp: 1 kit, Rfl: 0    blood glucose monitor strips, Test 2 times a day & as needed for symptoms of irregular blood glucose. Dispense sufficient amount for indicated testing frequency plus additional to accommodate PRN testing needs. , Disp: 100 strip, Rfl: 5    Lancets MISC, 1 each by Does not apply route 2 times daily, Disp: 300 each, Rfl: 1    magnesium cl-calcium carbonate (NU-MAG) 71.5-119 MG TBEC tablet, TAKE TWO TABLETS BY MOUTH EVERY DAY, Disp: 180 tablet, Rfl: 1    Lancets MISC, Inject 1 each into the skin daily, Disp: 100 each, Rfl: 5    blood glucose monitor strips, by Other route daily, Disp: 100 strip, Rfl: 5    Alpha-Lipoic Acid 600 MG TABS, Take 600 mg by mouth daily, Disp: , Rfl:     b complex vitamins capsule, Take 1 capsule by mouth daily, Disp: , Rfl:     clobetasol (TEMOVATE) 0.05 % external solution, APPLY DAILY AS DIRECTED, Disp: , Rfl: 1    ibuprofen (ADVIL;MOTRIN) 200 MG tablet, Take 400 mg by mouth every 6 hours as needed for Pain, Disp: , Rfl:     glucose monitoring kit (FREESTYLE) monitoring kit, 1 kit by Does not apply route daily as needed, Disp: 1 kit, Rfl: 0    Misc. Devices KIT, Blood pressure cuff- Ambulatory monitoring- check every other day and maintain blood pressure log, Disp: 1 kit, Rfl: 0  Allergies   Allergen Reactions    Bactrim [Sulfamethoxazole-Trimethoprim] Rash       Past Medical History:   Diagnosis Date    Abdominal pain     Anxiety state, unspecified 3/8/2016    Asthma     no inhalers    Breast fibrocystic disorder     Diabetes mellitus ([facility] [address] [facility]     Essential hypertension 4/25/2016    Hyperlipidemia     Irritable bowel syndrome     Left ovarian cyst     Morbid obesity ([address] 4/25/2016    Osteoarthritis     Pharyngoesophageal dysphagia 6/14/2016       There were no vitals filed for this visit. Work History/Social History:    Foot and ankle history:     Focused Lower Extremity Physical Exam:      Neurovascular examination:    Dorsalis Pedis palpable bilateral. Posterior tibialis palpable bilateral.    Capillary Refill Time:  Immediate return  Hair growth:  Symmetrical and bilateral   Skin:  Not atrophic  Edema: Mild edema bilateral feet. Mild edema bilateral ankles. Neurologic:  Light touch diminished bilateral.  Warm to coolness bilateral distal toes  Decreased epicritic sensation     Musculoskeletal/ Orthopedic examination:    Equinis: present bilateral  Dorsiflexion, plantarflexion, inversion, eversion bilateral 5 out of 5 muscle strength  Wiggling toes  Negative Homans  Flexible contracted hammertoes digits 2 through 5 bilateral    Dermatology examination:    Mild erythema plantar right great toe  Open wound plantar great toe measuring approximately 1 cm x 0.8 cm x 0.2 cm. 100% granulated wound with mild surrounding erythema. Foreign body noted which is a rock. Wound did not probe to bone. No opening noted distal phalanx great toe. Assessment and Plan:  Stephen Dobson was seen today for wound check, diabetes, foot swelling and leg swelling. Diagnoses and all orders for this visit:    Right foot pain  -     XR FOOT RIGHT (MIN 3 VIEWS); Future  -     Surgical Pathology; Future    Type 2 diabetes mellitus with foot ulcer, unspecified whether long term insulin use (HCC)  -     CBC Auto Differential; Future  -     C-Reactive Protein; Future  -     Sedimentation Rate; Future  -     Culture, Wound; Future  -     Surgical Pathology; Future    Foreign body in right foot, initial encounter  -     Surgical Pathology; Future    Cellulitis of right foot  -     Surgical Pathology; Future    Other orders  -     doxycycline hyclate (VIBRAMYCIN) 100 MG capsule; Take 1 capsule by mouth 2 times daily for 7 days        Stephen Dobson seen today foreign body right foot. I did obtain wound cultures today. Prophylactically placed on oral doxycycline 100 mg twice daily for the next week. Blood work was ordered. CBC with differential, CRP sedimentation rate.   45 total minute time.  Radiographs were ordered and reviewed. Did have foreign body noted on radiograph. Clinically does have an open wound which I did culture as well as pare hyperkeratotic tissue. Wound did not probe to bone. Wound is 100% granular after debridement. I did remove a rock from the plantar aspect of the right foot. I did send out surgical pathology for the rock. There was no distal wound noted. On radiograph did appear to have a second metallic fragment at the distal aspect of the toe but clinically there is no opening at this area. I did recommend surgical shoe to be worn during the day with once daily bandage change bacitracin and Band-Aid. She is a diabetic and is high risk for further infection. Any increase in redness drainage any nausea vomiting fever chills shortness of breath go to emergency room. I will follow closely 1 week. No follow-ups on file. Seen By:  Bertha Melo DPM      Document was created using voice recognition software. Note was reviewed however may contain grammatical errors.

## 2021-09-13 ENCOUNTER — TELEPHONE (OUTPATIENT)
Dept: ADMINISTRATIVE | Age: 51
End: 2021-09-13

## 2021-09-13 LAB
ORGANISM: ABNORMAL
ORGANISM: ABNORMAL
WOUND/ABSCESS: ABNORMAL
WOUND/ABSCESS: ABNORMAL

## 2021-09-13 NOTE — TELEPHONE ENCOUNTER
Patient wants Dr. Jere Hernandes to know that wound has signs of healing. She is taking Antibiotics and is seeing improvement. Patient says thank you very much. No call back requested.

## 2021-09-17 ENCOUNTER — OFFICE VISIT (OUTPATIENT)
Dept: PODIATRY | Age: 51
End: 2021-09-17
Payer: COMMERCIAL

## 2021-09-17 DIAGNOSIS — B35.1 TINEA UNGUIUM: ICD-10-CM

## 2021-09-17 DIAGNOSIS — S90.851D FOREIGN BODY IN RIGHT FOOT, SUBSEQUENT ENCOUNTER: ICD-10-CM

## 2021-09-17 DIAGNOSIS — L97.509 TYPE 2 DIABETES MELLITUS WITH FOOT ULCER, UNSPECIFIED WHETHER LONG TERM INSULIN USE (HCC): Primary | ICD-10-CM

## 2021-09-17 DIAGNOSIS — G60.8 HEREDITARY SENSORY NEUROPATHY: ICD-10-CM

## 2021-09-17 DIAGNOSIS — E11.621 TYPE 2 DIABETES MELLITUS WITH FOOT ULCER, UNSPECIFIED WHETHER LONG TERM INSULIN USE (HCC): Primary | ICD-10-CM

## 2021-09-17 PROCEDURE — G8417 CALC BMI ABV UP PARAM F/U: HCPCS | Performed by: PODIATRIST

## 2021-09-17 PROCEDURE — 3017F COLORECTAL CA SCREEN DOC REV: CPT | Performed by: PODIATRIST

## 2021-09-17 PROCEDURE — 2022F DILAT RTA XM EVC RTNOPTHY: CPT | Performed by: PODIATRIST

## 2021-09-17 PROCEDURE — 1036F TOBACCO NON-USER: CPT | Performed by: PODIATRIST

## 2021-09-17 PROCEDURE — 3044F HG A1C LEVEL LT 7.0%: CPT | Performed by: PODIATRIST

## 2021-09-17 PROCEDURE — G8427 DOCREV CUR MEDS BY ELIG CLIN: HCPCS | Performed by: PODIATRIST

## 2021-09-17 PROCEDURE — 99213 OFFICE O/P EST LOW 20 MIN: CPT | Performed by: PODIATRIST

## 2021-09-17 NOTE — PROGRESS NOTES
Patient in office to follow up with foreign body right foot. Completed abx and has no complaints of pain at this time. Currently wearing post op shoe. Mayra Sandoval : 1970 Sex: female  Age: 46 y.o. Patient was referred by Julio Weaver MD    CC:   Follow-up open wound right great toe with foreign body    HPI:   Kamini Mederos presents today 1 week follow-up open wound right great toe with foreign body. Tolerating antibiotics well. Continue surgical shoe. Denies any drainage or any pain right great toe or right foot. States she has no redness right foot today and is pleased with progression. Denies nausea vomiting fever chills shortness breath. No calf pain. No additional pedal complaints. ROS:  Const: Denies constitutional symptoms  Musculo: Denies symptoms other than stated above  Skin: Denies symptoms other than stated above      Current Outpatient Medications:     metFORMIN (GLUCOPHAGE) 1000 MG tablet, TAKE ONE TABLET BY MOUTH TWICE A DAY WITH MEALS, Disp: 180 tablet, Rfl: 0    pravastatin (PRAVACHOL) 80 MG tablet, TAKE ONE TABLET BY MOUTH EVERY DAY IN THE EVENING, Disp: 90 tablet, Rfl: 0    amLODIPine (NORVASC) 5 MG tablet, Take 1 tablet by mouth daily, Disp: 90 tablet, Rfl: 0    fenofibrate (TRICOR) 48 MG tablet, TAKE ONE TABLET BY MOUTH DAILY, Disp: 90 tablet, Rfl: 0    Dulaglutide (TRULICITY) 3.40 VK/7.0BT SOPN, INJECT 0.75MG SUBCUTANEOUSLY ONCE A WEEK, Disp: 12 pen, Rfl: 0    ammonium lactate (LAC-HYDRIN) 12 % lotion, Apply topically twice daily, Disp: 400 g, Rfl: 2    Blood Glucose Monitoring Suppl (TRUE METRIX METER) w/Device KIT, 1 Device by Does not apply route daily, Disp: 1 kit, Rfl: 0    blood glucose monitor strips, Test 2 times a day & as needed for symptoms of irregular blood glucose. Dispense sufficient amount for indicated testing frequency plus additional to accommodate PRN testing needs. , Disp: 100 strip, Rfl: 5    Lancets MISC, 1 each by Does not apply route 2 toes  Decreased epicritic sensation     Musculoskeletal/ Orthopedic examination:    Equinis: present bilateral  Dorsiflexion, plantarflexion, inversion, eversion bilateral 5 out of 5 muscle strength  Wiggling toes  Negative Homans  Flexible contracted hammertoes digits 2 through 5 bilateral    Dermatology examination:    Plantar right great toe wound at level of IPJ and proximal noted measuring approximate 0.2 cm x 0.2 cm x 0.1 cm.  100% granular. No surrounding erythema or edema. No drainage. Wound is superficial and does not probe to bone. No pain foot or ankle today. No erythema or edema right foot or right ankle today. Assessment and Plan:  Eliezer Howell was seen today for follow-up and wound check. Diagnoses and all orders for this visit:    Type 2 diabetes mellitus with foot ulcer, unspecified whether long term insulin use ([facility] [address]    Foreign body in right foot, subsequent encounter    Tinea unguium    Hereditary sensory neuropathy        1 week follow-up foreign body right foot  Blood work from 9/10/2020 reviewed in depth with patient today. Sedimentation rate 21, CRP 0.8, white blood cell count 12.0. Wound cultures from 9/10/2020 reviewed. Aeromonas hydrophila. Continue doxycycline 100 mg twice daily as directed. Significant improvement with no pain right foot. Still does have superficial wound plantar right foot. Did recommend offloading padding Medihoney and Band-Aid once daily with surgical shoe. Any increased redness or drainage go the emergency room. I will follow-up in 1 week to monitor progression. Formal diabetic debridement of toenails at this time as well. Return in about 1 week (around 9/24/2021). Seen By:  Lauri Akhtar DPM      Document was created using voice recognition software. Note was reviewed however may contain grammatical errors.

## 2021-09-24 ENCOUNTER — PROCEDURE VISIT (OUTPATIENT)
Dept: PODIATRY | Age: 51
End: 2021-09-24
Payer: COMMERCIAL

## 2021-09-24 VITALS
SYSTOLIC BLOOD PRESSURE: 130 MMHG | HEART RATE: 83 BPM | DIASTOLIC BLOOD PRESSURE: 80 MMHG | TEMPERATURE: 97.7 F | OXYGEN SATURATION: 96 %

## 2021-09-24 DIAGNOSIS — S90.851D FOREIGN BODY IN RIGHT FOOT, SUBSEQUENT ENCOUNTER: ICD-10-CM

## 2021-09-24 DIAGNOSIS — B35.1 TINEA UNGUIUM: ICD-10-CM

## 2021-09-24 DIAGNOSIS — L97.509 TYPE 2 DIABETES MELLITUS WITH FOOT ULCER, UNSPECIFIED WHETHER LONG TERM INSULIN USE (HCC): Primary | ICD-10-CM

## 2021-09-24 DIAGNOSIS — G60.8 HEREDITARY SENSORY NEUROPATHY: ICD-10-CM

## 2021-09-24 DIAGNOSIS — E11.621 TYPE 2 DIABETES MELLITUS WITH FOOT ULCER, UNSPECIFIED WHETHER LONG TERM INSULIN USE (HCC): Primary | ICD-10-CM

## 2021-09-24 PROCEDURE — 1036F TOBACCO NON-USER: CPT | Performed by: PODIATRIST

## 2021-09-24 PROCEDURE — 2022F DILAT RTA XM EVC RTNOPTHY: CPT | Performed by: PODIATRIST

## 2021-09-24 PROCEDURE — G8417 CALC BMI ABV UP PARAM F/U: HCPCS | Performed by: PODIATRIST

## 2021-09-24 PROCEDURE — 3017F COLORECTAL CA SCREEN DOC REV: CPT | Performed by: PODIATRIST

## 2021-09-24 PROCEDURE — 3044F HG A1C LEVEL LT 7.0%: CPT | Performed by: PODIATRIST

## 2021-09-24 PROCEDURE — G8427 DOCREV CUR MEDS BY ELIG CLIN: HCPCS | Performed by: PODIATRIST

## 2021-09-24 PROCEDURE — 99213 OFFICE O/P EST LOW 20 MIN: CPT | Performed by: PODIATRIST

## 2021-09-24 NOTE — PROGRESS NOTES
Patient in office to follow up with foreign body right foot. Completed abx and has no complaints of pain at this time. Currently wearing post op shoe. Allen Scott : 1970 Sex: female  Age: 46 y.o. Patient was referred by Arcadio Kimble MD    CC:   Follow-up foreign body right great toe with diabetic wound    HPI:   Follow-up foreign body right great toe. Tolerating Metformin well. Denies any redness or drainage right great toe. Denies any pain. No recent injuries. She is very pleased with overall progression. Denies nausea vomiting fever chills shortness breath. Has been changing Band-Aid with Neosporin and using surgical shoe once daily. No additional pedal complaints today.     ROS:  Const: Denies constitutional symptoms  Musculo: Denies symptoms other than stated above  Skin: Denies symptoms other than stated above      Current Outpatient Medications:     metFORMIN (GLUCOPHAGE) 1000 MG tablet, TAKE ONE TABLET BY MOUTH TWICE A DAY WITH MEALS, Disp: 180 tablet, Rfl: 0    pravastatin (PRAVACHOL) 80 MG tablet, TAKE ONE TABLET BY MOUTH EVERY DAY IN THE EVENING, Disp: 90 tablet, Rfl: 0    amLODIPine (NORVASC) 5 MG tablet, Take 1 tablet by mouth daily, Disp: 90 tablet, Rfl: 0    fenofibrate (TRICOR) 48 MG tablet, TAKE ONE TABLET BY MOUTH DAILY, Disp: 90 tablet, Rfl: 0    Dulaglutide (TRULICITY) 4.92 JT/8.6KH SOPN, INJECT 0.75MG SUBCUTANEOUSLY ONCE A WEEK, Disp: 12 pen, Rfl: 0    magnesium cl-calcium carbonate (NU-MAG) 71.5-119 MG TBEC tablet, TAKE TWO TABLETS BY MOUTH EVERY DAY, Disp: 180 tablet, Rfl: 1    Alpha-Lipoic Acid 600 MG TABS, Take 600 mg by mouth daily, Disp: , Rfl:     ammonium lactate (LAC-HYDRIN) 12 % lotion, Apply topically twice daily, Disp: 400 g, Rfl: 2    Blood Glucose Monitoring Suppl (TRUE METRIX METER) w/Device KIT, 1 Device by Does not apply route daily, Disp: 1 kit, Rfl: 0    blood glucose monitor strips, Test 2 times a day & as needed for symptoms of irregular blood glucose. Dispense sufficient amount for indicated testing frequency plus additional to accommodate PRN testing needs. , Disp: 100 strip, Rfl: 5    Lancets MISC, 1 each by Does not apply route 2 times daily, Disp: 300 each, Rfl: 1    Lancets MISC, Inject 1 each into the skin daily, Disp: 100 each, Rfl: 5    blood glucose monitor strips, by Other route daily, Disp: 100 strip, Rfl: 5    b complex vitamins capsule, Take 1 capsule by mouth daily, Disp: , Rfl:     clobetasol (TEMOVATE) 0.05 % external solution, APPLY DAILY AS DIRECTED (Patient not taking: Reported on 9/24/2021), Disp: , Rfl: 1    ibuprofen (ADVIL;MOTRIN) 200 MG tablet, Take 400 mg by mouth every 6 hours as needed for Pain, Disp: , Rfl:     glucose monitoring kit (FREESTYLE) monitoring kit, 1 kit by Does not apply route daily as needed, Disp: 1 kit, Rfl: 0    Misc. Devices KIT, Blood pressure cuff- Ambulatory monitoring- check every other day and maintain blood pressure log, Disp: 1 kit, Rfl: 0  Allergies   Allergen Reactions    Bactrim [Sulfamethoxazole-Trimethoprim] Rash       Past Medical History:   Diagnosis Date    Abdominal pain     Anxiety state, unspecified 3/8/2016    Asthma     no inhalers    Breast fibrocystic disorder     Diabetes mellitus ([facility] [address]     Essential hypertension 4/25/2016    Hyperlipidemia     Irritable bowel syndrome     Left ovarian cyst     Morbid obesity ([facility] [address] 4/25/2016    Osteoarthritis     Pharyngoesophageal dysphagia 6/14/2016       Vitals:    09/24/21 0754   BP: 130/80   Pulse: 83   Temp: 97.7 °F (36.5 °C)   SpO2: 96%          Work History/Social History: Foot and ankle history:     Focused Lower Extremity Physical Exam:      Neurovascular examination:    Dorsalis Pedis palpable bilateral.  Posterior tibialis palpable bilateral.    Capillary Refill Time:  Immediate return  Hair growth:  Symmetrical and bilateral   Skin:  Not atrophic  Edema: Mild edema bilateral feet.    Mild edema bilateral ankles. Neurologic:  Light touch diminished bilateral.  Warm to coolness bilateral distal toes  Decreased epicritic sensation     Musculoskeletal/ Orthopedic examination:    Equinis: present bilateral  Dorsiflexion, plantarflexion, inversion, eversion bilateral 5 out of 5 muscle strength  Wiggling toes  Negative Homans  Flexible contracted hammertoes digits 2 through 5 bilateral    Dermatology examination:    Plantar IPJ wound right great toe measure approximate 0.17 x 0.17 x 0.1 cm. Surrounding hyperkeratotic tissue noted. No drainage. Wound 1% granular. Wound is not track or probe to bone. No surrounding erythema or edema right foot. Assessment and Plan:  Lior Haas was seen today for wound infection. Diagnoses and all orders for this visit:    Type 2 diabetes mellitus with foot ulcer, unspecified whether long term insulin use ([facility] [address]    Foreign body in right foot, subsequent encounter    Tinea unguium    Hereditary sensory neuropathy        Blood work from 9/10/2020 Sedimentation rate 21, CRP 0.8, white blood cell count 12.0. Wound cultures from 9/10/2020 reviewed. Aeromonas hydrophila. Follow-up foreign body right foot  I did pare hyperkeratotic tissue surrounding wound today. Wound progressing well. Continue offloading padding bacitracin Band-Aid once daily. Continue surgical shoe with offloading padding. Any increased redness drainage any calf pain go directly to emergency room. I will follow-up 2 weeks to monitor progression wound. Return in about 2 weeks (around 10/8/2021). Seen By:  Venice Granado DPM      Document was created using voice recognition software. Note was reviewed however may contain grammatical errors.

## 2021-10-08 ENCOUNTER — OFFICE VISIT (OUTPATIENT)
Dept: PODIATRY | Age: 51
End: 2021-10-08
Payer: COMMERCIAL

## 2021-10-08 VITALS — WEIGHT: 240 LBS | BODY MASS INDEX: 38.57 KG/M2 | HEIGHT: 66 IN

## 2021-10-08 DIAGNOSIS — L84 CORNS AND CALLOSITIES: ICD-10-CM

## 2021-10-08 DIAGNOSIS — G60.8 HEREDITARY SENSORY NEUROPATHY: ICD-10-CM

## 2021-10-08 DIAGNOSIS — S90.851D FOREIGN BODY IN RIGHT FOOT, SUBSEQUENT ENCOUNTER: ICD-10-CM

## 2021-10-08 DIAGNOSIS — L97.509 TYPE 2 DIABETES MELLITUS WITH FOOT ULCER, UNSPECIFIED WHETHER LONG TERM INSULIN USE (HCC): Primary | ICD-10-CM

## 2021-10-08 DIAGNOSIS — B35.1 TINEA UNGUIUM: ICD-10-CM

## 2021-10-08 DIAGNOSIS — E11.621 TYPE 2 DIABETES MELLITUS WITH FOOT ULCER, UNSPECIFIED WHETHER LONG TERM INSULIN USE (HCC): Primary | ICD-10-CM

## 2021-10-08 PROCEDURE — 11042 DBRDMT SUBQ TIS 1ST 20SQCM/<: CPT | Performed by: PODIATRIST

## 2021-10-08 PROCEDURE — 11721 DEBRIDE NAIL 6 OR MORE: CPT | Performed by: PODIATRIST

## 2021-10-08 NOTE — PROGRESS NOTES
Patient is here for wound check right foot. Patient would like nail care. No concerns. Vanita Tran MD  Electronically signed by Katy Menjivar DPM on 10/8/2021 at 9:01 AM      Paul Garner : 1970 Sex: female  Age: 46 y.o. Patient was referred by Vanita Tran MD    CC:   Follow-up foreign body right great toe with diabetic wound  Diabetic exam    HPI:   Follow-up foreign body right great toe. Very pleased with progression. Denies any drainage or pain right great toe. Does present today wearing regular shoes. Continues Band-Aid and Neosporin once daily. Would also like to discuss debridement all toenails today as she does have long nails. No nausea vomiting fever chills shortness of breath. ROS:  Const: Denies constitutional symptoms  Musculo: Denies symptoms other than stated above  Skin: Denies symptoms other than stated above      Current Outpatient Medications:     metFORMIN (GLUCOPHAGE) 1000 MG tablet, TAKE ONE TABLET BY MOUTH TWICE A DAY WITH MEALS, Disp: 180 tablet, Rfl: 0    pravastatin (PRAVACHOL) 80 MG tablet, TAKE ONE TABLET BY MOUTH EVERY DAY IN THE EVENING, Disp: 90 tablet, Rfl: 0    amLODIPine (NORVASC) 5 MG tablet, Take 1 tablet by mouth daily, Disp: 90 tablet, Rfl: 0    fenofibrate (TRICOR) 48 MG tablet, TAKE ONE TABLET BY MOUTH DAILY, Disp: 90 tablet, Rfl: 0    Dulaglutide (TRULICITY) 0.60 JI/8.8YL SOPN, INJECT 0.75MG SUBCUTANEOUSLY ONCE A WEEK, Disp: 12 pen, Rfl: 0    ammonium lactate (LAC-HYDRIN) 12 % lotion, Apply topically twice daily, Disp: 400 g, Rfl: 2    Blood Glucose Monitoring Suppl (TRUE METRIX METER) w/Device KIT, 1 Device by Does not apply route daily, Disp: 1 kit, Rfl: 0    blood glucose monitor strips, Test 2 times a day & as needed for symptoms of irregular blood glucose. Dispense sufficient amount for indicated testing frequency plus additional to accommodate PRN testing needs. , Disp: 100 strip, Rfl: 5    Lancets MISC, 1 each by Does not apply route 2 times daily, Disp: 300 each, Rfl: 1    magnesium cl-calcium carbonate (NU-MAG) 71.5-119 MG TBEC tablet, TAKE TWO TABLETS BY MOUTH EVERY DAY, Disp: 180 tablet, Rfl: 1    Lancets MISC, Inject 1 each into the skin daily, Disp: 100 each, Rfl: 5    blood glucose monitor strips, by Other route daily, Disp: 100 strip, Rfl: 5    Alpha-Lipoic Acid 600 MG TABS, Take 600 mg by mouth daily, Disp: , Rfl:     b complex vitamins capsule, Take 1 capsule by mouth daily, Disp: , Rfl:     clobetasol (TEMOVATE) 0.05 % external solution, APPLY DAILY AS DIRECTED, Disp: , Rfl: 1    ibuprofen (ADVIL;MOTRIN) 200 MG tablet, Take 400 mg by mouth every 6 hours as needed for Pain, Disp: , Rfl:     glucose monitoring kit (FREESTYLE) monitoring kit, 1 kit by Does not apply route daily as needed, Disp: 1 kit, Rfl: 0    Misc. Devices KIT, Blood pressure cuff- Ambulatory monitoring- check every other day and maintain blood pressure log, Disp: 1 kit, Rfl: 0  Allergies   Allergen Reactions    Bactrim [Sulfamethoxazole-Trimethoprim] Rash       Past Medical History:   Diagnosis Date    Abdominal pain     Anxiety state, unspecified 3/8/2016    Asthma     no inhalers    Breast fibrocystic disorder     Diabetes mellitus ([facility] [address]     Essential hypertension 4/25/2016    Hyperlipidemia     Irritable bowel syndrome     Left ovarian cyst     Morbid obesity ([facility] [address] 4/25/2016    Osteoarthritis     Pharyngoesophageal dysphagia 6/14/2016       There were no vitals filed for this visit. Work History/Social History: Foot and ankle history:     Focused Lower Extremity Physical Exam:      Neurovascular examination:    Dorsalis Pedis palpable bilateral.  Posterior tibialis palpable bilateral.    Capillary Refill Time:  Immediate return  Hair growth:  Symmetrical and bilateral   Skin:  Not atrophic  Edema: Mild edema bilateral feet. Mild edema bilateral ankles.   Neurologic:  Light touch diminished bilateral.  Warm to coolness bilateral distal toes  Decreased epicritic sensation     Musculoskeletal/ Orthopedic examination:    Equinis: present bilateral  Dorsiflexion, plantarflexion, inversion, eversion bilateral 5 out of 5 muscle strength  Wiggling toes  Negative Homans  Flexible contracted hammertoes digits 2 through 5 bilateral  No pain right great toe    Dermatology examination:    Plantar IPJ wound right great toe measure approximate 0.1 cm x 0.1 cm x 0.1 cm. Wound is 100% granular. There is surrounding hyperkeratotic tissue. No drainage. Wound does not probe to bone. Toenails 1-5 right and left thickened, elongated, dystrophic, mycotic with subungual debris. Webspaces 1-4 bilateral clean, dry and intact. Assessment and Plan:  Nancy Mills was seen today for wound check. Diagnoses and all orders for this visit:    Type 2 diabetes mellitus with foot ulcer, unspecified whether long term insulin use ([facility] [address]    Foreign body in right foot, subsequent encounter    Corns and callosities    Tinea unguium    Hereditary sensory neuropathy        Blood work from 9/10/2020 Sedimentation rate 21, CRP 0.8, white blood cell count 12.0. Excisional debridement performed with sharp sterile #15 blade deep through subcutaneous tissue entire wound. Pre and post-wound measurements taken. Wound does not probe to bone. Wound is healing very well. No clinical signs of infection. Toenails 1-5 right and left thickened, elongated, dystrophic, mycotic with subungual debris. Webspaces 1-4 bilateral clean, dry and intact. I will follow-up 2 weeks to monitor progression right great toe wound. Any increase in redness or drainage go the emergency room. Did recommend continued use of offloading padding with bacitracin and Band-Aid once daily. We will follow 2 weeks for wound in 2 months for diabetic exam going forward. Return in about 2 weeks (around 10/22/2021).      Seen By:  Rahul Woods DPM      Document was created using voice recognition software. Note was reviewed however may contain grammatical errors.

## 2021-10-22 ENCOUNTER — OFFICE VISIT (OUTPATIENT)
Dept: PODIATRY | Age: 51
End: 2021-10-22
Payer: COMMERCIAL

## 2021-10-22 VITALS — WEIGHT: 240 LBS | BODY MASS INDEX: 38.57 KG/M2 | HEIGHT: 66 IN

## 2021-10-22 DIAGNOSIS — E11.9 TYPE 2 DIABETES MELLITUS WITHOUT COMPLICATION, UNSPECIFIED WHETHER LONG TERM INSULIN USE (HCC): ICD-10-CM

## 2021-10-22 DIAGNOSIS — L84 CORNS AND CALLOSITIES: ICD-10-CM

## 2021-10-22 DIAGNOSIS — S90.851D FOREIGN BODY IN RIGHT FOOT, SUBSEQUENT ENCOUNTER: Primary | ICD-10-CM

## 2021-10-22 PROCEDURE — 2022F DILAT RTA XM EVC RTNOPTHY: CPT | Performed by: PODIATRIST

## 2021-10-22 PROCEDURE — 3044F HG A1C LEVEL LT 7.0%: CPT | Performed by: PODIATRIST

## 2021-10-22 PROCEDURE — 99213 OFFICE O/P EST LOW 20 MIN: CPT | Performed by: PODIATRIST

## 2021-10-22 PROCEDURE — G8417 CALC BMI ABV UP PARAM F/U: HCPCS | Performed by: PODIATRIST

## 2021-10-22 PROCEDURE — G8427 DOCREV CUR MEDS BY ELIG CLIN: HCPCS | Performed by: PODIATRIST

## 2021-10-22 PROCEDURE — G8484 FLU IMMUNIZE NO ADMIN: HCPCS | Performed by: PODIATRIST

## 2021-10-22 PROCEDURE — 1036F TOBACCO NON-USER: CPT | Performed by: PODIATRIST

## 2021-10-22 PROCEDURE — 3017F COLORECTAL CA SCREEN DOC REV: CPT | Performed by: PODIATRIST

## 2021-10-22 NOTE — PROGRESS NOTES
Patient is here for wound check right foot. Wendy Rodas : 1970 Sex: female  Age: 46 y.o. Patient was referred by David James MD    CC:   Diabetic exam    HPI:   Ken Saavedra presents today diabetic exam.  No open wound right great toe today. Previously did have a foreign body and has responded very well to conservative treatment since removal of foreign body. No redness no open wounds. No pain foot or ankle. Denies calf pain. No additional pedal complaints today. ROS:  Const: Denies constitutional symptoms  Musculo: Denies symptoms other than stated above  Skin: Denies symptoms other than stated above      Current Outpatient Medications:     metFORMIN (GLUCOPHAGE) 1000 MG tablet, TAKE ONE TABLET BY MOUTH TWICE A DAY WITH MEALS, Disp: 180 tablet, Rfl: 0    pravastatin (PRAVACHOL) 80 MG tablet, TAKE ONE TABLET BY MOUTH EVERY DAY IN THE EVENING, Disp: 90 tablet, Rfl: 0    amLODIPine (NORVASC) 5 MG tablet, Take 1 tablet by mouth daily, Disp: 90 tablet, Rfl: 0    fenofibrate (TRICOR) 48 MG tablet, TAKE ONE TABLET BY MOUTH DAILY, Disp: 90 tablet, Rfl: 0    Dulaglutide (TRULICITY) 2.10 MK/8.3CY SOPN, INJECT 0.75MG SUBCUTANEOUSLY ONCE A WEEK, Disp: 12 pen, Rfl: 0    ammonium lactate (LAC-HYDRIN) 12 % lotion, Apply topically twice daily, Disp: 400 g, Rfl: 2    Blood Glucose Monitoring Suppl (TRUE METRIX METER) w/Device KIT, 1 Device by Does not apply route daily, Disp: 1 kit, Rfl: 0    blood glucose monitor strips, Test 2 times a day & as needed for symptoms of irregular blood glucose. Dispense sufficient amount for indicated testing frequency plus additional to accommodate PRN testing needs. , Disp: 100 strip, Rfl: 5    Lancets MISC, 1 each by Does not apply route 2 times daily, Disp: 300 each, Rfl: 1    magnesium cl-calcium carbonate (NU-MAG) 71.5-119 MG TBEC tablet, TAKE TWO TABLETS BY MOUTH EVERY DAY, Disp: 180 tablet, Rfl: 1    Lancets MISC, Inject 1 each into the skin daily, Disp: 100 each, Rfl: 5    blood glucose monitor strips, by Other route daily, Disp: 100 strip, Rfl: 5    Alpha-Lipoic Acid 600 MG TABS, Take 600 mg by mouth daily, Disp: , Rfl:     b complex vitamins capsule, Take 1 capsule by mouth daily, Disp: , Rfl:     clobetasol (TEMOVATE) 0.05 % external solution, APPLY DAILY AS DIRECTED, Disp: , Rfl: 1    ibuprofen (ADVIL;MOTRIN) 200 MG tablet, Take 400 mg by mouth every 6 hours as needed for Pain, Disp: , Rfl:     glucose monitoring kit (FREESTYLE) monitoring kit, 1 kit by Does not apply route daily as needed, Disp: 1 kit, Rfl: 0    Misc. Devices KIT, Blood pressure cuff- Ambulatory monitoring- check every other day and maintain blood pressure log, Disp: 1 kit, Rfl: 0  Allergies   Allergen Reactions    Bactrim [Sulfamethoxazole-Trimethoprim] Rash       Past Medical History:   Diagnosis Date    Abdominal pain     Anxiety state, unspecified 3/8/2016    Asthma     no inhalers    Breast fibrocystic disorder     Diabetes mellitus ([facility] [address]     Essential hypertension 4/25/2016    Hyperlipidemia     Irritable bowel syndrome     Left ovarian cyst     Morbid obesity ([facility] [address] 4/25/2016    Osteoarthritis     Pharyngoesophageal dysphagia 6/14/2016       There were no vitals filed for this visit. Work History/Social History: Foot and ankle history:     Focused Lower Extremity Physical Exam:      Neurovascular examination:    Dorsalis Pedis palpable bilateral.  Posterior tibialis palpable bilateral.    Capillary Refill Time:  Immediate return  Hair growth:  Symmetrical and bilateral   Skin:  Not atrophic  Edema: Mild edema bilateral feet. Mild edema bilateral ankles.   Neurologic:  Light touch diminished bilateral.  Warm to coolness bilateral distal toes  Decreased epicritic sensation     Musculoskeletal/ Orthopedic examination:    Equinis: present bilateral  Dorsiflexion, plantarflexion, inversion, eversion bilateral 5 out of 5 muscle strength  Wiggling toes  Negative Homans  Flexible contracted hammertoes digits 2 through 5 bilateral  No pain bilateral foot or ankle. Dermatology examination:    No open wound right great toe. No erythema. Assessment and Plan:  Elias Beck was seen today for follow-up, diabetes and wound check. Diagnoses and all orders for this visit:    Foreign body in right foot, subsequent encounter    Corns and callosities    Type 2 diabetes mellitus without complication, unspecified whether long term insulin use ([facility] [address]        Follow-up wound right great toe. No open wounds today. Continue ammonium lactate 12% twice daily both feet. Progressing well foot ankle standpoint. Avoid barefoot. Continue wide well supported walking shoes. Any new wounds come in sooner. We will follow-up 2 months diabetic exam.        Return in about 7 weeks (around 12/10/2021). Seen By:  Amber Gaviria DPM      Document was created using voice recognition software. Note was reviewed however may contain grammatical errors.

## 2021-11-09 ENCOUNTER — OFFICE VISIT (OUTPATIENT)
Dept: OBGYN | Age: 51
End: 2021-11-09
Payer: COMMERCIAL

## 2021-11-09 VITALS
BODY MASS INDEX: 39.86 KG/M2 | HEIGHT: 66 IN | HEART RATE: 89 BPM | DIASTOLIC BLOOD PRESSURE: 65 MMHG | WEIGHT: 248 LBS | SYSTOLIC BLOOD PRESSURE: 145 MMHG

## 2021-11-09 DIAGNOSIS — Z01.419 WELL WOMAN EXAM WITH ROUTINE GYNECOLOGICAL EXAM: Primary | ICD-10-CM

## 2021-11-09 PROCEDURE — 99213 OFFICE O/P EST LOW 20 MIN: CPT | Performed by: OBSTETRICS & GYNECOLOGY

## 2021-11-09 PROCEDURE — 99396 PREV VISIT EST AGE 40-64: CPT | Performed by: OBSTETRICS & GYNECOLOGY

## 2021-11-09 PROCEDURE — G8484 FLU IMMUNIZE NO ADMIN: HCPCS | Performed by: OBSTETRICS & GYNECOLOGY

## 2021-11-09 NOTE — PROGRESS NOTES
Patient alert and pleasant with no complaints  Here today for annual GYN exam  Assisted with pelvic exam, no specimens obtained. Discharge instructions have been discussed with the patient. Patient advised to call our office with any questions or concerns. Voiced understanding.

## 2021-11-09 NOTE — PROGRESS NOTES
Chief complaint : 46 year- old presents for well woman exam.     Present illness :    G4, P3,Ab1. Doing well. Periods:  Menopausal now. Sexually active : yes . No abdominal or pelvic pain. No dyspareunia. No abnormal vaginal  discharge. Previous Pap smears normal. Last Pap smear 2019, negative . Satish Nuñez No urinary or GI symptoms. Medical/ surgical history and medications reviewed. Denies bowel problems. Mammogram current and results are Negative. ROS: Negative    Examination :  Vital signs reviewed. GA : Healthy. Oriented x 3 no distress. HEENT : hearing grossly intact, no jaundice, no oral lesions or nasal discharge. Neck : Supple. Thyroid : normal, no goiter. Abdomen :Soft. No masses. No organomegaly. V&V : Normal female external genitalia. BUS: Normal.  PS : Adequate. Cervix : No lesions, pap smear not due until next year. Uterus : Normal size. Adnexa : Free, no masses. IMP: normal exam    Plan:RTC 1 year and pap will be due then.

## 2021-11-12 DIAGNOSIS — E11.65 TYPE 2 DIABETES MELLITUS WITH HYPERGLYCEMIA, WITHOUT LONG-TERM CURRENT USE OF INSULIN (HCC): ICD-10-CM

## 2021-11-12 RX ORDER — DULAGLUTIDE 0.75 MG/.5ML
INJECTION, SOLUTION SUBCUTANEOUS
Qty: 6 ML | Refills: 0 | Status: SHIPPED
Start: 2021-11-12 | End: 2021-12-02 | Stop reason: SDUPTHER

## 2021-11-22 ENCOUNTER — TELEPHONE (OUTPATIENT)
Dept: INTERNAL MEDICINE | Age: 51
End: 2021-11-22

## 2021-12-02 ENCOUNTER — OFFICE VISIT (OUTPATIENT)
Dept: INTERNAL MEDICINE | Age: 51
End: 2021-12-02
Payer: COMMERCIAL

## 2021-12-02 VITALS
HEART RATE: 86 BPM | RESPIRATION RATE: 16 BRPM | DIASTOLIC BLOOD PRESSURE: 85 MMHG | SYSTOLIC BLOOD PRESSURE: 142 MMHG | TEMPERATURE: 98.2 F | BODY MASS INDEX: 41.22 KG/M2 | HEIGHT: 66 IN | WEIGHT: 256.5 LBS

## 2021-12-02 DIAGNOSIS — I10 ESSENTIAL HYPERTENSION: ICD-10-CM

## 2021-12-02 DIAGNOSIS — E11.65 TYPE 2 DIABETES MELLITUS WITH HYPERGLYCEMIA, WITHOUT LONG-TERM CURRENT USE OF INSULIN (HCC): ICD-10-CM

## 2021-12-02 DIAGNOSIS — M17.11 LOCALIZED OSTEOARTHRITIS OF RIGHT KNEE: ICD-10-CM

## 2021-12-02 DIAGNOSIS — R25.2 LEG CRAMPS: ICD-10-CM

## 2021-12-02 DIAGNOSIS — E66.01 OBESITY, CLASS III, BMI 40-49.9 (MORBID OBESITY) (HCC): ICD-10-CM

## 2021-12-02 DIAGNOSIS — M79.604 RIGHT LEG PAIN: ICD-10-CM

## 2021-12-02 DIAGNOSIS — E78.2 MIXED HYPERLIPIDEMIA: ICD-10-CM

## 2021-12-02 DIAGNOSIS — Z11.59 NEED FOR HEPATITIS B SCREENING TEST: ICD-10-CM

## 2021-12-02 DIAGNOSIS — Z11.59 NEED FOR HEPATITIS C SCREENING TEST: ICD-10-CM

## 2021-12-02 DIAGNOSIS — D72.829 LEUKOCYTOSIS, UNSPECIFIED TYPE: Primary | ICD-10-CM

## 2021-12-02 LAB — HBA1C MFR BLD: 6.9 %

## 2021-12-02 PROCEDURE — 1036F TOBACCO NON-USER: CPT | Performed by: INTERNAL MEDICINE

## 2021-12-02 PROCEDURE — 2022F DILAT RTA XM EVC RTNOPTHY: CPT | Performed by: INTERNAL MEDICINE

## 2021-12-02 PROCEDURE — 3017F COLORECTAL CA SCREEN DOC REV: CPT | Performed by: INTERNAL MEDICINE

## 2021-12-02 PROCEDURE — 3044F HG A1C LEVEL LT 7.0%: CPT | Performed by: INTERNAL MEDICINE

## 2021-12-02 PROCEDURE — 99214 OFFICE O/P EST MOD 30 MIN: CPT | Performed by: INTERNAL MEDICINE

## 2021-12-02 PROCEDURE — 83036 HEMOGLOBIN GLYCOSYLATED A1C: CPT | Performed by: INTERNAL MEDICINE

## 2021-12-02 PROCEDURE — 99213 OFFICE O/P EST LOW 20 MIN: CPT | Performed by: INTERNAL MEDICINE

## 2021-12-02 PROCEDURE — G8482 FLU IMMUNIZE ORDER/ADMIN: HCPCS | Performed by: INTERNAL MEDICINE

## 2021-12-02 PROCEDURE — G8417 CALC BMI ABV UP PARAM F/U: HCPCS | Performed by: INTERNAL MEDICINE

## 2021-12-02 PROCEDURE — G8427 DOCREV CUR MEDS BY ELIG CLIN: HCPCS | Performed by: INTERNAL MEDICINE

## 2021-12-02 RX ORDER — AMMONIUM LACTATE 12 G/100G
LOTION TOPICAL
Qty: 400 G | Refills: 1 | Status: SHIPPED | OUTPATIENT
Start: 2021-12-02

## 2021-12-02 RX ORDER — FENOFIBRATE 48 MG/1
TABLET, COATED ORAL
Qty: 90 TABLET | Refills: 1 | Status: SHIPPED
Start: 2021-12-02 | End: 2022-07-25

## 2021-12-02 RX ORDER — LANCETS 30 GAUGE
1 EACH MISCELLANEOUS 2 TIMES DAILY
Qty: 300 EACH | Status: CANCELLED | OUTPATIENT
Start: 2021-12-02

## 2021-12-02 RX ORDER — MAGNESIUM CHLORIDE 71.5 MG
TABLET, DELAYED RELEASE (ENTERIC COATED) ORAL
Qty: 180 TABLET | Refills: 1 | Status: SHIPPED | OUTPATIENT
Start: 2021-12-02

## 2021-12-02 RX ORDER — GLUCOSAMINE HCL/CHONDROITIN SU 500-400 MG
CAPSULE ORAL
Qty: 100 STRIP | Refills: 5 | Status: SHIPPED | OUTPATIENT
Start: 2021-12-02

## 2021-12-02 RX ORDER — PRAVASTATIN SODIUM 80 MG/1
TABLET ORAL
Qty: 90 TABLET | Refills: 1 | Status: SHIPPED
Start: 2021-12-02 | End: 2022-05-31

## 2021-12-02 RX ORDER — DULAGLUTIDE 0.75 MG/.5ML
INJECTION, SOLUTION SUBCUTANEOUS
Qty: 6 ML | Refills: 1 | Status: SHIPPED
Start: 2021-12-02 | End: 2022-07-27

## 2021-12-02 RX ORDER — AMLODIPINE BESYLATE 5 MG/1
5 TABLET ORAL DAILY
Qty: 90 TABLET | Refills: 1 | Status: SHIPPED
Start: 2021-12-02 | End: 2022-06-16 | Stop reason: SDUPTHER

## 2021-12-02 RX ORDER — LANCETS 30 GAUGE
1 EACH MISCELLANEOUS DAILY
Qty: 100 EACH | Refills: 5 | Status: SHIPPED
Start: 2021-12-02 | End: 2022-09-29 | Stop reason: SDUPTHER

## 2021-12-02 ASSESSMENT — ENCOUNTER SYMPTOMS
NAUSEA: 0
WHEEZING: 0
SHORTNESS OF BREATH: 0
CONSTIPATION: 0
DIARRHEA: 0
VOMITING: 0
COUGH: 0
ABDOMINAL PAIN: 0

## 2021-12-02 NOTE — PATIENT INSTRUCTIONS
1) Referral sent for Physical therapy to [facility] at [facility] with attention for aquatic therapy if eligible. 2) You will receive contact to set up DAIJA assessment. 3) Labs have been ordered and please complete at your convenience. 4) Maintain Orthopedic and Podiatry follow-up. 5) Please call with any questions.

## 2021-12-02 NOTE — PROGRESS NOTES
Wendy Rodas (:  1970) is a 46 y.o. female,Established patient, here for evaluation of the following chief complaint(s):  Diabetes Mellitus, Medication Refill (rt knee pain ongoing), Knee Pain, and Ear Problem      1. Leukocytosis, unspecified type- likely secondary to prior infection- repeat   -     WBC; Future  2. Type 2 diabetes mellitus with hyperglycemia, without long-term current use of insulin ([facility] [address]  Assessment & Plan:   A1c today: 6.9% which is 1% higher than last check in 2021  Continue current management from a medication management standpoint   Lifestyle changes reinforced- 16 lb weight gain since Sept  Start home BP monitoring and follow with goals noted at <130/<80  Need to improve activity but concerns with knee pain  Will send for aquatic therapy assessment for knee pain rehab and to improve mobility   Monitor glucose  Repeat A1c will be needed at 3 months and following BP and glucose at home with home weight assessment  Orders:  -     MICROALBUMIN / CREATININE URINE RATIO; Future  -     POCT glycosylated hemoglobin (Hb A1C)  -     metFORMIN (GLUCOPHAGE) 1000 MG tablet; TAKE ONE TABLET BY MOUTH TWICE A DAY WITH MEALS, Disp-180 tablet, R-1Normal  -     Dulaglutide (TRULICITY) 0.59 SV/3.3XJ SOPN; INJECT 0.75MG SUBCUTANEOUSLY ONCE A WEEK, Disp-6 mL, R-1Normal  -     blood glucose monitor strips; Test 2 times a day & as needed for symptoms of irregular blood glucose. Dispense sufficient amount for indicated testing frequency plus additional to accommodate PRN testing needs. , Disp-100 strip, R-5, Normal  -     Lancets MISC; DAILY Starting Thu 2021, Disp-100 each, R-5, Normal  3. Essential hypertension  Assessment & Plan:   Management as above   Orders:  -     amLODIPine (NORVASC) 5 MG tablet; Take 1 tablet by mouth daily, Disp-90 tablet, R-1Normal  4.  Mixed hyperlipidemia  Assessment & Plan:   Improved lipid panel with improved TGs below 200  Continue current management plan  Orders:  -     pravastatin (PRAVACHOL) 80 MG tablet; TAKE ONE TABLET BY MOUTH EVERY DAY IN THE EVENING, Disp-90 tablet, R-1Normal  -     fenofibrate (TRICOR) 48 MG tablet; TAKE ONE TABLET BY MOUTH DAILY, Disp-90 tablet, R-1Normal  5. Obesity, Class III, BMI 40-49.9 (morbid obesity) (HCC)   Management plan as above   6. Leg cramps  -     magnesium cl-calcium carbonate (NU-MAG) 71.5-119 MG TBEC tablet; TAKE TWO TABLETS BY MOUTH EVERY DAY, Disp-180 tablet, R-1Normal  7. Need for hepatitis C screening test  -     HEPATITIS C ANTIBODY; Future  8. Need for hepatitis B screening test  -     Miscellaneous Sendout 1; Future  9. Right leg pain  -     VL DAIJA BILATERAL LIMITED 1-2 LEVELS; Future  -     [facility] - [facility], [place], [place]/Wellness  Concern for intermittent edema and pain  Underlying neuropathy  Continue Podiatry follow-up  Check DAIJA- although appear PAD risk less likely given strong pedal pulses   Foot care continued   Ortho follow-up for knee pain   Prior U/S of RLE negative for DVT   10. Localized osteoarthritis of right knee  -     [facility]   Maintain Ortho appt   Aquatic PT referral sent      The 10-year ASCVD risk score (Yuliya Burgos., et al., 2013) is: 4.1%    Values used to calculate the score:      Age: 46 years      Sex: Female      Is Non- : No      Diabetic: Yes      Tobacco smoker: No      Systolic Blood Pressure: 607 mmHg      Is BP treated: Yes      HDL Cholesterol: 44 mg/dL      Total Cholesterol: 155 mg/dL      Subjective   SUBJECTIVE/OBJECTIVE:  HPI    Presents for follow-up appointment. Recently seen in the Fall by Podiatry for foot swelling- foreign body in foot and secondary cellulitis. Followed regularly with Dr. Joey Aragon for improvement of wound and completed antibiotic- doxycycline. Need continued follow-up due to underlying neuropathy and diabetes.   Patient also has followed with [facility] and completed exam. COVID booster was completed as well. Labs noted mild CRP and leukocytosis likely related to foot infection at time of podiatry assessment. Today, patient states doing well overall. All concerns with foot infection have resolved. However, continued concerns with neuropathy, intermittent of right lower extremity and acute on chronic right knee pain complicated by recurrent effusion. Appts are currently scheduled with Podiatry on 12/10 and Orthopedics on 12/9. Notes reduced activity overall due to continued knee pain- has not resumed walking program. A1c is increased to 6.9% today and BP is elevated compared to previously controlled values. No glucose log present today. Review of Systems   Constitutional: Negative for chills and fever. HENT: Positive for tinnitus (intermittent- more on left ). Negative for ear discharge and ear pain. Respiratory: Negative for cough, shortness of breath and wheezing. Cardiovascular: Positive for leg swelling (Right- more). Negative for chest pain and palpitations. Gastrointestinal: Negative for abdominal pain, constipation, diarrhea, nausea and vomiting. Genitourinary: Negative for dysuria and hematuria. Neurological: Negative for dizziness, syncope and light-headedness. Objective    Vitals:    12/02/21 1319   BP: (!) 142/85   Site: Left Upper Arm   Position: Sitting   Cuff Size: Medium Adult   Pulse: 86   Resp: 16   Temp: 98.2 °F (36.8 °C)   TempSrc: Oral   Weight: 256 lb 8 oz (116.3 kg)   Height: 5' 6\" (1.676 m)       Physical Exam  Constitutional:       Appearance: Normal appearance. She is obese. HENT:      Head: Normocephalic and atraumatic. Right Ear: Tympanic membrane normal. There is no impacted cerumen. Left Ear: Tympanic membrane normal. There is no impacted cerumen. Cardiovascular:      Rate and Rhythm: Normal rate and regular rhythm. Pulses: Normal pulses. Dorsalis pedis pulses are 2+ on the right side. Heart sounds: Normal heart sounds, S1 normal and S2 normal.   Pulmonary:      Effort: Pulmonary effort is normal.      Breath sounds: Normal breath sounds. No decreased breath sounds, wheezing, rhonchi or rales. Musculoskeletal:      Right knee: Effusion present. No swelling or erythema. Feet:      Right foot:      Skin integrity: Callus (under 1st toe- no erythema) present. No ulcer. Neurological:      Mental Status: She is alert. An electronic signature was used to authenticate this note.     --Luzma De Dios MD , FACP

## 2021-12-02 NOTE — PROGRESS NOTES
All instructions reviewed with patient by Bernarda Ferrari  Printed lab scripts and printed avs given to pt with instructions   Understanding verbalized of all instructions

## 2021-12-03 ENCOUNTER — HOSPITAL ENCOUNTER (OUTPATIENT)
Dept: PHYSICAL THERAPY | Age: 51
Setting detail: THERAPIES SERIES
Discharge: HOME | End: 2021-12-03
Payer: COMMERCIAL

## 2021-12-03 PROCEDURE — 97161 PT EVAL LOW COMPLEX 20 MIN: CPT

## 2021-12-03 NOTE — PROGRESS NOTES
Physical Therapy  Initial Assessment  Date: 12/3/2021  Patient Name: Catalina Summers  MRN: [id number]  : 1970          Restrictions       Subjective   General  Chart Reviewed: Yes  Patient assessed for rehabilitation services?: Yes  Additional Pertinent Hx: Patient presents to PT to assess and treat issues of persistent pain and edema affecting the right knee and the Right LE. No specific GLADIS Pain has been persistent multiple mos. Recent orthopedic consult Dx of OA of the knee  Family / Caregiver Present: No  Referring Practitioner: Dr Sebastián Villatoro  Referral Date : 21  Diagnosis: Right Knee Pain  Follows Commands: Within Functional Limits  PT Visit Information  PT Insurance Information: [employer]  Subjective  Subjective: Pain right knee Pain noted with weight bearing pain with twisting movementsor prolonged knee extension Pain rated Constant edema  Pain Screening  Patient Currently in Pain: Yes  Vital Signs  Patient Currently in Pain: Yes    Vision/Hearing  Vision  Vision: Within Functional Limits  Hearing  Hearing: Within functional limits    Orientation  Orientation  Overall Orientation Status: Within Functional Limits    Social/Functional History  Social/Functional History  Lives With: Family  Type of Home: House  Home Layout: Two level; Laundry in basement  [place] Help From: Family  Homemaking Responsibilities: Yes  Active : Yes  Mode of Transportation: Car    Objective     Observation/Palpation  Posture: Fair  Palpation: Pain with palaption mdial aspect Right knee at the joint line Moderate palpable edema right knee posterior fossa  Observation: Patient maintains the right LE in ER in standing and with ambulation Reduced stance time on the right LE  Edema:  Moderate right klnee    AROM RLE (degrees)  RLE General AROM: Limited knee flexion Pain with knee flexion  AROM LLE (degrees)  LLE AROM : WFL    Strength RLE  Comment: Quad/HS 4-/5  Strength LLE  Comment: 4/5  Tone RLE  RLE Tone: Normotonic  Tone LLE  LLE Tone: Normotonic  Motor Control  Gross Motor?: WFL  Additional Measures  Flexibility: deficits of flexibility Lower trunk and LE's     Bed mobility  Bridging: Modified independent   Supine to Sit: Modified independent  Sit to Supine: Independent  Transfers  Sit to Stand: Independent  Stand to sit: Independent       Ambulation  Ambulation?: Yes  Ambulation 1  Surface: level tile  Device: No Device  Assistance: Modified Independent  Gait Deviations: Slow Meme; Increased SUKHJINDER; Decreased step length; Decreased step height  Stairs/Curb  Stairs?: No                            Assessment   Conditions Requiring Skilled Therapeutic Intervention  Body structures, Functions, Activity limitations: Decreased functional mobility ; Increased pain; Decreased posture; Decreased ROM;  Decreased strength; Decreased endurance  Prognosis: Fair  Decision Making: Low Complexity  REQUIRES PT FOLLOW UP: Yes         Plan   Plan  Times per week: 2  Plan weeks: 5  Current Treatment Recommendations: Strengthening, Neuromuscular Re-education, Home Exercise Program, ROM, Functional Mobility Training, Gait Training, Modalities, Aquatics    G-Code       OutComes Score                                                  AM-PAC Score             Goals          Therapy Time   Individual Concurrent Group Co-treatment   Time In 0900         Time Out 0940         Minutes 40         Timed Code Treatment Minutes: 27 Minutes       Luba Paul, PT

## 2021-12-09 ENCOUNTER — OFFICE VISIT (OUTPATIENT)
Dept: ORTHOPEDIC SURGERY | Age: 51
End: 2021-12-09
Payer: COMMERCIAL

## 2021-12-09 VITALS — HEIGHT: 66 IN | WEIGHT: 256 LBS | BODY MASS INDEX: 41.14 KG/M2

## 2021-12-09 DIAGNOSIS — M25.461 KNEE EFFUSION, RIGHT: Primary | ICD-10-CM

## 2021-12-09 PROCEDURE — 99213 OFFICE O/P EST LOW 20 MIN: CPT | Performed by: NURSE PRACTITIONER

## 2021-12-09 PROCEDURE — 1036F TOBACCO NON-USER: CPT | Performed by: NURSE PRACTITIONER

## 2021-12-09 PROCEDURE — G8427 DOCREV CUR MEDS BY ELIG CLIN: HCPCS | Performed by: NURSE PRACTITIONER

## 2021-12-09 PROCEDURE — 3017F COLORECTAL CA SCREEN DOC REV: CPT | Performed by: NURSE PRACTITIONER

## 2021-12-09 PROCEDURE — G8482 FLU IMMUNIZE ORDER/ADMIN: HCPCS | Performed by: NURSE PRACTITIONER

## 2021-12-09 PROCEDURE — G8417 CALC BMI ABV UP PARAM F/U: HCPCS | Performed by: NURSE PRACTITIONER

## 2021-12-09 NOTE — PROGRESS NOTES
Follow Up Visit     Sherly Morgan returns today for follow up visit regarding right knee osteoarthritis. Treatment thus far has included activity modification, cortisone injection and aspiration 9/9/2021. She reports symptoms are unchanged. REVIEW OF SYSTEMS:     Constitutional:  Negative for weight loss, fevers, chills, fatigue  Cardiovascular: Negative for chest pain, palpitations  Pulmonary: Negative for shortness of breath, labored breathing, cough  GI: negative for abdominal pain, nausea, vomitting   MSK: per HPI  Skin: negative for rash, open wounds    All other systems reviewed and are negative       Physical Exam:     No data recorded    General: Alert and oriented x3, no acute distress  Cardiovascular/pulmonary: No labored breathing, peripheral perfusion intact  Musculoskeletal:    Right knee exam range of motion 0-90, there is stiffness present with passive range of motion. There is medial joint line tenderness on palpation. Palpable effusion on exam.  Stable valgus and varus exams. Stable patella tracking midline. Controlled Substances Monitoring:      Imaging:  No new imaging was obtained today. Previous x-rays of the right knee were reviewed showing minimal degenerative changes with osteophyte formation to the patellofemoral compartment. Maintained joint space      Assessment: Right knee pain, mild degenerative changes      Plan: Today we discussed her right knee. Patient reports minimal pain relief with previous injection 3 months ago. Patient states that symptoms were improved for only a week and a half. On exam today she has a noticeable effusion present again. Her range of motion is limited 0-90. Pain is primarily medial.  Pain is worsened with ambulation and she is having difficulty with daily activities. With the very mild degenerative changes seen on her x-rays. We discussed obtaining an MRI of her right knee to further evaluate.   Patient was agreeable to plan of care and MRI was ordered today. Patient will continue with home exercise and aqua therapy for symptom relief. She will follow-up after MRI is obtained. Patient verbalized understanding.       Earl Prado, [facility]  Orthopedic Surgery   12/09/21  1:11 PM

## 2021-12-09 NOTE — PATIENT INSTRUCTIONS
MRI order was placed today, please call the office once you are scheduled for your MRI for a review with the physician. If you do not hear from radiology scheduling within 1 week, please call [phone] and press option #2.

## 2021-12-10 ENCOUNTER — PROCEDURE VISIT (OUTPATIENT)
Dept: PODIATRY | Age: 51
End: 2021-12-10
Payer: COMMERCIAL

## 2021-12-10 VITALS — BODY MASS INDEX: 41.14 KG/M2 | HEIGHT: 66 IN | WEIGHT: 256 LBS

## 2021-12-10 DIAGNOSIS — L84 CORNS AND CALLOSITIES: ICD-10-CM

## 2021-12-10 DIAGNOSIS — B35.1 TINEA UNGUIUM: Primary | ICD-10-CM

## 2021-12-10 DIAGNOSIS — G60.8 HEREDITARY SENSORY NEUROPATHY: ICD-10-CM

## 2021-12-10 DIAGNOSIS — E11.9 TYPE 2 DIABETES MELLITUS WITHOUT COMPLICATION, UNSPECIFIED WHETHER LONG TERM INSULIN USE (HCC): ICD-10-CM

## 2021-12-10 PROCEDURE — 11721 DEBRIDE NAIL 6 OR MORE: CPT | Performed by: PODIATRIST

## 2021-12-10 PROCEDURE — 11056 PARNG/CUTG B9 HYPRKR LES 2-4: CPT | Performed by: PODIATRIST

## 2021-12-10 NOTE — PROGRESS NOTES
Estee Somers is here today for a diabetic foot exam and nail care. her PCP is Sofía Antoine MD last OV was 2021. C/o callus to right great toe. Estee Somers : 1970 Sex: female  Age: 46 y.o. Patient was referred by Sofía Antoine MD    CC:   Follow-up diabetic exam      HPI:   Follow-up diabetic exam.  No open skin lesion abrasions. No recent injury. Has been using ammonium lactate lotion 12% daily. Has noticed improvement. Denies any open wounds. Continues diabetic inserts. No additional pedal complaints. ROS:  Const: Denies constitutional symptoms  Musculo: Denies symptoms other than stated above  Skin: Denies symptoms other than stated above      Current Outpatient Medications:     metFORMIN (GLUCOPHAGE) 1000 MG tablet, TAKE ONE TABLET BY MOUTH TWICE A DAY WITH MEALS, Disp: 180 tablet, Rfl: 1    Dulaglutide (TRULICITY) 0.51 SG/4.2TQ SOPN, INJECT 0.75MG SUBCUTANEOUSLY ONCE A WEEK, Disp: 6 mL, Rfl: 1    pravastatin (PRAVACHOL) 80 MG tablet, TAKE ONE TABLET BY MOUTH EVERY DAY IN THE EVENING, Disp: 90 tablet, Rfl: 1    amLODIPine (NORVASC) 5 MG tablet, Take 1 tablet by mouth daily, Disp: 90 tablet, Rfl: 1    fenofibrate (TRICOR) 48 MG tablet, TAKE ONE TABLET BY MOUTH DAILY, Disp: 90 tablet, Rfl: 1    ammonium lactate (LAC-HYDRIN) 12 % lotion, Apply topically twice daily, Disp: 400 g, Rfl: 1    blood glucose monitor strips, Test 2 times a day & as needed for symptoms of irregular blood glucose. Dispense sufficient amount for indicated testing frequency plus additional to accommodate PRN testing needs. , Disp: 100 strip, Rfl: 5    magnesium cl-calcium carbonate (NU-MAG) 71.5-119 MG TBEC tablet, TAKE TWO TABLETS BY MOUTH EVERY DAY, Disp: 180 tablet, Rfl: 1    Lancets MISC, Inject 1 each into the skin daily, Disp: 100 each, Rfl: 5    Blood Glucose Monitoring Suppl (TRUE METRIX METER) w/Device KIT, 1 Device by Does not apply route daily, Disp: 1 kit, Rfl: 0   Lancets MISC, 1 each by Does not apply route 2 times daily, Disp: 300 each, Rfl: 1    blood glucose monitor strips, by Other route daily, Disp: 100 strip, Rfl: 5    Alpha-Lipoic Acid 600 MG TABS, Take 600 mg by mouth daily, Disp: , Rfl:     b complex vitamins capsule, Take 1 capsule by mouth daily, Disp: , Rfl:     clobetasol (TEMOVATE) 0.05 % external solution, APPLY DAILY AS DIRECTED, Disp: , Rfl: 1    ibuprofen (ADVIL;MOTRIN) 200 MG tablet, Take 400 mg by mouth every 6 hours as needed for Pain, Disp: , Rfl:     Misc. Devices KIT, Blood pressure cuff- Ambulatory monitoring- check every other day and maintain blood pressure log, Disp: 1 kit, Rfl: 0  Allergies   Allergen Reactions    Bactrim [Sulfamethoxazole-Trimethoprim] Rash       Past Medical History:   Diagnosis Date    Abdominal pain     Anxiety state, unspecified 3/8/2016    Asthma     no inhalers    Breast fibrocystic disorder     Diabetes mellitus ([facility] [address]     Essential hypertension 4/25/2016    Hyperlipidemia     Irritable bowel syndrome     Left ovarian cyst     Morbid obesity ([facility] [address] 4/25/2016    Osteoarthritis     Pharyngoesophageal dysphagia 6/14/2016       There were no vitals filed for this visit. Focused Lower Extremity Physical Exam:      Neurovascular examination:    Dorsalis Pedis palpable bilateral.  Posterior tibialis palpable bilateral.    Capillary Refill Time:  Immediate return  Hair growth:  Symmetrical and bilateral   Skin:  Not atrophic  Edema: Mild edema bilateral feet. Mild edema bilateral ankles. Neurologic:  Light touch diminished bilateral.  Warm to coolness bilateral distal toes  Decreased epicritic sensation     Musculoskeletal/ Orthopedic examination:    Equinis: present bilateral  Dorsiflexion, plantarflexion, inversion, eversion bilateral 5 out of 5 muscle strength  Wiggling toes  Negative Homans  Flexible contracted hammertoes digits 2 through 5 bilateral  No pain foot or ankle.     Dermatology examination:    Toenails 1-5 right and left thickened, elongated, dystrophic, mycotic with subungual debris. Webspaces 1-4 bilateral clean, dry and intact. Hyperkeratotic tissue noted medial IPJ great toe bilateral and plantar MPJ great toe bilateral.  No open wounds. No erythema. Assessment and Plan:  FREDI was seen today for callouses, nail problem and diabetes. Diagnoses and all orders for this visit:    Tinea unguium    Corns and callosities    Type 2 diabetes mellitus without complication, unspecified whether long term insulin use (HCC)    Hereditary sensory neuropathy      Follow-up diabetic foot ankle exam.    Manual debridement with standard technique toenails 1 through 5 right and left in thickness and length. Patient tolerated procedure well. Paring of hyperkeratotic tissue with #15 blade plantar first metatarsal head bilateral and medial IPJ great toe bilateral.  Continue ammonium lactate 12% daily. Stressed importance once again avoiding barefoot. Continue diabetic inserts and diabetic shoes. Any new skin lesion abrasions come in sooner. Follow-up 2 months. Return in about 2 months (around 2/10/2022). Seen By:  George Pascual DPM      Document was created using voice recognition software. Note was reviewed however may contain grammatical errors.

## 2021-12-14 ENCOUNTER — PATIENT MESSAGE (OUTPATIENT)
Dept: INTERNAL MEDICINE | Age: 51
End: 2021-12-14

## 2021-12-14 DIAGNOSIS — I10 ESSENTIAL HYPERTENSION: ICD-10-CM

## 2021-12-14 RX ORDER — HYDROCHLOROTHIAZIDE 25 MG/1
25 TABLET ORAL EVERY MORNING
Qty: 90 TABLET | Refills: 1 | Status: SHIPPED
Start: 2021-12-14 | End: 2022-05-31

## 2021-12-14 NOTE — TELEPHONE ENCOUNTER
From: Adam Courtney  To: Dr. Freeman Peter: 12/14/2021 9:35 AM EST  Subject: Blood pressure    Dr. Jourdan Ying., Please give me a call when you can.  Elias Beck

## 2021-12-14 NOTE — TELEPHONE ENCOUNTER
Called patient back after receiving [employer] message. States felt an \"inside shakiness\" last night. Noted checked glucose and glucose was 140. Stated checked BP and BP was elevated to 170s/100's with pulse of 120. She took an extra dose of amlodipine on her own as she use to take 10 mg. She states BP improved and HR improved. All symptoms resolved. Home BP readings have remained above 130/80 as consistent with last office reading. Recommend adding 2nd agent as increase in amlodipine led to worsening LE edema in the past. Will add HCTZ- confirmed allergies with patient. Had stopped sglt2 therapy in the past due to recurrent infections. No prior usage of HCTZ and no noted allergy. Placed order. Repeat BMP in 2 weeks. Medication information sent via [employer] for review.

## 2021-12-15 ENCOUNTER — PATIENT MESSAGE (OUTPATIENT)
Dept: INTERNAL MEDICINE | Age: 51
End: 2021-12-15

## 2021-12-15 ENCOUNTER — TELEPHONE (OUTPATIENT)
Dept: INTERNAL MEDICINE | Age: 51
End: 2021-12-15

## 2021-12-15 NOTE — TELEPHONE ENCOUNTER
Called patient and discussed symptoms- currently asymptomatic. Similar episode- shorter- less intense- filling out [place] [employer]- describes abdominal \"jitteriness\" with feeling symptomatic Heart Rate. BP was checked and elevated initially. NO tachycardia on assessment- HR 94. Glucose in 140s. Improved with rest and taking BP med amlodipine. Denies any current symptoms- no CP, dyspnea, exertional dyspnea, abdominal pain, lightheadedness, or dizziness. States no prior exertional symptoms. Continues to deny abdominal pain. BP and HR currently are stable. Concerns regarding symptoms with unclear etiology. Recommend evaluation. Patient states feels well now. Does not want to go to ED. Understands that delay in assessment could lead to delay in care needs- but emphasizes feeling improved.  is home and will monitor. Recommend acute assessment as available- reviewing schedule in ambulatory clinic- resident opening noted at 8 AM which patient prefers morning appt. Asked patient to appear tomorrow for 8 AM appt. Routing note to schedulers as well. Recommend EKG in office. BP to be complete in both arms and with sitting/standing position. CT imaging reviewed from 2019 during hx of abscess- no noted vascular reading from radiology- no comment regarding size of aorta- re-review may be necessary. Patient follows with Dr. Torie Mcduffie- last stress test 2015- no ischemia; last echo in 2018- preserved EF with no significant valvular pathology- will discuss further coordination with Cardiology as appropriate.

## 2021-12-15 NOTE — TELEPHONE ENCOUNTER
From: Andres Bustillo  To: Dr. Dueñas Lowers: 12/15/2021 3:09 PM EST  Subject: BP    Dr. Sang Madrigal, I'm sorry but can you call, Ish Pedersen

## 2021-12-16 ENCOUNTER — HOSPITAL ENCOUNTER (OUTPATIENT)
Age: 51
Discharge: HOME | End: 2021-12-16
Payer: COMMERCIAL

## 2021-12-16 ENCOUNTER — CLINICAL DOCUMENTATION (OUTPATIENT)
Dept: INTERNAL MEDICINE | Age: 51
End: 2021-12-16

## 2021-12-16 ENCOUNTER — OFFICE VISIT (OUTPATIENT)
Dept: INTERNAL MEDICINE | Age: 51
End: 2021-12-16
Payer: COMMERCIAL

## 2021-12-16 VITALS
BODY MASS INDEX: 40.98 KG/M2 | SYSTOLIC BLOOD PRESSURE: 168 MMHG | WEIGHT: 255 LBS | HEIGHT: 66 IN | RESPIRATION RATE: 22 BRPM | OXYGEN SATURATION: 95 % | DIASTOLIC BLOOD PRESSURE: 97 MMHG | TEMPERATURE: 97.3 F | HEART RATE: 92 BPM

## 2021-12-16 DIAGNOSIS — I10 ESSENTIAL HYPERTENSION: ICD-10-CM

## 2021-12-16 DIAGNOSIS — R07.89 OTHER CHEST PAIN: ICD-10-CM

## 2021-12-16 DIAGNOSIS — R45.0 FEELING JITTERY: ICD-10-CM

## 2021-12-16 DIAGNOSIS — E78.2 MIXED HYPERLIPIDEMIA: ICD-10-CM

## 2021-12-16 DIAGNOSIS — F41.9 ANXIETY DISORDER, UNSPECIFIED TYPE: ICD-10-CM

## 2021-12-16 DIAGNOSIS — Z13.29 SCREENING FOR THYROID DISORDER: ICD-10-CM

## 2021-12-16 DIAGNOSIS — L29.9 ITCHING: ICD-10-CM

## 2021-12-16 DIAGNOSIS — G47.9 DIFFICULTY SLEEPING: Primary | ICD-10-CM

## 2021-12-16 DIAGNOSIS — N95.1 POST MENOPAUSAL SYNDROME: ICD-10-CM

## 2021-12-16 LAB — TSH SERPL DL<=0.05 MIU/L-ACNC: 1.37 UIU/ML (ref 0.27–4.2)

## 2021-12-16 PROCEDURE — 84443 ASSAY THYROID STIM HORMONE: CPT

## 2021-12-16 PROCEDURE — 3017F COLORECTAL CA SCREEN DOC REV: CPT | Performed by: INTERNAL MEDICINE

## 2021-12-16 PROCEDURE — 99214 OFFICE O/P EST MOD 30 MIN: CPT | Performed by: INTERNAL MEDICINE

## 2021-12-16 PROCEDURE — 99212 OFFICE O/P EST SF 10 MIN: CPT | Performed by: INTERNAL MEDICINE

## 2021-12-16 PROCEDURE — 93005 ELECTROCARDIOGRAM TRACING: CPT | Performed by: INTERNAL MEDICINE

## 2021-12-16 PROCEDURE — 93010 ELECTROCARDIOGRAM REPORT: CPT | Performed by: INTERNAL MEDICINE

## 2021-12-16 PROCEDURE — G8417 CALC BMI ABV UP PARAM F/U: HCPCS | Performed by: INTERNAL MEDICINE

## 2021-12-16 PROCEDURE — 36415 COLL VENOUS BLD VENIPUNCTURE: CPT

## 2021-12-16 PROCEDURE — 1036F TOBACCO NON-USER: CPT | Performed by: INTERNAL MEDICINE

## 2021-12-16 PROCEDURE — G8427 DOCREV CUR MEDS BY ELIG CLIN: HCPCS | Performed by: INTERNAL MEDICINE

## 2021-12-16 PROCEDURE — G8482 FLU IMMUNIZE ORDER/ADMIN: HCPCS | Performed by: INTERNAL MEDICINE

## 2021-12-16 RX ORDER — FLUOXETINE HYDROCHLORIDE 20 MG/1
20 CAPSULE ORAL DAILY
Qty: 90 CAPSULE | Refills: 1 | Status: SHIPPED
Start: 2021-12-16 | End: 2022-05-31

## 2021-12-16 RX ORDER — HYDROXYZINE HYDROCHLORIDE 25 MG/1
25 TABLET, FILM COATED ORAL EVERY 8 HOURS PRN
Qty: 30 TABLET | Refills: 1 | Status: SHIPPED
Start: 2021-12-16 | End: 2022-01-20 | Stop reason: SDUPTHER

## 2021-12-16 ASSESSMENT — ENCOUNTER SYMPTOMS
EYE DISCHARGE: 0
CHEST TIGHTNESS: 0
COLOR CHANGE: 0
APNEA: 0
BACK PAIN: 1
SHORTNESS OF BREATH: 0
ABDOMINAL DISTENTION: 0

## 2021-12-16 NOTE — PROGRESS NOTES
ANTONW met with pt, pt was cooperative and friendly and reported treating in the past for anxiety. Pt reported she would like to schedule appointment. ANTONW scheduled appt with pt for 1/4 at 9:00 am. Pt reported no other concerns to SW.

## 2021-12-16 NOTE — PROGRESS NOTES
[facility]    Attending Physician Statement  I have discussed the case, including pertinent history and exam findings with the resident physician. I agree with the assessment, plan and orders as documented by the resident. I have reviewed all pertinent PMHx, PSHx, FamHx, SocialHx, medications, and allergies and updated history as appropriate. Patient here for routine follow up of medical problems. HTN: controlled at time of original evaluation when patient arrived in office; became elevated as patient developed concerns during her evaluation and began changing positions; plan on continuing HCTZ and Amlodipine at current dosages and having the followup BMP as previously directed by PCP; consider polysomnography 2/2 patient having recurrent awakenings at night     STOP-BANG  Snore Yes   Tired, fatigued during the day No   Observed apnea  Yes   High blood pressure  Yes   BMI > 35 Yes   Age > 48 Yes   Neck circumference > 16in Yes   Gender Male  No   Total  6   Low risk 0-2  Intermediate risk 3-4  High risk 5-8    Anxiety: patient having recurrent anxiety; plan on having patient start taking SSRI and hydroxyzine for acute treatment; patient amenable    Remainder of medical problems as per resident note.     María Peters DO  12/16/2021 8:42 AM    Encounter time including independent chart review, discussion with patient, interpreting test results and/or external communications: 15'

## 2021-12-16 NOTE — PROGRESS NOTES
[facility] Internal Medicine      SUBJECTIVE:  Gerry Zhao (:  1970) is a 46 y.o. female here for evaluation of the following chief complaint(s): Other (ringing in head, high blood pressure & pulse readings)  Her PMH includes HTN, obesity, and remote tobacco abuse (x 3 years, quit > 20 years ago). First episode  -  (evening 6.30 pm, standing cooking) - Sudden episode of piercing headache(not typically on just one side), heart felt like it was jiggling (like feeling nervous), achy in chest or upper back occasionally(centre and upper back), feels tired especially after the episode, no shortness of breath but makes her gasp. Second episode - 12/15- 12 pm, sitting making remy cards) - started feeling jittery, headache hit- piercing - blood pressure - 172/94 Hr-94, dropped to 130/80 HR- 80 (after norvasc), 1.5 ours later 168/101, Hr- 90, 3pm 171/106 HR-86. Stayed high - 168/89 3 pm , 161/84. Blood sugar 150  6.50 pm -173/88   144/81, HR 84, BG -134   These episodes last for 30 mins - sits down, lays back and tries to relax. No outbursts of crying during any of these episodes. Patient has been following with Dr. Myles Zafar for palpitations. Holter monitoring (48 hours, 10/2015) - avg HR 91 BPM, no atrial atrial fibrillation, no ventricular ectopy, rare PAC's (3 isolated PAC's), \"palpitations\" did not correlate with any arrhythmias    She has also been having difficulty staying asleeping for a while ( noticed that she gasps when asleep, wakes up around 2-4 am). Review of Systems   Constitutional: Positive for fatigue. Negative for activity change and diaphoresis. HENT: Negative for congestion. Eyes: Negative for discharge. Respiratory: Negative for apnea, chest tightness and shortness of breath. Cardiovascular: Positive for chest pain. Gastrointestinal: Negative for abdominal distention. Endocrine: Negative for cold intolerance. Genitourinary: Negative for difficulty urinating. Musculoskeletal: Positive for back pain. Negative for arthralgias. Skin: Negative for color change and pallor. Allergic/Immunologic: Negative for environmental allergies. Neurological: Positive for headaches. Negative for dizziness, seizures, syncope, speech difficulty, weakness, light-headedness and numbness. Hematological: Negative for adenopathy. Psychiatric/Behavioral: Positive for sleep disturbance. Negative for agitation, self-injury and suicidal ideas. The patient is nervous/anxious. The patient is not hyperactive. Current Outpatient Medications on File Prior to Visit   Medication Sig Dispense Refill    hydroCHLOROthiazide (HYDRODIURIL) 25 MG tablet Take 1 tablet by mouth every morning 90 tablet 1    metFORMIN (GLUCOPHAGE) 1000 MG tablet TAKE ONE TABLET BY MOUTH TWICE A DAY WITH MEALS 180 tablet 1    Dulaglutide (TRULICITY) 3.20 II/5.7OQ SOPN INJECT 0.75MG SUBCUTANEOUSLY ONCE A WEEK 6 mL 1    pravastatin (PRAVACHOL) 80 MG tablet TAKE ONE TABLET BY MOUTH EVERY DAY IN THE EVENING 90 tablet 1    amLODIPine (NORVASC) 5 MG tablet Take 1 tablet by mouth daily 90 tablet 1    fenofibrate (TRICOR) 48 MG tablet TAKE ONE TABLET BY MOUTH DAILY 90 tablet 1    ammonium lactate (LAC-HYDRIN) 12 % lotion Apply topically twice daily 400 g 1    blood glucose monitor strips Test 2 times a day & as needed for symptoms of irregular blood glucose. Dispense sufficient amount for indicated testing frequency plus additional to accommodate PRN testing needs.  100 strip 5    magnesium cl-calcium carbonate (NU-MAG) 71.5-119 MG TBEC tablet TAKE TWO TABLETS BY MOUTH EVERY  tablet 1    Lancets MISC Inject 1 each into the skin daily 100 each 5    Blood Glucose Monitoring Suppl (TRUE METRIX METER) w/Device KIT 1 Device by Does not apply route daily 1 kit 0    Lancets MISC 1 each by Does not apply route 2 times daily 300 each 1    blood glucose monitor strips by Other route daily 100 strip 5    Alpha-Lipoic Acid 600 MG TABS Take 600 mg by mouth daily      b complex vitamins capsule Take 1 capsule by mouth daily      clobetasol (TEMOVATE) 0.05 % external solution APPLY DAILY AS DIRECTED  1    ibuprofen (ADVIL;MOTRIN) 200 MG tablet Take 400 mg by mouth every 6 hours as needed for Pain      Misc. Devices KIT Blood pressure cuff- Ambulatory monitoring- check every other day and maintain blood pressure log 1 kit 0     No current facility-administered medications on file prior to visit. OBJECTIVE:    VS:   Vitals:    12/16/21 0819 12/16/21 0826 12/16/21 0827 12/16/21 0828   BP: 139/83 (!) 161/96 (!) 170/96 (!) 168/97   Site: Left Upper Arm Right Upper Arm Right Upper Arm Left Upper Arm   Position: Sitting Sitting Standing Standing   Cuff Size: Medium Adult Medium Adult Medium Adult Medium Adult   Pulse: 80 82 87 92   Resp: 22 22 22 22   Temp: 97.3 °F (36.3 °C)      TempSrc: Temporal      SpO2: 98% 97% 97% 95%   Weight: 255 lb (115.7 kg)      Height: 5' 6\" (1.676 m)        Physical Exam     ASSESSMENT/PLAN:  1. Difficulty sleeping  -     Sleep Study with PAP Titration; Future  2. Feeling jittery  -     EKG 12 lead  3. Other chest pain  -     NM MYOCARDIAL SPECT REST EXERCISE OR RX; Future  4. Screening for thyroid disorder  -     TSH; Future  5. Anxiety disorder, Post menopausal syndrome  -     FLUoxetine (PROZAC) 20 MG capsule; Take 1 capsule by mouth daily, Disp-90 capsule, R-1Normal  -     hydrOXYzine (ATARAX) 25 MG tablet; Take 1 tablet by mouth every 8 hours as needed for Itching or Anxiety, Disp-30 tablet, R-1Normal  6. Essential hypertension  7. Mixed hyperlipidemia       RTC:  Return in about 5 weeks (around 1/20/2022). I have reviewed my findings and recommendations with Sam Armstrong and Dr. Maikol Jenkins.       Code Status: Prior    Craig Abad MD   12/16/2021 11:26 AM

## 2021-12-16 NOTE — PROGRESS NOTES
Patient has not knowingly had unprotected exposire to anyone positive for COVID-10 within the last 14 days DENIES    AND does not have the following signs or symptoms:    A. One of the followin. Fever greater than 100.0 F NEGATIVE       2. Cough NEGATIVE       3. New onset shortness of breath NEGATIVE       4. New onset difficulty breathing NEGATIVE    AND/OR    B. Two or more of the following criteria:        1. Muscle aches NEGATIVE       2. Headache NEGATIVE       3. Sore Throat NEGATIVE       4. New onset loss of smell or taste NEGATIVE       5. New onset diarrhea NEGATIVE       6. Chills NEGATIVE       7. Runny nose NEGATIVE       8. Sneezing NEGATIVE  +++++++++++++++++++++++++++++++++++++  Between 3rd and 4th BP reading, patient states she is getting a severe headache on the left side of the head. She was also swaying a bit and her eyes look glassy. BP #1 139/83 (sitting left) HR 80 R 22 SaO2 98%  BP #2 161/96 (sitting right) HR 82 R 22 SaO2 97%  BP #3 170/96 (standing right) HR 87 R 22 SaO2 97%  BP #4 167/97 (stansing left) HR 92 R 22 SaO2 95%    EKG obtained. SR.   Al Counter, LPN   ++++++++++++++++++++++++++++++++++++++++  AVS and lab/xray orders printed and patient discharged by Dr. Anamaria Hall

## 2021-12-16 NOTE — TELEPHONE ENCOUNTER
Please schedule with Dr. Crow Watson tomorrow (12/16) at 8 AM. Patient notified to appear at clinic 12/16 and Dr. Crow Watson updated regarding assessment needs in advance.

## 2021-12-17 ENCOUNTER — TELEPHONE (OUTPATIENT)
Dept: INTERNAL MEDICINE | Age: 51
End: 2021-12-17

## 2021-12-17 NOTE — TELEPHONE ENCOUNTER
Patient returned call to answer some questions. Patient ask if she can receive a call after 11am due to a an appointment she has. She ask for Camila.   [phone]

## 2022-01-03 ENCOUNTER — HOSPITAL ENCOUNTER (OUTPATIENT)
Age: 52
Discharge: HOME | End: 2022-01-03
Payer: COMMERCIAL

## 2022-01-03 ENCOUNTER — HOSPITAL ENCOUNTER (OUTPATIENT)
Dept: MRI IMAGING | Age: 52
Discharge: HOME | End: 2022-01-05
Payer: COMMERCIAL

## 2022-01-03 DIAGNOSIS — E11.65 TYPE 2 DIABETES MELLITUS WITH HYPERGLYCEMIA, WITHOUT LONG-TERM CURRENT USE OF INSULIN (HCC): ICD-10-CM

## 2022-01-03 DIAGNOSIS — Z11.59 NEED FOR HEPATITIS B SCREENING TEST: ICD-10-CM

## 2022-01-03 DIAGNOSIS — D72.829 LEUKOCYTOSIS, UNSPECIFIED TYPE: ICD-10-CM

## 2022-01-03 DIAGNOSIS — Z11.59 NEED FOR HEPATITIS C SCREENING TEST: ICD-10-CM

## 2022-01-03 DIAGNOSIS — M25.461 KNEE EFFUSION, RIGHT: ICD-10-CM

## 2022-01-03 LAB
ANION GAP SERPL CALCULATED.3IONS-SCNC: 13 MMOL/L (ref 7–16)
BUN BLDV-MCNC: 9 MG/DL (ref 6–20)
CALCIUM SERPL-MCNC: 10.3 MG/DL (ref 8.6–10.2)
CHLORIDE BLD-SCNC: 98 MMOL/L (ref 98–107)
CO2: 27 MMOL/L (ref 22–29)
CREAT SERPL-MCNC: 0.6 MG/DL (ref 0.5–1)
CREATININE URINE: 51 MG/DL (ref 29–226)
GFR AFRICAN AMERICAN: >60
GFR NON-AFRICAN AMERICAN: >60 ML/MIN/1.73
GLUCOSE BLD-MCNC: 135 MG/DL (ref 74–99)
MICROALBUMIN UR-MCNC: 15.6 MG/L
MICROALBUMIN/CREAT UR-RTO: 30.6 (ref 0–30)
POTASSIUM SERPL-SCNC: 5 MMOL/L (ref 3.5–5)
SODIUM BLD-SCNC: 138 MMOL/L (ref 132–146)
WBC # BLD: 8.2 E9/L (ref 4.5–11.5)

## 2022-01-03 PROCEDURE — 86803 HEPATITIS C AB TEST: CPT

## 2022-01-03 PROCEDURE — 85048 AUTOMATED LEUKOCYTE COUNT: CPT

## 2022-01-03 PROCEDURE — 82044 UR ALBUMIN SEMIQUANTITATIVE: CPT

## 2022-01-03 PROCEDURE — 36415 COLL VENOUS BLD VENIPUNCTURE: CPT

## 2022-01-03 PROCEDURE — 86704 HEP B CORE ANTIBODY TOTAL: CPT

## 2022-01-03 PROCEDURE — 82570 ASSAY OF URINE CREATININE: CPT

## 2022-01-03 PROCEDURE — 80048 BASIC METABOLIC PNL TOTAL CA: CPT

## 2022-01-03 PROCEDURE — 73721 MRI JNT OF LWR EXTRE W/O DYE: CPT

## 2022-01-04 ENCOUNTER — OFFICE VISIT (OUTPATIENT)
Dept: INTERNAL MEDICINE | Age: 52
End: 2022-01-04
Payer: COMMERCIAL

## 2022-01-04 DIAGNOSIS — F41.9 ANXIETY: Primary | ICD-10-CM

## 2022-01-04 LAB — HEPATITIS C ANTIBODY INTERPRETATION: NORMAL

## 2022-01-04 PROCEDURE — 90791 PSYCH DIAGNOSTIC EVALUATION: CPT | Performed by: SOCIAL WORKER

## 2022-01-04 ASSESSMENT — PATIENT HEALTH QUESTIONNAIRE - PHQ9
3. TROUBLE FALLING OR STAYING ASLEEP: 0
9. THOUGHTS THAT YOU WOULD BE BETTER OFF DEAD, OR OF HURTING YOURSELF: 0
8. MOVING OR SPEAKING SO SLOWLY THAT OTHER PEOPLE COULD HAVE NOTICED. OR THE OPPOSITE, BEING SO FIGETY OR RESTLESS THAT YOU HAVE BEEN MOVING AROUND A LOT MORE THAN USUAL: 0
SUM OF ALL RESPONSES TO PHQ QUESTIONS 1-9: 0
SUM OF ALL RESPONSES TO PHQ9 QUESTIONS 1 & 2: 0
2. FEELING DOWN, DEPRESSED OR HOPELESS: 0
1. LITTLE INTEREST OR PLEASURE IN DOING THINGS: 0
10. IF YOU CHECKED OFF ANY PROBLEMS, HOW DIFFICULT HAVE THESE PROBLEMS MADE IT FOR YOU TO DO YOUR WORK, TAKE CARE OF THINGS AT HOME, OR GET ALONG WITH OTHER PEOPLE: 0
SUM OF ALL RESPONSES TO PHQ QUESTIONS 1-9: 0
6. FEELING BAD ABOUT YOURSELF - OR THAT YOU ARE A FAILURE OR HAVE LET YOURSELF OR YOUR FAMILY DOWN: 0
5. POOR APPETITE OR OVEREATING: 0
SUM OF ALL RESPONSES TO PHQ QUESTIONS 1-9: 0
4. FEELING TIRED OR HAVING LITTLE ENERGY: 0
7. TROUBLE CONCENTRATING ON THINGS, SUCH AS READING THE NEWSPAPER OR WATCHING TELEVISION: 0
SUM OF ALL RESPONSES TO PHQ QUESTIONS 1-9: 0

## 2022-01-04 ASSESSMENT — ANXIETY QUESTIONNAIRES
4. TROUBLE RELAXING: 1
6. BECOMING EASILY ANNOYED OR IRRITABLE: 0
IF YOU CHECKED OFF ANY PROBLEMS ON THIS QUESTIONNAIRE, HOW DIFFICULT HAVE THESE PROBLEMS MADE IT FOR YOU TO DO YOUR WORK, TAKE CARE OF THINGS AT HOME, OR GET ALONG WITH OTHER PEOPLE: SOMEWHAT DIFFICULT
1. FEELING NERVOUS, ANXIOUS, OR ON EDGE: 2
7. FEELING AFRAID AS IF SOMETHING AWFUL MIGHT HAPPEN: 2
2. NOT BEING ABLE TO STOP OR CONTROL WORRYING: 1
GAD7 TOTAL SCORE: 7
5. BEING SO RESTLESS THAT IT IS HARD TO SIT STILL: 0
3. WORRYING TOO MUCH ABOUT DIFFERENT THINGS: 1

## 2022-01-04 NOTE — PROGRESS NOTES
Tyrone Fritz MSW, LISW    Visit Date: 1/4/2022   Time of appointment:  9:00  Time spent with Patient: 60 minutes. This is patient's first appointment. Reason for Consult:  Anxiety     Referring Provider/PCP:    No ref. provider found  Kanwal Hess MD      Pt provided informed consent for the behavioral health program. Discussed with patient model of service to include the limits of confidentiality (i.e. abuse reporting, suicide intervention, etc.) and short-term intervention focused approach. PRESENTING PROBLEM AND HISTORY  Yaneth Hernandez is a 46 y.o. female who presents for anxiety on and off for the past 2 years. She has the following symptoms: feeling nervous, anxious, or on edge. Onset of symptoms was approximately 2 years ago. Symptoms have been unchanged since that time. She denies current suicidal and homicidal ideation. Family history significant for no psychiatric illness. Risk factors: none. Pt reports she has anxiety but works to stay present focused. Pt has past and current stressors that contribute to overall anxiety. MENTAL STATUS EXAM  Mood was euthymic with congruent affect. Suicidal ideation was denied. Homicidal ideation was denied. Hygiene was good . Dress was neat. Behavior was Within Normal Limits with No self-report of difficulties ambulating. Attitude was Cooperative, [place] and Friendly. Eye-contact was good. Speech: rate - WNL, rhythm -  WNL, volume - WNL  Verbalizations were goal directed. Thought processes were intact and goal-oriented without evidence of delusions, hallucinations, obsessions, or mariana; with little cognitive distortions. Associations were characterized by intact cognitive processes. Pt was oriented to person, place, time, and general circumstances;  recent:  good.   Insight and judgment were estimated to be good, AEB, a good  understanding of cyclical maladaptive patterns, and the ability to use insight to inform behavior change. CURRENT MEDICATIONS    Current Outpatient Medications:     hydrOXYzine (ATARAX) 25 MG tablet, Take 1 tablet by mouth every 8 hours as needed for Itching or Anxiety, Disp: 30 tablet, Rfl: 1    FLUoxetine (PROZAC) 20 MG capsule, Take 1 capsule by mouth daily, Disp: 90 capsule, Rfl: 1    hydroCHLOROthiazide (HYDRODIURIL) 25 MG tablet, Take 1 tablet by mouth every morning, Disp: 90 tablet, Rfl: 1    metFORMIN (GLUCOPHAGE) 1000 MG tablet, TAKE ONE TABLET BY MOUTH TWICE A DAY WITH MEALS, Disp: 180 tablet, Rfl: 1    Dulaglutide (TRULICITY) 7.87 TK/8.9CY SOPN, INJECT 0.75MG SUBCUTANEOUSLY ONCE A WEEK, Disp: 6 mL, Rfl: 1    pravastatin (PRAVACHOL) 80 MG tablet, TAKE ONE TABLET BY MOUTH EVERY DAY IN THE EVENING, Disp: 90 tablet, Rfl: 1    amLODIPine (NORVASC) 5 MG tablet, Take 1 tablet by mouth daily, Disp: 90 tablet, Rfl: 1    fenofibrate (TRICOR) 48 MG tablet, TAKE ONE TABLET BY MOUTH DAILY, Disp: 90 tablet, Rfl: 1    ammonium lactate (LAC-HYDRIN) 12 % lotion, Apply topically twice daily, Disp: 400 g, Rfl: 1    blood glucose monitor strips, Test 2 times a day & as needed for symptoms of irregular blood glucose. Dispense sufficient amount for indicated testing frequency plus additional to accommodate PRN testing needs. , Disp: 100 strip, Rfl: 5    magnesium cl-calcium carbonate (NU-MAG) 71.5-119 MG TBEC tablet, TAKE TWO TABLETS BY MOUTH EVERY DAY, Disp: 180 tablet, Rfl: 1    Lancets MISC, Inject 1 each into the skin daily, Disp: 100 each, Rfl: 5    Blood Glucose Monitoring Suppl (TRUE METRIX METER) w/Device KIT, 1 Device by Does not apply route daily, Disp: 1 kit, Rfl: 0    Lancets MISC, 1 each by Does not apply route 2 times daily, Disp: 300 each, Rfl: 1    blood glucose monitor strips, by Other route daily, Disp: 100 strip, Rfl: 5    Alpha-Lipoic Acid 600 MG TABS, Take 600 mg by mouth daily, Disp: , Rfl:     b complex vitamins capsule, Take 1 capsule by mouth daily, Disp: , Rfl:     clobetasol (TEMOVATE) 0.05 % external solution, APPLY DAILY AS DIRECTED, Disp: , Rfl: 1    ibuprofen (ADVIL;MOTRIN) 200 MG tablet, Take 400 mg by mouth every 6 hours as needed for Pain, Disp: , Rfl:     Misc. Devices KIT, Blood pressure cuff- Ambulatory monitoring- check every other day and maintain blood pressure log, Disp: 1 kit, Rfl: 0     FAMILY MEDICAL/MH HISTORY   Her family history includes Arthritis in her father and mother; Cancer in her paternal grandfather and paternal grandmother; High Blood Pressure in her father, maternal grandmother, and mother; High Cholesterol in her father; Hypertension in her father and mother; Obesity in her father, maternal aunt, maternal grandfather, maternal grandmother, mother, and sister; Other in her father and paternal grandmother; Rheum Arthritis in her mother; Thyroid Disease in her father. 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 She started smoking about 32 years ago. She has a 1.50 pack-year smoking history. She has never used smokeless tobacco.  ALCOHOL:  She reports no history of alcohol use. OTHER SUBSTANCES: She reports no history of drug use. ASSESSMENT  Dennystristen Billing presented to the appointment today for evaluation and treatment of symptoms of anxiety. She is currently deemed no risk to herself or others and meets criteria for anxiety. She will benefit from a medication evaluation to assess if anxiety medications could be helpful in treating  symptoms. Pt's symptoms are well controlled at this time.   She will also benefit from brief and solution-focused consultation to address cognitive and behavioral interventions for symptoms. Linette Strauss was in agreement with recommendations. Discussed and processed contributing factors to anxiety. Identified strengths related to anxiety. Pt to continue to work towards relaxation techniques. Pt was help-seeking throughout session      PHQ Scores 8/12/2021 6/22/2020 2/22/2019 8/22/2017 2/23/2017 1/25/2017 11/16/2016   PHQ2 Score 0 0 0 4 2 2 3   PHQ9 Score 0 0 0 16 2 2 9     Interpretation of Total Score Depression Severity: 1-4 = Minimal depression, 5-9 = Mild depression, 10-14 = Moderate depression, 15-19 = Moderately severe depression, 20-27 = Severe depression    How often pt has had thoughts of death or hurting self (if PHQ positive for depression):       SHAHLA 7 SCORE 1/4/2022   SHAHLA-7 Total Score 7     Interpretation of SHAHLA-7 score: 5-9 = mild anxiety, 10-14 = moderate anxiety, 15+ = severe anxiety. Recommend referral to behavioral health for scores 10 or greater. DIAGNOSIS  Linette Strauss was seen today for anxiety. Diagnoses and all orders for this visit:    Anxiety          INTERVENTION  Established rapport, Carmen-setting to identify pt's primary goals for [facility] visit / overall health, Emphasized self-care as important for managing overall health and CBT to target anxiety      PLAN  Pt to be seen in two weeks   Pt to complete self-care activity      INTERACTIVE COMPLEXITY  Is interactive complexity present?   No  Reason:  N/A  Additional Supporting Information:  N/A       Electronically signed by ESSIE Zarate on 1/4/22 at 8:58 AM EST

## 2022-01-05 ENCOUNTER — TELEPHONE (OUTPATIENT)
Dept: INTERNAL MEDICINE | Age: 52
End: 2022-01-05

## 2022-01-05 NOTE — TELEPHONE ENCOUNTER
VM message left at office regarding need for clarification of a lab order. Called and spoke with lab. They were unsure of who had left message but wanted Dr to know that Hep B Antibody titer that was ordered was canceled as this is inpatient order. Order was changed to Hep B core antibody total. This result is currently still in process.

## 2022-01-07 LAB — HEPATITIS B CORE TOTAL ANTIBODY: NONREACTIVE

## 2022-01-14 ENCOUNTER — OFFICE VISIT (OUTPATIENT)
Dept: ORTHOPEDIC SURGERY | Age: 52
End: 2022-01-14
Payer: COMMERCIAL

## 2022-01-14 VITALS — BODY MASS INDEX: 40.98 KG/M2 | WEIGHT: 255 LBS | HEIGHT: 66 IN

## 2022-01-14 DIAGNOSIS — S83.281A TEAR OF LATERAL MENISCUS OF RIGHT KNEE, CURRENT, UNSPECIFIED TEAR TYPE, INITIAL ENCOUNTER: Primary | ICD-10-CM

## 2022-01-14 PROCEDURE — G8482 FLU IMMUNIZE ORDER/ADMIN: HCPCS | Performed by: ORTHOPAEDIC SURGERY

## 2022-01-14 PROCEDURE — 99213 OFFICE O/P EST LOW 20 MIN: CPT | Performed by: ORTHOPAEDIC SURGERY

## 2022-01-14 PROCEDURE — G8427 DOCREV CUR MEDS BY ELIG CLIN: HCPCS | Performed by: ORTHOPAEDIC SURGERY

## 2022-01-14 PROCEDURE — 1036F TOBACCO NON-USER: CPT | Performed by: ORTHOPAEDIC SURGERY

## 2022-01-14 PROCEDURE — 3017F COLORECTAL CA SCREEN DOC REV: CPT | Performed by: ORTHOPAEDIC SURGERY

## 2022-01-14 PROCEDURE — G8417 CALC BMI ABV UP PARAM F/U: HCPCS | Performed by: ORTHOPAEDIC SURGERY

## 2022-01-14 NOTE — PROGRESS NOTES
Follow Up Visit     Urvashi Farah returns today for follow up visit regarding right knee pain with mild patellofemoral changes noted on xray. Treatment thus far has included activity modification, cortisone injection and aspiration 9/9/2021, and recently we obtain an MRI to review. She reports symptoms are persistent, lateral joint line as well as anterior    Physical Exam:     Height: 5' 6\" (1.676 m), Weight: 255 lb (115.7 kg)    General: Alert and oriented x3, no acute distress  Cardiovascular/pulmonary: No labored breathing, peripheral perfusion intact  Musculoskeletal:    Right knee exam shows lateral joint line tenderness. Range of motion is 0 to 130 degrees. There is also lateral peripatellar pain as well as anterior pain over the patellar tendon. There is minimal effusion today. Alignment appears near neutral.  Ligamentous exam is stable. Controlled Substances Monitoring:      Imaging:  X-rays and MRI reviewed. X-ray shows some mild degenerative changes patellofemoral, maintained joint space femorotibial.  MRI reviewed showing lateral meniscus degenerative tear, chondromalacia of the lateral compartment, some full-thickness cartilage loss lateral facet of the patella      Assessment: Right knee degenerative lateral meniscus tear, patellofemoral chondromalacia      Plan:   We discussed her knee today. She has attempted conservative treatment including injections, activity modification, weight loss, physical therapy. These have not helped. Recent MRI shows degenerative lateral meniscus tear as well as some chondromalacia lateral compartment as well as some full-thickness cartilage loss of the lateral facet of the patella. We discussed that her symptoms are likely a combination of her meniscus tear and mild arthritic changes. We discussed the role of arthroscopic surgery for meniscus and that it may help minimal or short-term for any other arthritic problems in her knee. She understands.   We also discussed that she may potentially require arthroplasty in the future. She understands this as well. She was counseled on weight loss, BMI is 41.2 today. Discussed that she would ideally be under 40 if she is eventually to undergo replacement. I think arthroscopic surgery could potentially buy her some time, although we did discuss that this could be short-lived and in 6 months to a year she could be looking at potential arthroplasty. She understands. She would like to proceed with arthroscopic surgery. This will involve right knee arthroscopy, partial lateral meniscectomy. We will look at scheduling this in near future.   She does not require preoperative visit    Pat Lazo MD  Orthopaedic Surgery   1/14/22  10:57 AM

## 2022-01-17 ENCOUNTER — APPOINTMENT (OUTPATIENT)
Dept: GENERAL RADIOLOGY | Age: 52
End: 2022-01-17
Payer: COMMERCIAL

## 2022-01-17 ENCOUNTER — HOSPITAL ENCOUNTER (EMERGENCY)
Age: 52
Discharge: HOME | End: 2022-01-17
Payer: COMMERCIAL

## 2022-01-17 VITALS
TEMPERATURE: 98 F | OXYGEN SATURATION: 95 % | RESPIRATION RATE: 16 BRPM | BODY MASS INDEX: 40.18 KG/M2 | WEIGHT: 250 LBS | HEIGHT: 66 IN | SYSTOLIC BLOOD PRESSURE: 157 MMHG | DIASTOLIC BLOOD PRESSURE: 83 MMHG | HEART RATE: 104 BPM
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
## 2022-01-17 DIAGNOSIS — S89.91XA INJURY OF RIGHT LOWER EXTREMITY, INITIAL ENCOUNTER: ICD-10-CM

## 2022-01-17 DIAGNOSIS — S82.434A CLOSED NONDISPLACED OBLIQUE FRACTURE OF SHAFT OF RIGHT FIBULA, INITIAL ENCOUNTER: Primary | ICD-10-CM

## 2022-01-17 PROCEDURE — 99284 EMERGENCY DEPT VISIT MOD MDM: CPT

## 2022-01-17 PROCEDURE — 29515 APPLICATION SHORT LEG SPLINT: CPT

## 2022-01-17 PROCEDURE — 6370000000 HC RX 637 (ALT 250 FOR IP): Performed by: PHYSICIAN ASSISTANT

## 2022-01-17 PROCEDURE — 73610 X-RAY EXAM OF ANKLE: CPT

## 2022-01-17 PROCEDURE — 73590 X-RAY EXAM OF LOWER LEG: CPT

## 2022-01-17 PROCEDURE — 73630 X-RAY EXAM OF FOOT: CPT

## 2022-01-17 RX ORDER — OXYCODONE HYDROCHLORIDE AND ACETAMINOPHEN 5; 325 MG/1; MG/1
1 TABLET ORAL EVERY 6 HOURS PRN
Qty: 20 TABLET | Refills: 0 | Status: SHIPPED | OUTPATIENT
Start: 2022-01-17 | End: 2022-01-22

## 2022-01-17 RX ORDER — NAPROXEN 500 MG/1
500 TABLET ORAL 2 TIMES DAILY
Qty: 60 TABLET | Refills: 0 | Status: SHIPPED | OUTPATIENT
Start: 2022-01-17 | End: 2022-05-31

## 2022-01-17 RX ORDER — OXYCODONE HYDROCHLORIDE AND ACETAMINOPHEN 5; 325 MG/1; MG/1
1 TABLET ORAL ONCE
Status: COMPLETED | OUTPATIENT
Start: 2022-01-17 | End: 2022-01-17

## 2022-01-17 RX ADMIN — OXYCODONE HYDROCHLORIDE AND ACETAMINOPHEN 1 TABLET: 5; 325 TABLET ORAL at 12:35

## 2022-01-17 ASSESSMENT — PAIN SCALES - GENERAL
PAINLEVEL_OUTOF10: 9
PAINLEVEL_OUTOF10: 9

## 2022-01-17 ASSESSMENT — PAIN DESCRIPTION - ORIENTATION: ORIENTATION: RIGHT

## 2022-01-17 ASSESSMENT — PAIN DESCRIPTION - LOCATION: LOCATION: LEG

## 2022-01-17 NOTE — ED PROVIDER NOTES
[facility]     Department of Emergency Medicine   ED  Provider Note  Admit Date/RoomTime: 2022 12:22 PM  ED Room:     Chief Complaint   Fall (Pt. tripped and fell in snow and hurt her right leg)    History of Present Illness      Tiffany Crump is a 46 y.o. old female who presents to the emergency department for right leg pain. Patient states she tripped and fell in the snow. She did twist her right leg. She states she does have chronic right knee pain and follows with Ortho. She is scheduled for surgery in February. She states she felt a crack in her right ankle. She has been unable to bear weight. Patient denies numbness/tingling or sensation changes. She denies falling to the ground or hitting her head. She has no headache, dizziness, neck pain, arm pain, back pain, chest pain, shortness of breath, pain with breathing, or abdominal pain. She does not take anticoagulation. Patient is alert and oriented x3 and in mild distress at this exam.  She is nontoxic-appearing. Patient states she took 400mg of Ibuprofen about 30 minutes prior to arrival     Ortho: Dr. Yanira HANNA   Pertinent positives and negatives are stated within HPI, all other systems reviewed and are negative. Past Medical History:   Past Medical History:   Diagnosis Date    Abdominal pain     Anxiety state, unspecified 3/8/2016    Asthma     no inhalers    Breast fibrocystic disorder     Diabetes mellitus ([facility] [address]     Essential hypertension 2016    Hyperlipidemia     Irritable bowel syndrome     Left ovarian cyst     Morbid obesity ([facility] [address] 2016    Osteoarthritis     Pharyngoesophageal dysphagia 2016      Past Surgical History:  has a past surgical history that includes Appendectomy;  section (2012); Upper gastrointestinal endoscopy (); Tonsillectomy; Tubal ligation; Upper gastrointestinal endoscopy (16); Colonoscopy (16); Colonoscopy (2016);  Upper gastrointestinal endoscopy (06/14/2016); and Abdomen surgery (Left, 3/15/2019). Social History:  reports that she quit smoking about 29 years ago. She started smoking about 32 years ago. She has a 1.50 pack-year smoking history. She has never used smokeless tobacco. She reports that she does not drink alcohol and does not use drugs. Family History: family history includes Arthritis in her father and mother; Cancer in her paternal grandfather and paternal grandmother; High Blood Pressure in her father, maternal grandmother, and mother; High Cholesterol in her father; Hypertension in her father and mother; Obesity in her father, maternal aunt, maternal grandfather, maternal grandmother, mother, and sister; Other in her father and paternal grandmother; Rheum Arthritis in her mother; Thyroid Disease in her father. Allergies: Bactrim [sulfamethoxazole-trimethoprim]    Physical Exam     Vitals:    01/17/22 1221   BP: (!) 157/83   Pulse: 104   Resp: 16   Temp: 98 °F (36.7 °C)   SpO2: 95%   Weight: 250 lb (113.4 kg)   Height: 5' 6\" (1.676 m)     Oxygen Saturation Interpretation: Normal.    Constitutional:  Alert and oriented x3, development consistent with age. NAD  HEENT:  NC/NT. Airway patent. Neck:  Normal ROM. Supple. Non-tender  Back: No lumbar tenderness. Lower Extremity:  Right: lower leg. Tenderness: Significant tenderness to lateral right ankle, tenderness along distal right fibula, mild tenderness to right knee             Swelling: Mild to moderate along lateral aspect             Deformity: No.             ROM: diminished range with pain. Skin:  no erythema, rash or open wounds noted, compartments soft and compressible        Distal Function:              Motor deficit: none. Sensory deficit: none. Pulse deficit: none. Capillary refill: normal.  Integument:  Normal turgor. Warm, dry, without visible rash, unless noted elsewhere.   Neurological: Motor Plan   Discharge to home  Patient condition is good    New Medications     New Prescriptions    NAPROXEN (NAPROSYN) 500 MG TABLET    Take 1 tablet by mouth 2 times daily    OXYCODONE-ACETAMINOPHEN (PERCOCET) 5-325 MG PER TABLET    Take 1 tablet by mouth every 6 hours as needed for Pain for up to 5 days. Electronically signed by Grecia Georges PA-C   DD: 1/17/22  **This report was transcribed using voice recognition software. Every effort was made to ensure accuracy; however, inadvertent computerized transcription errors may be present.     END OF ED PROVIDER NOTE        Grecia Georges PA-C  01/17/22 [id number]

## 2022-01-18 ENCOUNTER — TELEPHONE (OUTPATIENT)
Dept: INTERNAL MEDICINE | Age: 52
End: 2022-01-18

## 2022-01-18 NOTE — TELEPHONE ENCOUNTER
----- Message from Sanjuanita Heladio sent at 1/17/2022  2:36 PM EST -----  Subject: Message to Provider    QUESTIONS  Information for Provider? Please have Sarabjit Weaver call patient she   broke her ankle  ---------------------------------------------------------------------------  --------------  CALL BACK INFO  What is the best way for the office to contact you? OK to leave message on   voicemail  Preferred Call Back Phone Number? [phone]  ---------------------------------------------------------------------------  --------------  SCRIPT ANSWERS  Relationship to Patient?  Self

## 2022-01-18 NOTE — TELEPHONE ENCOUNTER
Called and spoke with pt via phone. States they \"connected me to Dr. Nunzio Sacks office instead of Dr. María Maldonado office. \"  States she already called Dr. María Maldonado office-awaiting call back from his office. States she has a Temp splint. She is NWB right side. Ordered crutches but not doing too well with them. Pt states they did give her some pain medications while in ER. Verbalized understanding of medications and not to take any additional pain medication. Pt also states her daughter had an appt in [place] on Thursday morning. She will call if not back in town by the time of her appt with Dr. Bhavana Antoine at 2:15 that day. Pt instructed to call if any additional needs.

## 2022-01-18 NOTE — TELEPHONE ENCOUNTER
Contacted patient back for discussion. Patient is not eligible due to temporary injury for wheelchair after discussions with LSW. Patient was given crutches at DC from ED. Awaiting appt 1/20/22 and Ortho appt on 1/21/22. Maintain Ortho appt and follow if patient is able to make 1/20/22 appt. Left message for call back.

## 2022-01-20 ENCOUNTER — OFFICE VISIT (OUTPATIENT)
Dept: INTERNAL MEDICINE | Age: 52
End: 2022-01-20
Payer: COMMERCIAL

## 2022-01-20 VITALS
OXYGEN SATURATION: 98 % | TEMPERATURE: 97.2 F | SYSTOLIC BLOOD PRESSURE: 130 MMHG | RESPIRATION RATE: 18 BRPM | HEART RATE: 80 BPM | HEIGHT: 66 IN | DIASTOLIC BLOOD PRESSURE: 78 MMHG | BODY MASS INDEX: 40.66 KG/M2 | WEIGHT: 253 LBS

## 2022-01-20 DIAGNOSIS — E11.65 TYPE 2 DIABETES MELLITUS WITH HYPERGLYCEMIA, WITHOUT LONG-TERM CURRENT USE OF INSULIN (HCC): ICD-10-CM

## 2022-01-20 DIAGNOSIS — G62.9 NEUROPATHY: ICD-10-CM

## 2022-01-20 DIAGNOSIS — F41.9 ANXIETY DISORDER, UNSPECIFIED TYPE: ICD-10-CM

## 2022-01-20 DIAGNOSIS — S82.434D CLOSED NONDISPLACED OBLIQUE FRACTURE OF SHAFT OF RIGHT FIBULA WITH ROUTINE HEALING, SUBSEQUENT ENCOUNTER: Primary | ICD-10-CM

## 2022-01-20 DIAGNOSIS — E78.2 MIXED HYPERLIPIDEMIA: ICD-10-CM

## 2022-01-20 DIAGNOSIS — I10 ESSENTIAL HYPERTENSION: ICD-10-CM

## 2022-01-20 DIAGNOSIS — E66.01 OBESITY, CLASS III, BMI 40-49.9 (MORBID OBESITY) (HCC): ICD-10-CM

## 2022-01-20 PROCEDURE — 99212 OFFICE O/P EST SF 10 MIN: CPT | Performed by: INTERNAL MEDICINE

## 2022-01-20 PROCEDURE — 99213 OFFICE O/P EST LOW 20 MIN: CPT | Performed by: INTERNAL MEDICINE

## 2022-01-20 PROCEDURE — G8482 FLU IMMUNIZE ORDER/ADMIN: HCPCS | Performed by: INTERNAL MEDICINE

## 2022-01-20 PROCEDURE — 3046F HEMOGLOBIN A1C LEVEL >9.0%: CPT | Performed by: INTERNAL MEDICINE

## 2022-01-20 PROCEDURE — 2022F DILAT RTA XM EVC RTNOPTHY: CPT | Performed by: INTERNAL MEDICINE

## 2022-01-20 PROCEDURE — 1036F TOBACCO NON-USER: CPT | Performed by: INTERNAL MEDICINE

## 2022-01-20 PROCEDURE — G8417 CALC BMI ABV UP PARAM F/U: HCPCS | Performed by: INTERNAL MEDICINE

## 2022-01-20 PROCEDURE — G8427 DOCREV CUR MEDS BY ELIG CLIN: HCPCS | Performed by: INTERNAL MEDICINE

## 2022-01-20 PROCEDURE — 3017F COLORECTAL CA SCREEN DOC REV: CPT | Performed by: INTERNAL MEDICINE

## 2022-01-20 RX ORDER — HYDROXYZINE HYDROCHLORIDE 25 MG/1
25 TABLET, FILM COATED ORAL EVERY 8 HOURS PRN
Qty: 30 TABLET | Refills: 1
Start: 2022-01-20 | End: 2022-02-19

## 2022-01-20 SDOH — ECONOMIC STABILITY: FOOD INSECURITY: WITHIN THE PAST 12 MONTHS, YOU WORRIED THAT YOUR FOOD WOULD RUN OUT BEFORE YOU GOT MONEY TO BUY MORE.: NEVER TRUE

## 2022-01-20 SDOH — ECONOMIC STABILITY: HOUSING INSECURITY
IN THE LAST 12 MONTHS, WAS THERE A TIME WHEN YOU DID NOT HAVE A STEADY PLACE TO SLEEP OR SLEPT IN A SHELTER (INCLUDING NOW)?: NO

## 2022-01-20 SDOH — ECONOMIC STABILITY: HOUSING INSECURITY: IN THE LAST 12 MONTHS, HOW MANY PLACES HAVE YOU LIVED?: 1

## 2022-01-20 SDOH — ECONOMIC STABILITY: INCOME INSECURITY: IN THE LAST 12 MONTHS, WAS THERE A TIME WHEN YOU WERE NOT ABLE TO PAY THE MORTGAGE OR RENT ON TIME?: NO

## 2022-01-20 SDOH — ECONOMIC STABILITY: FOOD INSECURITY: WITHIN THE PAST 12 MONTHS, THE FOOD YOU BOUGHT JUST DIDN'T LAST AND YOU DIDN'T HAVE MONEY TO GET MORE.: NEVER TRUE

## 2022-01-20 ASSESSMENT — PATIENT HEALTH QUESTIONNAIRE - PHQ9
1. LITTLE INTEREST OR PLEASURE IN DOING THINGS: NOT AT ALL
2. FEELING DOWN, DEPRESSED OR HOPELESS: NOT AT ALL
SUM OF ALL RESPONSES TO PHQ9 QUESTIONS 1 & 2: 0
DEPRESSION UNABLE TO ASSESS: YES

## 2022-01-20 ASSESSMENT — ENCOUNTER SYMPTOMS
CONSTIPATION: 0
DIARRHEA: 0
SHORTNESS OF BREATH: 0
NAUSEA: 0
VOMITING: 0
WHEEZING: 0

## 2022-01-20 ASSESSMENT — LIFESTYLE VARIABLES: HOW OFTEN DO YOU HAVE A DRINK CONTAINING ALCOHOL: NEVER

## 2022-01-20 ASSESSMENT — SOCIAL DETERMINANTS OF HEALTH (SDOH): HOW HARD IS IT FOR YOU TO PAY FOR THE VERY BASICS LIKE FOOD, HOUSING, MEDICAL CARE, AND HEATING?: NOT HARD AT ALL

## 2022-01-20 NOTE — PROGRESS NOTES
Amado Mitchell (:  1970) is a 46 y.o. female,Established patient, here for evaluation of the following chief complaint(s):  1 Month Follow-Up, Hypertension (states follow up from last visit was put on new medication for ), and Fall (fell last Monday has right fibula fracture right above the ankle)         ASSESSMENT/PLAN:  1. Closed nondisplaced oblique fracture of shaft of right fibula with routine healing, subsequent encounter   Planned follow-up tomorrow with Orthopedics   Continue NWB status   Follow recs   Pain control continued   Hold hydroxyzine prn with opiate use    Bowel regimen discussed   All questions answered   2. Neuropathy- follow-up planned with Podiatry in future   3. Mixed hyperlipidemia- stable   4. Anxiety disorder, unspecified type- improved   Mood significantly improved   Not using hydroxyzine often   Tolerating fluoxetine and will continue   No noted side effects   LISW follow-up continued for ongoing counseling needs   5. Essential hypertension- improved to goal   Continue current management   6. Type 2 diabetes mellitus with hyperglycemia, without long-term current use of insulin ([address]- stable   7. Obesity, Class III, BMI 40-49.9 (morbid obesity) ([facility])   Continue active weight loss goals as discussed    Note some potential difficulty with NWB status and reduced ability for exercise   Planned arthroscopic procedure per Ortho will likely be delayed due to acute fracture   Improve dietary intake for now and continue management   Cancelled ABIs due to casting suspected         Subjective   SUBJECTIVE/OBJECTIVE:  HPI    Presents for follow-up appt- 1 month follow-up. Seen acutely by Dr. Trish Gandara on  for labile BP readings. Noted worsening anxiety with multiple external stressors. Patient was placed on prn hydroxyzine to control anxiety and recent correspondence noted significant improvement in symptoms and reduction of use. BP has remained subsequently stable.   Patient has also established with LISW with last correspondence on 1/4/2022- follow-up to continue. Seen in ED on 1/17 for acute fall and subsequent fracture of shaft of right fibula. Currently on crutches and has Ortho appt scheduled for tomorrow. Pain is relatively controlled currently. Since 1 month follow-up, patient states prior to the fall- feeling well. Improved mood and anxiety symptoms. Continued counseling ongoing with LISW due to multiple external stressors and noting significant improvement overall. Planned arthroscopic procedure by Orthopedics will likely be delayed and planned ABIs will be cancelled at this time. Review of Systems   Eyes: Negative for visual disturbance. Respiratory: Negative for shortness of breath and wheezing. Cardiovascular: Negative for chest pain and palpitations. Gastrointestinal: Negative for constipation, diarrhea, nausea and vomiting. Neurological: Negative for dizziness, syncope, weakness, light-headedness and headaches. Psychiatric/Behavioral: Negative for dysphoric mood (improving overall on SSRI therapy and prn hydroxyzine), sleep disturbance (improved sleeping) and suicidal ideas. The patient is nervous/anxious (improving). The patient is not hyperactive. Objective    Vitals:    01/20/22 1407   BP: 130/78   Site: Right Upper Arm   Position: Sitting   Cuff Size: Medium Adult   Pulse: 80   Resp: 18   Temp: 97.2 °F (36.2 °C)   TempSrc: Temporal   SpO2: 98%   Weight: 253 lb (114.8 kg)   Height: 5' 6\" (1.676 m)     Physical Exam  Vitals reviewed. Constitutional:       Appearance: She is obese. She is not ill-appearing. Comments: Presents in manual wheelchair- s/p mechanical fall   HENT:      Head: Normocephalic and atraumatic. Eyes:      General: No scleral icterus. Cardiovascular:      Rate and Rhythm: Normal rate and regular rhythm. Pulses: Normal pulses. Heart sounds: Normal heart sounds. No murmur heard.       Pulmonary: Breath sounds: Normal breath sounds. No wheezing, rhonchi or rales. Musculoskeletal:      Comments: Right Lower Leg currently placed in Soft cast/ACE wrap and seated in manual wheelchair- toes are well-perfused   Neurological:      Mental Status: She is alert. Psychiatric:         Mood and Affect: Mood normal. Mood is not depressed (improved mood). Speech: Speech normal.         Thought Content: Thought content does not include homicidal or suicidal ideation. Thought content does not include homicidal or suicidal plan. An electronic signature was used to authenticate this note.     --Maciel Browne MD, FACP

## 2022-01-20 NOTE — PATIENT INSTRUCTIONS
1) We will cancel scheduled U/S of Lower Legs given acute injury. 2) Maintain orthopedic appt tomorrow. 3) Continue follow-up with Michelle Dalton and use of medication only as needed. 4) Continue current management for blood pressure. 5) Please call with any questions.

## 2022-01-21 ENCOUNTER — TELEPHONE (OUTPATIENT)
Dept: INTERNAL MEDICINE | Age: 52
End: 2022-01-21

## 2022-01-21 ENCOUNTER — VIRTUAL VISIT (OUTPATIENT)
Dept: INTERNAL MEDICINE | Age: 52
End: 2022-01-21
Payer: COMMERCIAL

## 2022-01-21 ENCOUNTER — OFFICE VISIT (OUTPATIENT)
Dept: ORTHOPEDIC SURGERY | Age: 52
End: 2022-01-21
Payer: COMMERCIAL

## 2022-01-21 ENCOUNTER — TELEPHONE (OUTPATIENT)
Dept: ORTHOPEDIC SURGERY | Age: 52
End: 2022-01-21

## 2022-01-21 VITALS — HEIGHT: 66 IN | BODY MASS INDEX: 40.66 KG/M2 | WEIGHT: 253 LBS

## 2022-01-21 DIAGNOSIS — S82.434A CLOSED NONDISPLACED OBLIQUE FRACTURE OF SHAFT OF RIGHT FIBULA, INITIAL ENCOUNTER: Primary | ICD-10-CM

## 2022-01-21 DIAGNOSIS — S89.91XA INJURY OF RIGHT LOWER EXTREMITY, INITIAL ENCOUNTER: ICD-10-CM

## 2022-01-21 DIAGNOSIS — F41.9 ANXIETY: Primary | ICD-10-CM

## 2022-01-21 PROCEDURE — G8428 CUR MEDS NOT DOCUMENT: HCPCS | Performed by: ORTHOPAEDIC SURGERY

## 2022-01-21 PROCEDURE — 1036F TOBACCO NON-USER: CPT | Performed by: ORTHOPAEDIC SURGERY

## 2022-01-21 PROCEDURE — 3017F COLORECTAL CA SCREEN DOC REV: CPT | Performed by: ORTHOPAEDIC SURGERY

## 2022-01-21 PROCEDURE — G8482 FLU IMMUNIZE ORDER/ADMIN: HCPCS | Performed by: ORTHOPAEDIC SURGERY

## 2022-01-21 PROCEDURE — G8417 CALC BMI ABV UP PARAM F/U: HCPCS | Performed by: ORTHOPAEDIC SURGERY

## 2022-01-21 PROCEDURE — 90832 PSYTX W PT 30 MINUTES: CPT | Performed by: SOCIAL WORKER

## 2022-01-21 PROCEDURE — 99214 OFFICE O/P EST MOD 30 MIN: CPT | Performed by: ORTHOPAEDIC SURGERY

## 2022-01-21 RX ORDER — OXYCODONE HYDROCHLORIDE AND ACETAMINOPHEN 5; 325 MG/1; MG/1
1 TABLET ORAL EVERY 6 HOURS PRN
Qty: 28 TABLET | Refills: 0 | Status: SHIPPED | OUTPATIENT
Start: 2022-01-22 | End: 2022-01-29

## 2022-01-21 NOTE — TELEPHONE ENCOUNTER
Spoke with Blaine Bourne at [address] scheduling and cancelled VL DAIJA Bilateral imaging per verbal order of Dr. Angelic Vyas.
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
## 2022-01-21 NOTE — TELEPHONE ENCOUNTER
Spoke c OR regarding cancelling surgery for R knee 2/10. Spoke w Teddi Dandy    Pt aware.  Spoke with [address] [place] regarding surgery cancellation at appt on 1/21

## 2022-01-22 NOTE — PROGRESS NOTES
TeleMedicine Patient Consent     This visit was performed as a virtual video visit using a synchronous, two-way, audio-video telehealth technology platform. Patient identification was verified at the start of the visit, including the patient's telephone number and physical location. I discussed with the patient the nature of our telehealth visits, that:      1. Due to the nature of an audio- video modality, the only components of a physical exam that could be done are the elements supported by direct observation. 2. The provider will evaluate the patient and recommend diagnostics and treatments based on their assessment. 3. If it was felt that the patient should be evaluated in clinic or an emergency room setting, then they would be directed there. 4. Our sessions are not being recorded and that personal health information is protected. 5. Our team would provide follow up care in person if/when the patient needs it. Patient does agree to proceed with telemedicine consultation. Patient location: home address in [facility]    Physician location: regular office location    This visit was completed virtually using Doxy. me     This visit was performed during the 2799 public health crisis and COVID-19 pandemic. ADULT BEHAVIORAL HEALTH FOLLOW UP  [facility] Hicks,ESSIE      Visit Date: 1/21/2022   Time of appointment:  1:00  Time spent with Patient: 25 minutes. This is patient's second appointment. Reason for Consult:  Anxiety     Referring Provider/PCP:    No ref. provider found  Kaylin Pineda MD      Pt provided informed consent for the behavioral health program. Discussed with patient model of service to include the limits of confidentiality (i.e. abuse reporting, suicide intervention, etc.) and short-term intervention focused approach. Pt indicated understanding. Gerhardt Clas is a 46 y.o. female who presents for follow up of anxiety.  Pt reports progress with coping with anxiety and doing well expressing self. Pt reports she recently broke her leg but is staying calm and has positive family support. MENTAL STATUS EXAM  Mood was euthymic with congruent affect. Suicidal ideation was denied. Homicidal ideation was denied. Hygiene was good . Dress was neat. Behavior was Within Normal Limits with No self-report of difficulties ambulating. Attitude was Cooperative, [place] and Friendly. Eye-contact was good. Speech: rate - WNL, rhythm - WNL, volume - WNL. Verbalizations were goal directed. Thought processes were intact and goal-oriented without evidence of delusions, hallucinations, obsessions, or mariana; with little cognitive distortions. Associations were characterized by intact cognitive processes. Pt was oriented to person, place, time, and general circumstances;  recent:  good. Insight and judgment were estimated to be good, AEB, a good  understanding of cyclical maladaptive patterns, and the ability to use insight to inform behavior change. ASSESSMENT  Marisel Gross presented to the appointment today for evaluation and treatment of symptoms of anxiety. She is currently deemed no risk to herself or others and meets criteria for anxiety. Pt was in agreement with recommendations. Discussed and processed triggers to anxiety. Explored with pt continued progress.  Pt was help-seeking throughout the session      PHQ Scores 1/4/2022 8/12/2021 6/22/2020 2/22/2019 8/22/2017 2/23/2017 1/25/2017   PHQ2 Score 0 0 0 0 4 2 2   PHQ9 Score 0 0 0 0 16 2 2     Interpretation of Total Score Depression Severity: 1-4 = Minimal depression, 5-9 = Mild depression, 10-14 = Moderate depression, 15-19 = Moderately severe depression, 20-27 = Severe depression    How often pt has had thoughts of death or hurting self (if PHQ positive for depression):       SHAHLA 7 SCORE 1/4/2022   SHAHLA-7 Total Score 7     Interpretation of SHAHLA-7 score: 5-9 = mild anxiety, 10-14 = moderate anxiety, 15+ = severe anxiety. Recommend referral to behavioral health for scores 10 or greater. DIAGNOSIS  Will Adams was seen today for anxiety. Diagnoses and all orders for this visit:    Anxiety          INTERVENTION  Discussed and processed anxiety using CBT  Identified strengths related to progress   Reviewed session with pt      PLAN  Pt to be seen in two weeks   Pt to continue to use coping skills to reduce anxiety      INTERACTIVE COMPLEXITY  Is interactive complexity present?   No  Reason:  N/A  Additional Supporting Information:  N/A       Electronically signed by ESSIE Hough on 1/22/22 at 12:38 PM EST

## 2022-01-26 ENCOUNTER — HOSPITAL ENCOUNTER (OUTPATIENT)
Dept: SLEEP CENTER | Age: 52
Discharge: HOME | End: 2022-01-26
Payer: COMMERCIAL

## 2022-01-26 DIAGNOSIS — G47.9 DIFFICULTY SLEEPING: ICD-10-CM

## 2022-01-26 PROCEDURE — 95810 POLYSOM 6/> YRS 4/> PARAM: CPT

## 2022-01-27 VITALS
SYSTOLIC BLOOD PRESSURE: 159 MMHG | DIASTOLIC BLOOD PRESSURE: 82 MMHG | BODY MASS INDEX: 45 KG/M2 | HEIGHT: 66 IN | HEART RATE: 73 BPM | OXYGEN SATURATION: 98 % | WEIGHT: 280 LBS

## 2022-01-27 ASSESSMENT — SLEEP AND FATIGUE QUESTIONNAIRES
ESS TOTAL SCORE: 10
HOW LIKELY ARE YOU TO NOD OFF OR FALL ASLEEP IN A CAR, WHILE STOPPED FOR A FEW MINUTES IN TRAFFIC: 0
HOW LIKELY ARE YOU TO NOD OFF OR FALL ASLEEP WHILE LYING DOWN TO REST IN THE AFTERNOON WHEN CIRCUMSTANCES PERMIT: 2
HOW LIKELY ARE YOU TO NOD OFF OR FALL ASLEEP WHILE SITTING AND READING: 2
HOW LIKELY ARE YOU TO NOD OFF OR FALL ASLEEP WHILE SITTING AND TALKING TO SOMEONE: 0
HOW LIKELY ARE YOU TO NOD OFF OR FALL ASLEEP WHEN YOU ARE A PASSENGER IN A CAR FOR AN HOUR WITHOUT A BREAK: 2
HOW LIKELY ARE YOU TO NOD OFF OR FALL ASLEEP WHILE SITTING QUIETLY AFTER LUNCH WITHOUT ALCOHOL: 2
HOW LIKELY ARE YOU TO NOD OFF OR FALL ASLEEP WHILE SITTING INACTIVE IN A PUBLIC PLACE: 0
HOW LIKELY ARE YOU TO NOD OFF OR FALL ASLEEP WHILE WATCHING TV: 2

## 2022-01-31 DIAGNOSIS — S82.434A CLOSED NONDISPLACED OBLIQUE FRACTURE OF SHAFT OF RIGHT FIBULA, INITIAL ENCOUNTER: Primary | ICD-10-CM

## 2022-01-31 RX ORDER — OXYCODONE HYDROCHLORIDE AND ACETAMINOPHEN 5; 325 MG/1; MG/1
1 TABLET ORAL EVERY 6 HOURS PRN
Qty: 28 TABLET | Refills: 0 | Status: SHIPPED | OUTPATIENT
Start: 2022-01-31 | End: 2022-02-07

## 2022-01-31 NOTE — PROGRESS NOTES
[address] [address]                               SLEEP STUDY REPORT    PATIENT NAME: Rain Garcia                      :        1970  MED REC NO:   [id number]                            ROOM:  ACCOUNT NO:   [de-identified]                           ADMIT DATE: 2022  PROVIDER:     Ge Olvera MD    DATE OF STUDY:  2022    REFERRING PROVIDER:  Joseph Alexander M.D.    STUDY PERFORMED:  Polysomnography. INDICATION FOR STUDY:  Wakes gasping, snore, restless sleep, morning  headaches, and frequent waking to urinate. CURRENT MEDICATIONS:  Alpha-lipoic acid, Norvasc, Lac-Hydrin, Temovate,  TriCor, Prozac, HydroDIURIL, Atarax, metformin, Naprosyn, Percocet,  Pravachol, and Trulicity. INTERPRETATION:  SLEEP ARCHITECTURE:  This patient had a total time in bed of 354  minutes. Total sleep time was 270 minutes. Sleep efficiency was 76%  and sleep latency was 40 minutes. REM latency was 112 minutes. SLEEP STAGING:  The patient was awake 24% of the time in bed. Stage N1  was 2% and N2 was 81% of the total sleep time. Slow wave sleep was 2%  of the sleep time and stage REM sleep was 15% of the sleep time. RESPIRATION SUMMARY:  APNEA:  There were three apneic events including one central and two  obstructive. Two events occurred during REM stage sleep and maximum  duration was 11 seconds. HYPOPNEA:  There were 26 hypopneic events, all occurred during REM stage  sleep and maximum duration was 35 seconds. APNEA/HYPOPNEA INDEX:  The apnea/hypopnea index was 7. AROUSAL ANALYSIS:  There were 21 arousals/awakenings, the sleep  disruption index is normal.    LIMB MOVEMENT SUMMARY:  There were three limb movements, the limb  movement index is normal.    OXYGEN SATURATION:  Average oxygen saturation while awake was 94%. Lowest saturation was 87%.   The patient spent 2% of the time with  saturation less than 90%. HEART RATE SUMMARY:  Average heart rate was 77 beats per minute. Maximum heart rate was 91 beats per minute and minimum heart rate was 62  beats per minute. There were no ectopic beats. MISCELLANEOUS:  [place] Sleepiness Scale score is 10/24. Snoring was  moderately loud. This was graded as 7 on a 1 to 10 scale. There was no  apparent bruxism. Normal bedtime is listed as 09:30 to 10:30 p.m. with  rise time 02:00 a.m. to 05:00 a.m. IMPRESSION:  1. Very mild obstructive sleep apnea. 2.  Insufficient sleep time. 3.  Good oxygen saturation. 4.  No cardiac dysrhythmia. 5.  Moderately loud snoring. DISCUSSION:  This patient has an apnea/hypopnea index of less than  seven. Normal is less than five and mild is 5 to 15, leaving this  patient with mild disease. We do not always treat patients with mild  sleep apnea, however, one of the indications is an [place] Sleepiness  Scale score of 10 or greater. However, this patient does have  insufficient sleep time which is likely contributing to the high Wood[place]  score. Therefore, before treating this patient, she will be seen and  the results of the study and sleep hygiene will be discussed with her. If necessary, she will be sent back to [facility]  for positive airway pressure titration. PLAN:  1. No treatment at this time. 2.  The patient to be seen to discuss the results of study and sleep  hygiene.         Read Councilman, MD  Diplomat of Sleep Medicine    D: 01/30/2022 12:48:17       T: 01/30/2022 12:51:12     [email or web address]  Job#: [id number]     Doc#: [id number]    CC:

## 2022-01-31 NOTE — TELEPHONE ENCOUNTER
Patient called requesting medication refill --   Right ankle distal fibula fracture DOI: 1/17/22  Last refill 1/21     Pended and routed

## 2022-02-08 ENCOUNTER — VIRTUAL VISIT (OUTPATIENT)
Dept: INTERNAL MEDICINE | Age: 52
End: 2022-02-08
Payer: COMMERCIAL

## 2022-02-08 DIAGNOSIS — F41.9 ANXIETY: Primary | ICD-10-CM

## 2022-02-08 PROCEDURE — 90832 PSYTX W PT 30 MINUTES: CPT | Performed by: SOCIAL WORKER

## 2022-02-08 NOTE — PROGRESS NOTES
TeleMedicine Patient Consent     This visit was performed as a virtual video visit using a synchronous, two-way, audio-video telehealth technology platform. Patient identification was verified at the start of the visit, including the patient's telephone number and physical location. I discussed with the patient the nature of our telehealth visits, that:      1. Due to the nature of an audio- video modality, the only components of a physical exam that could be done are the elements supported by direct observation. 2. The provider will evaluate the patient and recommend diagnostics and treatments based on their assessment. 3. If it was felt that the patient should be evaluated in clinic or an emergency room setting, then they would be directed there. 4. Our sessions are not being recorded and that personal health information is protected. 5. Our team would provide follow up care in person if/when the patient needs it. Patient does agree to proceed with telemedicine consultation. Patient location: home address in [facility]    Physician location: regular office location    This visit was completed virtually using Doxy. me     This visit was performed during the 4853 public health crisis and COVID-19 pandemic. ADULT BEHAVIORAL HEALTH FOLLOW UP  FRIDA Schultz,ESSEI      Visit Date: 2/8/2022   Time of appointment:  10:00   Time spent with Patient: 25 minutes. This is patient's third appointment. Reason for Consult:  Anxiety     Referring Provider/PCP:    No ref. provider found  Crissy Witt MD      Pt provided informed consent for the behavioral health program. Discussed with patient model of service to include the limits of confidentiality (i.e. abuse reporting, suicide intervention, etc.) and short-term intervention focused approach. Pt indicated understanding. Blanca Styles is a 46 y.o. female who presents for follow up of anxiety. Pt reports medication is improving anxiety overall. Pt reports she is using coping skills to reduce anxiety    MENTAL STATUS EXAM  Mood was euthymic with congruent affect. Suicidal ideation was denied. Homicidal ideation was denied. Hygiene was good . Dress was neat. Behavior was Within Normal Limits with No self-report of difficulties ambulating. Attitude was Cooperative, [place] and Friendly. Eye-contact was good. Speech: rate - WNL, rhythm - WNL, volume - WNL. Verbalizations were goal directed. Thought processes were intact and goal-oriented without evidence of delusions, hallucinations, obsessions, or mariana; with little cognitive distortions. Associations were characterized by intact cognitive processes. Pt was oriented to person, place, time, and general circumstances;  recent:  good. Insight and judgment were estimated to be good, AEB, a good  understanding of cyclical maladaptive patterns, and the ability to use insight to inform behavior change. ASSESSMENT  Jerardo Velez presented to the appointment today for evaluation and treatment of symptoms of anxiety. She is currently deemed no risk to herself or others and meets criteria for anxiety. Pt was in agreement with recommendations. Discussed and processed improvements of anxiety. Pt was help seeking and appropriate throughout the session. PHQ Scores 1/4/2022 8/12/2021 6/22/2020 2/22/2019 8/22/2017 2/23/2017 1/25/2017   PHQ2 Score 0 0 0 0 4 2 2   PHQ9 Score 0 0 0 0 16 2 2     Interpretation of Total Score Depression Severity: 1-4 = Minimal depression, 5-9 = Mild depression, 10-14 = Moderate depression, 15-19 = Moderately severe depression, 20-27 = Severe depression    How often pt has had thoughts of death or hurting self (if PHQ positive for depression):       SHAHLA 7 SCORE 1/4/2022   SHAHLA-7 Total Score 7     Interpretation of SHAHLA-7 score: 5-9 = mild anxiety, 10-14 = moderate anxiety, 15+ = severe anxiety.  Recommend referral to behavioral health for scores 10 or greater. DIAGNOSIS  Ny Heller was seen today for anxiety. Diagnoses and all orders for this visit:    Anxiety          INTERVENTION  Used CBT to discuss and process anxiety  Identified strengths related to progress made  Reviewed session      PLAN  Pt to be seen in two weeks  Pt to continue to use coping skills to express self      INTERACTIVE COMPLEXITY  Is interactive complexity present?   No  Reason:  N/A  Additional Supporting Information:  N/A       Electronically signed by Jae Olmstead on 2/8/22 at 1:28 PM EST

## 2022-02-09 ENCOUNTER — TELEPHONE (OUTPATIENT)
Dept: INTERNAL MEDICINE | Age: 52
End: 2022-02-09

## 2022-02-09 NOTE — TELEPHONE ENCOUNTER
Reviewed sleep study- completed/read by Dr. Khurram Tapia- mild CARLENE with mild AHI of 7. Recommending improved sleep hygiene and if continued elevated [place] sleepiness scale will necessitate reassessment with Titration study for CPAP needs.

## 2022-02-10 DIAGNOSIS — S82.434A CLOSED NONDISPLACED OBLIQUE FRACTURE OF SHAFT OF RIGHT FIBULA, INITIAL ENCOUNTER: Primary | ICD-10-CM

## 2022-02-10 RX ORDER — HYDROCODONE BITARTRATE AND ACETAMINOPHEN 5; 325 MG/1; MG/1
1 TABLET ORAL EVERY 6 HOURS PRN
Qty: 28 TABLET | Refills: 0 | Status: SHIPPED | OUTPATIENT
Start: 2022-02-10 | End: 2022-02-17

## 2022-02-10 NOTE — TELEPHONE ENCOUNTER
Patient called requesting medication refill --   Right ankle distal fibula fracture DOI: 1/17/22  Last refill 1/31       Pended and routed

## 2022-02-11 ENCOUNTER — PROCEDURE VISIT (OUTPATIENT)
Dept: PODIATRY | Age: 52
End: 2022-02-11
Payer: COMMERCIAL

## 2022-02-11 VITALS — BODY MASS INDEX: 45 KG/M2 | WEIGHT: 280 LBS | HEIGHT: 66 IN

## 2022-02-11 DIAGNOSIS — E11.9 TYPE 2 DIABETES MELLITUS WITHOUT COMPLICATION, UNSPECIFIED WHETHER LONG TERM INSULIN USE (HCC): ICD-10-CM

## 2022-02-11 DIAGNOSIS — B35.1 TINEA UNGUIUM: Primary | ICD-10-CM

## 2022-02-11 DIAGNOSIS — G60.8 HEREDITARY SENSORY NEUROPATHY: ICD-10-CM

## 2022-02-11 DIAGNOSIS — L84 CORNS AND CALLOSITIES: ICD-10-CM

## 2022-02-11 PROCEDURE — 11056 PARNG/CUTG B9 HYPRKR LES 2-4: CPT | Performed by: PODIATRIST

## 2022-02-11 PROCEDURE — 11721 DEBRIDE NAIL 6 OR MORE: CPT | Performed by: PODIATRIST

## 2022-02-11 NOTE — PROGRESS NOTES
Britta Sanchez is here today for a diabetic foot exam and nail care. her PCP is Brian Aguilar MD last OV was 01/20/2022. CC:   Follow-up diabetic foot and ankle exam  Recent injury right ankle with fracture    HPI:   Erick Moore presents today follow-up diabetic exam.  Did have a recent ankle injury with a nondisplaced fracture right fibula. Does have a cast on her right leg today. Has been following with Dr. Ari Boyd. Denies calf pain. No additional pedal complaints. ROS:  Const: Denies constitutional symptoms  Musculo: Denies symptoms other than stated above  Skin: Denies symptoms other than stated above      Current Outpatient Medications:     HYDROcodone-acetaminophen (NORCO) 5-325 MG per tablet, Take 1 tablet by mouth every 6 hours as needed for Pain for up to 7 days. , Disp: 28 tablet, Rfl: 0    hydrOXYzine (ATARAX) 25 MG tablet, Take 1 tablet by mouth every 8 hours as needed for Itching or Anxiety, Disp: 30 tablet, Rfl: 1    naproxen (NAPROSYN) 500 MG tablet, Take 1 tablet by mouth 2 times daily, Disp: 60 tablet, Rfl: 0    FLUoxetine (PROZAC) 20 MG capsule, Take 1 capsule by mouth daily, Disp: 90 capsule, Rfl: 1    hydroCHLOROthiazide (HYDRODIURIL) 25 MG tablet, Take 1 tablet by mouth every morning, Disp: 90 tablet, Rfl: 1    metFORMIN (GLUCOPHAGE) 1000 MG tablet, TAKE ONE TABLET BY MOUTH TWICE A DAY WITH MEALS, Disp: 180 tablet, Rfl: 1    Dulaglutide (TRULICITY) 4.56 CC/0.2OT SOPN, INJECT 0.75MG SUBCUTANEOUSLY ONCE A WEEK, Disp: 6 mL, Rfl: 1    pravastatin (PRAVACHOL) 80 MG tablet, TAKE ONE TABLET BY MOUTH EVERY DAY IN THE EVENING, Disp: 90 tablet, Rfl: 1    amLODIPine (NORVASC) 5 MG tablet, Take 1 tablet by mouth daily, Disp: 90 tablet, Rfl: 1    fenofibrate (TRICOR) 48 MG tablet, TAKE ONE TABLET BY MOUTH DAILY, Disp: 90 tablet, Rfl: 1    ammonium lactate (LAC-HYDRIN) 12 % lotion, Apply topically twice daily, Disp: 400 g, Rfl: 1    blood glucose monitor strips, Test 2 times a day & as needed for symptoms of irregular blood glucose. Dispense sufficient amount for indicated testing frequency plus additional to accommodate PRN testing needs. (Patient not taking: Reported on 1/20/2022), Disp: 100 strip, Rfl: 5    magnesium cl-calcium carbonate (NU-MAG) 71.5-119 MG TBEC tablet, TAKE TWO TABLETS BY MOUTH EVERY DAY, Disp: 180 tablet, Rfl: 1    Lancets MISC, Inject 1 each into the skin daily, Disp: 100 each, Rfl: 5    Blood Glucose Monitoring Suppl (TRUE METRIX METER) w/Device KIT, 1 Device by Does not apply route daily, Disp: 1 kit, Rfl: 0    Lancets MISC, 1 each by Does not apply route 2 times daily (Patient not taking: Reported on 1/20/2022), Disp: 300 each, Rfl: 1    blood glucose monitor strips, by Other route daily, Disp: 100 strip, Rfl: 5    Alpha-Lipoic Acid 600 MG TABS, Take 600 mg by mouth daily, Disp: , Rfl:     b complex vitamins capsule, Take 1 capsule by mouth daily, Disp: , Rfl:     clobetasol (TEMOVATE) 0.05 % external solution, APPLY DAILY AS DIRECTED, Disp: , Rfl: 1    ibuprofen (ADVIL;MOTRIN) 200 MG tablet, Take 400 mg by mouth every 6 hours as needed for Pain, Disp: , Rfl:     Misc. Devices KIT, Blood pressure cuff- Ambulatory monitoring- check every other day and maintain blood pressure log, Disp: 1 kit, Rfl: 0  Allergies   Allergen Reactions    Bactrim [Sulfamethoxazole-Trimethoprim] Rash       Past Medical History:   Diagnosis Date    Abdominal pain     Anxiety state, unspecified 3/8/2016    Asthma     no inhalers    Breast fibrocystic disorder     Diabetes mellitus ([address]     Essential hypertension 4/25/2016    Hyperlipidemia     Irritable bowel syndrome     Left ovarian cyst     Morbid obesity ([address] 4/25/2016    Osteoarthritis     Pharyngoesophageal dysphagia 6/14/2016       There were no vitals filed for this visit.          Focused Lower Extremity Physical Exam:      Neurovascular examination:    Dorsalis Pedis palpable bilateral.  Posterior tibialis palpable bilateral.    Capillary Refill Time:  Immediate return  Hair growth:  Symmetrical and bilateral   Skin:  Not atrophic  Edema: Mild edema bilateral feet. Mild edema bilateral ankles. Neurologic:  Light touch diminished bilateral.  Warm to coolness bilateral distal toes  Decreased epicritic sensation     Musculoskeletal/ Orthopedic examination:    Equinis: present bilateral  Dorsiflexion, plantarflexion, inversion, eversion bilateral 5 out of 5 muscle strength  Wiggling toes  Negative Homans  Flexible contracted hammertoes digits 2 through 5 bilateral  No pain overlying toes 1 through 5 bilateral.      Dermatology examination:    Toenails 1-5 right and left thickened, elongated, dystrophic, mycotic with subungual debris. Webspaces 1-4 bilateral clean, dry and intact. Hyperkeratotic tissue noted medial IPJ great toe bilateral and plantar MPJ great toe bilateral.    No open skin lesion or abrasion  There is a hard cast right lower leg. No surrounding erythema noted. Cast is intact. Assessment and Plan:  Rey Ramos was seen today for diabetes, callouses and nail problem. Diagnoses and all orders for this visit:    Tinea unguium    Corns and callosities    Type 2 diabetes mellitus without complication, unspecified whether long term insulin use (HCC)    Hereditary sensory neuropathy      Follow-up diabetic exam  Manual debridement with standard technique toenails 1 through 5 right and left in thickness and length. Patient tolerated procedure well. Paring of hyperkeratotic tissue with #15 blade plantar first metatarsal head bilateral and medial IPJ great toe bilateral.  Continue ammonium lactate 12% lotion daily. Avoid barefoot. Recent injury right ankle with nondisplaced fracture right fibula. Following up with orthopedic surgeon Dr. Eliana Linn. Appreciate input going forward. I will follow-up 2 months for regular diabetic exam.      No follow-ups on file.      Seen By:  Harper Mayorga DPM      Document was created using voice recognition software. Note was reviewed however may contain grammatical errors.

## 2022-02-15 ENCOUNTER — TELEPHONE (OUTPATIENT)
Dept: ORTHOPEDIC SURGERY | Age: 52
End: 2022-02-15

## 2022-02-15 NOTE — TELEPHONE ENCOUNTER
Pt called stating that when she took the Norco 5 vs. Perc 5, she had a small reaction, itching and small bumps. States that she has never had this happen prior. Advised pt to stop taking, take Benadryl to help with itching and to see if the naproxen that she takes manages her pain. Will re-evaluate at appt on Friday.

## 2022-02-18 ENCOUNTER — OFFICE VISIT (OUTPATIENT)
Dept: ORTHOPEDIC SURGERY | Age: 52
End: 2022-02-18
Payer: COMMERCIAL

## 2022-02-18 VITALS — HEIGHT: 66 IN | WEIGHT: 280 LBS | BODY MASS INDEX: 45 KG/M2

## 2022-02-18 DIAGNOSIS — S82.434A CLOSED NONDISPLACED OBLIQUE FRACTURE OF SHAFT OF RIGHT FIBULA, INITIAL ENCOUNTER: Primary | ICD-10-CM

## 2022-02-18 PROCEDURE — 99213 OFFICE O/P EST LOW 20 MIN: CPT | Performed by: ORTHOPAEDIC SURGERY

## 2022-02-18 PROCEDURE — G8417 CALC BMI ABV UP PARAM F/U: HCPCS | Performed by: ORTHOPAEDIC SURGERY

## 2022-02-18 PROCEDURE — G8482 FLU IMMUNIZE ORDER/ADMIN: HCPCS | Performed by: ORTHOPAEDIC SURGERY

## 2022-02-18 PROCEDURE — 3017F COLORECTAL CA SCREEN DOC REV: CPT | Performed by: ORTHOPAEDIC SURGERY

## 2022-02-18 PROCEDURE — 1036F TOBACCO NON-USER: CPT | Performed by: ORTHOPAEDIC SURGERY

## 2022-02-18 PROCEDURE — G8427 DOCREV CUR MEDS BY ELIG CLIN: HCPCS | Performed by: ORTHOPAEDIC SURGERY

## 2022-02-18 NOTE — PROGRESS NOTES
Follow Up Visit     Yash Officer returns today for follow up visit regarding Right ankle distal fibula fracture on 1/17/2022. Treatment thus far has included cast x 4 weeks. She reports symptoms are improved. Physical Exam:     Height: 5' 6\" (1.676 m), Weight: 280 lb (127 kg) (PER PT)    General: Alert and oriented x3, no acute distress  Cardiovascular/pulmonary: No labored breathing, peripheral perfusion intact  Musculoskeletal:    Exam of the ankle shows minimal tenderness over the fibula. Skin is intact. There is no swelling. Motion at the ankle joint passively and actively is not painful    Controlled Substances Monitoring:      Imaging:  X-rays 3 views of the right ankle show interval healing of her fibula fracture with anatomically aligned mortise    Impression: Maintained alignment of fibula fracture with likely early interval healing      Assessment: 1 month out from right distal fibula fracture treated nonoperatively      Plan:   She is doing well. We will transition her to a boot. She can progress weightbearing as tolerated in the boot dictated by pain. In 2 to 3 weeks she can start to wean herself from the boot and walk in a normal shoe.   Follow-up will be in 6 weeks with x-rays of the right ankle    Pat Lazo MD  Orthopaedic Surgery   2/18/22  10:30 AM

## 2022-02-22 ENCOUNTER — TELEMEDICINE (OUTPATIENT)
Dept: INTERNAL MEDICINE | Age: 52
End: 2022-02-22
Payer: COMMERCIAL

## 2022-02-22 DIAGNOSIS — F41.9 ANXIETY: Primary | ICD-10-CM

## 2022-02-22 PROCEDURE — 90832 PSYTX W PT 30 MINUTES: CPT | Performed by: SOCIAL WORKER

## 2022-02-22 NOTE — PROGRESS NOTES
overall. Pt reports she is using coping skills to reduce anxiety. Pt reports continued improvement with anxiety    MENTAL STATUS EXAM  Mood was euthymic with congruent affect. Suicidal ideation was denied. Homicidal ideation was denied. Hygiene was good . Dress was neat. Behavior was Within Normal Limits with No self-report of difficulties ambulating. Attitude was Cooperative, [place] and Friendly. Eye-contact was good. Speech: rate - WNL, rhythm - WNL, volume - WNL. Verbalizations were goal directed. Thought processes were intact and goal-oriented without evidence of delusions, hallucinations, obsessions, or mariana; with little cognitive distortions. Associations were characterized by intact cognitive processes. Pt was oriented to person, place, time, and general circumstances;  recent:  good. Insight and judgment were estimated to be good, AEB, a good  understanding of cyclical maladaptive patterns, and the ability to use insight to inform behavior change. ASSESSMENT  Isaura De Los Santos presented to the appointment today for evaluation and treatment of symptoms of anxiety. She is currently deemed no risk to herself or others and meets criteria for anxiety. Pt was in agreement with recommendations. Discussed and processed improvements of anxiety. Pt was help seeking and appropriate throughout the session. Pt reports she feels medication has reduced her anxiety and she is doing well. Pt also is recovering from a leg injury as well and reported family is very supportive to her as well.       PHQ Scores 1/4/2022 8/12/2021 6/22/2020 2/22/2019 8/22/2017 2/23/2017 1/25/2017   PHQ2 Score 0 0 0 0 4 2 2   PHQ9 Score 0 0 0 0 16 2 2     Interpretation of Total Score Depression Severity: 1-4 = Minimal depression, 5-9 = Mild depression, 10-14 = Moderate depression, 15-19 = Moderately severe depression, 20-27 = Severe depression    How often pt has had thoughts of death or hurting self (if PHQ positive for depression):       SHAHLA 7 SCORE 1/4/2022   SHAHLA-7 Total Score 7     Interpretation of SHAHLA-7 score: 5-9 = mild anxiety, 10-14 = moderate anxiety, 15+ = severe anxiety. Recommend referral to behavioral health for scores 10 or greater. DIAGNOSIS  Rey Ramos was seen today for anxiety. Diagnoses and all orders for this visit:    Anxiety          INTERVENTION  Used CBT to discuss and process anxiety  Identified strengths related to progress made  Reviewed session      PLAN  Pt to be seen in one month and to discharge from services at that point due to progress made  Pt to continue to use coping skills to express self      INTERACTIVE COMPLEXITY  Is interactive complexity present?   No  Reason:  N/A  Additional Supporting Information:  N/A       Electronically signed by Saadia Osman on 2/8/22 at 1:28 PM EST

## 2022-02-28 ENCOUNTER — TELEPHONE (OUTPATIENT)
Dept: INTERNAL MEDICINE | Age: 52
End: 2022-02-28

## 2022-02-28 NOTE — TELEPHONE ENCOUNTER
Called Will Adams and informed her of such. Told her I would call back when I had more info to reschedule.

## 2022-02-28 NOTE — TELEPHONE ENCOUNTER
Erick Moore called and had to cancel appointment for her  on March 3rd and wishes to have him scheduled the same day as her on 4/21 at 3:15 pm. Please advise

## 2022-03-22 ENCOUNTER — TELEMEDICINE (OUTPATIENT)
Dept: INTERNAL MEDICINE | Age: 52
End: 2022-03-22
Payer: COMMERCIAL

## 2022-03-22 ENCOUNTER — HOSPITAL ENCOUNTER (OUTPATIENT)
Dept: SLEEP CENTER | Age: 52
Discharge: HOME | End: 2022-03-22
Payer: COMMERCIAL

## 2022-03-22 DIAGNOSIS — G47.33 OSA (OBSTRUCTIVE SLEEP APNEA): ICD-10-CM

## 2022-03-22 DIAGNOSIS — F41.9 ANXIETY: Primary | ICD-10-CM

## 2022-03-22 PROCEDURE — 90832 PSYTX W PT 30 MINUTES: CPT | Performed by: SOCIAL WORKER

## 2022-03-22 PROCEDURE — 95811 POLYSOM 6/>YRS CPAP 4/> PARM: CPT

## 2022-03-22 NOTE — PROGRESS NOTES
TeleMedicine Patient Consent     This visit was performed as a virtual video visit using a synchronous, two-way, audio-video telehealth technology platform. Patient identification was verified at the start of the visit, including the patient's telephone number and physical location. I discussed with the patient the nature of our telehealth visits, that:      1. Due to the nature of an audio- video modality, the only components of a physical exam that could be done are the elements supported by direct observation. 2. The provider will evaluate the patient and recommend diagnostics and treatments based on their assessment. 3. If it was felt that the patient should be evaluated in clinic or an emergency room setting, then they would be directed there. 4. Our sessions are not being recorded and that personal health information is protected. 5. Our team would provide follow up care in person if/when the patient needs it. Patient does agree to proceed with telemedicine consultation. Patient location: home address in [facility]    Physician location: regular office location    This visit was completed virtually using Doxy. me     This visit was performed during the 8842 public health crisis and COVID-19 pandemic. ADULT BEHAVIORAL HEALTH FOLLOW UP  FRIDA Jones,ESSIE      Visit Date: 3/22/2022   Time of appointment:  10:00  Time spent with Patient: 20 minutes. This is patient's fifth appointment. Reason for Consult:  Anxiety     Referring Provider/PCP:    No ref. provider found  Dung Cervantes MD      Pt provided informed consent for the behavioral health program. Discussed with patient model of service to include the limits of confidentiality (i.e. abuse reporting, suicide intervention, etc.) and short-term intervention focused approach. Pt indicated understanding. Koby Mccoy is a 46 y.o. female who presents for follow up of anxiety.  Pt reports continued progress with anxiety and taking things one day at a time. MENTAL STATUS EXAM  Mood was euthymic with congruent affect. Suicidal ideation was denied. Homicidal ideation was denied. Hygiene was good . Dress was neat. Behavior was Within Normal Limits with No self-report of difficulties ambulating. Attitude was Cooperative, [place] and Friendly. Eye-contact was good. Speech: rate - WNL, rhythm - WNL, volume - WNL. Verbalizations were goal directed. Thought processes were intact and goal-oriented without evidence of delusions, hallucinations, obsessions, or mariana; with little cognitive distortions. Associations were characterized by intact cognitive processes. Pt was oriented to person, place, time, and general circumstances;  recent:  good. Insight and judgment were estimated to be good, AEB, a good  understanding of cyclical maladaptive patterns, and the ability to use insight to inform behavior change. ASSESSMENT  Marlyn Patino presented to the appointment today for evaluation and treatment of symptoms of anxiety. She is currently deemed no risk to herself or others and meets criteria for anxiety. Pt was in agreement with recommendations. Discussed and processed continued ways to express self and identify positive emotions. Pt reports progress with anxiety and ability to cope with anxiety overall. Pt reports feeling ready to discharge and will continue to use coping skills.       PHQ Scores 1/4/2022 8/12/2021 6/22/2020 2/22/2019 8/22/2017 2/23/2017 1/25/2017   PHQ2 Score 0 0 0 0 4 2 2   PHQ9 Score 0 0 0 0 16 2 2     Interpretation of Total Score Depression Severity: 1-4 = Minimal depression, 5-9 = Mild depression, 10-14 = Moderate depression, 15-19 = Moderately severe depression, 20-27 = Severe depression    How often pt has had thoughts of death or hurting self (if PHQ positive for depression):       SHAHLA 7 SCORE 1/4/2022   SHAHLA-7 Total Score 7     Interpretation of SHAHLA-7 score: 5-9 = mild anxiety, 10-14 = moderate anxiety, 15+ = severe anxiety. Recommend referral to behavioral health for scores 10 or greater. DIAGNOSIS  Will Adams was seen today for anxiety. Diagnoses and all orders for this visit:    Anxiety          INTERVENTION  Used CBT to address thinking patterns  Identified strengths related to progress      PLAN  Pt to be discharged due to progress    INTERACTIVE COMPLEXITY  Is interactive complexity present?   No  Reason:  N/A  Additional Supporting Information:  N/A       Electronically signed by Jeffery Chow on 3/22/22 at 10:35 AM EDT

## 2022-03-23 VITALS
HEART RATE: 68 BPM | BODY MASS INDEX: 45 KG/M2 | WEIGHT: 280 LBS | DIASTOLIC BLOOD PRESSURE: 80 MMHG | OXYGEN SATURATION: 96 % | SYSTOLIC BLOOD PRESSURE: 134 MMHG | HEIGHT: 66 IN

## 2022-03-24 ENCOUNTER — PATIENT MESSAGE (OUTPATIENT)
Dept: INTERNAL MEDICINE | Age: 52
End: 2022-03-24

## 2022-03-24 NOTE — PROGRESS NOTES
[address] [place] [address]                               SLEEP STUDY REPORT    PATIENT NAME: Marcy Harding                      :        1970  MED REC NO:   [id number]                            ROOM:  ACCOUNT NO:   [de-identified]                           ADMIT DATE: 2022  PROVIDER:     Cornelius Bowling MD    DATE OF STUDY:  2022    POLYSOMNOGRAPHY    Patient of Cornelius Bowling MD.    INDICATION FOR POLYSOMNOGRAPHY:  Known sleep apnea - for positive airway  pressure titration. CURRENT MEDICATIONS:  Alpha-lipoic acid, Norvasc, Lac-Hydrin, Temovate,  TriCor, Prozac, HydroDIURIL, Atarax, metformin, Naprosyn, Percocet,  Pravachol, and Trulicity. INTERPRETATION:  SLEEP ARCHITECTURE:  This patient had a total time in bed of 375  minutes. Total sleep time was 239 minutes. Sleep efficiency was 64%  and sleep latency was 60 minutes. REM latency was 156 minutes. SLEEP STAGING:  The patient was awake 36% of the time in bed. Stage N1  was 6% and N2 was 76% of the total sleep time. Slow wave sleep was 5%  of the sleep time and stage REM sleep was 14% of the sleep time. RESPIRATION SUMMARY:  APNEA:  There were 2 apneic events which were both central, occurred  during non-REM stage sleep and had a maximum duration of 14 seconds. HYPOPNEA:  There were 17 hypopneic events, all occurred during non-REM  stage sleep and maximum duration was 22 seconds. APNEA/HYPOPNEA INDEX:  The apnea/hypopnea index was less than 5. TITRATION OF POSITIVE AIRWAY PRESSURE:  This patient was started on a  CPAP of 6 which was gradually increased to a CPAP of 11. At that level,  the patient slept for 27 minutes in REM stage sleep and 106 minutes in  non-REM stage sleep. The apnea/hypopnea index was less than 3.     AROUSAL ANALYSIS:  There were 40 arousals/awakenings, the sleep  disruption index is normal.    LIMB MOVEMENT SUMMARY: There were 90 limb movements, the limb movement  index was 23 (normal less than 15). OXYGEN SATURATION:  Average oxygen saturation while awake was 94%. Lowest saturation was 88%. The patient spent less than 1% of the time  with saturation less than 90%. HEART RATE SUMMARY:  Average heart rate while awake was 79 beats per  minute. Maximum heart rate during sleep was 91 beats per minute and  minimum heart rate during sleep was 66 beats per minute. There were no  ectopic beats noted. MISCELLANEOUS:  [place] Sleepiness Scale score is 10/24. There was no  snoring or bruxism noted. IMPRESSION:  1. Known obstructive sleep apnea. 2.  Optimal titration with CPAP. 3.  Snoring eliminated. 4.  Good oxygen saturation. 5.  No cardiac dysrhythmia. DISCUSSION:  On a previous study, this patient was found to have  obstructive sleep apnea. With titration of CPAP, which the patient  tolerated well, optimal results were achieved with elimination of  snoring, normalization of oxygen saturation, and normalization of the  apnea/hypopnea index. PLAN:  1. CPAP at 11 cm of water pressure. 2.  AirFit P30 nasal pillow mask with heated humidification and EPR of 3  - size small. 3.  The patient to be seen in six to 10 weeks.         Chiquita Mao MD  Diplomat of Sleep Medicine    D: 03/23/2022 73:39:25       T: 03/23/2022 18:08:46     [email or web address]  Job#: [id number]     Doc#: [id number]    CC:

## 2022-03-24 NOTE — TELEPHONE ENCOUNTER
From: Sergio Cisneros  To: Dr. Delonte White: 3/24/2022 4:05 PM EDT  Subject: Hydrochlorothiazide recall    Hi , Just checking with you about the Hydrochlorothiazide recall. .. thank you, Joe Manley

## 2022-03-24 NOTE — TELEPHONE ENCOUNTER
Called pharmacy for clarification as unclear of concerns. HCTZ inquiry noted with pharmacy: Per [employer]- recall is not associated with HCTZ alone (It is related to combination medication). No concerns noted. Mychart response generated.

## 2022-04-04 ENCOUNTER — OFFICE VISIT (OUTPATIENT)
Dept: ORTHOPEDIC SURGERY | Age: 52
End: 2022-04-04
Payer: COMMERCIAL

## 2022-04-04 DIAGNOSIS — S82.434A CLOSED NONDISPLACED OBLIQUE FRACTURE OF SHAFT OF RIGHT FIBULA, INITIAL ENCOUNTER: Primary | ICD-10-CM

## 2022-04-04 PROCEDURE — 3017F COLORECTAL CA SCREEN DOC REV: CPT | Performed by: ORTHOPAEDIC SURGERY

## 2022-04-04 PROCEDURE — G8428 CUR MEDS NOT DOCUMENT: HCPCS | Performed by: ORTHOPAEDIC SURGERY

## 2022-04-04 PROCEDURE — 1036F TOBACCO NON-USER: CPT | Performed by: ORTHOPAEDIC SURGERY

## 2022-04-04 PROCEDURE — 99213 OFFICE O/P EST LOW 20 MIN: CPT | Performed by: ORTHOPAEDIC SURGERY

## 2022-04-04 PROCEDURE — G8417 CALC BMI ABV UP PARAM F/U: HCPCS | Performed by: ORTHOPAEDIC SURGERY

## 2022-04-04 NOTE — PROGRESS NOTES
Follow Up Visit     Judy Walsh returns today for follow up visit regarding right ankle distal fibula fracture on 1/17/2022. She was in a walking boot and now weightbearing as tolerated in a regular shoe. She reports symptoms are improved. REVIEW OF SYSTEMS:     Constitutional:  Negative for weight loss, fevers, chills, fatigue  Cardiovascular: Negative for chest pain, palpitations  Pulmonary: Negative for shortness of breath, labored breathing, cough  GI: negative for abdominal pain, nausea, vomitting   MSK: per HPI  Skin: negative for rash, open wounds    All other systems reviewed and are negative       Physical Exam:     No data recorded    General: Alert and oriented x3, no acute distress  Cardiovascular/pulmonary: No labored breathing, peripheral perfusion intact  Musculoskeletal:    Right knee exam near full range of motion present, no swelling deformity visualized. Stable valgus varus exams. Stable patella tracked midline. Foot/Ankle:   Right Ankle exam displays no swelling, no ecchymosis. There is no deformity. Range of motion is 10 degrees dorsiflexion, 45 degrees plantarflexion. Resisted external rotation is negative. Resisted internal rotation is negative. Palpation of the lateral malleolus reveals no tenderness. Palpation of the medial malleolus reveals mild tenderness. Palpation ATFL reveals no tenderness. Palpation over deltoid ligament reveals no tenderness. Palpation over the 5th metartarsal reveals no tenderness. Palpation of the achilles tendon reveals no tenderness       Controlled Substances Monitoring:      Imaging:  X-ray including 3 views of the right ankle shows routine healing of distal fibula fracture    Impression: Healing right ankle distal fibula fracture    Previous MRI of the right knee shows mild degenerative changes the patellofemoral compartment with maintained femoral-tibial joint space.   There is evidence of degenerative lateral meniscus tear, chondromalacia of the lateral compartment with some full-thickness cartilage loss to the lateral facet of the patella. Assessment: Healing right distal fibula fracture      Plan: Today we discussed her right ankle. She is roughly 3 months out from injury resulting in a right distal fibula fracture. Treatment thus far has been nonoperative with immobilization in cast transition to walking boot and weightbearing as tolerated in a normal shoe. Patient has returned to wearing a normal shoulder she is walking unassisted without pain. She does have some mild stiffness with passive range of motion of the ankle joint today. She will begin home exercises independently. We will continue with normal progression to all activities without restrictions. We also discussed her right knee today as she was initially scheduled for a right knee arthroscopy in January prior to her injury. She reports right knee pain remains unchanged and would like to proceed with surgical intervention specifically a right knee arthroscopy partial lateral meniscectomy. We will look to reschedule here in the near future. ALEXANDER Franklin  Orthopedic Surgery   04/04/22  1:11 PM    Staff Addendum    I have seen and evaluated the patient and agree with the assessment and plan as documented by Monster Fuentes CNP. I have performed the key components of the history and physical examination and concur with the findings and plan, and have made changes where appropriate/necessary.             Sue Robles MD  [address]

## 2022-04-04 NOTE — PATIENT INSTRUCTIONS
Surgery: Right knee arthroscopy, partial lateral meniscectomy   Date: 4/12/2022  Location: [address] [place] [address] a call from the office once on the surgery schedule within the next 1-2 days. If you have any questions, please call [phone] and ask for Joyce Lyons.     -Read over your patient handout on what to expect about your upcoming surgery.     -You will receive a phone call from the hospital within week of your date of surgery regarding instructions and arrival time.

## 2022-04-06 ENCOUNTER — TELEPHONE (OUTPATIENT)
Dept: ORTHOPEDIC SURGERY | Age: 52
End: 2022-04-06

## 2022-04-06 NOTE — TELEPHONE ENCOUNTER
Surgery scheduling discussed with patient, they would like to proceed     Surgery: RIGHT KNEE ARTHROSCOPY, PARTIAL LATERAL MENISCECTOMY  OR: ADRIANNE 4/12  Vendor: [employer]  Block: N/A  CPT: 42294    Pre/post-operative appointments discussed with the patient. Surgical handout given with education discussion, patient aware PAT will also call to go over education prior to surgery. Office extension provided to patient with any further questions. CPT added to [facility], does not require auth.

## 2022-04-07 ENCOUNTER — TELEPHONE (OUTPATIENT)
Dept: ADMINISTRATIVE | Age: 52
End: 2022-04-07

## 2022-04-07 NOTE — TELEPHONE ENCOUNTER
Pt calling she is due at the end of June for her yearly with Dr. Rk Bowie - she would like to schedule that for July sometime. Please call her to schedule when the July schedule is available. Thank you.

## 2022-04-08 ENCOUNTER — CLINICAL DOCUMENTATION (OUTPATIENT)
Dept: ORTHOPEDIC SURGERY | Age: 52
End: 2022-04-08

## 2022-04-08 DIAGNOSIS — Z98.890 STATUS POST ARTHROSCOPY OF RIGHT KNEE: Primary | ICD-10-CM

## 2022-04-08 RX ORDER — HYDROCODONE BITARTRATE AND ACETAMINOPHEN 5; 325 MG/1; MG/1
1 TABLET ORAL EVERY 6 HOURS PRN
Qty: 28 TABLET | Refills: 0 | Status: SHIPPED | OUTPATIENT
Start: 2022-04-08 | End: 2022-04-15

## 2022-04-08 RX ORDER — KETOROLAC TROMETHAMINE 10 MG/1
10 TABLET, FILM COATED ORAL EVERY 6 HOURS
Qty: 8 TABLET | Refills: 0 | Status: SHIPPED
Start: 2022-04-08 | End: 2022-05-31

## 2022-04-08 NOTE — PROGRESS NOTES
Department of Orthopedic Surgery  Attending Pre-operative History and Physical        DIAGNOSIS: Right knee degenerative lateral meniscus tear, patellofemoral chondromalacia    INDICATION: Failed conservative treatment    PROCEDURE: Right knee arthroscopy, partial lateral meniscectomy    CHIEF COMPLAINT: Right knee pain    History Obtained From:  patient    HISTORY OF PRESENT ILLNESS:      The patient is a 46 y.o. female with significant past medical history of right knee degenerative lateral meniscus tear who presents with right knee pain. Patient was originally scheduled back in January to undergo arthroscopic surgery. She unfortunately suffered an injury and a subsequent ankle fracture. She has since healed her fracture and wishes to proceed with surgical intervention to her right knee. Patient has failed conservative treatment including activity modification, home exercise regimen, OTC medications. MRI shows mild degenerative changes with a degenerative lateral meniscus tear. For these reasons she is interested in surgical intervention specifically a right knee arthroscopy lateral partial meniscectomy. The risks, benefits, and alternatives to the procedure were discussed at length with the patient and family members. Risks include but are not limited to postoperative infection, neurovascular injury, blood clots, postoperative knee stiffness requiring repeat surgery versus manipulation, failure of repair if meniscus is repaired, progression of degenerative changes, and the risks of general anesthesia. Patient verbalizes understanding of the risks and wishes to proceed.        Past Medical History:        Diagnosis Date    Abdominal pain     Anxiety state, unspecified 3/8/2016    Asthma     no inhalers    Breast fibrocystic disorder     Diabetes mellitus ([address]     Essential hypertension 4/25/2016    Hyperlipidemia     Irritable bowel syndrome     Left ovarian cyst     Morbid obesity ([address] 4/25/2016  Osteoarthritis     Pharyngoesophageal dysphagia 2016       Past Surgical History:        Procedure Laterality Date    ABDOMEN SURGERY Left 3/15/2019    INCISION AND DRAINAGE LEFT LABIAL ABSCESS performed by Aby Clarke MD at [address]      Age 15     SECTION  2012    3 total    COLONOSCOPY  16    [facility]    COLONOSCOPY  2016    TONSILLECTOMY      Age 9    TUBAL LIGATION      UPPER GASTROINTESTINAL ENDOSCOPY      Negative for Hpylori    UPPER GASTROINTESTINAL ENDOSCOPY  16    [facility]    UPPER GASTROINTESTINAL ENDOSCOPY  2016       Medications Prior to Admission:   Not in a hospital admission. Allergies:  Bactrim [sulfamethoxazole-trimethoprim]    History of allergic reaction to anesthesia:  No    Social History:   TOBACCO:   reports that she quit smoking about 29 years ago. She started smoking about 32 years ago. She has a 1.50 pack-year smoking history. She has never used smokeless tobacco.  ETOH:   reports no history of alcohol use. DRUGS:   reports no history of drug use.     Family History:       Problem Relation Age of Onset    Rheum Arthritis Mother     Hypertension Mother    Lilyan Montane Arthritis Mother     High Blood Pressure Mother     Obesity Mother     Other Father         Heart Disease    Hypertension Father     High Cholesterol Father     Thyroid Disease Father         Hyperthyroid s/p radiation    Arthritis Father     High Blood Pressure Father     Obesity Father     Obesity Sister     Cancer Paternal Grandmother         Colon Cancer    Other Paternal Grandmother         COPD    Cancer Paternal Grandfather         Lung Cancer    Obesity Maternal Aunt     High Blood Pressure Maternal Grandmother     Obesity Maternal Grandmother     Obesity Maternal Grandfather        REVIEW OF SYSTEMS:  CONSTITUTIONAL:  negative  RESPIRATORY:  negative  CARDIOVASCULAR:  negative  MUSCULOSKELETAL:  negative except for HPI  NEUROLOGICAL:  negative    PHYSICAL EXAM:     VITALS:  LMP 09/30/2017 (Approximate)     Gen: AOx3, NAD    Heart:  normal apical pulses, normal S1 and S2 and no edema    Lungs:  No increased work of breathing, good air exchange     Abdomen:  no scars, non-distended and non-tender    Extremities:  No clubbing, cyanosis, or edema    Musculoskeletal: Right knee exam lateral joint line tenderness present, range of motion 01 30. Lateral peripatellar pain as well as anterior pain over the patellar tendon. No swelling or deformity visualized. Alignment appears near neutral.  Stable ligament exam      DATA:  CBC:   Lab Results   Component Value Date    WBC 8.2 01/03/2022    RBC 4.70 09/10/2021    HGB 13.6 09/10/2021    HCT 41.0 09/10/2021    MCV 87.2 09/10/2021    MCH 28.9 09/10/2021    MCHC 33.2 09/10/2021    RDW 12.3 09/10/2021     09/10/2021    MPV 9.4 09/10/2021     BMP:    Lab Results   Component Value Date     01/03/2022    K 5.0 01/03/2022    K 3.8 03/18/2019    CL 98 01/03/2022    CO2 27 01/03/2022    BUN 9 01/03/2022    LABALBU 4.2 08/11/2021    CREATININE 0.6 01/03/2022    CALCIUM 10.3 01/03/2022    GFRAA >60 01/03/2022    LABGLOM >60 01/03/2022    GLUCOSE 135 01/03/2022    GLUCOSE 105 05/18/2012       Radiology Review: X-rays and MRI reviewed showing mild degenerative changes to the patellofemoral joint, maintained femoral-tibial joint space. There is chondromalacia to the lateral compartment, some full-thickness cartilage loss to the lateral facet of the patella    ASSESSMENT AND PLAN:    1. Patient is a 46 y.o. female with above specified procedure planned right knee arthroscopy, partial lateral meniscectomy with anesthesia    2. Procedure options, risks and benefits reviewed with patient. Patient expresses understanding and has signed consent form to proceed.     3.  Patient and family were provided with pre-op and post-op instructions, prescription medications, and any other supplied

## 2022-04-08 NOTE — Clinical Note
Please fax updated H&P to [facility], please notify patient that her postoperative prescriptions have been sent to her pharmacy and she can pick them up anytime between now and surgery hold onto until after surgery

## 2022-04-08 NOTE — PROGRESS NOTES
Updated H&P faxed to [facility]. Notified patient that postoperative prescriptions have been sent to her pharmacy and she can pick them up anytime between now and surgery.

## 2022-04-11 ENCOUNTER — PROCEDURE VISIT (OUTPATIENT)
Dept: PODIATRY | Age: 52
End: 2022-04-11
Payer: COMMERCIAL

## 2022-04-11 VITALS — WEIGHT: 280 LBS | HEIGHT: 66 IN | BODY MASS INDEX: 45 KG/M2

## 2022-04-11 DIAGNOSIS — L84 CORNS AND CALLOSITIES: ICD-10-CM

## 2022-04-11 DIAGNOSIS — E11.9 TYPE 2 DIABETES MELLITUS WITHOUT COMPLICATION, UNSPECIFIED WHETHER LONG TERM INSULIN USE (HCC): ICD-10-CM

## 2022-04-11 DIAGNOSIS — L60.0 INGROWN NAIL: ICD-10-CM

## 2022-04-11 DIAGNOSIS — B35.1 TINEA UNGUIUM: Primary | ICD-10-CM

## 2022-04-11 DIAGNOSIS — G60.8 HEREDITARY SENSORY NEUROPATHY: ICD-10-CM

## 2022-04-11 DIAGNOSIS — M79.674 PAIN OF RIGHT GREAT TOE: ICD-10-CM

## 2022-04-11 PROCEDURE — 2022F DILAT RTA XM EVC RTNOPTHY: CPT | Performed by: PODIATRIST

## 2022-04-11 PROCEDURE — 3017F COLORECTAL CA SCREEN DOC REV: CPT | Performed by: PODIATRIST

## 2022-04-11 PROCEDURE — G8417 CALC BMI ABV UP PARAM F/U: HCPCS | Performed by: PODIATRIST

## 2022-04-11 PROCEDURE — 1036F TOBACCO NON-USER: CPT | Performed by: PODIATRIST

## 2022-04-11 PROCEDURE — G8427 DOCREV CUR MEDS BY ELIG CLIN: HCPCS | Performed by: PODIATRIST

## 2022-04-11 PROCEDURE — 3046F HEMOGLOBIN A1C LEVEL >9.0%: CPT | Performed by: PODIATRIST

## 2022-04-11 PROCEDURE — 99213 OFFICE O/P EST LOW 20 MIN: CPT | Performed by: PODIATRIST

## 2022-04-11 NOTE — PROGRESS NOTES
Allen Scott is here today for a diabetic foot exam and nail care. her PCP is Arcadio Kimble MD last OV was 03/22/2022. CC:   Follow-up diabetic exam  Pain right great toenail    HPI:   Presents today for diabetic follow-up exam.  Does have scheduled total knee arthroplasty tomorrow. Did change her appointment up early as she was having some tenderness right great toenail. Denies any recent injury or trauma. Denies any redness or drainage. Does have some tenderness especially when she is wearing closed toed shoe. No additional pedal complaints. ROS:  Const: Denies constitutional symptoms  Musculo: Denies symptoms other than stated above  Skin: Denies symptoms other than stated above      Current Outpatient Medications:     HYDROcodone-acetaminophen (NORCO) 5-325 MG per tablet, Take 1 tablet by mouth every 6 hours as needed for Pain for up to 7 days. Intended supply: 7 days.  Take lowest dose possible to manage pain, Disp: 28 tablet, Rfl: 0    ketorolac (TORADOL) 10 MG tablet, Take 1 tablet by mouth every 6 hours for 2 days, Disp: 8 tablet, Rfl: 0    naproxen (NAPROSYN) 500 MG tablet, Take 1 tablet by mouth 2 times daily (Patient taking differently: Take 500 mg by mouth as needed ), Disp: 60 tablet, Rfl: 0    FLUoxetine (PROZAC) 20 MG capsule, Take 1 capsule by mouth daily, Disp: 90 capsule, Rfl: 1    hydroCHLOROthiazide (HYDRODIURIL) 25 MG tablet, Take 1 tablet by mouth every morning, Disp: 90 tablet, Rfl: 1    metFORMIN (GLUCOPHAGE) 1000 MG tablet, TAKE ONE TABLET BY MOUTH TWICE A DAY WITH MEALS, Disp: 180 tablet, Rfl: 1    Dulaglutide (TRULICITY) 4.15 CK/3.2EX SOPN, INJECT 0.75MG SUBCUTANEOUSLY ONCE A WEEK, Disp: 6 mL, Rfl: 1    pravastatin (PRAVACHOL) 80 MG tablet, TAKE ONE TABLET BY MOUTH EVERY DAY IN THE EVENING, Disp: 90 tablet, Rfl: 1    amLODIPine (NORVASC) 5 MG tablet, Take 1 tablet by mouth daily, Disp: 90 tablet, Rfl: 1    fenofibrate (TRICOR) 48 MG tablet, TAKE ONE TABLET BY MOUTH DAILY, Disp: 90 tablet, Rfl: 1    ammonium lactate (LAC-HYDRIN) 12 % lotion, Apply topically twice daily, Disp: 400 g, Rfl: 1    blood glucose monitor strips, Test 2 times a day & as needed for symptoms of irregular blood glucose. Dispense sufficient amount for indicated testing frequency plus additional to accommodate PRN testing needs. , Disp: 100 strip, Rfl: 5    magnesium cl-calcium carbonate (NU-MAG) 71.5-119 MG TBEC tablet, TAKE TWO TABLETS BY MOUTH EVERY DAY, Disp: 180 tablet, Rfl: 1    Lancets MISC, Inject 1 each into the skin daily, Disp: 100 each, Rfl: 5    Blood Glucose Monitoring Suppl (TRUE METRIX METER) w/Device KIT, 1 Device by Does not apply route daily, Disp: 1 kit, Rfl: 0    Lancets MISC, 1 each by Does not apply route 2 times daily, Disp: 300 each, Rfl: 1    blood glucose monitor strips, by Other route daily, Disp: 100 strip, Rfl: 5    Alpha-Lipoic Acid 600 MG TABS, Take 600 mg by mouth daily, Disp: , Rfl:     b complex vitamins capsule, Take 1 capsule by mouth daily, Disp: , Rfl:     ibuprofen (ADVIL;MOTRIN) 200 MG tablet, Take 400 mg by mouth every 6 hours as needed for Pain, Disp: , Rfl:     Misc. Devices KIT, Blood pressure cuff- Ambulatory monitoring- check every other day and maintain blood pressure log, Disp: 1 kit, Rfl: 0  Allergies   Allergen Reactions    Bactrim [Sulfamethoxazole-Trimethoprim] Rash       Past Medical History:   Diagnosis Date    Abdominal pain     Anxiety state, unspecified 3/8/2016    Asthma     no inhalers    Breast fibrocystic disorder     Diabetes mellitus ([address]     Essential hypertension 4/25/2016    Hyperlipidemia     Irritable bowel syndrome     Left ovarian cyst     Morbid obesity ([address] 4/25/2016    Osteoarthritis     Pharyngoesophageal dysphagia 6/14/2016       There were no vitals filed for this visit.          Focused Lower Extremity Physical Exam:      Neurovascular examination:    Dorsalis Pedis palpable bilateral.  Posterior tibialis palpable bilateral.    Capillary Refill Time:  Immediate return  Hair growth:  Symmetrical and bilateral   Skin:  Not atrophic  Edema: Mild edema bilateral feet. Mild edema bilateral ankles. Neurologic:  Light touch diminished bilateral.  Warm to coolness bilateral distal toes  Decreased epicritic sensation     Musculoskeletal/ Orthopedic examination:    Equinis: present bilateral  Dorsiflexion, plantarflexion, inversion, eversion bilateral 5 out of 5 muscle strength  Wiggling toes  Negative Homans  Flexible hammertoes 2 through 5 bilateral.  No pain to palpation. Mild tenderness medial and lateral border right great toenail. Dermatology examination:    Toenails 1-5 right and left thickened, elongated, dystrophic, mycotic with subungual debris. Webspaces 1-4 bilateral clean, dry and intact. Hyperkeratotic tissue IPJ great toe bilateral plantar first metatarsal bilateral.  No open wounds. Mild incurvation medial lateral border right great toenail. No open or draining wounds noted. No erythema. Assessment and Plan:  Paulette Wood was seen today for diabetes, nail problem and callouses. Diagnoses and all orders for this visit:    Tinea unguium    Corns and callosities    Type 2 diabetes mellitus without complication, unspecified whether long term insulin use (HCC)    Hereditary sensory neuropathy    Pain of right great toe    Ingrown nail      Follow-up diabetic exam  Did change her appointment early as she was having pain right great toenail. Did perform slant back procedure medial lateral border right great toenail today. Manual debridement with standard technique toenails 1 through 5 right and left in thickness and length. Patient tolerated procedure well. Paring of hyperkeratotic tissue with #15 blade medial IPJ right great toe and plantar metatarsal head right great toe. She is having a scheduled knee arthroplasty tomorrow with Dr. Kenya Deluna. Appreciate his input.     I will follow-up 2 months for regular diabetic exam.    Return in about 2 months (around 6/11/2022). Seen By:  Joel Connolly DPM      Document was created using voice recognition software. Note was reviewed however may contain grammatical errors.

## 2022-04-13 ENCOUNTER — OFFICE VISIT (OUTPATIENT)
Dept: ORTHOPEDIC SURGERY | Age: 52
End: 2022-04-13

## 2022-04-13 VITALS — WEIGHT: 280 LBS | BODY MASS INDEX: 45 KG/M2 | HEIGHT: 66 IN

## 2022-04-13 DIAGNOSIS — Z98.890 STATUS POST ARTHROSCOPY OF RIGHT KNEE: Primary | ICD-10-CM

## 2022-04-13 PROCEDURE — 99024 POSTOP FOLLOW-UP VISIT: CPT | Performed by: ORTHOPAEDIC SURGERY

## 2022-04-13 NOTE — PROGRESS NOTES
Surgical dressings were removed s/p  RIGHT KNEE ARTHROSCOPY, PARTIAL LATERAL MENISCECTOMY, OR: ADRIANNE 4/12/2022. Incision was cleaned with alcohol prep pads. Minimal swelling and bleeding in area. Steri stripes and Tegaderm placed over the area to cover surgical sites x 1 week.     [facility]

## 2022-04-13 NOTE — PROGRESS NOTES
Follow Up Post Operative Visit     Surgery: Right knee arthroscopy, partial lateral meniscectomy  Date: 4/12/2022    Subjective:    Jay Fernandez is here for follow up visit s/p above procedure. She is doing well. She has been compliant    Controlled Substances Monitoring:        Physical Exam:    Height: 5' 6\" (1.676 m), Weight: 280 lb (127 kg) (per pt)    General: Alert and oriented x3, no acute distress  Cardiovascular/pulmonary: No labored breathing, peripheral perfusion intact  Musculoskeletal:    Right knee exam shows minimal swelling. Incisions intact. Knee range of motion functional.      Imaging: No new images. Previous images reviewed    Assessment and Plan: Postop day 1 status post right knee arthroscopy partial lateral meniscectomy  She is doing well. Operative findings were discussed. She will begin progressing with activities as tolerated.   She will be seen next week for suture removal.    Richardson Davis MD  Orthopaedic Surgery   4/13/22  10:20 AM

## 2022-04-14 ENCOUNTER — TELEPHONE (OUTPATIENT)
Dept: ORTHOPEDIC SURGERY | Age: 52
End: 2022-04-14

## 2022-04-14 NOTE — TELEPHONE ENCOUNTER
Pt phoned in to office stating she has had some increased edema and pain onset today. . pt states she is \"OK\" but was concerned and wanted to let us know. Pt inquired if refill of Toradol would benefit her? Instructed pt to take post op meds as prescribed, ice, elevate, and take Ibuprofen for breakthrough pain.

## 2022-04-19 ENCOUNTER — OFFICE VISIT (OUTPATIENT)
Dept: ORTHOPEDIC SURGERY | Age: 52
End: 2022-04-19

## 2022-04-19 VITALS — WEIGHT: 260 LBS | BODY MASS INDEX: 41.78 KG/M2 | HEIGHT: 66 IN

## 2022-04-19 DIAGNOSIS — Z48.02 VISIT FOR SUTURE REMOVAL: ICD-10-CM

## 2022-04-19 DIAGNOSIS — Z98.890 STATUS POST ARTHROSCOPY OF RIGHT KNEE: Primary | ICD-10-CM

## 2022-04-19 PROCEDURE — 99024 POSTOP FOLLOW-UP VISIT: CPT | Performed by: NURSE PRACTITIONER

## 2022-04-19 NOTE — PROGRESS NOTES
Follow Up Post Operative Visit     Surgery: Right knee arthroscopy, partial lateral meniscectomy  Date: 4/12/2022  Subjective:    Stuart Zavala is here for follow up visit s/p above procedure. She is doing well. She has been weightbearing as tolerated. She reports some persistent pain and mild swelling today. Controlled Substances Monitoring:        Physical Exam:    No data recorded    General: Alert and oriented x3, no acute distress  Cardiovascular/pulmonary: No labored breathing, peripheral perfusion intact  Musculoskeletal:    Right knee exam incision sites are closed edges well approximated new Steri-Strips and Tegaderm dressings were applied after sutures were removed. Neurovascular sensation grossly intact. Range of motion 0-90 without significant pain or stiffness present. Imaging: No new imaging obtained today. Previous imaging reviewed    Assessment and Plan: Status post right knee arthroscopy partial lateral meniscectomy    Today we discussed right knee. She is 1 week out from procedure list above. Today surgical sutures were removed new Steri-Strips and Tegaderm dressing were applied. She will remove dressing in 1 week. She will not submerge incision sites until mature scars are present. She continue with showers for basic hygiene purposes and pat dry incisions once completed. She will continue normal progression to daily activities as tolerated. She will follow-up in 6 weeks.       Dossie Kocher, APRN-CNP  Orthopedic Surgery   04/19/22  4:07 PM

## 2022-04-19 NOTE — PATIENT INSTRUCTIONS
-Remove post-operative dressings in 1 week  -While showering let water run over incision sites, no scrubbing, pat dry only  -Avoid submerging in the water for the next 6 weeks  -Avoid putting lotions, ointments, rubbing alcohol, hydrogen peroxide over incision sites  -Avoid direct sunlight onto area to avoid burning of incision sites

## 2022-05-02 ENCOUNTER — PATIENT MESSAGE (OUTPATIENT)
Dept: INTERNAL MEDICINE | Age: 52
End: 2022-05-02

## 2022-05-02 DIAGNOSIS — Z12.31 ENCOUNTER FOR SCREENING MAMMOGRAM FOR MALIGNANT NEOPLASM OF BREAST: Primary | ICD-10-CM

## 2022-05-02 NOTE — TELEPHONE ENCOUNTER
From: Joy Prior  To: Dr. Strickland Bowels: 5/2/2022 4:25 PM EDT  Subject: Appt    Dr. Harlan Wasserman and nurse, we got your message about change for upcoming appointment. Thank you.

## 2022-05-06 ENCOUNTER — TELEPHONE (OUTPATIENT)
Dept: INTERNAL MEDICINE | Age: 52
End: 2022-05-06

## 2022-05-06 NOTE — TELEPHONE ENCOUNTER
Left voicemail with patient mammogram order was placed and to obtain at her convience. Instructed a call back as needed.

## 2022-05-28 DIAGNOSIS — E78.2 MIXED HYPERLIPIDEMIA: ICD-10-CM

## 2022-05-30 DIAGNOSIS — N95.1 POST MENOPAUSAL SYNDROME: ICD-10-CM

## 2022-05-30 DIAGNOSIS — I10 ESSENTIAL HYPERTENSION: ICD-10-CM

## 2022-05-31 RX ORDER — FLUOXETINE HYDROCHLORIDE 20 MG/1
CAPSULE ORAL
Qty: 90 CAPSULE | Refills: 0 | Status: SHIPPED
Start: 2022-05-31 | End: 2022-09-13

## 2022-05-31 RX ORDER — PRAVASTATIN SODIUM 80 MG/1
TABLET ORAL
Qty: 90 TABLET | Refills: 0 | Status: SHIPPED
Start: 2022-05-31 | End: 2022-08-30 | Stop reason: SDUPTHER

## 2022-05-31 RX ORDER — HYDROCHLOROTHIAZIDE 25 MG/1
TABLET ORAL
Qty: 90 TABLET | Refills: 0 | Status: SHIPPED
Start: 2022-05-31 | End: 2022-09-13

## 2022-06-03 ENCOUNTER — PATIENT MESSAGE (OUTPATIENT)
Dept: INTERNAL MEDICINE | Age: 52
End: 2022-06-03

## 2022-06-03 NOTE — TELEPHONE ENCOUNTER
From: Viki Doherty  To: Dr. Gutierrez Re: 6/3/2022 2:58 PM EDT  Subject: Trudi Aldrich, Dr. Yodit Hudson, please can pravochol and prozac be re ordered to [facility]. Taisha Favor for mammogram asking when ok after I had last covid booster end of April.

## 2022-06-06 ENCOUNTER — HOSPITAL ENCOUNTER (OUTPATIENT)
Age: 52
Discharge: HOME | End: 2022-06-06
Payer: COMMERCIAL

## 2022-06-06 DIAGNOSIS — I10 ESSENTIAL HYPERTENSION: ICD-10-CM

## 2022-06-06 LAB
ANION GAP SERPL CALCULATED.3IONS-SCNC: 15 MMOL/L (ref 7–16)
BUN BLDV-MCNC: 18 MG/DL (ref 6–20)
CALCIUM SERPL-MCNC: 9.4 MG/DL (ref 8.6–10.2)
CHLORIDE BLD-SCNC: 100 MMOL/L (ref 98–107)
CO2: 20 MMOL/L (ref 22–29)
CREAT SERPL-MCNC: 0.6 MG/DL (ref 0.5–1)
GFR AFRICAN AMERICAN: >60
GFR NON-AFRICAN AMERICAN: >60 ML/MIN/1.73
GLUCOSE BLD-MCNC: 150 MG/DL (ref 74–99)
POTASSIUM SERPL-SCNC: 4.3 MMOL/L (ref 3.5–5)
SODIUM BLD-SCNC: 135 MMOL/L (ref 132–146)

## 2022-06-06 PROCEDURE — 36415 COLL VENOUS BLD VENIPUNCTURE: CPT

## 2022-06-06 PROCEDURE — 80048 BASIC METABOLIC PNL TOTAL CA: CPT

## 2022-06-08 ENCOUNTER — TELEPHONE (OUTPATIENT)
Dept: CARDIOLOGY CLINIC | Age: 52
End: 2022-06-08

## 2022-06-15 PROBLEM — G47.33 OSA (OBSTRUCTIVE SLEEP APNEA): Status: ACTIVE | Noted: 2022-06-15

## 2022-06-15 NOTE — PROGRESS NOTES
Messi Sherwood (:  1970) is a 46 y.o. female,Established patient, here for evaluation of the following chief complaint(s):  Follow-up         ASSESSMENT/PLAN:  1. Mixed hyperlipidemia- stable    Continue current management    Diabetes Management Plan:     Blood Glucose Log Present no  Blood Pressure Monitoring <130/<80 yes   Retinopathy No    ACEI/ARB No  MOST RECENT A1c less than 8% yes - 7.1%  Microalbumin/Cr Ratio completed in last year no- monitor and consider adding ARB  Diabetic Foot Exam completed in last year no- Podiatry follow-up next week with underlying neuropathy  Eye Exam completed in last year yes - Follow-up planned  Statin Use Yes  Appropriate Aspirin Use if Known CAD no- no ASA indiciations  2. Type 2 diabetes mellitus with hyperglycemia, without long-term current use of insulin ([facility])  -     POCT glycosylated hemoglobin (Hb A1C)  As above   3. Obesity, Class III, BMI 40-49.9 (morbid obesity) ([facility])   Lifestyle modifications continued    Recommend increasing GLP1 as discussed for goal A1c 6.5% and further weight loss possibility   4. Essential hypertension- stable   -     amLODIPine (NORVASC) 5 MG tablet; Take 1 tablet by mouth daily, Disp-90 tablet, R-1Normal  5. CARLENE (obstructive sleep apnea)- awaiting CPAP availability   6. Neuropathy- follow-up with Podiatry   7. Anxiety disorder, unspecified type- stable with SSRI use- continue use     HCM  Mammogram scheduled and pending. Podiatry follow-up in a week     Return in about 3 months (around 2022). Subjective   SUBJECTIVE/OBJECTIVE:  HPI    Presents for follow-up appointment with history of DM, HTN. Last A1c was at goal of less than 7%. A1c today:     Labs recently completed in  with mild reduction in bicarbonate requiring surveillance- without significant Anion Gap. Recently underwent arthroscopic surgery of knee s/p follow-up with Orthopedic surgery.      Recently diagnosed with CARLENE and completed CPAP titration in March 2022 with recs from Dr Eliazbeth Khan. Denies any new complaints. States doing well overall after arthroscopic surgery. Tolerating all medications. Planned mammogram in July. A1c completed today with slight increase to 7.1%     Review of Systems   Constitutional: Negative for chills and fever. Respiratory: Negative for cough, shortness of breath and wheezing. Cardiovascular: Positive for palpitations (intermittent and unchanged). Negative for chest pain and leg swelling. Gastrointestinal: Negative for anal bleeding, blood in stool, constipation, diarrhea, nausea and vomiting. Genitourinary: Negative for dysuria and hematuria. Musculoskeletal:        Knee pain improved   Neurological: Negative for dizziness and light-headedness. Objective    Vitals:    06/16/22 1416 06/16/22 1425   BP: (!) 115/58 115/65   Site: Left Upper Arm Left Upper Arm   Position: Sitting Sitting   Cuff Size: Large Adult Medium Adult   Pulse: 80    Resp: 18    Temp: 97 °F (36.1 °C)    TempSrc: Temporal    SpO2: 99%    Weight: 251 lb (113.9 kg)    Height: 5' 6\" (1.676 m)      Physical Exam  Constitutional:       Appearance: She is obese. She is not ill-appearing. HENT:      Head: Normocephalic. Eyes:      General: No scleral icterus. Cardiovascular:      Rate and Rhythm: Normal rate and regular rhythm. Pulses: Normal pulses. Heart sounds: No murmur heard. Pulmonary:      Effort: Pulmonary effort is normal.      Breath sounds: Normal breath sounds. No wheezing, rhonchi or rales. Musculoskeletal:      Right knee: No swelling, deformity or effusion. Right lower leg: No edema. Left lower leg: No edema. Comments: Well-healed incisions from Rt Knee arthroscopy    Skin:     General: Skin is warm and dry. Neurological:      General: No focal deficit present. Mental Status: She is alert.    Psychiatric:         Mood and Affect: Mood normal.                An electronic signature was used to authenticate this note.     --Allan Lares MD, FACP

## 2022-06-16 ENCOUNTER — HOSPITAL ENCOUNTER (OUTPATIENT)
Age: 52
Discharge: HOME | End: 2022-06-16
Payer: COMMERCIAL

## 2022-06-16 ENCOUNTER — OFFICE VISIT (OUTPATIENT)
Dept: INTERNAL MEDICINE | Age: 52
End: 2022-06-16
Payer: COMMERCIAL

## 2022-06-16 VITALS
OXYGEN SATURATION: 99 % | BODY MASS INDEX: 40.34 KG/M2 | HEART RATE: 80 BPM | TEMPERATURE: 97 F | RESPIRATION RATE: 18 BRPM | HEIGHT: 66 IN | SYSTOLIC BLOOD PRESSURE: 115 MMHG | WEIGHT: 251 LBS | DIASTOLIC BLOOD PRESSURE: 65 MMHG

## 2022-06-16 DIAGNOSIS — E78.2 MIXED HYPERLIPIDEMIA: Primary | ICD-10-CM

## 2022-06-16 DIAGNOSIS — G62.9 NEUROPATHY: ICD-10-CM

## 2022-06-16 DIAGNOSIS — E11.65 TYPE 2 DIABETES MELLITUS WITH HYPERGLYCEMIA, WITHOUT LONG-TERM CURRENT USE OF INSULIN (HCC): ICD-10-CM

## 2022-06-16 DIAGNOSIS — E66.01 OBESITY, CLASS III, BMI 40-49.9 (MORBID OBESITY) (HCC): ICD-10-CM

## 2022-06-16 DIAGNOSIS — G47.33 OSA (OBSTRUCTIVE SLEEP APNEA): ICD-10-CM

## 2022-06-16 DIAGNOSIS — I10 ESSENTIAL HYPERTENSION: ICD-10-CM

## 2022-06-16 DIAGNOSIS — F41.9 ANXIETY DISORDER, UNSPECIFIED TYPE: ICD-10-CM

## 2022-06-16 LAB
CREATININE URINE: 175 MG/DL (ref 29–226)
HBA1C MFR BLD: 7.1 %
MICROALBUMIN UR-MCNC: 22.3 MG/L
MICROALBUMIN/CREAT UR-RTO: 12.7 (ref 0–30)
WBC # BLD: 5.4 E9/L (ref 4.5–11.5)

## 2022-06-16 PROCEDURE — 99214 OFFICE O/P EST MOD 30 MIN: CPT | Performed by: INTERNAL MEDICINE

## 2022-06-16 PROCEDURE — 86317 IMMUNOASSAY INFECTIOUS AGENT: CPT

## 2022-06-16 PROCEDURE — 3017F COLORECTAL CA SCREEN DOC REV: CPT | Performed by: INTERNAL MEDICINE

## 2022-06-16 PROCEDURE — 82570 ASSAY OF URINE CREATININE: CPT

## 2022-06-16 PROCEDURE — 3051F HG A1C>EQUAL 7.0%<8.0%: CPT | Performed by: INTERNAL MEDICINE

## 2022-06-16 PROCEDURE — 85048 AUTOMATED LEUKOCYTE COUNT: CPT

## 2022-06-16 PROCEDURE — 86803 HEPATITIS C AB TEST: CPT

## 2022-06-16 PROCEDURE — 82044 UR ALBUMIN SEMIQUANTITATIVE: CPT

## 2022-06-16 PROCEDURE — 99212 OFFICE O/P EST SF 10 MIN: CPT | Performed by: INTERNAL MEDICINE

## 2022-06-16 PROCEDURE — 1036F TOBACCO NON-USER: CPT | Performed by: INTERNAL MEDICINE

## 2022-06-16 PROCEDURE — 36415 COLL VENOUS BLD VENIPUNCTURE: CPT

## 2022-06-16 PROCEDURE — 2022F DILAT RTA XM EVC RTNOPTHY: CPT | Performed by: INTERNAL MEDICINE

## 2022-06-16 PROCEDURE — G8417 CALC BMI ABV UP PARAM F/U: HCPCS | Performed by: INTERNAL MEDICINE

## 2022-06-16 PROCEDURE — G8427 DOCREV CUR MEDS BY ELIG CLIN: HCPCS | Performed by: INTERNAL MEDICINE

## 2022-06-16 PROCEDURE — 83036 HEMOGLOBIN GLYCOSYLATED A1C: CPT | Performed by: INTERNAL MEDICINE

## 2022-06-16 RX ORDER — AMLODIPINE BESYLATE 5 MG/1
5 TABLET ORAL DAILY
Qty: 90 TABLET | Refills: 1 | Status: SHIPPED
Start: 2022-06-16 | End: 2022-09-29 | Stop reason: SDUPTHER

## 2022-06-16 ASSESSMENT — ENCOUNTER SYMPTOMS
DIARRHEA: 0
CONSTIPATION: 0
VOMITING: 0
BLOOD IN STOOL: 0
COUGH: 0
ANAL BLEEDING: 0
SHORTNESS OF BREATH: 0
NAUSEA: 0
WHEEZING: 0

## 2022-06-16 NOTE — PROGRESS NOTES
AVS printed with follow up appointment and discharge instructions and given to the patient. Instructed to call per Dr. Tami Fontenot when she finishes current  Trulicity dose  send message via [employer] so it can be increased to 1.5 mg weekly. Encouraged to follow up with office regarding podiatry recommendations. Patient verbalized all understanding.

## 2022-06-17 LAB — HEPATITIS C ANTIBODY INTERPRETATION: NORMAL

## 2022-06-20 ENCOUNTER — OFFICE VISIT (OUTPATIENT)
Dept: ORTHOPEDIC SURGERY | Age: 52
End: 2022-06-20

## 2022-06-20 VITALS — BODY MASS INDEX: 40.34 KG/M2 | HEIGHT: 66 IN | WEIGHT: 251 LBS

## 2022-06-20 DIAGNOSIS — Z98.890 STATUS POST ARTHROSCOPY OF RIGHT KNEE: Primary | ICD-10-CM

## 2022-06-20 LAB
Lab: NORMAL
REPORT: NORMAL
THIS TEST SENT TO: NORMAL

## 2022-06-20 PROCEDURE — 99024 POSTOP FOLLOW-UP VISIT: CPT | Performed by: ORTHOPAEDIC SURGERY

## 2022-06-20 NOTE — PROGRESS NOTES
Follow Up Post Operative Visit     Surgery: Right knee arthroscopy, partial lateral meniscectomy  Date: 4/12/2022    Subjective:    Mayra Sandoval is here for follow up visit s/p above procedure. She is doing well. She has been back to normal activities with no issues    Controlled Substances Monitoring:        Physical Exam:    Height: 5' 6\" (1.676 m), Weight: 251 lb (113.9 kg)    General: Alert and oriented x3, no acute distress  Cardiovascular/pulmonary: No labored breathing, peripheral perfusion intact  Musculoskeletal:    Exam of the knee shows healed incisions. There is full range of motion. Knee is stable. There is no effusion      Imaging: No new images. Previous images reviewed    Assessment and Plan: She is 2 and half months out from right knee partial lateral meniscectomy  She is doing very well. She has no pain today and good function. She will continue activities as tolerated.   Follow-up as needed    Kalen Rashid MD  Orthopaedic Surgery   6/20/22  10:03 AM

## 2022-06-21 ENCOUNTER — PROCEDURE VISIT (OUTPATIENT)
Dept: PODIATRY | Age: 52
End: 2022-06-21
Payer: COMMERCIAL

## 2022-06-21 VITALS — BODY MASS INDEX: 40.34 KG/M2 | HEIGHT: 66 IN | WEIGHT: 251 LBS

## 2022-06-21 DIAGNOSIS — L84 CORNS AND CALLOSITIES: ICD-10-CM

## 2022-06-21 DIAGNOSIS — E11.9 TYPE 2 DIABETES MELLITUS WITHOUT COMPLICATION, UNSPECIFIED WHETHER LONG TERM INSULIN USE (HCC): ICD-10-CM

## 2022-06-21 DIAGNOSIS — B35.1 TINEA UNGUIUM: Primary | ICD-10-CM

## 2022-06-21 DIAGNOSIS — G60.8 HEREDITARY SENSORY NEUROPATHY: ICD-10-CM

## 2022-06-21 PROCEDURE — 11056 PARNG/CUTG B9 HYPRKR LES 2-4: CPT | Performed by: PODIATRIST

## 2022-06-21 PROCEDURE — 11721 DEBRIDE NAIL 6 OR MORE: CPT | Performed by: PODIATRIST

## 2022-06-21 NOTE — PROGRESS NOTES
Ginger Medina is here today for a diabetic foot exam and  nail care. her PCP is Gloria Mantilla MD last OV was 06/16/2022. CC:   Follow-up diabetic exam      HPI:   Presents follow-up diabetic exam  Recent knee surgery. Progressing well. No calf pain. No open wounds. No foot or ankle pain today. No additional pedal complaints. ROS:  Const: Denies constitutional symptoms  Musculo: Denies symptoms other than stated above  Skin: Denies symptoms other than stated above      Current Outpatient Medications:     amLODIPine (NORVASC) 5 MG tablet, Take 1 tablet by mouth daily, Disp: 90 tablet, Rfl: 1    pravastatin (PRAVACHOL) 80 MG tablet, TAKE ONE TABLET BY MOUTH EVERY DAY IN THE EVENING, Disp: 90 tablet, Rfl: 0    hydroCHLOROthiazide (HYDRODIURIL) 25 MG tablet, TAKE ONE TABLET BY MOUTH EVERY MORNING, Disp: 90 tablet, Rfl: 0    FLUoxetine (PROZAC) 20 MG capsule, TAKE ONE CAPSULE BY MOUTH DAILY, Disp: 90 capsule, Rfl: 0    metFORMIN (GLUCOPHAGE) 1000 MG tablet, TAKE ONE TABLET BY MOUTH TWICE A DAY WITH MEALS, Disp: 180 tablet, Rfl: 1    Dulaglutide (TRULICITY) 5.86 HJ/7.8LX SOPN, INJECT 0.75MG SUBCUTANEOUSLY ONCE A WEEK, Disp: 6 mL, Rfl: 1    fenofibrate (TRICOR) 48 MG tablet, TAKE ONE TABLET BY MOUTH DAILY, Disp: 90 tablet, Rfl: 1    ammonium lactate (LAC-HYDRIN) 12 % lotion, Apply topically twice daily, Disp: 400 g, Rfl: 1    blood glucose monitor strips, Test 2 times a day & as needed for symptoms of irregular blood glucose. Dispense sufficient amount for indicated testing frequency plus additional to accommodate PRN testing needs. , Disp: 100 strip, Rfl: 5    magnesium cl-calcium carbonate (NU-MAG) 71.5-119 MG TBEC tablet, TAKE TWO TABLETS BY MOUTH EVERY DAY, Disp: 180 tablet, Rfl: 1    Lancets MISC, Inject 1 each into the skin daily, Disp: 100 each, Rfl: 5    Blood Glucose Monitoring Suppl (TRUE METRIX METER) w/Device KIT, 1 Device by Does not apply route daily, Disp: 1 kit, Rfl: 0   Lancets MISC, 1 each by Does not apply route 2 times daily, Disp: 300 each, Rfl: 1    blood glucose monitor strips, by Other route daily, Disp: 100 strip, Rfl: 5    Alpha-Lipoic Acid 600 MG TABS, Take 600 mg by mouth daily, Disp: , Rfl:     b complex vitamins capsule, Take 1 capsule by mouth daily, Disp: , Rfl:     Misc. Devices KIT, Blood pressure cuff- Ambulatory monitoring- check every other day and maintain blood pressure log, Disp: 1 kit, Rfl: 0  Allergies   Allergen Reactions    Bactrim [Sulfamethoxazole-Trimethoprim] Rash       Past Medical History:   Diagnosis Date    Abdominal pain     Anxiety state, unspecified 3/8/2016    Asthma     no inhalers    Breast fibrocystic disorder     Diabetes mellitus ([facility] [address]     Essential hypertension 4/25/2016    Hyperlipidemia     Irritable bowel syndrome     Left ovarian cyst     Morbid obesity ([facility] [address] 4/25/2016    Osteoarthritis     Pharyngoesophageal dysphagia 6/14/2016       There were no vitals filed for this visit. Focused Lower Extremity Physical Exam:      Neurovascular examination:    Dorsalis Pedis palpable bilateral.  Posterior tibialis palpable bilateral.    Capillary Refill Time:  Immediate return  Hair growth:  Symmetrical and bilateral   Skin:  Not atrophic  Edema: Mild edema bilateral feet. Mild edema bilateral ankles. Neurologic:  Light touch diminished bilateral.  Warm to coolness bilateral distal toes  Decreased epicritic sensation     Musculoskeletal/ Orthopedic examination:    Equinis: present bilateral  Dorsiflexion, plantarflexion, inversion, eversion bilateral 5 out of 5 muscle strength  Wiggling toes  Negative Homans  Flexible hammertoes 2 through 5 bilateral.  No pain to palpation. No pain bilateral great toe. Dermatology examination:    Toenails 1-5 right and left thickened, elongated, dystrophic, mycotic with subungual debris. Webspaces 1-4 bilateral clean, dry and intact.    Hyperkeratotic tissue IPJ great toe bilateral and plantar first metatarsal bilateral.  No open wounds. Assessment and Plan:  Dwight Roche was seen today for diabetes, callouses and nail problem. Diagnoses and all orders for this visit:    Tinea unguium    Corns and callosities    Type 2 diabetes mellitus without complication, unspecified whether long term insulin use (HCC)    Hereditary sensory neuropathy      Follow-up diabetic exam  Manual debridement with standard technique toenails 1 through 5 right and left in thickness and length. Patient tolerated procedure well. Paring hyperkeratotic tissue plantar first metatarsal head bilateral and medial IPJ great toe bilateral.  Recent hemoglobin A1c from 6/16/2022.  7.1. I did also write a prescription for diabetic shoes and inserts at [facility]. Appreciate their input. Follow-up in office 2 months. Return in about 2 months (around 8/21/2022). Seen By:  Alicia Hollins DPM      Document was created using voice recognition software. Note was reviewed however may contain grammatical errors.

## 2022-07-18 ENCOUNTER — HOSPITAL ENCOUNTER (OUTPATIENT)
Dept: GENERAL RADIOLOGY | Age: 52
Discharge: HOME | End: 2022-07-20
Payer: COMMERCIAL

## 2022-07-18 DIAGNOSIS — Z12.31 ENCOUNTER FOR SCREENING MAMMOGRAM FOR MALIGNANT NEOPLASM OF BREAST: ICD-10-CM

## 2022-07-18 PROCEDURE — 77063 BREAST TOMOSYNTHESIS BI: CPT

## 2022-07-23 DIAGNOSIS — E78.2 MIXED HYPERLIPIDEMIA: ICD-10-CM

## 2022-07-25 RX ORDER — FENOFIBRATE 48 MG/1
TABLET, COATED ORAL
Qty: 90 TABLET | Refills: 1 | Status: SHIPPED | OUTPATIENT
Start: 2022-07-25

## 2022-07-27 DIAGNOSIS — E11.65 TYPE 2 DIABETES MELLITUS WITH HYPERGLYCEMIA, WITHOUT LONG-TERM CURRENT USE OF INSULIN (HCC): ICD-10-CM

## 2022-07-27 RX ORDER — DULAGLUTIDE 0.75 MG/.5ML
INJECTION, SOLUTION SUBCUTANEOUS
Qty: 6 ML | Refills: 0 | Status: SHIPPED
Start: 2022-07-27 | End: 2022-08-30 | Stop reason: SDUPTHER

## 2022-08-12 ENCOUNTER — TELEPHONE (OUTPATIENT)
Dept: PRIMARY CARE CLINIC | Age: 52
End: 2022-08-12

## 2022-08-12 NOTE — TELEPHONE ENCOUNTER
Patient states  Lucia Downing already has a CPaP. Patient further states she believes she is the one this is about because she has an appointment on Monday morning to get her CPaP.  Please call her back  Geeta Serrano LPN

## 2022-08-12 NOTE — TELEPHONE ENCOUNTER
Spoke with patients wife Karen Ramos regarding denial of Cpap for Federico Rosario  we received from Geno Romero. She states he has a Cpap and no issues needed. She states she picks hers up Monday. She states she was already pre approved so it may be a error. Instructed to call with all issues.

## 2022-08-20 DIAGNOSIS — E11.65 TYPE 2 DIABETES MELLITUS WITH HYPERGLYCEMIA, WITHOUT LONG-TERM CURRENT USE OF INSULIN (HCC): ICD-10-CM

## 2022-08-26 ENCOUNTER — TELEPHONE (OUTPATIENT)
Dept: INTERNAL MEDICINE | Age: 52
End: 2022-08-26

## 2022-08-30 DIAGNOSIS — E11.65 TYPE 2 DIABETES MELLITUS WITH HYPERGLYCEMIA, WITHOUT LONG-TERM CURRENT USE OF INSULIN (HCC): ICD-10-CM

## 2022-08-30 DIAGNOSIS — E78.2 MIXED HYPERLIPIDEMIA: ICD-10-CM

## 2022-08-30 RX ORDER — DULAGLUTIDE 0.75 MG/.5ML
INJECTION, SOLUTION SUBCUTANEOUS
Qty: 6 ML | Refills: 1 | Status: SHIPPED
Start: 2022-08-30 | End: 2022-09-29

## 2022-08-30 RX ORDER — PRAVASTATIN SODIUM 80 MG/1
TABLET ORAL
Qty: 90 TABLET | Refills: 1 | Status: SHIPPED | OUTPATIENT
Start: 2022-08-30

## 2022-09-08 ENCOUNTER — PROCEDURE VISIT (OUTPATIENT)
Dept: PODIATRY | Age: 52
End: 2022-09-08
Payer: COMMERCIAL

## 2022-09-08 DIAGNOSIS — G60.8 HEREDITARY SENSORY NEUROPATHY: ICD-10-CM

## 2022-09-08 DIAGNOSIS — E11.9 TYPE 2 DIABETES MELLITUS WITHOUT COMPLICATION, UNSPECIFIED WHETHER LONG TERM INSULIN USE (HCC): ICD-10-CM

## 2022-09-08 DIAGNOSIS — B35.1 TINEA UNGUIUM: Primary | ICD-10-CM

## 2022-09-08 DIAGNOSIS — L84 CORNS AND CALLOSITIES: ICD-10-CM

## 2022-09-08 PROCEDURE — 11721 DEBRIDE NAIL 6 OR MORE: CPT | Performed by: PODIATRIST

## 2022-09-08 PROCEDURE — 11056 PARNG/CUTG B9 HYPRKR LES 2-4: CPT | Performed by: PODIATRIST

## 2022-09-13 DIAGNOSIS — N95.1 POST MENOPAUSAL SYNDROME: ICD-10-CM

## 2022-09-13 DIAGNOSIS — I10 ESSENTIAL HYPERTENSION: ICD-10-CM

## 2022-09-13 RX ORDER — FLUOXETINE HYDROCHLORIDE 20 MG/1
CAPSULE ORAL
Qty: 90 CAPSULE | Refills: 0 | Status: SHIPPED | OUTPATIENT
Start: 2022-09-13

## 2022-09-13 RX ORDER — FLUOXETINE HYDROCHLORIDE 20 MG/1
CAPSULE ORAL
Qty: 90 CAPSULE | Refills: 0 | OUTPATIENT
Start: 2022-09-13

## 2022-09-13 RX ORDER — HYDROCHLOROTHIAZIDE 25 MG/1
TABLET ORAL
Qty: 90 TABLET | Refills: 0 | OUTPATIENT
Start: 2022-09-13

## 2022-09-13 RX ORDER — HYDROCHLOROTHIAZIDE 25 MG/1
TABLET ORAL
Qty: 90 TABLET | Refills: 0 | Status: SHIPPED | OUTPATIENT
Start: 2022-09-13

## 2022-09-29 ENCOUNTER — OFFICE VISIT (OUTPATIENT)
Dept: INTERNAL MEDICINE | Age: 52
End: 2022-09-29
Payer: COMMERCIAL

## 2022-09-29 VITALS
TEMPERATURE: 97.6 F | BODY MASS INDEX: 40.66 KG/M2 | HEART RATE: 74 BPM | OXYGEN SATURATION: 99 % | RESPIRATION RATE: 18 BRPM | WEIGHT: 253 LBS | DIASTOLIC BLOOD PRESSURE: 83 MMHG | HEIGHT: 66 IN | SYSTOLIC BLOOD PRESSURE: 134 MMHG

## 2022-09-29 DIAGNOSIS — Z23 NEED FOR INFLUENZA VACCINATION: ICD-10-CM

## 2022-09-29 DIAGNOSIS — F41.9 ANXIETY DISORDER, UNSPECIFIED TYPE: ICD-10-CM

## 2022-09-29 DIAGNOSIS — G47.33 OSA (OBSTRUCTIVE SLEEP APNEA): ICD-10-CM

## 2022-09-29 DIAGNOSIS — R21 FACIAL RASH: ICD-10-CM

## 2022-09-29 DIAGNOSIS — E11.65 TYPE 2 DIABETES MELLITUS WITH HYPERGLYCEMIA, WITHOUT LONG-TERM CURRENT USE OF INSULIN (HCC): Primary | ICD-10-CM

## 2022-09-29 DIAGNOSIS — E78.2 MIXED HYPERLIPIDEMIA: ICD-10-CM

## 2022-09-29 DIAGNOSIS — I10 ESSENTIAL HYPERTENSION: ICD-10-CM

## 2022-09-29 DIAGNOSIS — E66.01 OBESITY, CLASS III, BMI 40-49.9 (MORBID OBESITY) (HCC): ICD-10-CM

## 2022-09-29 DIAGNOSIS — D75.839 THROMBOCYTOSIS: ICD-10-CM

## 2022-09-29 LAB — HBA1C MFR BLD: 7.7 %

## 2022-09-29 PROCEDURE — G8427 DOCREV CUR MEDS BY ELIG CLIN: HCPCS | Performed by: INTERNAL MEDICINE

## 2022-09-29 PROCEDURE — 99214 OFFICE O/P EST MOD 30 MIN: CPT | Performed by: INTERNAL MEDICINE

## 2022-09-29 PROCEDURE — 1036F TOBACCO NON-USER: CPT | Performed by: INTERNAL MEDICINE

## 2022-09-29 PROCEDURE — 2022F DILAT RTA XM EVC RTNOPTHY: CPT | Performed by: INTERNAL MEDICINE

## 2022-09-29 PROCEDURE — G8417 CALC BMI ABV UP PARAM F/U: HCPCS | Performed by: INTERNAL MEDICINE

## 2022-09-29 PROCEDURE — 3017F COLORECTAL CA SCREEN DOC REV: CPT | Performed by: INTERNAL MEDICINE

## 2022-09-29 PROCEDURE — 3051F HG A1C>EQUAL 7.0%<8.0%: CPT | Performed by: INTERNAL MEDICINE

## 2022-09-29 RX ORDER — CLINDAMYCIN AND BENZOYL PEROXIDE 10; 50 MG/G; MG/G
GEL TOPICAL
Qty: 60 G | Refills: 0 | Status: SHIPPED | OUTPATIENT
Start: 2022-09-29

## 2022-09-29 RX ORDER — GLUCOSAMINE HCL/CHONDROITIN SU 500-400 MG
CAPSULE ORAL DAILY
Qty: 100 STRIP | Refills: 5 | Status: SHIPPED | OUTPATIENT
Start: 2022-09-29

## 2022-09-29 RX ORDER — LANCETS 30 GAUGE
1 EACH MISCELLANEOUS DAILY
Qty: 100 EACH | Refills: 5 | Status: SHIPPED | OUTPATIENT
Start: 2022-09-29

## 2022-09-29 RX ORDER — DULAGLUTIDE 1.5 MG/.5ML
1.5 INJECTION, SOLUTION SUBCUTANEOUS WEEKLY
Qty: 4 ADJUSTABLE DOSE PRE-FILLED PEN SYRINGE | Refills: 1 | Status: SHIPPED | OUTPATIENT
Start: 2022-09-29

## 2022-09-29 RX ORDER — DULAGLUTIDE 0.75 MG/.5ML
INJECTION, SOLUTION SUBCUTANEOUS
Qty: 6 ML | Refills: 1 | Status: CANCELLED | OUTPATIENT
Start: 2022-09-29

## 2022-09-29 RX ORDER — AMLODIPINE BESYLATE 5 MG/1
5 TABLET ORAL DAILY
Qty: 90 TABLET | Refills: 1 | Status: SHIPPED | OUTPATIENT
Start: 2022-09-29

## 2022-09-29 ASSESSMENT — ENCOUNTER SYMPTOMS
ABDOMINAL PAIN: 0
DIARRHEA: 0
CONSTIPATION: 0
NAUSEA: 0
VOMITING: 0
COUGH: 0
WHEEZING: 0
SHORTNESS OF BREATH: 0
BLOOD IN STOOL: 0

## 2022-09-29 NOTE — ASSESSMENT & PLAN NOTE
Diabetes Management Plan:     Blood Glucose Log Present no  Blood Pressure Monitoring <130/<80 yes - ON 2 drug regimen  Retinopathy No    ACEI/ARB No- Previously on SGLT2 but did not tolerate due to recurrent UTI/Fungal infections- repeat Microalbumin needed in future   MOST RECENT A1c less than 8% yes - TODAY A1C: 7.;7% which is an increase   Microalbumin/Cr Ratio completed in last year yes - less than 30- repeat at 6 months   Diabetic Foot Exam completed in last year yes - following with Dr. Sp Ventura- seen in Sept   Eye Exam completed in last year yes - no retinopathy  Statin Use Yes  Appropriate Aspirin Use if Known CAD yes - no ASA     Increase Trulicity to 1.5 mg and follow at 8 weeks for potential further increase to 3 mg if warranted.

## 2022-09-29 NOTE — PROGRESS NOTES
Juan Carlos Lainez (:  1970) is a 46 y.o. female,Established patient, here for evaluation of the following chief complaint(s):  Follow-up         ASSESSMENT/PLAN:  1. Type 2 diabetes mellitus with hyperglycemia, without long-term current use of insulin (HCC)  Assessment & Plan:  Diabetes Management Plan:     Blood Glucose Log Present no  Blood Pressure Monitoring <130/<80 yes - ON 2 drug regimen  Retinopathy No    ACEI/ARB No- Previously on SGLT2 but did not tolerate due to recurrent UTI/Fungal infections- repeat Microalbumin needed in future   MOST RECENT A1c less than 8% yes - TODAY A1C: 7.;7% which is an increase   Microalbumin/Cr Ratio completed in last year yes - less than 30- repeat at 6 months   Diabetic Foot Exam completed in last year yes - following with Dr. Patt Hernandez- seen in Sept   Eye Exam completed in last year yes - no retinopathy  Statin Use Yes  Appropriate Aspirin Use if Known CAD yes - no ASA     Increase Trulicity to 1.5 mg and follow at 8 weeks for potential further increase to 3 mg if warranted. Orders:  -     Comprehensive Metabolic Panel; Future  -     POCT glycosylated hemoglobin (Hb A1C)  -     metFORMIN (GLUCOPHAGE) 1000 MG tablet; TAKE ONE TABLET BY MOUTH TWO TIMES A DAY WITH MEALS, Disp-180 tablet, R-0Normal  -     blood glucose monitor strips; by Other route daily, Other, DAILY Starting Thu 2022, Disp-100 strip, R-5, Normal  -     Lancets MISC; DAILY Starting Thu 2022, Disp-100 each, R-5, Normal  -     Dulaglutide (TRULICITY) 1.5 VZ/1.1HK SOPN; Inject 1.5 mg into the skin once a week, Disp-4 Adjustable Dose Pre-filled Pen Syringe, R-1Normal  2. Essential hypertension  Assessment & Plan:  ACC/AHA goal of <130/<80  On 2 drug regimen- HCTZ and CCB  Continue current therapy at this time to maintain goal at current dosing. Orders:  -     Comprehensive Metabolic Panel; Future  -     amLODIPine (NORVASC) 5 MG tablet; Take 1 tablet by mouth daily, Disp-90 tablet, R-1Normal  3. Mixed hyperlipidemia  Assessment & Plan:  Repeat lipid panel needed   Orders:  -     Lipid Panel; Future  -     Comprehensive Metabolic Panel; Future  4. CARLENE (obstructive sleep apnea)  Assessment & Plan:  Recent diagnosis with recent use of CPAP  CPAP toleration:   5. Obesity, Class III, BMI 40-49.9 (morbid obesity) ([facility]  6. Need for influenza vaccination  -     Influenza, AFLURIA, (age 1 y+), IM, Preservative Free, 0.5 mL  7. Thrombocytosis  Assessment & Plan:  Repeat CBC and follow   Orders:  -     CBC with Auto Differential; Future  8. Anxiety disorder, unspecified type  Assessment & Plan:  All symptoms controlled at this time on current therapy   Patient would like to proceed with therapy. 9. Facial rash  -     clindamycin-benzoyl peroxide (BENZACLIN) 1-5 % gel; Apply topically 2 times daily. Thin film, Disp-60 g, R-0, Normal    1) Increase Trulicity as noted   2) Facial rash unclear etiology- no findings consistent with impetigo; papules without pustules; will trial clindamycin combination topical management. Encourage follow-up with Dr. Don Shine as discussed [facility])  3) Continue current meds     HCM   Influenza vaccine today    Need to schedule with Women's Health and PAP due- encouraged to call    Foot exam is up to day   Encouraged to complete COVID Bi[employer] Boosters    Return in about 3 months (around 12/29/2022). Subjective   SUBJECTIVE/OBJECTIVE:  HPI    Following for 3 month follow-up. States seen by Dr. Danny Gaytan in September and notes healing abrasion left 1st toe- healing- no secondary infection. Follow-up with Podiatry in 3 months. A1c today is increasing to 7.7%. Only new complaint of facial rash. Rash is around the perioral region- papular in nature. Has used topical neosporin and states improvement but recurrence. Denies any additional new symptoms. Review of Systems   Constitutional:  Negative for activity change, appetite change, chills and fever.    Eyes:  Negative for visual disturbance. Respiratory:  Negative for cough, shortness of breath and wheezing. Cardiovascular:  Positive for palpitations (intermittent and stable). Negative for chest pain and leg swelling. Gastrointestinal:  Negative for abdominal pain, blood in stool, constipation, diarrhea, nausea and vomiting. Genitourinary:  Negative for dysuria. Skin:  Positive for rash. Abrasion left 1st toe   Neurological:  Negative for dizziness, syncope and light-headedness. Psychiatric/Behavioral:  Negative for decreased concentration. The patient is not nervous/anxious. Objective   Vitals:    09/29/22 1249   BP: 134/83   Site: Right Upper Arm   Position: Sitting   Cuff Size: Medium Adult   Pulse: 74   Resp: 18   Temp: 97.6 °F (36.4 °C)   TempSrc: Temporal   SpO2: 99%   Weight: 253 lb (114.8 kg)   Height: 5' 6\" (1.676 m)       Physical Exam  Constitutional:       Appearance: She is not ill-appearing. Eyes:      General: No scleral icterus. Cardiovascular:      Rate and Rhythm: Normal rate and regular rhythm. Pulses: Normal pulses. Heart sounds: No murmur heard. Pulmonary:      Effort: Pulmonary effort is normal.      Breath sounds: Normal breath sounds. Abdominal:      Palpations: Abdomen is soft. Tenderness: There is no abdominal tenderness. There is no guarding. Musculoskeletal:      Right lower leg: No edema. Left lower leg: No edema. Skin:     General: Skin is warm and dry. Comments: Periorbital rash- papular in nature; no central pustules; scaling areas    Neurological:      Mental Status: She is alert. An electronic signature was used to authenticate this note.     --Cirilo Lynch MD , FACP

## 2022-09-29 NOTE — ASSESSMENT & PLAN NOTE
ACC/AHA goal of <130/<80  On 2 drug regimen- HCTZ and CCB  Continue current therapy at this time to maintain goal at current dosing.

## 2022-09-29 NOTE — PROGRESS NOTES
AVS printed with follow up appointment and discharge instructions along with lab script and given to the patient.  Instructed to call with any issues

## 2022-09-29 NOTE — PATIENT INSTRUCTIONS
1) Increase Trulicity 1.5 mg weekly. Stop lower dose. 2) In 8 weeks- we will likely plan to increase further to 3 mg if tolerating. 3) Keep glucose checks and follow with [employer] message. 4) Set up Women's Health appt for PAP follow-up. 5) Maintain eventual appt for COVID booster.

## 2022-09-29 NOTE — ASSESSMENT & PLAN NOTE
All symptoms controlled at this time on current therapy   Patient would like to proceed with therapy.

## 2022-10-18 ENCOUNTER — TELEPHONE (OUTPATIENT)
Dept: INTERNAL MEDICINE | Age: 52
End: 2022-10-18

## 2022-10-19 NOTE — TELEPHONE ENCOUNTER
Please notify patient and patient's  of unread mychart notifications and follow-up for symptoms and management needs.

## 2022-11-10 ENCOUNTER — PROCEDURE VISIT (OUTPATIENT)
Dept: PODIATRY | Age: 52
End: 2022-11-10
Payer: COMMERCIAL

## 2022-11-10 ENCOUNTER — OFFICE VISIT (OUTPATIENT)
Dept: OBGYN | Age: 52
End: 2022-11-10
Payer: COMMERCIAL

## 2022-11-10 VITALS
DIASTOLIC BLOOD PRESSURE: 74 MMHG | HEART RATE: 76 BPM | BODY MASS INDEX: 41.51 KG/M2 | SYSTOLIC BLOOD PRESSURE: 123 MMHG | WEIGHT: 257.2 LBS

## 2022-11-10 VITALS — BODY MASS INDEX: 41.3 KG/M2 | HEIGHT: 66 IN | WEIGHT: 257 LBS

## 2022-11-10 DIAGNOSIS — G60.8 HEREDITARY SENSORY NEUROPATHY: ICD-10-CM

## 2022-11-10 DIAGNOSIS — L84 CORNS AND CALLOSITIES: ICD-10-CM

## 2022-11-10 DIAGNOSIS — Z12.4 PAP SMEAR FOR CERVICAL CANCER SCREENING: ICD-10-CM

## 2022-11-10 DIAGNOSIS — Z01.419 WELL WOMAN EXAM WITH ROUTINE GYNECOLOGICAL EXAM: Primary | ICD-10-CM

## 2022-11-10 DIAGNOSIS — I10 ESSENTIAL HYPERTENSION: ICD-10-CM

## 2022-11-10 DIAGNOSIS — E11.65 TYPE 2 DIABETES MELLITUS WITH HYPERGLYCEMIA, WITHOUT LONG-TERM CURRENT USE OF INSULIN (HCC): ICD-10-CM

## 2022-11-10 DIAGNOSIS — E66.01 MORBID OBESITY WITH BODY MASS INDEX (BMI) OF 40.0 TO 44.9 IN ADULT (HCC): ICD-10-CM

## 2022-11-10 DIAGNOSIS — B35.1 TINEA UNGUIUM: Primary | ICD-10-CM

## 2022-11-10 DIAGNOSIS — E11.9 TYPE 2 DIABETES MELLITUS WITHOUT COMPLICATION, UNSPECIFIED WHETHER LONG TERM INSULIN USE (HCC): ICD-10-CM

## 2022-11-10 PROCEDURE — 11056 PARNG/CUTG B9 HYPRKR LES 2-4: CPT | Performed by: PODIATRIST

## 2022-11-10 PROCEDURE — 99396 PREV VISIT EST AGE 40-64: CPT | Performed by: OBSTETRICS & GYNECOLOGY

## 2022-11-10 PROCEDURE — 11721 DEBRIDE NAIL 6 OR MORE: CPT | Performed by: PODIATRIST

## 2022-11-10 PROCEDURE — 3074F SYST BP LT 130 MM HG: CPT | Performed by: OBSTETRICS & GYNECOLOGY

## 2022-11-10 PROCEDURE — 3078F DIAST BP <80 MM HG: CPT | Performed by: OBSTETRICS & GYNECOLOGY

## 2022-11-10 PROCEDURE — G8482 FLU IMMUNIZE ORDER/ADMIN: HCPCS | Performed by: OBSTETRICS & GYNECOLOGY

## 2022-11-10 PROCEDURE — 99213 OFFICE O/P EST LOW 20 MIN: CPT | Performed by: OBSTETRICS & GYNECOLOGY

## 2022-11-10 NOTE — PROGRESS NOTES
Patient alert and pleasant with no complaints  Here today for annual GYN visit. Assisted with pelvic exam, pap smear obtained, labeled and sent to lab. Discharge instructions have been discussed with the patient. Patient advised to call our office with any questions or concerns. Voiced understanding.

## 2022-11-10 NOTE — PROGRESS NOTES
Yvan Mcgovern is here today for a diabetic foot exam and nail care. c/o wound to left great toe which she states drains occasionally. her PCP is Jeffry Acevedo MD last OV was 09/29/2022. CC:   Follow-up diabetic exam      HPI:   Follow-up diabetic exam.  No open wounds. No foot or ankle issues today. ROS:  Const: Denies constitutional symptoms  Musculo: Denies symptoms other than stated above  Skin: Denies symptoms other than stated above      Current Outpatient Medications:     metFORMIN (GLUCOPHAGE) 1000 MG tablet, TAKE ONE TABLET BY MOUTH TWO TIMES A DAY WITH MEALS, Disp: 180 tablet, Rfl: 0    amLODIPine (NORVASC) 5 MG tablet, Take 1 tablet by mouth daily, Disp: 90 tablet, Rfl: 1    blood glucose monitor strips, by Other route daily, Disp: 100 strip, Rfl: 5    Lancets MISC, Inject 1 each into the skin daily, Disp: 100 each, Rfl: 5    Dulaglutide (TRULICITY) 1.5 UE/9.5SU SOPN, Inject 1.5 mg into the skin once a week, Disp: 4 Adjustable Dose Pre-filled Pen Syringe, Rfl: 1    clindamycin-benzoyl peroxide (BENZACLIN) 1-5 % gel, Apply topically 2 times daily. Thin film, Disp: 60 g, Rfl: 0    FLUoxetine (PROZAC) 20 MG capsule, TAKE ONE CAPSULE BY MOUTH DAILY, Disp: 90 capsule, Rfl: 0    hydroCHLOROthiazide (HYDRODIURIL) 25 MG tablet, TAKE ONE TABLET BY MOUTH EVERY MORNING, Disp: 90 tablet, Rfl: 0    pravastatin (PRAVACHOL) 80 MG tablet, TAKE ONE TABLET BY MOUTH EVERY DAY IN THE EVENING, Disp: 90 tablet, Rfl: 1    fenofibrate (TRICOR) 48 MG tablet, TAKE ONE TABLET BY MOUTH DAILY, Disp: 90 tablet, Rfl: 1    ammonium lactate (LAC-HYDRIN) 12 % lotion, Apply topically twice daily, Disp: 400 g, Rfl: 1    blood glucose monitor strips, Test 2 times a day & as needed for symptoms of irregular blood glucose. Dispense sufficient amount for indicated testing frequency plus additional to accommodate PRN testing needs. , Disp: 100 strip, Rfl: 5    magnesium cl-calcium carbonate (NU-MAG) 71.5-119 MG TBEC tablet, TAKE TWO TABLETS BY MOUTH EVERY DAY, Disp: 180 tablet, Rfl: 1    Blood Glucose Monitoring Suppl (TRUE METRIX METER) w/Device KIT, 1 Device by Does not apply route daily, Disp: 1 kit, Rfl: 0    Lancets MISC, 1 each by Does not apply route 2 times daily, Disp: 300 each, Rfl: 1    Alpha-Lipoic Acid 600 MG TABS, Take 600 mg by mouth daily, Disp: , Rfl:     b complex vitamins capsule, Take 1 capsule by mouth daily, Disp: , Rfl:     Misc. Devices KIT, Blood pressure cuff- Ambulatory monitoring- check every other day and maintain blood pressure log, Disp: 1 kit, Rfl: 0  Allergies   Allergen Reactions    Bactrim [Sulfamethoxazole-Trimethoprim] Rash       Past Medical History:   Diagnosis Date    Abdominal pain     Anxiety state, unspecified 3/8/2016    Asthma     no inhalers    Breast fibrocystic disorder     Diabetes mellitus ([facility] [address]     Essential hypertension 4/25/2016    Hyperlipidemia     Irritable bowel syndrome     Left ovarian cyst     Morbid obesity ([facility] [address] 4/25/2016    Osteoarthritis     Pharyngoesophageal dysphagia 6/14/2016       There were no vitals filed for this visit. Focused Lower Extremity Physical Exam:      Neurovascular examination:    Dorsalis Pedis palpable bilateral.  Posterior tibialis palpable bilateral.    Capillary Refill Time:  Immediate return  Hair growth:  Symmetrical and bilateral   Skin:  Not atrophic  Edema: Mild edema bilateral feet. Mild edema bilateral ankles. Neurologic:  Light touch diminished bilateral.  Warm to coolness bilateral distal toes  Decreased epicritic sensation     Musculoskeletal/ Orthopedic examination:    Equinis: present bilateral  Dorsiflexion, plantarflexion, inversion, eversion bilateral 5 out of 5 muscle strength  Wiggling toes  Negative Homans  Flexible hammertoes 2 through 5 bilateral.    No pain foot or ankle    Dermatology examination:    Toenails 1-5 right and left thickened, elongated, dystrophic, mycotic with subungual debris.   Webspaces 1-4 bilateral clean, dry and intact. Hyperkeratotic tissue IPJ great toe bilateral plantar first metatarsal bilateral.  No open wounds. Assessment and Plan:  Rolf Huber was seen today for callouses, nail problem, diabetes and wound check. Diagnoses and all orders for this visit:    Tinea unguium    Corns and callosities    Type 2 diabetes mellitus without complication, unspecified whether long term insulin use (HCC)    Hereditary sensory neuropathy      Follow-up diabetic exam  Manual debridement with standard technique toenails 1 through 5 right and left in thickness and length. Patient tolerated procedure well. I did pare hyperkeratotic tissue IPJ great toe bilateral plantar first metatarsal bilateral.  Stressed importance diabetic inserts. Ammonium lactate 12% twice daily. Follow-up 2 months. Return in about 2 months (around 1/10/2023). Seen By:  Ean Key DPM      Document was created using voice recognition software. Note was reviewed however may contain grammatical errors.

## 2022-11-10 NOTE — PROGRESS NOTES
Chief complaint : 46 year- old presents for well woman exam.     Present illness :    G4, P3,Ab1. Doing well. Periods:  Menopausal now. Sexually active : yes . No abdominal or pelvic pain. No dyspareunia. No abnormal vaginal  discharge. Previous Pap smears normal. Last Pap smear 2019, negative . Jennifer Castillo No urinary or GI symptoms. Medical/ surgical history and medications reviewed. Denies bowel problems. Mammogram current and results are Negative. Type 2 DM on orals. Also Hypertension on treatment. Blood sugars still a bit high. A1c not at goal.    ROS: Negative    Examination :  Vital signs reviewed. BP is good today. GA : Healthy. Oriented x 3 no distress. HEENT : hearing grossly intact, no jaundice, no oral lesions or nasal discharge. Neck : Supple. Thyroid : normal, no goiter. Breasts deferred . Abdomen :Soft. No masses. No organomegaly. V&V : Normal female external genitalia. BUS: Normal.  PS : Adequate. Cervix : No lesions, pap smear done as a TPP with co-testing  Uterus : Normal size. Adnexa : Free, no masses. IMP: Normal well woman exam, Pap for cancer screening and HPV, DM, Hypertension    Plan:RTC 2-3 years if pap and HPV are negative. Call if results are not in My Chart by 3 weeks.

## 2022-11-14 LAB
HPV SAMPLE: NORMAL
HPV TYPE 16: NOT DETECTED
HPV TYPE 18: NOT DETECTED
HPV, HIGH RISK OTHER: NOT DETECTED
INTERPRETATION: NORMAL
SOURCE: NORMAL

## 2022-11-17 NOTE — TELEPHONE ENCOUNTER
Last Appointment:  4/28/2021  Future Appointments   Date Time Provider Diane Cainisti   7/23/2021  8:00 AM Tony Poon DPM Col Podiat[facility]   8/12/2021  2:15 PM Atul Singletary MD [facility]      Should have enough but I wasn't sure if you wanted to send a small supply in case. TeleConsultation - Lenard [age] y o  female MRN: [id number]  Unit/Bed#: 424-01 Encounter: [id number]          REQUIRED DOCUMENTATION:     1  This service was provided via Telemedicine  2  Provider located at [facility]   3  TeleMed provider: Corrinne Pap, MD   4  Identify all parties in room with patient during tele consult: Patient   5  Patient was then informed that this was a Telemedicine visit and that the exam was being conducted confidentially over secure lines  My office door was closed  No one else was in the room  Patient acknowledged consent and understanding of privacy and security of the Telemedicine visit, and gave us permission to have the assistant stay in the room in order to assist with the history and to conduct the exam   I informed the patient that I have reviewed their record in Epic and presented the opportunity for them to ask any questions regarding the visit today  The patient agreed to participate  Discussed with Weston Rodriguez MD    Assessment/Plan     Principal Problem:    AMS (altered mental status)  Active Problems:    Bipolar 1 disorder (HCC)    GERD (gastroesophageal reflux disease)    Chronic pain    Assessment:  Jamel Laughlin is a 76 y/o female with PMH significant for Bipolar Disorder and Chronic Pain that presented for AMS  Patients presentation consistent with Metabolic Encephalopathy 2/2 polypharmacy with BDZ, Opioids, Gabapentin, and dual or tripple antipsychotic therapy  Patient should ideally be optimized on one antipsychotic  Patient without any active mood symptoms to include trinity or depression  Patient does endorse some passive SI without plan or intent and spontaneously produces protective factors   Patient without any indication for voluntary or involuntary IP psychiatric admission or 1:1      Metabolic Encephalopathy     Treatment Plan:    Planned Medication Changes:    -Consider using only Olanzapine 20 mg qhs     Current Medications:     Current Facility-Administered Medications   Medication Dose Route Frequency Provider Last Rate   • acetaminophen  650 mg Oral Q6H PRN Pati Guzman DO     • albuterol  2 puff Inhalation Q4H PRN Malou Connelly MD     • amLODIPine  5 mg Oral Daily Malou Connelly MD     • aspirin  81 mg Oral Daily Malou Connelly MD     • atorvastatin  40 mg Oral QPM Delayaysha Guzman DO     • vitamin B-12  1,000 mcg Oral Daily Malou Connelly MD     • enoxaparin  40 mg Subcutaneous Q24H Pati Guzman DO     • famotidine  20 mg Oral Daily Malou Connelyl MD     • [START ON 11/18/2022] metoprolol succinate  100 mg Oral Daily Pati Guzman DO     • nicotine  1 patch Transdermal Daily Malou Connelly MD     • ondansetron  4 mg Intravenous Q6H PRN Malou Connelly MD     • [START ON 11/18/2022] pantoprazole  40 mg Oral Daily Malou Connelly MD     • sodium chloride  125 mL/hr Intravenous Continuous Malou Connelly  mL/hr (11/17/22 1250)       Risks / Benefits of Treatment:    Risks, benefits, and possible side effects of medications explained to patient and patient verbalizes understanding  Inpatient consult to Psychiatry  Consult performed by: Dixon Hightower MD  Consult ordered by: Malou Connelly MD        Physician Requesting Consult: Roya Núñez MD  Principal Problem:AMS (altered mental status)    Reason for Consult: Psych Evaluation       History of Present Illness      Patient states that she can not remember much about her presentation  Patient was a willing historian and denied any current or recent symptoms of trinity or hypomania nor any depressive or psychotic symptoms  Patient is unable to identify the last time that she had any acute decompensation and per chart review has no IP psychiatric admissions and psychiatric medications are managed by PCP  Patient denied any other acute psychiatric complaints          Psychiatric Review Of Systems:    sleep: no  appetite changes: no  weight changes: no  energy/anergy: no  interest/pleasure/anhedonia: no  somatic symptoms: no  anxiety/panic: no  trinity: no  guilty/hopeless: no  self injurious behavior/risky behavior: no    Historical Information     Past Psychiatric History:     Psychiatric Hospitalizations:   • No history of past inpatient psychiatric admissions  Outpatient Treatment History:   • PCP  Suicide Attempts:   • Yes, one attempt by overdose  History of self-harm:   • None  Violence History:   • no  Past Psychiatric medication trials: Unable to recall     Substance Abuse History:    barbiturates route Denied   Use of Alcohol: denied    Longest clean time: Years  History of IP/OP rehabilitation program: None  Smoking history: Denied  Use of Caffeine: Yes    Family Psychiatric History: Denied         Social History:    Education: high school diploma/GED  Learning Disabilities: Denied  Marital history: single  Living arrangement, social support: The patient lives in home with alone  Occupational History: unknown occupation  Functioning Relationships: good support system    Other Pertinent History: None    Traumatic History:     Abuse: None  Other Traumatic Events: none    Past Medical History:   Diagnosis Date   • Bipolar 1 disorder ([facility] )    • Cancer ([facility] )     kidney   • Cardiac disease     fast heart beat   • Chronic pain    • Fractures    • GERD (gastroesophageal reflux disease)    • Hard of hearing    • Hypertension    • Renal cell carcinoma ([facility] )    • Stroke ([facility] )     2009 affected her speech, right sided weakness   • TIA (transient ischemic attack)        Medical Review Of Systems:    Review of Systems    Meds/Allergies     all current active meds have been reviewed  Allergies   Allergen Reactions   • Celecoxib Rash and Other (See Comments)     CELEBREX   • Doxazosin Rash and Hives   • Metaxalone Rash and Hives       Objective     Vital signs in last 24 hours:  Temp:  [96 1 °F (35 6 °C)-98 2 °F (36 8 °C)] 98 2 °F (36 8 °C)  HR:  [58-71] 65  Resp:  [13-20] 18  BP: (101-161)/() 111/63    No intake or output data in the 24 hours ending 11/17/22 1733    Mental Status Evaluation:    Appearance:  age appropriate   Behavior:  normal   Speech:  normal pitch and normal volume   Mood:  normal   Affect:  normal   Language: naming objects   Thought Process:  normal   Associations intact associations   Thought Content:  normal   Perceptual Disturbances: None   Risk Potential: Suicidal Ideations none  Homicidal Ideations none  Potential for Aggression No   Sensorium:  person and place   Cognition:  recent memory moderately impaired   Consciousness:  alert    Attention: attention span and concentration were age appropriate   Intellect: within normal limits   Fund of Knowledge: awareness of current events: President   Insight:  limited   Judgment: limited   Muscle Strength:  Muscle Tone: normal NFT  normal   Gait/Station: normal gait/station   Motor Activity: no abnormal movements       Lab Results: I have personally reviewed all pertinent laboratory/tests results       Most Recent Labs:   Lab Results   Component Value Date    WBC 6 13 11/17/2022    RBC 3 02 (L) 11/17/2022    HGB 8 2 (L) 11/17/2022    HCT 27 2 (L) 11/17/2022     11/17/2022    RDW 26 6 (H) 11/17/2022    NEUTROABS 4 33 11/17/2022    SODIUM 142 11/17/2022    K 3 6 11/17/2022     11/17/2022    CO2 30 11/17/2022    BUN 17 11/17/2022    CREATININE 0 97 11/17/2022    GLUC 130 11/17/2022    GLUF 110 (H) 07/28/2022    CALCIUM 8 7 11/17/2022    AST 15 11/17/2022    ALT 9 (L) 11/17/2022    ALKPHOS 113 11/17/2022    TP 6 2 (L) 11/17/2022    ALB 2 9 (L) 11/17/2022    TBILI 0 26 11/17/2022    CHOLESTEROL 140 08/18/2021    HDL 38 (L) 08/18/2021    TRIG 128 08/18/2021    LDLCALC 76 08/18/2021    NONHDLC 102 08/18/2021    LITHIUM 0 4 (L) 10/27/2015    AMMONIA 19 06/24/2021    [id number] 0 438 (L) 11/17/2022    FREET4 0 62 (L) 03/06/2018    HGBA1C 5 3 10/16/2020  10/16/2020       Imaging Studies: CTA head and neck w wo contrast    Result Date: 11/17/2022  Narrative: CTA NECK AND BRAIN WITH CONTRAST INDICATION: cva sx- speech, ams COMPARISON:   Noncontrast CT from earlier today  TECHNIQUE:   Post contrast imaging was performed after administration of iodinated contrast through the neck and brain  Post contrast axial 0 625 mm images timed to opacify the arterial system  3D rendering was performed on an independent workstation  MIP reconstructions performed  Coronal reconstructions were performed of the noncontrast portion of the brain  Radiation dose length product (DLP) for this visit:  360 17 mGy-cm   This examination, like all CT scans performed in the [facility], was performed utilizing techniques to minimize radiation dose exposure, including the use of iterative  reconstruction and automated exposure control  IV Contrast:  100 mL of iohexol (OMNIPAQUE)  IMAGE QUALITY:   Diagnostic FINDINGS: NONCONTRAST BRAIN PARENCHYMA:  No intracranial mass, mass effect or midline shift  No CT signs of acute infarction  No acute parenchymal hemorrhage  VENTRICLES AND EXTRA-AXIAL SPACES:  Normal for the patient's age  VISUALIZED ORBITS AND PARANASAL SINUSES:  Unremarkable  CERVICAL VASCULATURE AORTIC ARCH AND GREAT VESSELS:  Normal aortic arch and great vessel origins  Normal visualized subclavian vessels  RIGHT VERTEBRAL ARTERY CERVICAL SEGMENT:  Normal origin  Developmentally small  No significant stenosis  LEFT VERTEBRAL ARTERY CERVICAL SEGMENT: Dominant  Normal origin  The vessel is normal in caliber throughout the neck  RIGHT EXTRACRANIAL CAROTID SEGMENT:  Normal caliber common carotid artery  Normal bifurcation and cervical internal carotid artery  No stenosis or dissection  LEFT EXTRACRANIAL CAROTID SEGMENT:  Normal caliber common carotid artery  Normal bifurcation and cervical internal carotid artery  No stenosis or dissection   NASCET criteria was used to determine the degree of internal carotid artery diameter stenosis  INTRACRANIAL VASCULATURE INTERNAL CAROTID ARTERIES:  Calcified plaque in the cavernous and supraclinoid internal carotid arteries with mild narrowing but no high-grade stenosis  Normal ophthalmic artery origins  Normal ICA terminus  ANTERIOR CIRCULATION:  Symmetric A1 segments and anterior cerebral arteries with normal enhancement  Normal anterior communicating artery  MIDDLE CEREBRAL ARTERY CIRCULATION:  M1 segment and middle cerebral artery branches demonstrate normal enhancement bilaterally  DISTAL VERTEBRAL ARTERIES:  Unremarkable dominant left vertebral artery  Patent hypoplastic right vertebral artery is diminutive distal to PICA  Posterior inferior cerebellar artery origins are normal  Normal vertebral basilar junction  BASILAR ARTERY:  Basilar artery is normal in caliber  Normal superior cerebellar arteries  POSTERIOR CEREBRAL ARTERIES: Both posterior cerebral arteries arises from the basilar tip  Both arteries demonstrate normal enhancement  VENOUS STRUCTURES:  Normal  NON VASCULAR ANATOMY BONY STRUCTURES:  Spinal degenerative changes  Sclerotic lesion in the anterior C6 vertebral body is most likely benign given stability dating back to cervical spine CT dated 7/8/2015  SOFT TISSUES OF THE NECK:  Unremarkable  THORACIC INLET:  Mild subsegmental atelectasis  Impression: No significant stenosis of the cervical carotid or vertebral arteries No significant intracranial stenosis, large vessel occlusion or aneurysm  Workstation performed: [id number]     XR chest 1 view portable    Result Date: 11/17/2022  Narrative: CHEST INDICATION:   placement of central line  COMPARISON:  Earlier today  EXAM PERFORMED/VIEWS:  XR CHEST PORTABLE  AP supine Images: 4 FINDINGS:  Right IJ line has been pulled  Cardiomediastinal silhouette appears unremarkable  The lungs are clear  No pneumothorax or pleural effusion   Osseous structures appear within normal limits for patient age  Impression: No acute cardiopulmonary disease  Workstation performed: [id number]     XR chest 1 view portable    Result Date: 11/17/2022  Narrative: CHEST INDICATION:   ams, cva sx  COMPARISON:  CXR and CT 6/24/2021  EXAM PERFORMED/VIEWS:  XR CHEST PORTABLE FINDINGS:  Right jugular catheter malpositioned in the right axillary vein  Cardiomediastinal silhouette appears unremarkable  The lungs are clear  No pneumothorax or pleural effusion  Osseous structures appear within normal limits for patient age  Impression: Right jugular catheter malpositioned in the right axillary vein  No acute pulmonary disease with no pneumothorax  I personally discussed this study with Dr Thierno Childs on 11/17/2022 at 9:14 AM  Workstation performed: [id number]     CT head wo contrast    Result Date: 11/17/2022  Narrative: CT BRAIN - WITHOUT CONTRAST INDICATION:   slurred speech  COMPARISON:  CT head 6/24/2021 TECHNIQUE:  CT examination of the brain was performed  In addition to axial images, sagittal and coronal 2D reformatted images were created and submitted for interpretation  Radiation dose length product (DLP) for this visit:  908  31 mGy-cm   This examination, like all CT scans performed in the [facility], was performed utilizing techniques to minimize radiation dose exposure, including the use of iterative  reconstruction and automated exposure control  IMAGE QUALITY:  Diagnostic  FINDINGS: PARENCHYMA:  No intracranial mass, mass effect or midline shift  No CT signs of acute infarction  No acute parenchymal hemorrhage  Periventricular and centrum semiovale white matter hypodensity is a nonspecific finding likely representing chronic microangiopathy  Chronic lacunar infarct left basal ganglia  Calcification bilateral cavernous internal carotid arteries  VENTRICLES AND EXTRA-AXIAL SPACES:  Normal for the patient's age   VISUALIZED ORBITS AND PARANASAL SINUSES:  Changes of right-sided lens replacement noted  Minimal mucosal thickening left maxillary sinus  CALVARIUM AND EXTRACRANIAL SOFT TISSUES:  Normal      Impression: No evidence of acute intracranial process  Chronic microangiopathy  Workstation performed: [id number]     EKG/Pathology/Other Studies:   Lab Results   Component Value Date    VENTRATE 68 11/17/2022    ATRIALRATE 68 11/17/2022    PRINT 196 11/17/2022    QRSDINT 86 11/17/2022    QTINT 398 11/17/2022    QTCINT 423 11/17/2022    PAXIS 74 11/17/2022    QRSAXIS 75 11/17/2022    TWAVEAXIS 65 11/17/2022        Code Status: Level 1 - Full Code  Advance Directive and Living Will:      Power of :    POLST:      Counseling / Coordination of Care: Total floor / unit time spent today 30 minutes  Greater than 50% of total time was spent with the patient and / or family counseling and / or coordination of care  A description of the counseling / coordination of care: Direct Patient Care, Chart Review, and Documentation

## 2022-11-19 DIAGNOSIS — E11.65 TYPE 2 DIABETES MELLITUS WITH HYPERGLYCEMIA, WITHOUT LONG-TERM CURRENT USE OF INSULIN (HCC): ICD-10-CM

## 2022-11-21 RX ORDER — DULAGLUTIDE 1.5 MG/.5ML
INJECTION, SOLUTION SUBCUTANEOUS
Qty: 2 ML | Refills: 0 | Status: SHIPPED | OUTPATIENT
Start: 2022-11-21

## 2022-12-14 DIAGNOSIS — I10 ESSENTIAL HYPERTENSION: ICD-10-CM

## 2022-12-14 DIAGNOSIS — N95.1 POST MENOPAUSAL SYNDROME: ICD-10-CM

## 2022-12-14 RX ORDER — HYDROCHLOROTHIAZIDE 25 MG/1
TABLET ORAL
Qty: 90 TABLET | Refills: 0 | Status: SHIPPED | OUTPATIENT
Start: 2022-12-14

## 2022-12-14 RX ORDER — FLUOXETINE HYDROCHLORIDE 20 MG/1
CAPSULE ORAL
Qty: 90 CAPSULE | Refills: 0 | Status: SHIPPED | OUTPATIENT
Start: 2022-12-14

## 2022-12-14 NOTE — TELEPHONE ENCOUNTER
Last Appointment:  9/29/2022  Future Appointments   Date Time Provider Diane Mishel   1/5/2023  1:15 PM Cornelius Fournier MD [address] [facility]   1/12/2023 11:15 AM Nellie Dyer DPM Col Podiatry [facility]

## 2022-12-28 DIAGNOSIS — E11.65 TYPE 2 DIABETES MELLITUS WITH HYPERGLYCEMIA, WITHOUT LONG-TERM CURRENT USE OF INSULIN (HCC): ICD-10-CM

## 2022-12-29 RX ORDER — DULAGLUTIDE 1.5 MG/.5ML
INJECTION, SOLUTION SUBCUTANEOUS
Qty: 4 ADJUSTABLE DOSE PRE-FILLED PEN SYRINGE | Refills: 2 | Status: SHIPPED | OUTPATIENT
Start: 2022-12-29

## 2023-01-04 ENCOUNTER — HOSPITAL ENCOUNTER (OUTPATIENT)
Age: 53
Discharge: HOME | End: 2023-01-04
Payer: COMMERCIAL

## 2023-01-04 DIAGNOSIS — E78.2 MIXED HYPERLIPIDEMIA: ICD-10-CM

## 2023-01-04 DIAGNOSIS — E11.65 TYPE 2 DIABETES MELLITUS WITH HYPERGLYCEMIA, WITHOUT LONG-TERM CURRENT USE OF INSULIN (HCC): ICD-10-CM

## 2023-01-04 DIAGNOSIS — D75.839 THROMBOCYTOSIS: ICD-10-CM

## 2023-01-04 DIAGNOSIS — I10 ESSENTIAL HYPERTENSION: ICD-10-CM

## 2023-01-04 LAB
ALBUMIN SERPL-MCNC: 4.4 G/DL (ref 3.5–5.2)
ALP BLD-CCNC: 68 U/L (ref 35–104)
ALT SERPL-CCNC: 37 U/L (ref 0–32)
ANION GAP SERPL CALCULATED.3IONS-SCNC: 9 MMOL/L (ref 7–16)
AST SERPL-CCNC: 21 U/L (ref 0–31)
BASOPHILS ABSOLUTE: 0.04 E9/L (ref 0–0.2)
BASOPHILS RELATIVE PERCENT: 0.5 % (ref 0–2)
BILIRUB SERPL-MCNC: 0.2 MG/DL (ref 0–1.2)
BUN BLDV-MCNC: 17 MG/DL (ref 6–20)
CALCIUM SERPL-MCNC: 9.8 MG/DL (ref 8.6–10.2)
CHLORIDE BLD-SCNC: 97 MMOL/L (ref 98–107)
CHOLESTEROL, TOTAL: 161 MG/DL (ref 0–199)
CO2: 27 MMOL/L (ref 22–29)
CREAT SERPL-MCNC: 0.7 MG/DL (ref 0.5–1)
EOSINOPHILS ABSOLUTE: 0.54 E9/L (ref 0.05–0.5)
EOSINOPHILS RELATIVE PERCENT: 7.4 % (ref 0–6)
GFR SERPL CREATININE-BSD FRML MDRD: >60 ML/MIN/1.73
GLUCOSE BLD-MCNC: 230 MG/DL (ref 74–99)
HCT VFR BLD CALC: 42.1 % (ref 34–48)
HDLC SERPL-MCNC: 39 MG/DL
HEMOGLOBIN: 13.9 G/DL (ref 11.5–15.5)
IMMATURE GRANULOCYTES #: 0.03 E9/L
IMMATURE GRANULOCYTES %: 0.4 % (ref 0–5)
LDL CHOLESTEROL CALCULATED: 66 MG/DL (ref 0–99)
LYMPHOCYTES ABSOLUTE: 2.47 E9/L (ref 1.5–4)
LYMPHOCYTES RELATIVE PERCENT: 33.9 % (ref 20–42)
MCH RBC QN AUTO: 29.1 PG (ref 26–35)
MCHC RBC AUTO-ENTMCNC: 33 % (ref 32–34.5)
MCV RBC AUTO: 88.1 FL (ref 80–99.9)
MONOCYTES ABSOLUTE: 0.59 E9/L (ref 0.1–0.95)
MONOCYTES RELATIVE PERCENT: 8.1 % (ref 2–12)
NEUTROPHILS ABSOLUTE: 3.62 E9/L (ref 1.8–7.3)
NEUTROPHILS RELATIVE PERCENT: 49.7 % (ref 43–80)
PDW BLD-RTO: 12.3 FL (ref 11.5–15)
PLATELET # BLD: 466 E9/L (ref 130–450)
PMV BLD AUTO: 9.1 FL (ref 7–12)
POTASSIUM SERPL-SCNC: 4.2 MMOL/L (ref 3.5–5)
RBC # BLD: 4.78 E12/L (ref 3.5–5.5)
SODIUM BLD-SCNC: 133 MMOL/L (ref 132–146)
TOTAL PROTEIN: 7.1 G/DL (ref 6.4–8.3)
TRIGL SERPL-MCNC: 279 MG/DL (ref 0–149)
VLDLC SERPL CALC-MCNC: 56 MG/DL
WBC # BLD: 7.3 E9/L (ref 4.5–11.5)

## 2023-01-04 PROCEDURE — 80053 COMPREHEN METABOLIC PANEL: CPT

## 2023-01-04 PROCEDURE — 80061 LIPID PANEL: CPT

## 2023-01-04 PROCEDURE — 85025 COMPLETE CBC W/AUTO DIFF WBC: CPT

## 2023-01-04 PROCEDURE — 36415 COLL VENOUS BLD VENIPUNCTURE: CPT

## 2023-01-05 ENCOUNTER — OFFICE VISIT (OUTPATIENT)
Dept: INTERNAL MEDICINE | Age: 53
End: 2023-01-05
Payer: COMMERCIAL

## 2023-01-05 ENCOUNTER — TELEPHONE (OUTPATIENT)
Dept: INTERNAL MEDICINE | Age: 53
End: 2023-01-05

## 2023-01-05 VITALS
HEIGHT: 66 IN | OXYGEN SATURATION: 99 % | BODY MASS INDEX: 41.3 KG/M2 | RESPIRATION RATE: 18 BRPM | HEART RATE: 74 BPM | WEIGHT: 257 LBS | SYSTOLIC BLOOD PRESSURE: 129 MMHG | DIASTOLIC BLOOD PRESSURE: 84 MMHG | TEMPERATURE: 97.6 F

## 2023-01-05 DIAGNOSIS — N95.1 POST MENOPAUSAL SYNDROME: ICD-10-CM

## 2023-01-05 DIAGNOSIS — E66.01 CLASS 3 SEVERE OBESITY DUE TO EXCESS CALORIES WITH SERIOUS COMORBIDITY AND BODY MASS INDEX (BMI) OF 40.0 TO 44.9 IN ADULT (HCC): ICD-10-CM

## 2023-01-05 DIAGNOSIS — E88.81 METABOLIC SYNDROME: ICD-10-CM

## 2023-01-05 DIAGNOSIS — E78.2 MIXED HYPERLIPIDEMIA: ICD-10-CM

## 2023-01-05 DIAGNOSIS — I10 ESSENTIAL HYPERTENSION: ICD-10-CM

## 2023-01-05 DIAGNOSIS — E11.65 TYPE 2 DIABETES MELLITUS WITH HYPERGLYCEMIA, WITHOUT LONG-TERM CURRENT USE OF INSULIN (HCC): Primary | ICD-10-CM

## 2023-01-05 DIAGNOSIS — F41.9 ANXIETY DISORDER, UNSPECIFIED TYPE: ICD-10-CM

## 2023-01-05 PROBLEM — E66.813 CLASS 3 SEVERE OBESITY DUE TO EXCESS CALORIES WITH SERIOUS COMORBIDITY AND BODY MASS INDEX (BMI) OF 40.0 TO 44.9 IN ADULT: Status: ACTIVE | Noted: 2017-03-06

## 2023-01-05 PROBLEM — E88.810 METABOLIC SYNDROME: Status: ACTIVE | Noted: 2023-01-05

## 2023-01-05 LAB — HBA1C MFR BLD: 7.8 %

## 2023-01-05 PROCEDURE — 99213 OFFICE O/P EST LOW 20 MIN: CPT | Performed by: INTERNAL MEDICINE

## 2023-01-05 PROCEDURE — 3074F SYST BP LT 130 MM HG: CPT | Performed by: INTERNAL MEDICINE

## 2023-01-05 PROCEDURE — 3017F COLORECTAL CA SCREEN DOC REV: CPT | Performed by: INTERNAL MEDICINE

## 2023-01-05 PROCEDURE — 83036 HEMOGLOBIN GLYCOSYLATED A1C: CPT | Performed by: INTERNAL MEDICINE

## 2023-01-05 PROCEDURE — G8427 DOCREV CUR MEDS BY ELIG CLIN: HCPCS | Performed by: INTERNAL MEDICINE

## 2023-01-05 PROCEDURE — 3078F DIAST BP <80 MM HG: CPT | Performed by: INTERNAL MEDICINE

## 2023-01-05 PROCEDURE — 2022F DILAT RTA XM EVC RTNOPTHY: CPT | Performed by: INTERNAL MEDICINE

## 2023-01-05 PROCEDURE — 3051F HG A1C>EQUAL 7.0%<8.0%: CPT | Performed by: INTERNAL MEDICINE

## 2023-01-05 PROCEDURE — G8482 FLU IMMUNIZE ORDER/ADMIN: HCPCS | Performed by: INTERNAL MEDICINE

## 2023-01-05 PROCEDURE — G8417 CALC BMI ABV UP PARAM F/U: HCPCS | Performed by: INTERNAL MEDICINE

## 2023-01-05 PROCEDURE — 1036F TOBACCO NON-USER: CPT | Performed by: INTERNAL MEDICINE

## 2023-01-05 PROCEDURE — 99212 OFFICE O/P EST SF 10 MIN: CPT | Performed by: INTERNAL MEDICINE

## 2023-01-05 RX ORDER — DULAGLUTIDE 3 MG/.5ML
3 INJECTION, SOLUTION SUBCUTANEOUS WEEKLY
Qty: 4 ADJUSTABLE DOSE PRE-FILLED PEN SYRINGE | Refills: 1 | Status: SHIPPED | OUTPATIENT
Start: 2023-01-05

## 2023-01-05 RX ORDER — AMLODIPINE BESYLATE 5 MG/1
5 TABLET ORAL DAILY
Qty: 90 TABLET | Refills: 1 | Status: SHIPPED | OUTPATIENT
Start: 2023-01-05

## 2023-01-05 RX ORDER — ICOSAPENT ETHYL 1000 MG/1
2 CAPSULE ORAL 2 TIMES DAILY
Qty: 60 CAPSULE | Refills: 3 | Status: SHIPPED | OUTPATIENT
Start: 2023-01-05

## 2023-01-05 RX ORDER — PRAVASTATIN SODIUM 80 MG/1
TABLET ORAL
Qty: 90 TABLET | Refills: 1 | Status: SHIPPED | OUTPATIENT
Start: 2023-01-05

## 2023-01-05 RX ORDER — HYDROCHLOROTHIAZIDE 25 MG/1
TABLET ORAL
Qty: 90 TABLET | Refills: 0 | Status: SHIPPED | OUTPATIENT
Start: 2023-01-05

## 2023-01-05 RX ORDER — FENOFIBRATE 48 MG/1
TABLET, COATED ORAL
Qty: 90 TABLET | Refills: 1 | Status: SHIPPED
Start: 2023-01-05 | End: 2023-01-05

## 2023-01-05 RX ORDER — FLUOXETINE HYDROCHLORIDE 20 MG/1
CAPSULE ORAL
Qty: 90 CAPSULE | Refills: 0 | Status: SHIPPED | OUTPATIENT
Start: 2023-01-05

## 2023-01-05 SDOH — ECONOMIC STABILITY: FOOD INSECURITY: WITHIN THE PAST 12 MONTHS, YOU WORRIED THAT YOUR FOOD WOULD RUN OUT BEFORE YOU GOT MONEY TO BUY MORE.: NEVER TRUE

## 2023-01-05 SDOH — ECONOMIC STABILITY: FOOD INSECURITY: WITHIN THE PAST 12 MONTHS, THE FOOD YOU BOUGHT JUST DIDN'T LAST AND YOU DIDN'T HAVE MONEY TO GET MORE.: NEVER TRUE

## 2023-01-05 SDOH — ECONOMIC STABILITY: HOUSING INSECURITY: IN THE LAST 12 MONTHS, HOW MANY PLACES HAVE YOU LIVED?: 1

## 2023-01-05 SDOH — ECONOMIC STABILITY: INCOME INSECURITY: IN THE LAST 12 MONTHS, WAS THERE A TIME WHEN YOU WERE NOT ABLE TO PAY THE MORTGAGE OR RENT ON TIME?: NO

## 2023-01-05 ASSESSMENT — SOCIAL DETERMINANTS OF HEALTH (SDOH): HOW HARD IS IT FOR YOU TO PAY FOR THE VERY BASICS LIKE FOOD, HOUSING, MEDICAL CARE, AND HEATING?: NOT HARD AT ALL

## 2023-01-05 ASSESSMENT — PATIENT HEALTH QUESTIONNAIRE - PHQ9
SUM OF ALL RESPONSES TO PHQ QUESTIONS 1-9: 0
2. FEELING DOWN, DEPRESSED OR HOPELESS: 0
1. LITTLE INTEREST OR PLEASURE IN DOING THINGS: 0
SUM OF ALL RESPONSES TO PHQ QUESTIONS 1-9: 0
SUM OF ALL RESPONSES TO PHQ QUESTIONS 1-9: 0
SUM OF ALL RESPONSES TO PHQ9 QUESTIONS 1 & 2: 0
SUM OF ALL RESPONSES TO PHQ QUESTIONS 1-9: 0

## 2023-01-05 ASSESSMENT — LIFESTYLE VARIABLES
HOW OFTEN DO YOU HAVE A DRINK CONTAINING ALCOHOL: NEVER
HOW MANY STANDARD DRINKS CONTAINING ALCOHOL DO YOU HAVE ON A TYPICAL DAY: PATIENT DOES NOT DRINK

## 2023-01-05 NOTE — PROGRESS NOTES
AVS printed with follow up appointment and discharge instructions and given to the patient. Instructed to call with all issues.

## 2023-01-05 NOTE — TELEPHONE ENCOUNTER
Please inform pharmacy- Vascepa to be trialed if accepted by insurance and Fibrate to be discontinued (Tricor)- please discontinue refills of Tricor.

## 2023-01-05 NOTE — PATIENT INSTRUCTIONS
1) Set goals for diet. Add fiber supplementation, water supplementation and healthy protein as discussed. 2) review Vascepa- we will order and try to obtain in place of the Tricor.     3) 3 month follow-up for weight loss and repeat A1c.     4) Please call with any concerns

## 2023-01-05 NOTE — PROGRESS NOTES
Yobani Bob (:  1970) is a 46 y.o. female,Established patient, here for evaluation of the following chief complaint(s):  Follow-up         ASSESSMENT/PLAN:  1. Type 2 diabetes mellitus with hyperglycemia, without long-term current use of insulin (HCC)  -     metFORMIN (GLUCOPHAGE) 1000 MG tablet; TAKE ONE TABLET BY MOUTH TWO TIMES A DAY WITH MEALS, Disp-180 tablet, R-0Normal  -     POCT glycosylated hemoglobin (Hb A1C)  -     Dulaglutide (TRULICITY) 3 XT/0.6NS SOPN; Inject 3 mg into the skin once a week, Disp-4 Adjustable Dose Pre-filled Pen Syringe, R-1Normal  Worsening A1c to 7.8%  Following with Podiatry- toe injury resolved  Eye exam up to date  Depression screen stable   Discussed options at length  Recommend increase in GLP1  Discussed goal A1c 7.5%   Discussed and counseled on approach- patient is motivated to set own self-care goals- defers Dietician or Medical Weight loss referral  Set weight goals  Set next appt goal- 3 lb weight loss  Increase fiber, plant based protein, and decrease carbohydrates  Glucose log at next appt   All questions answered   2. Mixed hyperlipidemia  -     pravastatin (PRAVACHOL) 80 MG tablet; TAKE ONE TABLET BY MOUTH EVERY DAY IN THE EVENING, Disp-90 tablet, R-1Normal  -     fenofibrate (TRICOR) 48 MG tablet; TAKE ONE TABLET BY MOUTH DAILY, Disp-90 tablet, R-1Normal  Worsening TGs  LDL at goal   Discussed options- improving glucose will partially improve TGs  Metabolic Syndrome noted   Recommend active weight loss   Recommend change Fibrate to Vascepa trial    Repeat lipid panel in 3 months   3. Essential hypertension- at goal per ACC/AHA- continue current regimen   -     hydroCHLOROthiazide (HYDRODIURIL) 25 MG tablet; TAKE ONE TABLET BY MOUTH EVERY DAY IN THE MORNING, Disp-90 tablet, R-0Normal  -     amLODIPine (NORVASC) 5 MG tablet; Take 1 tablet by mouth daily, Disp-90 tablet, R-1Normal  4.  Post menopausal syndrome  -     FLUoxetine (PROZAC) 20 MG capsule; TAKE ONE CAPSULE BY MOUTH DAILY, Disp-90 capsule, R-0Normal  5. Anxiety disorder, unspecified type- as above- currently stable on Prozac dosing   6. Class 3 severe obesity due to excess calories with serious comorbidity and body mass index (BMI) of 40.0 to 44.9 in adult [facility])   Self-management goal set   Discussed implication of Metabolic syndrome- relevance of cardiovascular risk and NAFLD with progressional risk to COLBERT   Need 7.5% total body weight goal for weight loss   Set goal for 3 lbs at next visit   Set goal if no improvement- strongly recommend Dietician vs. Medical Weight Loss goal   Set discussion on lifestyle intervention as documented above. Return in about 3 months (around 4/5/2023). Subjective   SUBJECTIVE/OBJECTIVE:  HPI    Presents for 3 month follow-up appointment. Denies any new complaints today. Facial dermatitis has resolved with combination therapy of topical Clindamycin-benzoyl peroxide. No recurrence of rash. Toe injury has improved and will continue follow-up with Dr. Sp Ventura. Tolerating all medications. Labs were completed prior to appointment and noting no improvement in A1c and worsening Triglyceride levels. Review of Systems   Constitutional:  Negative for activity change, appetite change, chills, fever and unexpected weight change. Eyes:  Negative for visual disturbance. Respiratory:  Negative for shortness of breath and wheezing. Cardiovascular:  Negative for chest pain, palpitations and leg swelling. Gastrointestinal:  Negative for abdominal pain, blood in stool, constipation, diarrhea, nausea and vomiting. Genitourinary:  Negative for dysuria, hematuria and vaginal discharge. Neurological:  Negative for dizziness, syncope and light-headedness.         Objective   Vitals:    01/05/23 1323   BP: 129/84   Site: Right Upper Arm   Position: Sitting   Cuff Size: Medium Adult   Pulse: 74   Resp: 18   Temp: 97.6 °F (36.4 °C)   TempSrc: Temporal   SpO2: 99% Weight: 257 lb (116.6 kg)   Height: 5' 6\" (1.676 m)       Physical Exam  Vitals reviewed. Constitutional:       Appearance: Normal appearance. She is obese. Cardiovascular:      Rate and Rhythm: Normal rate and regular rhythm. Pulses: Normal pulses. Heart sounds: No murmur heard. Pulmonary:      Effort: Pulmonary effort is normal.      Breath sounds: Normal breath sounds. No wheezing or rales. Abdominal:      Palpations: Abdomen is soft. Tenderness: There is no abdominal tenderness. Musculoskeletal:      Right lower leg: No edema. Left lower leg: No edema. Skin:     General: Skin is warm and dry. Neurological:      Mental Status: She is alert. Psychiatric:         Mood and Affect: Mood normal.            An electronic signature was used to authenticate this note.     --Drake Zhang MD, FACP

## 2023-01-06 ASSESSMENT — ENCOUNTER SYMPTOMS
DIARRHEA: 0
WHEEZING: 0
ABDOMINAL PAIN: 0
SHORTNESS OF BREATH: 0
NAUSEA: 0
CONSTIPATION: 0
VOMITING: 0
BLOOD IN STOOL: 0

## 2023-01-06 NOTE — TELEPHONE ENCOUNTER
Spoke with pharmacist at [facility] and states Vascepa was filled. Informed to discontinue Tricor. Patient called and notified of this as well. No questions voiced.

## 2023-01-12 ENCOUNTER — PROCEDURE VISIT (OUTPATIENT)
Dept: PODIATRY | Age: 53
End: 2023-01-12

## 2023-01-12 DIAGNOSIS — E11.9 TYPE 2 DIABETES MELLITUS WITHOUT COMPLICATION, UNSPECIFIED WHETHER LONG TERM INSULIN USE (HCC): ICD-10-CM

## 2023-01-12 DIAGNOSIS — B35.1 TINEA UNGUIUM: Primary | ICD-10-CM

## 2023-01-12 DIAGNOSIS — G60.8 HEREDITARY SENSORY NEUROPATHY: ICD-10-CM

## 2023-01-12 DIAGNOSIS — L84 CORNS AND CALLOSITIES: ICD-10-CM

## 2023-01-12 NOTE — PROGRESS NOTES
Frank Guzman is here today for a diabetic foot exam and nail care. her PCP is oJdy Ott MD last OV was 01/05/2023. CC:   Follow-up diabetic exam      HPI:   Follow-up diabetic foot ankle exam.  No open wounds. Does have ammonium lactate 12% lotion at home. Has noted significant provement since last visit. Denies any foot or ankle pain today. No additional pedal complaints. ROS:  Const: Denies constitutional symptoms  Musculo: Denies symptoms other than stated above  Skin: Denies symptoms other than stated above      Current Outpatient Medications:     metFORMIN (GLUCOPHAGE) 1000 MG tablet, TAKE ONE TABLET BY MOUTH TWO TIMES A DAY WITH MEALS, Disp: 180 tablet, Rfl: 0    hydroCHLOROthiazide (HYDRODIURIL) 25 MG tablet, TAKE ONE TABLET BY MOUTH EVERY DAY IN THE MORNING, Disp: 90 tablet, Rfl: 0    FLUoxetine (PROZAC) 20 MG capsule, TAKE ONE CAPSULE BY MOUTH DAILY, Disp: 90 capsule, Rfl: 0    amLODIPine (NORVASC) 5 MG tablet, Take 1 tablet by mouth daily, Disp: 90 tablet, Rfl: 1    pravastatin (PRAVACHOL) 80 MG tablet, TAKE ONE TABLET BY MOUTH EVERY DAY IN THE EVENING, Disp: 90 tablet, Rfl: 1    Dulaglutide (TRULICITY) 3 VR/7.9KQ SOPN, Inject 3 mg into the skin once a week, Disp: 4 Adjustable Dose Pre-filled Pen Syringe, Rfl: 1    Icosapent Ethyl (VASCEPA) 1 g CAPS capsule, Take 2 capsules by mouth 2 times daily, Disp: 60 capsule, Rfl: 3    blood glucose monitor strips, by Other route daily, Disp: 100 strip, Rfl: 5    Lancets MISC, Inject 1 each into the skin daily, Disp: 100 each, Rfl: 5    clindamycin-benzoyl peroxide (BENZACLIN) 1-5 % gel, Apply topically 2 times daily. Thin film (Patient not taking: Reported on 1/5/2023), Disp: 60 g, Rfl: 0    ammonium lactate (LAC-HYDRIN) 12 % lotion, Apply topically twice daily, Disp: 400 g, Rfl: 1    blood glucose monitor strips, Test 2 times a day & as needed for symptoms of irregular blood glucose.  Dispense sufficient amount for indicated testing frequency plus additional to accommodate PRN testing needs. , Disp: 100 strip, Rfl: 5    magnesium cl-calcium carbonate (NU-MAG) 71.5-119 MG TBEC tablet, TAKE TWO TABLETS BY MOUTH EVERY DAY, Disp: 180 tablet, Rfl: 1    Blood Glucose Monitoring Suppl (TRUE METRIX METER) w/Device KIT, 1 Device by Does not apply route daily, Disp: 1 kit, Rfl: 0    Lancets MISC, 1 each by Does not apply route 2 times daily (Patient not taking: Reported on 1/5/2023), Disp: 300 each, Rfl: 1    Alpha-Lipoic Acid 600 MG TABS, Take 600 mg by mouth daily, Disp: , Rfl:     b complex vitamins capsule, Take 1 capsule by mouth daily, Disp: , Rfl:     Misc. Devices KIT, Blood pressure cuff- Ambulatory monitoring- check every other day and maintain blood pressure log (Patient not taking: Reported on 1/5/2023), Disp: 1 kit, Rfl: 0  Allergies   Allergen Reactions    Bactrim [Sulfamethoxazole-Trimethoprim] Rash       Past Medical History:   Diagnosis Date    Abdominal pain     Anxiety state, unspecified 3/8/2016    Asthma     no inhalers    Breast fibrocystic disorder     Diabetes mellitus ([facility] [address]     Essential hypertension 4/25/2016    Hyperlipidemia     Irritable bowel syndrome     Left ovarian cyst     Morbid obesity ([facility] [address] 4/25/2016    Osteoarthritis     Pharyngoesophageal dysphagia 6/14/2016       There were no vitals filed for this visit. Focused Lower Extremity Physical Exam:      Neurovascular examination:    Dorsalis Pedis palpable bilateral.  Posterior tibialis palpable bilateral.    Capillary Refill Time:  Immediate return  Hair growth:  Symmetrical and bilateral   Skin:  Not atrophic  Edema: Mild edema bilateral feet. Mild edema bilateral ankles.   Neurologic:  Light touch diminished bilateral.  Warm to coolness bilateral distal toes  Decreased epicritic sensation     Musculoskeletal/ Orthopedic examination:    Equinis: present bilateral  Dorsiflexion, plantarflexion, inversion, eversion bilateral 5 out of 5 muscle strength  Wiggling toes  Negative Homans  Flexible hammertoes 2 through 5 bilateral.    No pain foot or ankle    Dermatology examination:    Toenails 1-5 right and left thickened, elongated, dystrophic, mycotic with subungual debris. Webspaces 1-4 bilateral clean, dry and intact. Hyperkeratotic tissue IPJ great toe bilateral and plantar first metatarsal bilateral.  No erythema. No open wounds. Assessment and Plan:  Mega Gr was seen today for follow-up, diabetes, callouses and nail problem. Diagnoses and all orders for this visit:    Tinea unguium    Corns and callosities    Type 2 diabetes mellitus without complication, unspecified whether long term insulin use (HCC)    Hereditary sensory neuropathy      Follow-up diabetic exam  Manual debridement with standard technique toenails 1 through 5 right and left in thickness and length. Patient tolerated procedure well. Paring hyperkeratotic tissue plantar great toe bilateral and plantar first metatarsal bilateral.  No open wounds today progressing well. Continue inserts. Avoid barefoot. Ammonium lactate 12% twice daily. Follow-up 2 months diabetic foot ankle exam.      Return in about 2 months (around 3/12/2023). Seen By:  Nellie Dyer DPM      Document was created using voice recognition software. Note was reviewed however may contain grammatical errors.

## 2023-03-07 ENCOUNTER — PATIENT MESSAGE (OUTPATIENT)
Dept: INTERNAL MEDICINE | Age: 53
End: 2023-03-07

## 2023-03-07 DIAGNOSIS — E78.2 MIXED HYPERLIPIDEMIA: ICD-10-CM

## 2023-03-07 DIAGNOSIS — E11.65 TYPE 2 DIABETES MELLITUS WITH HYPERGLYCEMIA, WITHOUT LONG-TERM CURRENT USE OF INSULIN (HCC): ICD-10-CM

## 2023-03-07 RX ORDER — DULAGLUTIDE 3 MG/.5ML
3 INJECTION, SOLUTION SUBCUTANEOUS WEEKLY
Qty: 4 ADJUSTABLE DOSE PRE-FILLED PEN SYRINGE | Refills: 1 | Status: SHIPPED | OUTPATIENT
Start: 2023-03-07

## 2023-03-07 RX ORDER — ICOSAPENT ETHYL 1000 MG/1
2 CAPSULE ORAL 2 TIMES DAILY
Qty: 60 CAPSULE | Refills: 3 | Status: SHIPPED | OUTPATIENT
Start: 2023-03-07

## 2023-03-07 NOTE — TELEPHONE ENCOUNTER
From: Emma Chatman  To: Dr. Dany Abdi: 3/7/2023 9:35 AM EST  Subject: Lore Jimenez/ Emma Chatman    Good morning all ! Gina Slider, [facility], called [address] [place] [address] and they said there is no refills. I sure appreciate you!!!!  Thank you, Emma Chatman

## 2023-03-16 ENCOUNTER — PROCEDURE VISIT (OUTPATIENT)
Dept: PODIATRY | Age: 53
End: 2023-03-16
Payer: COMMERCIAL

## 2023-03-16 VITALS — WEIGHT: 257 LBS | HEIGHT: 66 IN | BODY MASS INDEX: 41.3 KG/M2

## 2023-03-16 DIAGNOSIS — G60.8 HEREDITARY SENSORY NEUROPATHY: ICD-10-CM

## 2023-03-16 DIAGNOSIS — L84 CORNS AND CALLOSITIES: ICD-10-CM

## 2023-03-16 DIAGNOSIS — B35.1 TINEA UNGUIUM: ICD-10-CM

## 2023-03-16 DIAGNOSIS — E11.9 TYPE 2 DIABETES MELLITUS WITHOUT COMPLICATION, UNSPECIFIED WHETHER LONG TERM INSULIN USE (HCC): Primary | ICD-10-CM

## 2023-03-16 PROCEDURE — 11056 PARNG/CUTG B9 HYPRKR LES 2-4: CPT | Performed by: PODIATRIST

## 2023-03-16 PROCEDURE — 11721 DEBRIDE NAIL 6 OR MORE: CPT | Performed by: PODIATRIST

## 2023-03-16 NOTE — PROGRESS NOTES
Christine Robert is here today for a diabetic foot exam and nail care. her PCP is Lizz Trammell MD last OV was 01/15/2023. CC:   Follow-up diabetic exam    HPI:   Follow-up diabetic foot ankle exam.  No calf pain. No open wounds. Very pleased with progression decreased callus tissue both feet. ROS:  Const: Denies constitutional symptoms  Musculo: Denies symptoms other than stated above  Skin: Denies symptoms other than stated above      Current Outpatient Medications:     Dulaglutide (TRULICITY) 3 PP/1.3KE SOPN, Inject 3 mg into the skin once a week, Disp: 4 Adjustable Dose Pre-filled Pen Syringe, Rfl: 1    Icosapent Ethyl (VASCEPA) 1 g CAPS capsule, Take 2 capsules by mouth 2 times daily, Disp: 60 capsule, Rfl: 3    metFORMIN (GLUCOPHAGE) 1000 MG tablet, TAKE ONE TABLET BY MOUTH TWO TIMES A DAY WITH MEALS, Disp: 180 tablet, Rfl: 0    hydroCHLOROthiazide (HYDRODIURIL) 25 MG tablet, TAKE ONE TABLET BY MOUTH EVERY DAY IN THE MORNING, Disp: 90 tablet, Rfl: 0    FLUoxetine (PROZAC) 20 MG capsule, TAKE ONE CAPSULE BY MOUTH DAILY, Disp: 90 capsule, Rfl: 0    amLODIPine (NORVASC) 5 MG tablet, Take 1 tablet by mouth daily, Disp: 90 tablet, Rfl: 1    pravastatin (PRAVACHOL) 80 MG tablet, TAKE ONE TABLET BY MOUTH EVERY DAY IN THE EVENING, Disp: 90 tablet, Rfl: 1    blood glucose monitor strips, by Other route daily, Disp: 100 strip, Rfl: 5    Lancets MISC, Inject 1 each into the skin daily, Disp: 100 each, Rfl: 5    ammonium lactate (LAC-HYDRIN) 12 % lotion, Apply topically twice daily, Disp: 400 g, Rfl: 1    blood glucose monitor strips, Test 2 times a day & as needed for symptoms of irregular blood glucose. Dispense sufficient amount for indicated testing frequency plus additional to accommodate PRN testing needs. , Disp: 100 strip, Rfl: 5    magnesium cl-calcium carbonate (NU-MAG) 71.5-119 MG TBEC tablet, TAKE TWO TABLETS BY MOUTH EVERY DAY, Disp: 180 tablet, Rfl: 1    Blood Glucose Monitoring Suppl (TRUE METRIX METER) w/Device KIT, 1 Device by Does not apply route daily, Disp: 1 kit, Rfl: 0    Alpha-Lipoic Acid 600 MG TABS, Take 600 mg by mouth daily, Disp: , Rfl:     b complex vitamins capsule, Take 1 capsule by mouth daily, Disp: , Rfl:   Allergies   Allergen Reactions    Bactrim [Sulfamethoxazole-Trimethoprim] Rash       Past Medical History:   Diagnosis Date    Abdominal pain     Anxiety state, unspecified 3/8/2016    Asthma     no inhalers    Breast fibrocystic disorder     Diabetes mellitus ([facility] [address]     Essential hypertension 4/25/2016    Hyperlipidemia     Irritable bowel syndrome     Left ovarian cyst     Morbid obesity ([facility] [address] 4/25/2016    Osteoarthritis     Pharyngoesophageal dysphagia 6/14/2016       There were no vitals filed for this visit. Focused Lower Extremity Physical Exam:      Neurovascular examination:    Dorsalis Pedis palpable bilateral.  Posterior tibialis palpable bilateral.    Capillary Refill Time:  Immediate return  Hair growth:  Symmetrical and bilateral   Skin:  Not atrophic  Edema: Mild edema bilateral feet. Mild edema bilateral ankles. Neurologic:  Light touch diminished bilateral.  Warm to coolness bilateral distal toes  Decreased epicritic sensation     Musculoskeletal/ Orthopedic examination:    Equinis: present bilateral  Dorsiflexion, plantarflexion, inversion, eversion bilateral 5 out of 5 muscle strength  Wiggling toes  Negative [facility]    Dermatology examination:    Toenails 1-5 right and left thickened, elongated, dystrophic, mycotic with subungual debris. Webspaces 1-4 bilateral clean, dry and intact. Hyperkeratotic tissue IPJ great toe bilateral plantar first metatarsal bilateral.      Assessment and Plan:  Mega Gr was seen today for callouses, nail problem and diabetes.     Diagnoses and all orders for this visit:    Type 2 diabetes mellitus without complication, unspecified whether long term insulin use (HCC)    Tinea unguium    Corns and callosities    Hereditary sensory neuropathy    Follow-up diabetic foot ankle exam.    Manual debridement with standard technique toenails 1 through 5 right and left in thickness and length. Patient tolerated procedure well. I did pare hyperkeratotic tissue IPJ great toe bilateral plantar first metatarsal bilateral.  Follow-up in office 2 months        Return in about 2 months (around 5/16/2023). Seen By:  Kristin Morales DPM      Document was created using voice recognition software. Note was reviewed however may contain grammatical errors.

## 2023-04-05 NOTE — ASSESSMENT & PLAN NOTE
Follows with Podiatry- Updated Foot exam completed on 3/16   Continue foot care and reduce morbidity for risk of worsening neuropathy  Glycemic control encouraged

## 2023-04-06 ENCOUNTER — OFFICE VISIT (OUTPATIENT)
Dept: INTERNAL MEDICINE | Age: 53
End: 2023-04-06
Payer: COMMERCIAL

## 2023-04-06 VITALS
TEMPERATURE: 97.6 F | BODY MASS INDEX: 39.7 KG/M2 | WEIGHT: 247 LBS | HEART RATE: 88 BPM | DIASTOLIC BLOOD PRESSURE: 67 MMHG | HEIGHT: 66 IN | OXYGEN SATURATION: 99 % | RESPIRATION RATE: 18 BRPM | SYSTOLIC BLOOD PRESSURE: 111 MMHG

## 2023-04-06 DIAGNOSIS — N95.1 POST MENOPAUSAL SYNDROME: ICD-10-CM

## 2023-04-06 DIAGNOSIS — E78.2 MIXED HYPERLIPIDEMIA: ICD-10-CM

## 2023-04-06 DIAGNOSIS — G62.9 NEUROPATHY: Primary | ICD-10-CM

## 2023-04-06 DIAGNOSIS — E11.65 TYPE 2 DIABETES MELLITUS WITH HYPERGLYCEMIA, WITHOUT LONG-TERM CURRENT USE OF INSULIN (HCC): ICD-10-CM

## 2023-04-06 DIAGNOSIS — Z01.84 IMMUNITY TO HEPATITIS B VIRUS DEMONSTRATED BY SEROLOGIC TEST: ICD-10-CM

## 2023-04-06 DIAGNOSIS — Z23 NEED FOR TDAP VACCINATION: ICD-10-CM

## 2023-04-06 DIAGNOSIS — I10 ESSENTIAL HYPERTENSION: ICD-10-CM

## 2023-04-06 DIAGNOSIS — R21 RASH: ICD-10-CM

## 2023-04-06 LAB — HBA1C MFR BLD: 7.1 %

## 2023-04-06 PROCEDURE — 3074F SYST BP LT 130 MM HG: CPT | Performed by: INTERNAL MEDICINE

## 2023-04-06 PROCEDURE — G8417 CALC BMI ABV UP PARAM F/U: HCPCS | Performed by: INTERNAL MEDICINE

## 2023-04-06 PROCEDURE — 3078F DIAST BP <80 MM HG: CPT | Performed by: INTERNAL MEDICINE

## 2023-04-06 PROCEDURE — 99212 OFFICE O/P EST SF 10 MIN: CPT | Performed by: INTERNAL MEDICINE

## 2023-04-06 PROCEDURE — 90715 TDAP VACCINE 7 YRS/> IM: CPT | Performed by: INTERNAL MEDICINE

## 2023-04-06 PROCEDURE — 83036 HEMOGLOBIN GLYCOSYLATED A1C: CPT | Performed by: INTERNAL MEDICINE

## 2023-04-06 PROCEDURE — 3017F COLORECTAL CA SCREEN DOC REV: CPT | Performed by: INTERNAL MEDICINE

## 2023-04-06 PROCEDURE — 2022F DILAT RTA XM EVC RTNOPTHY: CPT | Performed by: INTERNAL MEDICINE

## 2023-04-06 PROCEDURE — 3051F HG A1C>EQUAL 7.0%<8.0%: CPT | Performed by: INTERNAL MEDICINE

## 2023-04-06 PROCEDURE — G8427 DOCREV CUR MEDS BY ELIG CLIN: HCPCS | Performed by: INTERNAL MEDICINE

## 2023-04-06 PROCEDURE — 99214 OFFICE O/P EST MOD 30 MIN: CPT | Performed by: INTERNAL MEDICINE

## 2023-04-06 PROCEDURE — 1036F TOBACCO NON-USER: CPT | Performed by: INTERNAL MEDICINE

## 2023-04-06 RX ORDER — LANCETS 30 GAUGE
1 EACH MISCELLANEOUS DAILY
Qty: 100 EACH | Refills: 5 | Status: SHIPPED | OUTPATIENT
Start: 2023-04-06

## 2023-04-06 RX ORDER — HYDROCHLOROTHIAZIDE 25 MG/1
TABLET ORAL
Qty: 90 TABLET | Refills: 0 | Status: SHIPPED | OUTPATIENT
Start: 2023-04-06

## 2023-04-06 RX ORDER — FLUOXETINE HYDROCHLORIDE 20 MG/1
CAPSULE ORAL
Qty: 90 CAPSULE | Refills: 0 | Status: SHIPPED | OUTPATIENT
Start: 2023-04-06

## 2023-04-06 RX ORDER — DULAGLUTIDE 3 MG/.5ML
3 INJECTION, SOLUTION SUBCUTANEOUS WEEKLY
Qty: 4 ADJUSTABLE DOSE PRE-FILLED PEN SYRINGE | Refills: 1 | Status: SHIPPED | OUTPATIENT
Start: 2023-04-06

## 2023-04-06 RX ORDER — ICOSAPENT ETHYL 1000 MG/1
2 CAPSULE ORAL 2 TIMES DAILY
Qty: 60 CAPSULE | Refills: 3 | Status: SHIPPED | OUTPATIENT
Start: 2023-04-06

## 2023-04-06 RX ORDER — GLUCOSAMINE HCL/CHONDROITIN SU 500-400 MG
CAPSULE ORAL
Qty: 100 STRIP | Refills: 5 | Status: SHIPPED | OUTPATIENT
Start: 2023-04-06

## 2023-04-06 RX ORDER — CLOTRIMAZOLE AND BETAMETHASONE DIPROPIONATE 10; .64 MG/G; MG/G
CREAM TOPICAL
Qty: 1 EACH | Refills: 0 | Status: SHIPPED | OUTPATIENT
Start: 2023-04-06

## 2023-04-06 ASSESSMENT — ENCOUNTER SYMPTOMS
NAUSEA: 0
BLOOD IN STOOL: 0
CONSTIPATION: 0
SHORTNESS OF BREATH: 0
WHEEZING: 0
ABDOMINAL PAIN: 0
VOMITING: 0
DIARRHEA: 0

## 2023-04-06 NOTE — PROGRESS NOTES
AVS printed with follow up appointment , lab scripts and discharge instructions and given to patient  per her request as she had to leave before her  visit over.  Instructed to call with all issues
normal.      Breath sounds: Normal breath sounds. No wheezing or rales. Abdominal:      General: Bowel sounds are normal.      Palpations: Abdomen is soft. Tenderness: There is no abdominal tenderness. Musculoskeletal:      Right lower leg: No edema. Left lower leg: No edema. Skin:     General: Skin is warm. Findings: Rash (Behind neck- erythematous plaque with central clearing; no blistering; no additional findings) present. Neurological:      Mental Status: She is alert. An electronic signature was used to authenticate this note.     --Lizz Trammell MD, FACP

## 2023-04-06 NOTE — PATIENT INSTRUCTIONS
1) Continue active weight loss goals. 2) Continue current medication regimen. 3) Please complete labs prior to next appt at 3 months. 4) If not tolerating Vascepa, please call office and stop medication.

## 2023-05-11 DIAGNOSIS — E78.2 MIXED HYPERLIPIDEMIA: ICD-10-CM

## 2023-05-11 RX ORDER — ICOSAPENT ETHYL 1000 MG/1
2 CAPSULE ORAL 2 TIMES DAILY
Qty: 60 CAPSULE | Refills: 3 | Status: SHIPPED | OUTPATIENT
Start: 2023-05-11

## 2023-05-18 ENCOUNTER — PROCEDURE VISIT (OUTPATIENT)
Dept: PODIATRY | Age: 53
End: 2023-05-18
Payer: COMMERCIAL

## 2023-05-18 VITALS — HEIGHT: 66 IN | BODY MASS INDEX: 39.7 KG/M2 | WEIGHT: 247 LBS

## 2023-05-18 DIAGNOSIS — E11.9 TYPE 2 DIABETES MELLITUS WITHOUT COMPLICATION, UNSPECIFIED WHETHER LONG TERM INSULIN USE (HCC): Primary | ICD-10-CM

## 2023-05-18 DIAGNOSIS — B35.1 TINEA UNGUIUM: ICD-10-CM

## 2023-05-18 DIAGNOSIS — L84 CORNS AND CALLOSITIES: ICD-10-CM

## 2023-05-18 DIAGNOSIS — G60.8 HEREDITARY SENSORY NEUROPATHY: ICD-10-CM

## 2023-05-18 PROCEDURE — 11721 DEBRIDE NAIL 6 OR MORE: CPT | Performed by: PODIATRIST

## 2023-05-18 PROCEDURE — 11056 PARNG/CUTG B9 HYPRKR LES 2-4: CPT | Performed by: PODIATRIST

## 2023-05-18 NOTE — PROGRESS NOTES
Mylene Urrutia is here today for a diabetic foot exam and nail care. her PCP is Sarah Guerrero MD last OV was 04/06/2023. CC:   Follow-up diabetic exam    HPI:   Follow-up diabetic foot ankle exam.  History metformin. No open wounds. No recent injury. No additional pedal complaints today. ROS:  Const: Denies constitutional symptoms  Musculo: Denies symptoms other than stated above  Skin: Denies symptoms other than stated above      Current Outpatient Medications:     Icosapent Ethyl (VASCEPA) 1 g CAPS capsule, Take 2 capsules by mouth 2 times daily, Disp: 60 capsule, Rfl: 3    hydroCHLOROthiazide (HYDRODIURIL) 25 MG tablet, TAKE ONE TABLET BY MOUTH EVERY DAY IN THE MORNING, Disp: 90 tablet, Rfl: 0    metFORMIN (GLUCOPHAGE) 1000 MG tablet, TAKE ONE TABLET BY MOUTH TWO TIMES A DAY WITH MEALS, Disp: 180 tablet, Rfl: 0    FLUoxetine (PROZAC) 20 MG capsule, TAKE ONE CAPSULE BY MOUTH DAILY, Disp: 90 capsule, Rfl: 0    Lancets MISC, Inject 1 each into the skin daily, Disp: 100 each, Rfl: 5    blood glucose monitor strips, Test 2 times a day & as needed for symptoms of irregular blood glucose. Dispense sufficient amount for indicated testing frequency plus additional to accommodate PRN testing needs. , Disp: 100 strip, Rfl: 5    Dulaglutide (TRULICITY) 3 DJ/9.7BW SOPN, Inject 3 mg into the skin once a week, Disp: 4 Adjustable Dose Pre-filled Pen Syringe, Rfl: 1    clotrimazole-betamethasone (LOTRISONE) 1-0.05 % cream, Apply topically 2 times daily. , Disp: 1 each, Rfl: 0    amLODIPine (NORVASC) 5 MG tablet, Take 1 tablet by mouth daily, Disp: 90 tablet, Rfl: 1    pravastatin (PRAVACHOL) 80 MG tablet, TAKE ONE TABLET BY MOUTH EVERY DAY IN THE EVENING, Disp: 90 tablet, Rfl: 1    blood glucose monitor strips, by Other route daily (Patient not taking: Reported on 4/6/2023), Disp: 100 strip, Rfl: 5    ammonium lactate (LAC-HYDRIN) 12 % lotion, Apply topically twice daily, Disp: 400 g, Rfl: 1    magnesium

## 2023-05-31 ENCOUNTER — OFFICE VISIT (OUTPATIENT)
Dept: PODIATRY | Age: 53
End: 2023-05-31
Payer: COMMERCIAL

## 2023-05-31 VITALS — WEIGHT: 247 LBS | BODY MASS INDEX: 39.7 KG/M2 | HEIGHT: 66 IN

## 2023-05-31 DIAGNOSIS — E11.9 TYPE 2 DIABETES MELLITUS WITHOUT COMPLICATION, UNSPECIFIED WHETHER LONG TERM INSULIN USE (HCC): ICD-10-CM

## 2023-05-31 DIAGNOSIS — M25.571 RIGHT ANKLE PAIN, UNSPECIFIED CHRONICITY: Primary | ICD-10-CM

## 2023-05-31 DIAGNOSIS — T63.301D SPIDER BITE WOUND, ACCIDENTAL OR UNINTENTIONAL, SUBSEQUENT ENCOUNTER: ICD-10-CM

## 2023-05-31 DIAGNOSIS — L30.9 DERMATITIS: ICD-10-CM

## 2023-05-31 PROCEDURE — G8427 DOCREV CUR MEDS BY ELIG CLIN: HCPCS | Performed by: PODIATRIST

## 2023-05-31 PROCEDURE — 99213 OFFICE O/P EST LOW 20 MIN: CPT | Performed by: PODIATRIST

## 2023-05-31 PROCEDURE — 3051F HG A1C>EQUAL 7.0%<8.0%: CPT | Performed by: PODIATRIST

## 2023-05-31 PROCEDURE — 1036F TOBACCO NON-USER: CPT | Performed by: PODIATRIST

## 2023-05-31 PROCEDURE — 3017F COLORECTAL CA SCREEN DOC REV: CPT | Performed by: PODIATRIST

## 2023-05-31 PROCEDURE — 2022F DILAT RTA XM EVC RTNOPTHY: CPT | Performed by: PODIATRIST

## 2023-05-31 PROCEDURE — G8417 CALC BMI ABV UP PARAM F/U: HCPCS | Performed by: PODIATRIST

## 2023-05-31 RX ORDER — MOMETASONE FUROATE 1 MG/G
CREAM TOPICAL
Qty: 50 G | Refills: 0 | Status: SHIPPED | OUTPATIENT
Start: 2023-05-31

## 2023-05-31 RX ORDER — DOXYCYCLINE HYCLATE 100 MG/1
100 CAPSULE ORAL 2 TIMES DAILY
Qty: 14 CAPSULE | Refills: 0 | Status: SHIPPED | OUTPATIENT
Start: 2023-05-31 | End: 2023-06-07

## 2023-06-08 ENCOUNTER — OFFICE VISIT (OUTPATIENT)
Dept: PODIATRY | Age: 53
End: 2023-06-08
Payer: COMMERCIAL

## 2023-06-08 VITALS — WEIGHT: 247 LBS | HEIGHT: 66 IN | BODY MASS INDEX: 39.7 KG/M2

## 2023-06-08 DIAGNOSIS — E11.9 TYPE 2 DIABETES MELLITUS WITHOUT COMPLICATION, UNSPECIFIED WHETHER LONG TERM INSULIN USE (HCC): Primary | ICD-10-CM

## 2023-06-08 DIAGNOSIS — L23.9 ALLERGIC CONTACT DERMATITIS OF LOWER LEG: ICD-10-CM

## 2023-06-08 DIAGNOSIS — M25.571 RIGHT ANKLE PAIN, UNSPECIFIED CHRONICITY: ICD-10-CM

## 2023-06-08 PROCEDURE — 1036F TOBACCO NON-USER: CPT | Performed by: PODIATRIST

## 2023-06-08 PROCEDURE — G8417 CALC BMI ABV UP PARAM F/U: HCPCS | Performed by: PODIATRIST

## 2023-06-08 PROCEDURE — G8427 DOCREV CUR MEDS BY ELIG CLIN: HCPCS | Performed by: PODIATRIST

## 2023-06-08 PROCEDURE — 2022F DILAT RTA XM EVC RTNOPTHY: CPT | Performed by: PODIATRIST

## 2023-06-08 PROCEDURE — 99213 OFFICE O/P EST LOW 20 MIN: CPT | Performed by: PODIATRIST

## 2023-06-08 PROCEDURE — 3051F HG A1C>EQUAL 7.0%<8.0%: CPT | Performed by: PODIATRIST

## 2023-06-08 PROCEDURE — 3017F COLORECTAL CA SCREEN DOC REV: CPT | Performed by: PODIATRIST

## 2023-06-08 NOTE — PROGRESS NOTES
without complication, unspecified whether long term insulin use (HCC)    Right ankle pain, unspecified chronicity    Allergic contact dermatitis of lower leg      Follow-up dermatitis right lower leg. No open wounds. Progressing well. Still did recommend 1 additional week of topical mometasone. Any new foot or ankle issues I did tell her to come in sooner. Has progressed very well no open wounds today. Pain-free foot and ankle bilateral.  We do a regular scheduled diabetic foot ankle exam on July 27, 2023 did recommend keeping this appointment. Return in about 7 weeks (around 7/27/2023). Seen By:  Merlene Santos DPM      Document was created using voice recognition software. Note was reviewed however may contain grammatical errors.

## 2023-06-24 PROCEDURE — 85025 COMPLETE CBC W/AUTO DIFF WBC: CPT

## 2023-06-24 PROCEDURE — 80053 COMPREHEN METABOLIC PANEL: CPT

## 2023-06-24 PROCEDURE — 86140 C-REACTIVE PROTEIN: CPT

## 2023-06-24 PROCEDURE — 85652 RBC SED RATE AUTOMATED: CPT

## 2023-06-24 PROCEDURE — 82248 BILIRUBIN DIRECT: CPT

## 2023-07-07 DIAGNOSIS — E11.65 TYPE 2 DIABETES MELLITUS WITH HYPERGLYCEMIA, WITHOUT LONG-TERM CURRENT USE OF INSULIN (HCC): ICD-10-CM

## 2023-07-07 RX ORDER — DULAGLUTIDE 3 MG/.5ML
3 INJECTION, SOLUTION SUBCUTANEOUS WEEKLY
Qty: 4 ADJUSTABLE DOSE PRE-FILLED PEN SYRINGE | Refills: 0 | Status: SHIPPED | OUTPATIENT
Start: 2023-07-07

## 2023-07-07 RX ORDER — DULAGLUTIDE 3 MG/.5ML
INJECTION, SOLUTION SUBCUTANEOUS
Qty: 2 ML | Refills: 0 | OUTPATIENT
Start: 2023-07-07

## 2023-07-07 NOTE — TELEPHONE ENCOUNTER
Last Appointment:  4/6/2023  Future Appointments   Date Time Provider [address]   7/20/2023 12:45 PM Sallie Gusman MD [facility]   7/27/2023 10:30 AM Rafy Hill DPM [facility]      Dulaglutide filled 4/6/23 #4 with one refill (6/6/23)   Spoke with patient at [address] and she states she is indeed out of her medication. Please refill to get her to her appointment. - Pt with relapse of alcohol dependence. Initially sober for about 2 years per history. Relapsed due to intolerable leg pain. Was in AA for 2 years and completed program  - Initial CIWA 4 on admission, +b/l hand tremors, tongue fasciculations, HA, anxiety start standing high risk CIWA w/ PO lorazepam taper  - MVI/folic acid/thiamine  - SW consult  - Monitor CIWA closely

## 2023-07-18 DIAGNOSIS — E11.65 TYPE 2 DIABETES MELLITUS WITH HYPERGLYCEMIA, WITHOUT LONG-TERM CURRENT USE OF INSULIN (HCC): ICD-10-CM

## 2023-07-19 ENCOUNTER — HOSPITAL ENCOUNTER (OUTPATIENT)
Age: 53
Discharge: HOME | End: 2023-07-19
Payer: COMMERCIAL

## 2023-07-19 DIAGNOSIS — Z01.84 IMMUNITY TO HEPATITIS B VIRUS DEMONSTRATED BY SEROLOGIC TEST: ICD-10-CM

## 2023-07-19 DIAGNOSIS — E78.2 MIXED HYPERLIPIDEMIA: ICD-10-CM

## 2023-07-19 LAB
CHOLEST SERPL-MCNC: 150 MG/DL
HBV SURFACE AB SERPL IA-ACNC: 6.66 MIU/ML (ref 0–9.99)
HDLC SERPL-MCNC: 45 MG/DL
LDLC SERPL CALC-MCNC: 57 MG/DL
TRIGL SERPL-MCNC: 242 MG/DL
VLDLC SERPL CALC-MCNC: 48 MG/DL

## 2023-07-19 PROCEDURE — 86317 IMMUNOASSAY INFECTIOUS AGENT: CPT

## 2023-07-19 PROCEDURE — 36415 COLL VENOUS BLD VENIPUNCTURE: CPT

## 2023-07-19 PROCEDURE — 80061 LIPID PANEL: CPT

## 2023-07-19 NOTE — ASSESSMENT & PLAN NOTE
Continue current management to maintain ACC/AHA goal <130/<80   Continue Hydrochlorothiazide and Amlodipine at current dosing

## 2023-07-19 NOTE — ASSESSMENT & PLAN NOTE
TGs remain elevated at this time on Vascepa   Discussed options  LDL is less than 70  Discussed alternative to transition to high intensity statin trial which could lead to further TG reduction. Deferred by patient at this time. Improving Weight loss, dietary intervention, and improving A1c will all lead to further TG reduction as counseled.

## 2023-07-19 NOTE — PROGRESS NOTES
Nelly Olivo (:  1970) is a 48 y.o. female,Established patient, here for evaluation of the following chief complaint(s):  Diabetes         ASSESSMENT/PLAN:  1. Type 2 diabetes mellitus with hyperglycemia, without long-term current use of insulin ([facility])  Assessment & Plan:  A1c today: 7.4%     Diabetes Management Plan:     Blood Glucose Log Present no  Blood Pressure Monitoring <130/<80 yes  Retinopathy No    ACEI/ARB No  MOST RECENT A1c less than 8% yes - REPEAT TODAY:   Microalbumin/Cr Ratio completed in last year yes - repeat ordered today   Diabetic Foot Exam completed in last year yes - Follows with Dr. Zhanna Calderon- Diabetes exam completed in May 2023   Eye Exam completed in last year yes - Up to date  Statin Use Yes    Plan to increase Trulicity to maximum dosing 4.5 mg/weekly. Continue lifestyle modifications and follow-up   Goal A1c less than 7% explained at length   Continue Podiatry follow-up   Orders:  -      DIABETES FOOT EXAM  -     MICROALBUMIN, RANDOM URINE (W/O CREATININE); Future  -     Comprehensive Metabolic Panel with Bilirubin; Future  -     POCT glycosylated hemoglobin (Hb A1C)  -     Dulaglutide 4.5 MG/0.5ML SOPN; Inject 4.5 mg into the skin once a week, Disp-4 Adjustable Dose Pre-filled Pen Syringe, R-2Normal  2. Essential hypertension  Assessment & Plan:  Continue current management to maintain ACC/AHA goal <130/<80   Continue Hydrochlorothiazide and Amlodipine at current dosing   Orders:  -     Comprehensive Metabolic Panel with Bilirubin; Future  -     hydroCHLOROthiazide (HYDRODIURIL) 25 MG tablet; TAKE ONE TABLET BY MOUTH EVERY DAY IN THE MORNING, Disp-90 tablet, R-0Normal  -     amLODIPine (NORVASC) 5 MG tablet; Take 1 tablet by mouth daily, Disp-90 tablet, R-1Normal  3. Metabolic syndrome  -     Comprehensive Metabolic Panel with Bilirubin; Future  4.  Mixed hyperlipidemia  Assessment & Plan:  TGs remain elevated at this time on Vascepa   Discussed options  LDL is less than

## 2023-07-19 NOTE — ASSESSMENT & PLAN NOTE
A1c today: 7.4%     Diabetes Management Plan:     Blood Glucose Log Present no  Blood Pressure Monitoring <130/<80 yes  Retinopathy No    ACEI/ARB No  MOST RECENT A1c less than 8% yes - REPEAT TODAY:   Microalbumin/Cr Ratio completed in last year yes - repeat ordered today   Diabetic Foot Exam completed in last year yes - Follows with Dr. Shavon Campuzano- Diabetes exam completed in May 2023   Eye Exam completed in last year yes - Up to date  Statin Use Yes    Plan to increase Trulicity to maximum dosing 4.5 mg/weekly.    Continue lifestyle modifications and follow-up   Goal A1c less than 7% explained at length   Continue Podiatry follow-up

## 2023-07-20 ENCOUNTER — OFFICE VISIT (OUTPATIENT)
Dept: INTERNAL MEDICINE | Age: 53
End: 2023-07-20
Payer: COMMERCIAL

## 2023-07-20 VITALS
DIASTOLIC BLOOD PRESSURE: 80 MMHG | BODY MASS INDEX: 39.86 KG/M2 | TEMPERATURE: 97.3 F | RESPIRATION RATE: 20 BRPM | WEIGHT: 248 LBS | SYSTOLIC BLOOD PRESSURE: 127 MMHG | OXYGEN SATURATION: 99 % | HEART RATE: 82 BPM | HEIGHT: 66 IN

## 2023-07-20 DIAGNOSIS — N95.1 POST MENOPAUSAL SYNDROME: ICD-10-CM

## 2023-07-20 DIAGNOSIS — E78.2 MIXED HYPERLIPIDEMIA: ICD-10-CM

## 2023-07-20 DIAGNOSIS — I10 ESSENTIAL HYPERTENSION: ICD-10-CM

## 2023-07-20 DIAGNOSIS — Z23 NEED FOR VACCINATION FOR VIRAL HEPATITIS: ICD-10-CM

## 2023-07-20 DIAGNOSIS — E88.81 METABOLIC SYNDROME: ICD-10-CM

## 2023-07-20 DIAGNOSIS — G47.33 OSA (OBSTRUCTIVE SLEEP APNEA): ICD-10-CM

## 2023-07-20 DIAGNOSIS — Z12.31 ENCOUNTER FOR SCREENING MAMMOGRAM FOR MALIGNANT NEOPLASM OF BREAST: ICD-10-CM

## 2023-07-20 DIAGNOSIS — E11.65 TYPE 2 DIABETES MELLITUS WITH HYPERGLYCEMIA, WITHOUT LONG-TERM CURRENT USE OF INSULIN (HCC): Primary | ICD-10-CM

## 2023-07-20 LAB — HBA1C MFR BLD: 7.4 %

## 2023-07-20 PROCEDURE — G8417 CALC BMI ABV UP PARAM F/U: HCPCS | Performed by: INTERNAL MEDICINE

## 2023-07-20 PROCEDURE — 3078F DIAST BP <80 MM HG: CPT | Performed by: INTERNAL MEDICINE

## 2023-07-20 PROCEDURE — 99214 OFFICE O/P EST MOD 30 MIN: CPT | Performed by: INTERNAL MEDICINE

## 2023-07-20 PROCEDURE — 3074F SYST BP LT 130 MM HG: CPT | Performed by: INTERNAL MEDICINE

## 2023-07-20 PROCEDURE — 90746 HEPB VACCINE 3 DOSE ADULT IM: CPT | Performed by: INTERNAL MEDICINE

## 2023-07-20 PROCEDURE — 1036F TOBACCO NON-USER: CPT | Performed by: INTERNAL MEDICINE

## 2023-07-20 PROCEDURE — 3017F COLORECTAL CA SCREEN DOC REV: CPT | Performed by: INTERNAL MEDICINE

## 2023-07-20 PROCEDURE — 83037 HB GLYCOSYLATED A1C HOME DEV: CPT | Performed by: INTERNAL MEDICINE

## 2023-07-20 PROCEDURE — 2022F DILAT RTA XM EVC RTNOPTHY: CPT | Performed by: INTERNAL MEDICINE

## 2023-07-20 PROCEDURE — G8427 DOCREV CUR MEDS BY ELIG CLIN: HCPCS | Performed by: INTERNAL MEDICINE

## 2023-07-20 PROCEDURE — 99212 OFFICE O/P EST SF 10 MIN: CPT | Performed by: INTERNAL MEDICINE

## 2023-07-20 PROCEDURE — 3051F HG A1C>EQUAL 7.0%<8.0%: CPT | Performed by: INTERNAL MEDICINE

## 2023-07-20 RX ORDER — ICOSAPENT ETHYL 1000 MG/1
2 CAPSULE ORAL 2 TIMES DAILY
Qty: 60 CAPSULE | Refills: 3 | Status: SHIPPED | OUTPATIENT
Start: 2023-07-20

## 2023-07-20 RX ORDER — FLUOXETINE HYDROCHLORIDE 20 MG/1
CAPSULE ORAL
Qty: 90 CAPSULE | Refills: 0 | Status: SHIPPED | OUTPATIENT
Start: 2023-07-20

## 2023-07-20 RX ORDER — HYDROCHLOROTHIAZIDE 25 MG/1
TABLET ORAL
Qty: 90 TABLET | Refills: 0 | Status: SHIPPED | OUTPATIENT
Start: 2023-07-20

## 2023-07-20 RX ORDER — PRAVASTATIN SODIUM 80 MG/1
TABLET ORAL
Qty: 90 TABLET | Refills: 1 | Status: SHIPPED | OUTPATIENT
Start: 2023-07-20

## 2023-07-20 RX ORDER — AMLODIPINE BESYLATE 5 MG/1
5 TABLET ORAL DAILY
Qty: 90 TABLET | Refills: 1 | Status: SHIPPED | OUTPATIENT
Start: 2023-07-20

## 2023-07-20 ASSESSMENT — ENCOUNTER SYMPTOMS
VOMITING: 0
TROUBLE SWALLOWING: 0
ABDOMINAL DISTENTION: 0
DIARRHEA: 0
CONSTIPATION: 0
SHORTNESS OF BREATH: 0
NAUSEA: 0
ABDOMINAL PAIN: 0
COUGH: 0

## 2023-07-20 NOTE — PROGRESS NOTES
AVS printed with follow up appointment an, lab scripts d discharge instructions given per Hitesh Lowery LPN.  Mammogram ordered faxed to [facility] per patient request. Emani Slight to call with all issues

## 2023-07-20 NOTE — PATIENT INSTRUCTIONS
1) Increased Trulicity to 4.5 mg/weekly     2) Repeat A1c at appt in 3 months     3) Monitor Right ankle- any swelling of area- will need U/S of leg as discussed. 4) Hep B Vaccination series is 3 part- we need to 1 month nursing visit for 2nd vaccination.

## 2023-07-21 ENCOUNTER — TELEPHONE (OUTPATIENT)
Dept: INTERNAL MEDICINE | Age: 53
End: 2023-07-21

## 2023-07-21 NOTE — ASSESSMENT & PLAN NOTE
Requesting refill/replacement of DME CPAP supplies. Orders placed   Continues compliance with CPAP use with good use nightly and providing improved sleep pattern.

## 2023-07-21 NOTE — TELEPHONE ENCOUNTER
Please note DME orders for replacement of CPAP mask and tubing at this time. Order will need faxed to DME supplier for replacement- please coordinate with patient and complete.

## 2023-07-21 NOTE — TELEPHONE ENCOUNTER
Called and spoke with patient requesting [facility] for Cpap replacement and supplies. Aware I will fax today.

## 2023-07-27 ENCOUNTER — PROCEDURE VISIT (OUTPATIENT)
Dept: PODIATRY | Age: 53
End: 2023-07-27
Payer: COMMERCIAL

## 2023-07-27 VITALS — HEIGHT: 66 IN | WEIGHT: 248 LBS | BODY MASS INDEX: 39.86 KG/M2

## 2023-07-27 DIAGNOSIS — L84 CORNS AND CALLOSITIES: ICD-10-CM

## 2023-07-27 DIAGNOSIS — E11.9 TYPE 2 DIABETES MELLITUS WITHOUT COMPLICATION, UNSPECIFIED WHETHER LONG TERM INSULIN USE (HCC): Primary | ICD-10-CM

## 2023-07-27 DIAGNOSIS — G60.8 HEREDITARY SENSORY NEUROPATHY: ICD-10-CM

## 2023-07-27 DIAGNOSIS — B35.1 TINEA UNGUIUM: ICD-10-CM

## 2023-07-27 PROCEDURE — 11056 PARNG/CUTG B9 HYPRKR LES 2-4: CPT | Performed by: PODIATRIST

## 2023-07-27 PROCEDURE — 11721 DEBRIDE NAIL 6 OR MORE: CPT | Performed by: PODIATRIST

## 2023-07-27 NOTE — PROGRESS NOTES
Elaina Fernandes is here today for a diabetic foot exam and nail care. her PCP is Bessie Russell MD last OV was 07/20/2023. CC:   Follow-up diabetic foot ankle exam      HPI:   Follow-up diabetic foot ankle exam.  No open wounds. No recent injury. Denies any calf pain. Denies nausea vomit fever chills shortness breath. No additional pedal complaints today. ROS:  Const: Denies constitutional symptoms  Musculo: Denies symptoms other than stated above  Skin: Denies symptoms other than stated above      Current Outpatient Medications:     Misc. Devices MISC, CPAP Replacement Supplies- Tubing and Mask. Dx: CARLENE, Disp: 1 each, Rfl: 0    Icosapent Ethyl (VASCEPA) 1 g CAPS capsule, Take 2 capsules by mouth 2 times daily, Disp: 60 capsule, Rfl: 3    hydroCHLOROthiazide (HYDRODIURIL) 25 MG tablet, TAKE ONE TABLET BY MOUTH EVERY DAY IN THE MORNING, Disp: 90 tablet, Rfl: 0    FLUoxetine (PROZAC) 20 MG capsule, TAKE ONE CAPSULE BY MOUTH DAILY, Disp: 90 capsule, Rfl: 0    amLODIPine (NORVASC) 5 MG tablet, Take 1 tablet by mouth daily, Disp: 90 tablet, Rfl: 1    pravastatin (PRAVACHOL) 80 MG tablet, TAKE ONE TABLET BY MOUTH EVERY DAY IN THE EVENING, Disp: 90 tablet, Rfl: 1    Dulaglutide 4.5 MG/0.5ML SOPN, Inject 4.5 mg into the skin once a week, Disp: 4 Adjustable Dose Pre-filled Pen Syringe, Rfl: 2    metFORMIN (GLUCOPHAGE) 1000 MG tablet, TAKE ONE TABLET BY MOUTH TWO TIMES A DAY WITH MEALS, Disp: 180 tablet, Rfl: 0    mometasone (ELOCON) 0.1 % cream, Apply topically daily. , Disp: 50 g, Rfl: 0    Lancets MISC, Inject 1 each into the skin daily, Disp: 100 each, Rfl: 5    blood glucose monitor strips, Test 2 times a day & as needed for symptoms of irregular blood glucose. Dispense sufficient amount for indicated testing frequency plus additional to accommodate PRN testing needs. , Disp: 100 strip, Rfl: 5    clotrimazole-betamethasone (LOTRISONE) 1-0.05 % cream, Apply topically 2 times daily. , Disp: 1 each, Rfl: 0

## 2023-08-02 ENCOUNTER — HOSPITAL ENCOUNTER (OUTPATIENT)
Dept: GENERAL RADIOLOGY | Age: 53
Discharge: HOME | End: 2023-08-04
Payer: COMMERCIAL

## 2023-08-02 ENCOUNTER — TELEPHONE (OUTPATIENT)
Dept: INTERNAL MEDICINE | Age: 53
End: 2023-08-02

## 2023-08-02 VITALS — WEIGHT: 248 LBS | HEIGHT: 66 IN | BODY MASS INDEX: 39.86 KG/M2

## 2023-08-02 DIAGNOSIS — Z12.31 ENCOUNTER FOR SCREENING MAMMOGRAM FOR MALIGNANT NEOPLASM OF BREAST: ICD-10-CM

## 2023-08-02 PROCEDURE — 77063 BREAST TOMOSYNTHESIS BI: CPT

## 2023-08-03 NOTE — TELEPHONE ENCOUNTER
Patient called and instructed [place] [facility]  [place] results and no questions voiced

## 2023-08-03 NOTE — TELEPHONE ENCOUNTER
Reviewed results- incomplete mammogram findings- Category 0- need coned compression per reading. Please advise of recommendations and ensure follow-up is established with [facility].

## 2023-08-04 ENCOUNTER — PATIENT MESSAGE (OUTPATIENT)
Dept: INTERNAL MEDICINE | Age: 53
End: 2023-08-04

## 2023-08-04 ENCOUNTER — TELEPHONE (OUTPATIENT)
Dept: INTERNAL MEDICINE | Age: 53
End: 2023-08-04

## 2023-08-04 ENCOUNTER — TELEPHONE (OUTPATIENT)
Dept: GENERAL RADIOLOGY | Age: 53
End: 2023-08-04

## 2023-08-04 DIAGNOSIS — R92.8 ABNORMAL MAMMOGRAM OF RIGHT BREAST: Primary | ICD-10-CM

## 2023-08-04 NOTE — TELEPHONE ENCOUNTER
Orders adjusted- please coordinate with Jim Her that the correct orders for the right breast including Coned Down compression and U/S are correct based on the recommendations.

## 2023-08-04 NOTE — TELEPHONE ENCOUNTER
From: Sherrie Keller  To: Dr. Duenas Caul: 8/4/2023 11:22 AM EDT  Subject: Question     Good morning [facility], I hope you both have a great weekend!!! Message me if Dr Lennox Silva.  Thank you both!!!

## 2023-08-04 NOTE — TELEPHONE ENCOUNTER
Call to patient in reference to her mammogram performed at [facility] on August 2, 2023. Instructed patient that the radiologist has recommended some additional breast imaging, in order to make a final determination/result. Verbalizes understanding and is agreeable to proceed.

## 2023-08-11 ENCOUNTER — APPOINTMENT (OUTPATIENT)
Dept: GENERAL RADIOLOGY | Age: 53
End: 2023-08-11
Payer: COMMERCIAL

## 2023-08-11 ENCOUNTER — HOSPITAL ENCOUNTER (OUTPATIENT)
Dept: GENERAL RADIOLOGY | Age: 53
End: 2023-08-11
Payer: COMMERCIAL

## 2023-08-11 VITALS — BODY MASS INDEX: 39.86 KG/M2 | WEIGHT: 248 LBS | HEIGHT: 66 IN

## 2023-08-11 DIAGNOSIS — R92.8 ABNORMAL MAMMOGRAM OF RIGHT BREAST: ICD-10-CM

## 2023-08-11 PROCEDURE — G0279 TOMOSYNTHESIS, MAMMO: HCPCS

## 2023-08-11 PROCEDURE — 77065 DX MAMMO INCL CAD UNI: CPT

## 2023-08-14 DIAGNOSIS — E78.2 MIXED HYPERLIPIDEMIA: ICD-10-CM

## 2023-08-14 RX ORDER — ICOSAPENT ETHYL 1000 MG/1
2 CAPSULE ORAL 2 TIMES DAILY
Qty: 60 CAPSULE | Refills: 3 | OUTPATIENT
Start: 2023-08-14

## 2023-08-17 ENCOUNTER — NURSE ONLY (OUTPATIENT)
Dept: INTERNAL MEDICINE | Age: 53
End: 2023-08-17
Payer: COMMERCIAL

## 2023-08-17 DIAGNOSIS — Z11.59 NEED FOR HEPATITIS B SCREENING TEST: Primary | ICD-10-CM

## 2023-08-17 PROCEDURE — 90746 HEPB VACCINE 3 DOSE ADULT IM: CPT

## 2023-08-22 DIAGNOSIS — E11.65 TYPE 2 DIABETES MELLITUS WITH HYPERGLYCEMIA, WITHOUT LONG-TERM CURRENT USE OF INSULIN (HCC): ICD-10-CM

## 2023-08-23 DIAGNOSIS — E11.65 TYPE 2 DIABETES MELLITUS WITH HYPERGLYCEMIA, WITHOUT LONG-TERM CURRENT USE OF INSULIN (HCC): ICD-10-CM

## 2023-09-05 ENCOUNTER — TELEPHONE (OUTPATIENT)
Dept: INTERNAL MEDICINE | Age: 53
End: 2023-09-05

## 2023-09-05 NOTE — TELEPHONE ENCOUNTER
Called patient and discussed signs/symptoms after receiving [employer] Notification of positive COVID testing. Per patient, tested positive yesterday- symptoms since Thursday. She is noted now for 5 days of symptoms. Still some congestion, no fever, no chills, no myalgias, no change in oxygen status or escalating symptoms. Given [email or web address] information on signs/symptoms to monitor. At day of 5 of symptoms, discussed Paxlovid use, indications, and drug to drug interactions. Patient has been advised on potential reduction in hospitalization but recommended to be initiated within 5 day period. She has deferred management at this time. Supportive care continued and signs/sypmtoms discussed that would necessitate immediate re-evaluation. All questions answered.

## 2023-09-12 DIAGNOSIS — N95.1 POST MENOPAUSAL SYNDROME: ICD-10-CM

## 2023-09-12 RX ORDER — FLUOXETINE HYDROCHLORIDE 20 MG/1
CAPSULE ORAL
Qty: 90 CAPSULE | Refills: 0 | OUTPATIENT
Start: 2023-09-12

## 2023-09-12 NOTE — TELEPHONE ENCOUNTER
Last Appointment:  7/20/2023  Future Appointments   Date Time Provider [address]   9/28/2023 10:30 AM Jazz Song DPM Col Podiatry [facility]   11/2/2023 12:45 PM Philomena Nieves MD [facility]      Fluoxetine last filled 7/20/23 #90 no refills (10/19/23)  [phone] Spoke with patient who states continuing issues with [facility]. States her prescription ready June 2023 and she has one pill. I informed patient I would call [facility] and speak to them about the prescription that was sent on 7/20/23 and call her back. Spoke with Omid Paul the pharmacist at [facility] who states this RX is available and he would have it filled and ready for patient. Called patient back and let her know she can go and  her prescription at her convenience. This request is denied as it is a duplicate.   Chanel Wilde LPN

## 2023-09-28 ENCOUNTER — PROCEDURE VISIT (OUTPATIENT)
Dept: PODIATRY | Age: 53
End: 2023-09-28
Payer: COMMERCIAL

## 2023-09-28 DIAGNOSIS — B35.1 TINEA UNGUIUM: ICD-10-CM

## 2023-09-28 DIAGNOSIS — G60.8 HEREDITARY SENSORY NEUROPATHY: ICD-10-CM

## 2023-09-28 DIAGNOSIS — L84 CORNS AND CALLOSITIES: ICD-10-CM

## 2023-09-28 DIAGNOSIS — E11.9 TYPE 2 DIABETES MELLITUS WITHOUT COMPLICATION, UNSPECIFIED WHETHER LONG TERM INSULIN USE (HCC): Primary | ICD-10-CM

## 2023-09-28 PROCEDURE — 11721 DEBRIDE NAIL 6 OR MORE: CPT | Performed by: PODIATRIST

## 2023-09-28 PROCEDURE — 11056 PARNG/CUTG B9 HYPRKR LES 2-4: CPT | Performed by: PODIATRIST

## 2023-09-28 NOTE — PROGRESS NOTES
Joann Palomo is here today for a diabetic foot exam and nail care. her PCP is Scarlett Guevara MD last OV was 07/20/2023. CC:   Follow-up diabetic foot ankle exam      HPI:   Follow-up diabetic foot ankle exam.  No recent injury or trauma. Would like a new order for diabetic inserts. No additional pedal complaints. ROS:  Const: Denies constitutional symptoms  Musculo: Denies symptoms other than stated above  Skin: Denies symptoms other than stated above      Current Outpatient Medications:     Misc. Devices MISC, CPAP Replacement Supplies- Tubing and Mask. Dx: CARLENE, Disp: 1 each, Rfl: 0    Icosapent Ethyl (VASCEPA) 1 g CAPS capsule, Take 2 capsules by mouth 2 times daily, Disp: 60 capsule, Rfl: 3    hydroCHLOROthiazide (HYDRODIURIL) 25 MG tablet, TAKE ONE TABLET BY MOUTH EVERY DAY IN THE MORNING, Disp: 90 tablet, Rfl: 0    FLUoxetine (PROZAC) 20 MG capsule, TAKE ONE CAPSULE BY MOUTH DAILY, Disp: 90 capsule, Rfl: 0    amLODIPine (NORVASC) 5 MG tablet, Take 1 tablet by mouth daily, Disp: 90 tablet, Rfl: 1    pravastatin (PRAVACHOL) 80 MG tablet, TAKE ONE TABLET BY MOUTH EVERY DAY IN THE EVENING, Disp: 90 tablet, Rfl: 1    Dulaglutide 4.5 MG/0.5ML SOPN, Inject 4.5 mg into the skin once a week, Disp: 4 Adjustable Dose Pre-filled Pen Syringe, Rfl: 2    metFORMIN (GLUCOPHAGE) 1000 MG tablet, TAKE ONE TABLET BY MOUTH TWO TIMES A DAY WITH MEALS, Disp: 180 tablet, Rfl: 0    mometasone (ELOCON) 0.1 % cream, Apply topically daily. , Disp: 50 g, Rfl: 0    Lancets MISC, Inject 1 each into the skin daily, Disp: 100 each, Rfl: 5    blood glucose monitor strips, Test 2 times a day & as needed for symptoms of irregular blood glucose. Dispense sufficient amount for indicated testing frequency plus additional to accommodate PRN testing needs. , Disp: 100 strip, Rfl: 5    clotrimazole-betamethasone (LOTRISONE) 1-0.05 % cream, Apply topically 2 times daily. , Disp: 1 each, Rfl: 0    blood glucose monitor strips, by Other

## 2023-09-30 ENCOUNTER — HOSPITAL ENCOUNTER (OUTPATIENT)
Age: 53
Discharge: HOME | End: 2023-09-30
Payer: COMMERCIAL

## 2023-09-30 DIAGNOSIS — I10 ESSENTIAL HYPERTENSION: ICD-10-CM

## 2023-09-30 DIAGNOSIS — E88.810 METABOLIC SYNDROME: ICD-10-CM

## 2023-09-30 DIAGNOSIS — E11.65 TYPE 2 DIABETES MELLITUS WITH HYPERGLYCEMIA, WITHOUT LONG-TERM CURRENT USE OF INSULIN (HCC): ICD-10-CM

## 2023-09-30 DIAGNOSIS — E78.2 MIXED HYPERLIPIDEMIA: ICD-10-CM

## 2023-09-30 LAB
ALBUMIN SERPL-MCNC: 4.4 G/DL (ref 3.5–5.2)
ALP SERPL-CCNC: 55 U/L (ref 35–104)
ALT SERPL-CCNC: 25 U/L (ref 0–32)
ANION GAP SERPL CALCULATED.3IONS-SCNC: 14 MMOL/L (ref 7–16)
AST SERPL-CCNC: 17 U/L (ref 0–31)
BILIRUB DIRECT SERPL-MCNC: <0.2 MG/DL (ref 0–0.3)
BILIRUB INDIRECT SERPL-MCNC: ABNORMAL MG/DL (ref 0–1)
BILIRUB SERPL-MCNC: 0.3 MG/DL (ref 0–1.2)
BUN SERPL-MCNC: 11 MG/DL (ref 6–20)
CALCIUM SERPL-MCNC: 9.7 MG/DL (ref 8.6–10.2)
CHLORIDE SERPL-SCNC: 97 MMOL/L (ref 98–107)
CO2 SERPL-SCNC: 24 MMOL/L (ref 22–29)
CREAT SERPL-MCNC: 0.7 MG/DL (ref 0.5–1)
GFR SERPL CREATININE-BSD FRML MDRD: >60 ML/MIN/1.73M2
GLUCOSE SERPL-MCNC: 161 MG/DL (ref 74–99)
MICROALBUMIN UR-MCNC: 63 MG/L (ref 0–19)
POTASSIUM SERPL-SCNC: 4 MMOL/L (ref 3.5–5)
PROT SERPL-MCNC: 7 G/DL (ref 6.4–8.3)
SODIUM SERPL-SCNC: 135 MMOL/L (ref 132–146)

## 2023-09-30 PROCEDURE — 80053 COMPREHEN METABOLIC PANEL: CPT

## 2023-09-30 PROCEDURE — 82248 BILIRUBIN DIRECT: CPT

## 2023-09-30 PROCEDURE — 82043 UR ALBUMIN QUANTITATIVE: CPT

## 2023-10-02 ENCOUNTER — TELEPHONE (OUTPATIENT)
Dept: ADMINISTRATIVE | Age: 53
End: 2023-10-02

## 2023-10-18 ENCOUNTER — PATIENT MESSAGE (OUTPATIENT)
Dept: INTERNAL MEDICINE | Age: 53
End: 2023-10-18

## 2023-10-23 DIAGNOSIS — E11.65 TYPE 2 DIABETES MELLITUS WITH HYPERGLYCEMIA, WITHOUT LONG-TERM CURRENT USE OF INSULIN (HCC): ICD-10-CM

## 2023-10-23 RX ORDER — DULAGLUTIDE 4.5 MG/.5ML
INJECTION, SOLUTION SUBCUTANEOUS
Qty: 2 ML | Refills: 0 | Status: SHIPPED | OUTPATIENT
Start: 2023-10-23

## 2023-10-23 NOTE — TELEPHONE ENCOUNTER
Last Appointment:  7/20/2023  Future Appointments   Date Time Provider [address]   11/2/2023  7:45 AM Felicita Humphrey Res, MD [facility]   12/7/2023 10:45 AM Ira Martínez DPM Col Podiatry [facility]

## 2023-10-29 DIAGNOSIS — E11.65 TYPE 2 DIABETES MELLITUS WITH HYPERGLYCEMIA, WITHOUT LONG-TERM CURRENT USE OF INSULIN (HCC): ICD-10-CM

## 2023-10-30 NOTE — TELEPHONE ENCOUNTER
Last Appointment:  7/20/2023  Future Appointments   Date Time Provider [address]   11/2/2023  7:45 AM Tyler Biswas MD [facility]   12/7/2023 10:45 AM Olga Noonan DPM Col Podiatry [facility]

## 2023-11-02 ENCOUNTER — OFFICE VISIT (OUTPATIENT)
Dept: INTERNAL MEDICINE | Age: 53
End: 2023-11-02
Payer: COMMERCIAL

## 2023-11-02 VITALS
HEIGHT: 66 IN | BODY MASS INDEX: 39.08 KG/M2 | DIASTOLIC BLOOD PRESSURE: 80 MMHG | HEART RATE: 82 BPM | RESPIRATION RATE: 16 BRPM | SYSTOLIC BLOOD PRESSURE: 130 MMHG | TEMPERATURE: 97 F | OXYGEN SATURATION: 95 % | WEIGHT: 243.2 LBS

## 2023-11-02 DIAGNOSIS — L85.3 DRY SKIN DERMATITIS: ICD-10-CM

## 2023-11-02 DIAGNOSIS — I10 ESSENTIAL HYPERTENSION: ICD-10-CM

## 2023-11-02 DIAGNOSIS — N95.1 POST MENOPAUSAL SYNDROME: ICD-10-CM

## 2023-11-02 DIAGNOSIS — Z23 NEED FOR PNEUMOCOCCAL 20-VALENT CONJUGATE VACCINATION: ICD-10-CM

## 2023-11-02 DIAGNOSIS — G44.52 NEW DAILY PERSISTENT HEADACHE: Primary | ICD-10-CM

## 2023-11-02 DIAGNOSIS — E78.2 MIXED HYPERLIPIDEMIA: ICD-10-CM

## 2023-11-02 DIAGNOSIS — E11.65 TYPE 2 DIABETES MELLITUS WITH HYPERGLYCEMIA, WITHOUT LONG-TERM CURRENT USE OF INSULIN (HCC): ICD-10-CM

## 2023-11-02 DIAGNOSIS — R25.2 LEG CRAMPS: ICD-10-CM

## 2023-11-02 PROCEDURE — 2022F DILAT RTA XM EVC RTNOPTHY: CPT | Performed by: INTERNAL MEDICINE

## 2023-11-02 PROCEDURE — G8427 DOCREV CUR MEDS BY ELIG CLIN: HCPCS | Performed by: INTERNAL MEDICINE

## 2023-11-02 PROCEDURE — G8417 CALC BMI ABV UP PARAM F/U: HCPCS | Performed by: INTERNAL MEDICINE

## 2023-11-02 PROCEDURE — 3051F HG A1C>EQUAL 7.0%<8.0%: CPT | Performed by: INTERNAL MEDICINE

## 2023-11-02 PROCEDURE — 3079F DIAST BP 80-89 MM HG: CPT | Performed by: INTERNAL MEDICINE

## 2023-11-02 PROCEDURE — G8482 FLU IMMUNIZE ORDER/ADMIN: HCPCS | Performed by: INTERNAL MEDICINE

## 2023-11-02 PROCEDURE — 3017F COLORECTAL CA SCREEN DOC REV: CPT | Performed by: INTERNAL MEDICINE

## 2023-11-02 PROCEDURE — 1036F TOBACCO NON-USER: CPT | Performed by: INTERNAL MEDICINE

## 2023-11-02 PROCEDURE — 90677 PCV20 VACCINE IM: CPT | Performed by: INTERNAL MEDICINE

## 2023-11-02 PROCEDURE — 3075F SYST BP GE 130 - 139MM HG: CPT | Performed by: INTERNAL MEDICINE

## 2023-11-02 PROCEDURE — 99213 OFFICE O/P EST LOW 20 MIN: CPT | Performed by: INTERNAL MEDICINE

## 2023-11-02 PROCEDURE — 99214 OFFICE O/P EST MOD 30 MIN: CPT | Performed by: INTERNAL MEDICINE

## 2023-11-02 RX ORDER — DULAGLUTIDE 4.5 MG/.5ML
INJECTION, SOLUTION SUBCUTANEOUS
Qty: 2 ML | Refills: 2 | Status: SHIPPED | OUTPATIENT
Start: 2023-11-02

## 2023-11-02 RX ORDER — HYDROCHLOROTHIAZIDE 25 MG/1
TABLET ORAL
Qty: 90 TABLET | Refills: 1 | Status: SHIPPED | OUTPATIENT
Start: 2023-11-02

## 2023-11-02 RX ORDER — AMMONIUM LACTATE 12 G/100G
LOTION TOPICAL
Qty: 400 G | Refills: 1 | Status: SHIPPED | OUTPATIENT
Start: 2023-11-02

## 2023-11-02 RX ORDER — MAGNESIUM CHLORIDE 71.5 MG
TABLET, DELAYED RELEASE (ENTERIC COATED) ORAL
Qty: 180 TABLET | Refills: 1 | Status: SHIPPED | OUTPATIENT
Start: 2023-11-02

## 2023-11-02 RX ORDER — ICOSAPENT ETHYL 1000 MG/1
2 CAPSULE ORAL 2 TIMES DAILY
Qty: 360 CAPSULE | Refills: 1 | Status: SHIPPED | OUTPATIENT
Start: 2023-11-02

## 2023-11-02 RX ORDER — FLUOXETINE HYDROCHLORIDE 20 MG/1
CAPSULE ORAL
Qty: 90 CAPSULE | Refills: 1 | Status: SHIPPED | OUTPATIENT
Start: 2023-11-02

## 2023-11-02 ASSESSMENT — ENCOUNTER SYMPTOMS
CONSTIPATION: 0
DIARRHEA: 0
COUGH: 0
VOMITING: 0
NAUSEA: 0
BLOOD IN STOOL: 0
SHORTNESS OF BREATH: 0
WHEEZING: 0
TROUBLE SWALLOWING: 0

## 2023-11-02 NOTE — PROGRESS NOTES
Ap Barrera (:  1970) is a 48 y.o. female,Established patient, here for evaluation of the following chief complaint(s):  Hypertension, Diabetes, Headache (Headaches/fatigue post covid), and Immunizations (pneumonia)         ASSESSMENT/PLAN:  1. New daily persistent headache  -     Comprehensive Metabolic Panel with Bilirubin; Future  -     CBC with Auto Differential; Future  -     Sedimentation Rate; Future  -     C-Reactive Protein; Future  -     Santos Vyas MD, Neurology, [facility]  ? Post COVID syndrome and potential risk of LONG COVID  Given recurrent headaches- recommending additional assessment; no focal neuro deficits  Recommended MRI, labs, and Neurology referral  Deferred MRI Brain- if abnormal labs will order as discussed and may still be recommended by Neuro  Continue supportive care for now   Offered long covid assessment at [facility]- deferred for now   2. Type 2 diabetes mellitus with hyperglycemia, without long-term current use of insulin (HCC)  -     Dulaglutide (TRULICITY) 4.5 OL/7.3NQ SOPN; INJECT 4.5MG INTO THE SKIN ONCE A WEEK, Disp-2 mL, R-2Normal  -     Microalbumin, Ur; Future  Continue current management   3. Mixed hyperlipidemia  -     Icosapent Ethyl (VASCEPA) 1 g CAPS capsule; Take 2 capsules by mouth 2 times daily, Disp-360 capsule, R-1Normal  4. Essential hypertension- currently at goal per ACC/AHA- continue current management   -     hydroCHLOROthiazide (HYDRODIURIL) 25 MG tablet; TAKE ONE TABLET BY MOUTH EVERY DAY IN THE MORNING, Disp-90 tablet, R-1Normal  5. Post menopausal syndrome- symptoms stable   -     FLUoxetine (PROZAC) 20 MG capsule; TAKE ONE CAPSULE BY MOUTH DAILY, Disp-90 capsule, R-1Normal  6. Leg cramps  -     magnesium cl-calcium carbonate (NU-MAG) 71.5-119 MG TBEC tablet; TAKE TWO TABLETS BY MOUTH EVERY DAY, Disp-180 tablet, R-1Normal  7.  Need for pneumococcal 20-valent conjugate vaccination  -     Pneumococcal, PCV20, PREVNAR

## 2023-11-02 NOTE — PATIENT INSTRUCTIONS
Please ask your Eye Doctor to fax your office note to [phone].    1) We will order labs to be completed at your convenience     2) We will additionally discuss whether an MRI of brain is indicated with Neurology referral already in place. 3) Please keep a headache diary and follow trends as we move forward with scheduled work-up.

## 2023-11-07 ENCOUNTER — HOSPITAL ENCOUNTER (OUTPATIENT)
Age: 53
Discharge: HOME | End: 2023-11-07
Payer: COMMERCIAL

## 2023-11-07 DIAGNOSIS — G44.52 NEW DAILY PERSISTENT HEADACHE: ICD-10-CM

## 2023-11-07 DIAGNOSIS — E11.65 TYPE 2 DIABETES MELLITUS WITH HYPERGLYCEMIA, WITHOUT LONG-TERM CURRENT USE OF INSULIN (HCC): ICD-10-CM

## 2023-11-07 LAB
ALBUMIN SERPL-MCNC: 4.2 G/DL (ref 3.5–5.2)
ALP SERPL-CCNC: 63 U/L (ref 35–104)
ALT SERPL-CCNC: 26 U/L (ref 0–32)
ANION GAP SERPL CALCULATED.3IONS-SCNC: 15 MMOL/L (ref 7–16)
AST SERPL-CCNC: 20 U/L (ref 0–31)
BASOPHILS # BLD: 0.06 K/UL (ref 0–0.2)
BASOPHILS NFR BLD: 1 % (ref 0–2)
BILIRUB DIRECT SERPL-MCNC: <0.2 MG/DL (ref 0–0.3)
BILIRUB INDIRECT SERPL-MCNC: ABNORMAL MG/DL (ref 0–1)
BILIRUB SERPL-MCNC: 0.2 MG/DL (ref 0–1.2)
BUN SERPL-MCNC: 10 MG/DL (ref 6–20)
CALCIUM SERPL-MCNC: 9.7 MG/DL (ref 8.6–10.2)
CHLORIDE SERPL-SCNC: 98 MMOL/L (ref 98–107)
CO2 SERPL-SCNC: 25 MMOL/L (ref 22–29)
CREAT SERPL-MCNC: 0.6 MG/DL (ref 0.5–1)
CREAT UR-MCNC: 35.8 MG/DL (ref 29–226)
CRP SERPL HS-MCNC: 4 MG/L (ref 0–5)
EOSINOPHIL # BLD: 0.59 K/UL (ref 0.05–0.5)
EOSINOPHILS RELATIVE PERCENT: 9 % (ref 0–6)
ERYTHROCYTE [DISTWIDTH] IN BLOOD BY AUTOMATED COUNT: 12.9 % (ref 11.5–15)
ERYTHROCYTE [SEDIMENTATION RATE] IN BLOOD BY WESTERGREN METHOD: 32 MM/HR (ref 0–20)
GFR SERPL CREATININE-BSD FRML MDRD: >60 ML/MIN/1.73M2
GLUCOSE SERPL-MCNC: 169 MG/DL (ref 74–99)
HCT VFR BLD AUTO: 42.9 % (ref 34–48)
HGB BLD-MCNC: 13.9 G/DL (ref 11.5–15.5)
IMM GRANULOCYTES # BLD AUTO: 0.03 K/UL (ref 0–0.58)
IMM GRANULOCYTES NFR BLD: 0 % (ref 0–5)
LYMPHOCYTES NFR BLD: 2.42 K/UL (ref 1.5–4)
LYMPHOCYTES RELATIVE PERCENT: 35 % (ref 20–42)
MCH RBC QN AUTO: 28.9 PG (ref 26–35)
MCHC RBC AUTO-ENTMCNC: 32.4 G/DL (ref 32–34.5)
MCV RBC AUTO: 89.2 FL (ref 80–99.9)
MICROALBUMIN UR-MCNC: 13 MG/L (ref 0–19)
MICROALBUMIN/CREAT UR-RTO: 37 MCG/MG CREAT (ref 0–30)
MONOCYTES NFR BLD: 0.56 K/UL (ref 0.1–0.95)
MONOCYTES NFR BLD: 8 % (ref 2–12)
NEUTROPHILS NFR BLD: 47 % (ref 43–80)
NEUTS SEG NFR BLD: 3.28 K/UL (ref 1.8–7.3)
PLATELET # BLD AUTO: 433 K/UL (ref 130–450)
PMV BLD AUTO: 9.5 FL (ref 7–12)
POTASSIUM SERPL-SCNC: 4.1 MMOL/L (ref 3.5–5)
PROT SERPL-MCNC: 6.8 G/DL (ref 6.4–8.3)
RBC # BLD AUTO: 4.81 M/UL (ref 3.5–5.5)
SODIUM SERPL-SCNC: 138 MMOL/L (ref 132–146)
WBC OTHER # BLD: 6.9 K/UL (ref 4.5–11.5)

## 2023-11-07 PROCEDURE — 82570 ASSAY OF URINE CREATININE: CPT

## 2023-11-07 PROCEDURE — 82043 UR ALBUMIN QUANTITATIVE: CPT

## 2023-11-10 ENCOUNTER — PATIENT MESSAGE (OUTPATIENT)
Dept: INTERNAL MEDICINE | Age: 53
End: 2023-11-10

## 2023-11-10 DIAGNOSIS — R51.9 ACUTE NONINTRACTABLE HEADACHE, UNSPECIFIED HEADACHE TYPE: Primary | ICD-10-CM

## 2023-11-13 NOTE — TELEPHONE ENCOUNTER
From: Jonathan Abreu  To: Dr. Muhammad Jam: 11/10/2023 4:04 PM EST  Subject: Jonathan Abreu     This does not need addressed today, but wanted to let you know neurologist appointment they have next available is beginning of February, so whenever you think for the mri, no change.  Thank you, Karen Cornell

## 2023-11-15 LAB — DIABETIC RETINOPATHY: NEGATIVE

## 2023-12-03 ENCOUNTER — HOSPITAL ENCOUNTER (OUTPATIENT)
Dept: MRI IMAGING | Age: 53
Discharge: HOME | End: 2023-12-05
Attending: INTERNAL MEDICINE
Payer: COMMERCIAL

## 2023-12-03 DIAGNOSIS — R51.9 ACUTE NONINTRACTABLE HEADACHE, UNSPECIFIED HEADACHE TYPE: ICD-10-CM

## 2023-12-03 PROCEDURE — 70551 MRI BRAIN STEM W/O DYE: CPT

## 2023-12-07 ENCOUNTER — PROCEDURE VISIT (OUTPATIENT)
Dept: PODIATRY | Age: 53
End: 2023-12-07
Payer: COMMERCIAL

## 2023-12-07 VITALS — WEIGHT: 243 LBS | BODY MASS INDEX: 39.05 KG/M2 | HEIGHT: 66 IN

## 2023-12-07 DIAGNOSIS — E11.9 TYPE 2 DIABETES MELLITUS WITHOUT COMPLICATION, UNSPECIFIED WHETHER LONG TERM INSULIN USE (HCC): Primary | ICD-10-CM

## 2023-12-07 DIAGNOSIS — L84 CORNS AND CALLOSITIES: ICD-10-CM

## 2023-12-07 DIAGNOSIS — G60.8 HEREDITARY SENSORY NEUROPATHY: ICD-10-CM

## 2023-12-07 DIAGNOSIS — B35.1 TINEA UNGUIUM: ICD-10-CM

## 2023-12-07 PROCEDURE — 11721 DEBRIDE NAIL 6 OR MORE: CPT | Performed by: PODIATRIST

## 2023-12-07 PROCEDURE — 11056 PARNG/CUTG B9 HYPRKR LES 2-4: CPT | Performed by: PODIATRIST

## 2023-12-07 NOTE — PROGRESS NOTES
Patient in today for nail care. Patient does not have any complaints of pain at this time. Patient's PCP is Yoshi Rock MD date of last ov 11/02/2023. Torie Lauren LPN       CC:   Follow-up diabetic foot ankle exam    HPI:   Follow-up diabetic foot ankle exam.  No open wounds. No recent injury or trauma. No additional pedal complaints. ROS:  Const: Denies constitutional symptoms  Musculo: Denies symptoms other than stated above  Skin: Denies symptoms other than stated above      Current Outpatient Medications:     Dulaglutide (TRULICITY) 4.5 HA/0.4VC SOPN, INJECT 4.5MG INTO THE SKIN ONCE A WEEK, Disp: 2 mL, Rfl: 2    Icosapent Ethyl (VASCEPA) 1 g CAPS capsule, Take 2 capsules by mouth 2 times daily, Disp: 360 capsule, Rfl: 1    hydroCHLOROthiazide (HYDRODIURIL) 25 MG tablet, TAKE ONE TABLET BY MOUTH EVERY DAY IN THE MORNING, Disp: 90 tablet, Rfl: 1    FLUoxetine (PROZAC) 20 MG capsule, TAKE ONE CAPSULE BY MOUTH DAILY, Disp: 90 capsule, Rfl: 1    ammonium lactate (LAC-HYDRIN) 12 % lotion, Apply topically twice daily, Disp: 400 g, Rfl: 1    magnesium cl-calcium carbonate (NU-MAG) 71.5-119 MG TBEC tablet, TAKE TWO TABLETS BY MOUTH EVERY DAY, Disp: 180 tablet, Rfl: 1    metFORMIN (GLUCOPHAGE) 1000 MG tablet, TAKE ONE TABLET BY MOUTH TWICE A DAY WITH MEALS, Disp: 180 tablet, Rfl: 1    Misc. Devices MISC, CPAP Replacement Supplies- Tubing and Mask. Dx: CARLENE, Disp: 1 each, Rfl: 0    amLODIPine (NORVASC) 5 MG tablet, Take 1 tablet by mouth daily, Disp: 90 tablet, Rfl: 1    pravastatin (PRAVACHOL) 80 MG tablet, TAKE ONE TABLET BY MOUTH EVERY DAY IN THE EVENING, Disp: 90 tablet, Rfl: 1    mometasone (ELOCON) 0.1 % cream, Apply topically daily. , Disp: 50 g, Rfl: 0    Lancets MISC, Inject 1 each into the skin daily, Disp: 100 each, Rfl: 5    blood glucose monitor strips, Test 2 times a day & as needed for symptoms of irregular blood glucose.  Dispense sufficient amount for indicated testing

## 2024-01-11 ENCOUNTER — OFFICE VISIT (OUTPATIENT)
Dept: CARDIOLOGY CLINIC | Age: 54
End: 2024-01-11
Payer: COMMERCIAL

## 2024-01-11 VITALS
SYSTOLIC BLOOD PRESSURE: 118 MMHG | BODY MASS INDEX: 39.21 KG/M2 | RESPIRATION RATE: 16 BRPM | DIASTOLIC BLOOD PRESSURE: 80 MMHG | WEIGHT: 244 LBS | HEART RATE: 77 BPM | HEIGHT: 66 IN

## 2024-01-11 DIAGNOSIS — R00.2 PALPITATIONS: Primary | ICD-10-CM

## 2024-01-11 DIAGNOSIS — R06.09 DYSPNEA ON EXERTION: ICD-10-CM

## 2024-01-11 DIAGNOSIS — G47.33 OSA (OBSTRUCTIVE SLEEP APNEA): ICD-10-CM

## 2024-01-11 DIAGNOSIS — I10 ESSENTIAL HYPERTENSION: ICD-10-CM

## 2024-01-11 PROCEDURE — G8482 FLU IMMUNIZE ORDER/ADMIN: HCPCS | Performed by: INTERNAL MEDICINE

## 2024-01-11 PROCEDURE — 1036F TOBACCO NON-USER: CPT | Performed by: INTERNAL MEDICINE

## 2024-01-11 PROCEDURE — 93000 ELECTROCARDIOGRAM COMPLETE: CPT | Performed by: INTERNAL MEDICINE

## 2024-01-11 PROCEDURE — 3074F SYST BP LT 130 MM HG: CPT | Performed by: INTERNAL MEDICINE

## 2024-01-11 PROCEDURE — 99213 OFFICE O/P EST LOW 20 MIN: CPT | Performed by: INTERNAL MEDICINE

## 2024-01-11 PROCEDURE — 3017F COLORECTAL CA SCREEN DOC REV: CPT | Performed by: INTERNAL MEDICINE

## 2024-01-11 PROCEDURE — G8417 CALC BMI ABV UP PARAM F/U: HCPCS | Performed by: INTERNAL MEDICINE

## 2024-01-11 PROCEDURE — G8427 DOCREV CUR MEDS BY ELIG CLIN: HCPCS | Performed by: INTERNAL MEDICINE

## 2024-01-11 PROCEDURE — 3079F DIAST BP 80-89 MM HG: CPT | Performed by: INTERNAL MEDICINE

## 2024-01-11 NOTE — PROGRESS NOTES
Not on file   Tobacco Use    Smoking status: Former     Current packs/day: 0.00     Average packs/day: 0.5 packs/day for 3.0 years (1.5 ttl pk-yrs)     Types: Cigarettes     Start date:      Quit date:      Years since quittin.0    Smokeless tobacco: Never    Tobacco comments:     Quit 29 yrs. ago   Vaping Use    Vaping Use: Never used    Passive vaping exposure: Yes   Substance and Sexual Activity    Alcohol use: No     Alcohol/week: 0.0 standard drinks of alcohol    Drug use: No    Sexual activity: Yes     Partners: Male   Other Topics Concern    Not on file   Social History Narrative    Not on file     Social Determinants of Health     Financial Resource Strain: Low Risk  (2023)    Overall Financial Resource Strain (CARDIA)     Difficulty of Paying Living Expenses: Not hard at all   Food Insecurity: Not on file (2023)   Transportation Needs: No Transportation Needs (2023)    PRAPARE - Transportation     Lack of Transportation (Medical): No     Lack of Transportation (Non-Medical): No   Physical Activity: Not on file   Stress: Not on file   Social Connections: Not on file   Intimate Partner Violence: Not on file   Housing Stability: Low Risk  (2023)    Housing Stability Vital Sign     Unable to Pay for Housing in the Last Year: No     Number of Places Lived in the Last Year: 1     Unstable Housing in the Last Year: No       Allergies:  Allergies   Allergen Reactions    Bactrim [Sulfamethoxazole-Trimethoprim] Rash       Current Medications:  Current Outpatient Medications   Medication Sig Dispense Refill    Dulaglutide (TRULICITY) 4.5 MG/0.5ML SOPN INJECT 4.5MG INTO THE SKIN ONCE A WEEK 2 mL 2    Icosapent Ethyl (VASCEPA) 1 g CAPS capsule Take 2 capsules by mouth 2 times daily 360 capsule 1    hydroCHLOROthiazide (HYDRODIURIL) 25 MG tablet TAKE ONE TABLET BY MOUTH EVERY DAY IN THE MORNING 90 tablet 1    FLUoxetine (PROZAC) 20 MG capsule TAKE ONE CAPSULE BY MOUTH DAILY 90 capsule 1

## 2024-02-05 ENCOUNTER — OFFICE VISIT (OUTPATIENT)
Age: 54
End: 2024-02-05
Payer: COMMERCIAL

## 2024-02-05 VITALS
BODY MASS INDEX: 39.5 KG/M2 | HEART RATE: 77 BPM | WEIGHT: 244.7 LBS | TEMPERATURE: 98 F | SYSTOLIC BLOOD PRESSURE: 145 MMHG | DIASTOLIC BLOOD PRESSURE: 84 MMHG

## 2024-02-05 DIAGNOSIS — T39.95XA ANALGESIC REBOUND HEADACHE: ICD-10-CM

## 2024-02-05 DIAGNOSIS — G44.40 ANALGESIC REBOUND HEADACHE: ICD-10-CM

## 2024-02-05 DIAGNOSIS — R51.9 POST-COVID CHRONIC HEADACHE: ICD-10-CM

## 2024-02-05 DIAGNOSIS — G43.711 INTRACTABLE CHRONIC MIGRAINE WITHOUT AURA AND WITH STATUS MIGRAINOSUS: Primary | ICD-10-CM

## 2024-02-05 DIAGNOSIS — G89.29 POST-COVID CHRONIC HEADACHE: ICD-10-CM

## 2024-02-05 DIAGNOSIS — U09.9 POST-COVID CHRONIC HEADACHE: ICD-10-CM

## 2024-02-05 PROCEDURE — G8427 DOCREV CUR MEDS BY ELIG CLIN: HCPCS | Performed by: PSYCHIATRY & NEUROLOGY

## 2024-02-05 PROCEDURE — 99204 OFFICE O/P NEW MOD 45 MIN: CPT | Performed by: PSYCHIATRY & NEUROLOGY

## 2024-02-05 PROCEDURE — G8417 CALC BMI ABV UP PARAM F/U: HCPCS | Performed by: PSYCHIATRY & NEUROLOGY

## 2024-02-05 PROCEDURE — G8482 FLU IMMUNIZE ORDER/ADMIN: HCPCS | Performed by: PSYCHIATRY & NEUROLOGY

## 2024-02-05 PROCEDURE — 3077F SYST BP >= 140 MM HG: CPT | Performed by: PSYCHIATRY & NEUROLOGY

## 2024-02-05 PROCEDURE — 3079F DIAST BP 80-89 MM HG: CPT | Performed by: PSYCHIATRY & NEUROLOGY

## 2024-02-05 PROCEDURE — 3017F COLORECTAL CA SCREEN DOC REV: CPT | Performed by: PSYCHIATRY & NEUROLOGY

## 2024-02-05 PROCEDURE — 1036F TOBACCO NON-USER: CPT | Performed by: PSYCHIATRY & NEUROLOGY

## 2024-02-05 RX ORDER — SUMATRIPTAN 100 MG/1
100 TABLET, FILM COATED ORAL PRN
Qty: 9 TABLET | Refills: 2 | Status: SHIPPED | OUTPATIENT
Start: 2024-02-05

## 2024-02-05 RX ORDER — TOPIRAMATE 25 MG/1
25 TABLET ORAL NIGHTLY
Qty: 7 TABLET | Refills: 0 | Status: SHIPPED | OUTPATIENT
Start: 2024-02-05 | End: 2024-02-12

## 2024-02-05 RX ORDER — METHYLPREDNISOLONE 4 MG/1
TABLET ORAL
Qty: 22 TABLET | Refills: 0 | Status: SHIPPED | OUTPATIENT
Start: 2024-02-05 | End: 2024-02-11

## 2024-02-05 RX ORDER — TOPIRAMATE 100 MG/1
100 TABLET, FILM COATED ORAL
Qty: 30 TABLET | Refills: 3 | Status: SHIPPED | OUTPATIENT
Start: 2024-02-19

## 2024-02-05 RX ORDER — TOPIRAMATE 50 MG/1
50 TABLET, FILM COATED ORAL
Qty: 7 TABLET | Refills: 0 | Status: SHIPPED | OUTPATIENT
Start: 2024-02-12 | End: 2024-02-19

## 2024-02-05 NOTE — PROGRESS NOTES
tongue strength  Normal          Muscle tone examination showed normal tone  Muscle strength testing revealed  5/5 in ue/le's  Deep tendon reflexes were 2+  The plantar reflex was not done  There not present at rest  There was not dysmetria seen on bilaterally found on finger-nose-finger testing and heel shin testing.  Rapid alternating movements were unaffected  There was no sensory deficits noted    There was not extinction on the bilaterally with bilateral simultaneous stimulus.   The gait examination Normal  Skin:  Skin is warm. she is not diaphoretic.  Psychiatric: Memory, affect and judgement normal.       ASSESSMENT AND PLAN   1. Intractable chronic migraine without aura and with status migrainosus  Has family hx of migraines  But headaches changed for her only post covid  Now has chronic migraine with a component of rebound headaches  D/c otc medications.  On mag  Add otc b2 200 mg bid  Suggest prophylaxis with topamax titrated to 100 mg qhs  Use imitrex prn < 2 days a week      2. Post-COVID chronic headache  Post covid severe worsening of headaches.   Her headaches features are that of chronic migraine + rebound headaches.   No hx of cognitive issues post covid, so topamax can be a good option     3. Analgesic rebound headache  D/c all otc medications like tylenol, nsaid or excedrin  Suggest bridging therapy with medrol dose pack  Use topamax prophylaxis  Use imitrex < 2 days a week       Solange Myers MD

## 2024-02-07 NOTE — ASSESSMENT & PLAN NOTE
Continue HCTZ and amlodipine at current dosing to maintain ACC/AHA goal <130/<80  Off SGLT2 due to recurrent  infections  Consider adding low dose ACEI due to microalbuminuria      149

## 2024-02-07 NOTE — ASSESSMENT & PLAN NOTE
Diabetes Management Plan:     Blood Glucose Log Present yes - reviewed  Blood Pressure Monitoring <130/<80 yes - has been at goal per ACC/AHA   Retinopathy No    ACEI/ARB No- monitoring BP and possible adjust to low dose ACEI with mild microalbuminuria ratio noted   MOST RECENT A1c less than 8% yes - Repeat today:   Microalbumin/Cr Ratio completed in last year yes - ratio above normal   Diabetic Foot Exam completed in last year yes - Follows with Dr. Thornton   Eye Exam completed in last year yes - continues follow-up   Statin Use Yes

## 2024-02-07 NOTE — PROGRESS NOTES
Rehana Dong (:  1970) is a 53 y.o. female,Established patient, here for evaluation of the following chief complaint(s):  Follow-up, Hypertension, and Diabetes         ASSESSMENT/PLAN:  1. Essential hypertension  Assessment & Plan:  Continue HCTZ and amlodipine at current dosing to maintain ACC/AHA goal <130/<80  Off SGLT2 due to recurrent  infections  Consider adding low dose ACEI due to microalbuminuria     Orders:  -     amLODIPine (NORVASC) 5 MG tablet; Take 1 tablet by mouth daily, Disp-90 tablet, R-1Normal  -     Comprehensive Metabolic Panel with Bilirubin; Future  2. Type 2 diabetes mellitus with diabetic neuropathy, without long-term current use of insulin (HCC)  Assessment & Plan:  Diabetes Management Plan:     Blood Glucose Log Present yes - reviewed  Blood Pressure Monitoring <130/<80 yes - has been at goal per ACC/AHA   Retinopathy No    ACEI/ARB No- monitoring BP and possible adjust to low dose ACEI with mild microalbuminuria ratio noted   MOST RECENT A1c less than 8% yes - Repeat today:   Microalbumin/Cr Ratio completed in last year yes - ratio above normal   Diabetic Foot Exam completed in last year yes - Follows with Dr. Thornton   Eye Exam completed in last year yes - continues follow-up   Statin Use Yes      Orders:  -     POCT glycosylated hemoglobin (Hb A1C)  -     Comprehensive Metabolic Panel with Bilirubin; Future  3. Neuropathy  4. CARLENE (obstructive sleep apnea)  5. Mixed hyperlipidemia  Assessment & Plan:  TGs remain above goal despite adding Vascepa to regimen  Continue statin use   Orders:  -     pravastatin (PRAVACHOL) 80 MG tablet; TAKE ONE TABLET BY MOUTH EVERY DAY IN THE EVENING, Disp-90 tablet, R-1Normal  -     Comprehensive Metabolic Panel with Bilirubin; Future  6. Class 3 severe obesity due to excess calories with serious comorbidity and body mass index (BMI) of 40.0 to 44.9 in adult (HCC)  7. Need for hepatitis B vaccination  -     Hep B, ENGERIX-B, (age 20 yrs+), IM,

## 2024-02-08 ENCOUNTER — PROCEDURE VISIT (OUTPATIENT)
Dept: PODIATRY | Age: 54
End: 2024-02-08
Payer: COMMERCIAL

## 2024-02-08 ENCOUNTER — OFFICE VISIT (OUTPATIENT)
Dept: INTERNAL MEDICINE | Age: 54
End: 2024-02-08
Payer: COMMERCIAL

## 2024-02-08 VITALS
RESPIRATION RATE: 18 BRPM | OXYGEN SATURATION: 97 % | TEMPERATURE: 97.5 F | HEART RATE: 75 BPM | SYSTOLIC BLOOD PRESSURE: 141 MMHG | HEIGHT: 66 IN | WEIGHT: 241 LBS | DIASTOLIC BLOOD PRESSURE: 89 MMHG | BODY MASS INDEX: 38.73 KG/M2

## 2024-02-08 DIAGNOSIS — G47.33 OSA (OBSTRUCTIVE SLEEP APNEA): ICD-10-CM

## 2024-02-08 DIAGNOSIS — E66.01 CLASS 3 SEVERE OBESITY DUE TO EXCESS CALORIES WITH SERIOUS COMORBIDITY AND BODY MASS INDEX (BMI) OF 40.0 TO 44.9 IN ADULT (HCC): ICD-10-CM

## 2024-02-08 DIAGNOSIS — R80.9 MICROALBUMINURIA DUE TO TYPE 2 DIABETES MELLITUS (HCC): ICD-10-CM

## 2024-02-08 DIAGNOSIS — G43.709 CHRONIC MIGRAINE W/O AURA W/O STATUS MIGRAINOSUS, NOT INTRACTABLE: ICD-10-CM

## 2024-02-08 DIAGNOSIS — L84 CORNS AND CALLOSITIES: ICD-10-CM

## 2024-02-08 DIAGNOSIS — Z23 NEED FOR HEPATITIS B VACCINATION: ICD-10-CM

## 2024-02-08 DIAGNOSIS — E11.29 MICROALBUMINURIA DUE TO TYPE 2 DIABETES MELLITUS (HCC): ICD-10-CM

## 2024-02-08 DIAGNOSIS — E11.9 TYPE 2 DIABETES MELLITUS WITHOUT COMPLICATION, UNSPECIFIED WHETHER LONG TERM INSULIN USE (HCC): Primary | ICD-10-CM

## 2024-02-08 DIAGNOSIS — I10 ESSENTIAL HYPERTENSION: Primary | ICD-10-CM

## 2024-02-08 DIAGNOSIS — G62.9 NEUROPATHY: ICD-10-CM

## 2024-02-08 DIAGNOSIS — E78.2 MIXED HYPERLIPIDEMIA: ICD-10-CM

## 2024-02-08 DIAGNOSIS — E11.40 TYPE 2 DIABETES MELLITUS WITH DIABETIC NEUROPATHY, WITHOUT LONG-TERM CURRENT USE OF INSULIN (HCC): ICD-10-CM

## 2024-02-08 DIAGNOSIS — B35.1 TINEA UNGUIUM: ICD-10-CM

## 2024-02-08 DIAGNOSIS — G60.8 HEREDITARY SENSORY NEUROPATHY: ICD-10-CM

## 2024-02-08 LAB — HBA1C MFR BLD: 7.2 %

## 2024-02-08 PROCEDURE — 3079F DIAST BP 80-89 MM HG: CPT | Performed by: INTERNAL MEDICINE

## 2024-02-08 PROCEDURE — 83036 HEMOGLOBIN GLYCOSYLATED A1C: CPT | Performed by: INTERNAL MEDICINE

## 2024-02-08 PROCEDURE — 99212 OFFICE O/P EST SF 10 MIN: CPT | Performed by: INTERNAL MEDICINE

## 2024-02-08 PROCEDURE — 99214 OFFICE O/P EST MOD 30 MIN: CPT | Performed by: INTERNAL MEDICINE

## 2024-02-08 PROCEDURE — 1036F TOBACCO NON-USER: CPT | Performed by: INTERNAL MEDICINE

## 2024-02-08 PROCEDURE — 2022F DILAT RTA XM EVC RTNOPTHY: CPT | Performed by: INTERNAL MEDICINE

## 2024-02-08 PROCEDURE — G8427 DOCREV CUR MEDS BY ELIG CLIN: HCPCS | Performed by: INTERNAL MEDICINE

## 2024-02-08 PROCEDURE — 90746 HEPB VACCINE 3 DOSE ADULT IM: CPT | Performed by: INTERNAL MEDICINE

## 2024-02-08 PROCEDURE — 11056 PARNG/CUTG B9 HYPRKR LES 2-4: CPT | Performed by: PODIATRIST

## 2024-02-08 PROCEDURE — 11721 DEBRIDE NAIL 6 OR MORE: CPT | Performed by: PODIATRIST

## 2024-02-08 PROCEDURE — 3017F COLORECTAL CA SCREEN DOC REV: CPT | Performed by: INTERNAL MEDICINE

## 2024-02-08 PROCEDURE — 3051F HG A1C>EQUAL 7.0%<8.0%: CPT | Performed by: INTERNAL MEDICINE

## 2024-02-08 PROCEDURE — G8482 FLU IMMUNIZE ORDER/ADMIN: HCPCS | Performed by: INTERNAL MEDICINE

## 2024-02-08 PROCEDURE — 3075F SYST BP GE 130 - 139MM HG: CPT | Performed by: INTERNAL MEDICINE

## 2024-02-08 PROCEDURE — G8417 CALC BMI ABV UP PARAM F/U: HCPCS | Performed by: INTERNAL MEDICINE

## 2024-02-08 RX ORDER — AMLODIPINE BESYLATE 5 MG/1
5 TABLET ORAL DAILY
Qty: 90 TABLET | Refills: 1 | Status: SHIPPED | OUTPATIENT
Start: 2024-02-08

## 2024-02-08 RX ORDER — LISINOPRIL 5 MG/1
5 TABLET ORAL DAILY
Qty: 90 TABLET | Refills: 1 | Status: SHIPPED | OUTPATIENT
Start: 2024-02-08

## 2024-02-08 RX ORDER — PRAVASTATIN SODIUM 80 MG/1
TABLET ORAL
Qty: 90 TABLET | Refills: 1 | Status: SHIPPED | OUTPATIENT
Start: 2024-02-08

## 2024-02-08 ASSESSMENT — ENCOUNTER SYMPTOMS
SHORTNESS OF BREATH: 0
BLOOD IN STOOL: 0
COUGH: 0
TROUBLE SWALLOWING: 0
CONSTIPATION: 0
DIARRHEA: 0
WHEEZING: 0
VOMITING: 0
NAUSEA: 0

## 2024-02-08 NOTE — PATIENT INSTRUCTIONS
1) we will add Lisinopril 5 mg to regimen. Monitor blood pressure at home and any potential symptoms.    2) Labs in 1 month.     3) Please follow with us regarding control of headaches with regimen changes.

## 2024-02-08 NOTE — ASSESSMENT & PLAN NOTE
Cannot tolerate SGLT2 due to side effects  Will start low dose ACEI- discussed appropriate administration and potential side effects  Monitor renal function   Repeat Microalbumin ratio at 6 months  BP log and follow

## 2024-02-08 NOTE — PROGRESS NOTES
Rehana Dong is here today for a diabetic foot exam and nail care. her PCP is Luc Rock MD last OV was 11/02/2023.     CC:   Follow-up diabetic foot ankle exam    HPI:   Follow-up diabetic foot ankle exam.  No open wounds.  No recent injury or trauma.  Denies any foot or ankle pain today.  No additional pedal complaints.      ROS:  Const: Denies constitutional symptoms  Musculo: Denies symptoms other than stated above  Skin: Denies symptoms other than stated above      Current Outpatient Medications:     methylPREDNISolone (MEDROL, KLAUS,) 4 MG tablet, Take by mouth. As directed, Disp: 22 tablet, Rfl: 0    [START ON 2/12/2024] topiramate (TOPAMAX) 50 MG tablet, Take 1 tablet by mouth nightly for 7 days, Disp: 7 tablet, Rfl: 0    topiramate (TOPAMAX) 25 MG tablet, Take 1 tablet by mouth nightly for 7 days, Disp: 7 tablet, Rfl: 0    [START ON 2/19/2024] topiramate (TOPAMAX) 100 MG tablet, Take 1 tablet by mouth nightly, Disp: 30 tablet, Rfl: 3    SUMAtriptan (IMITREX) 100 MG tablet, Take 1 tablet by mouth as needed for Migraine If headache persists repeat a dose in 2 hours. Max use 2 days a week, Disp: 9 tablet, Rfl: 2    Dulaglutide (TRULICITY) 4.5 MG/0.5ML SOPN, INJECT 4.5MG INTO THE SKIN ONCE A WEEK, Disp: 2 mL, Rfl: 2    Icosapent Ethyl (VASCEPA) 1 g CAPS capsule, Take 2 capsules by mouth 2 times daily, Disp: 360 capsule, Rfl: 1    hydroCHLOROthiazide (HYDRODIURIL) 25 MG tablet, TAKE ONE TABLET BY MOUTH EVERY DAY IN THE MORNING, Disp: 90 tablet, Rfl: 1    FLUoxetine (PROZAC) 20 MG capsule, TAKE ONE CAPSULE BY MOUTH DAILY, Disp: 90 capsule, Rfl: 1    ammonium lactate (LAC-HYDRIN) 12 % lotion, Apply topically twice daily, Disp: 400 g, Rfl: 1    magnesium cl-calcium carbonate (NU-MAG) 71.5-119 MG TBEC tablet, TAKE TWO TABLETS BY MOUTH EVERY DAY, Disp: 180 tablet, Rfl: 1    metFORMIN (GLUCOPHAGE) 1000 MG tablet, TAKE ONE TABLET BY MOUTH TWICE A DAY WITH MEALS, Disp: 180 tablet, Rfl: 1    Misc. Devices MISC,

## 2024-02-08 NOTE — ASSESSMENT & PLAN NOTE
MRI Brain unremarkable  Following with Neurology  Stopped all OTC meds due to complicated/superimposed overuse headaches   Plan for prn imitrex and initiation of Topamax  Discussed med monitoring including renal function to monitor for any risk of hyponatremia and RTA while on Topamax- orders placed   HA diary encouraged and follow for response

## 2024-03-07 DIAGNOSIS — E11.65 TYPE 2 DIABETES MELLITUS WITH HYPERGLYCEMIA, WITHOUT LONG-TERM CURRENT USE OF INSULIN (HCC): ICD-10-CM

## 2024-03-07 RX ORDER — DULAGLUTIDE 4.5 MG/.5ML
INJECTION, SOLUTION SUBCUTANEOUS
Qty: 2 ML | Refills: 2 | Status: SHIPPED | OUTPATIENT
Start: 2024-03-07

## 2024-03-07 NOTE — TELEPHONE ENCOUNTER
Last Appointment:  2/8/2024  Future Appointments   Date Time Provider Department Center   4/5/2024 11:20 AM Solange Myers MD [facility] NEURO Neurology -   4/11/2024  9:45 AM Alvin Thornton DPM Col Podiatry [facility]   5/23/2024  1:15 PM Luc Rock MD [facility]

## 2024-03-11 ENCOUNTER — TELEPHONE (OUTPATIENT)
Dept: INTERNAL MEDICINE | Age: 54
End: 2024-03-11

## 2024-03-11 NOTE — TELEPHONE ENCOUNTER
Called patient back to discuss. Lisinopril was initiated in February for combination management of microalbuminuria and BP management.     She notes a persistent dry cough- repeated and difficulty with catching breath with the coughing; No noted swelling of lips or mouth or signs of angioedema.      Decision given known side effects- stop ACEI. Washout for next 7 days. Ensure all symptoms are resolved. If so, will then consider starting low dose ARB for microalbuminuria and BP control.

## 2024-03-11 NOTE — TELEPHONE ENCOUNTER
Pt has been c/o cough since starting the Lisinopril. Denies any fever. States she thought something was wrong at first . At times feels lite \"asthma tightening up\", she would have to \"go outside to get air\", drink water, etc. States she starting looking at side effects of her new medications and feels cough is due to her Lisinopril.    Please advise.

## 2024-04-05 ENCOUNTER — OFFICE VISIT (OUTPATIENT)
Age: 54
End: 2024-04-05

## 2024-04-05 VITALS
TEMPERATURE: 97.9 F | DIASTOLIC BLOOD PRESSURE: 87 MMHG | HEIGHT: 66 IN | WEIGHT: 236.1 LBS | BODY MASS INDEX: 37.94 KG/M2 | HEART RATE: 67 BPM | SYSTOLIC BLOOD PRESSURE: 138 MMHG

## 2024-04-05 DIAGNOSIS — G43.711 INTRACTABLE CHRONIC MIGRAINE WITHOUT AURA AND WITH STATUS MIGRAINOSUS: Primary | ICD-10-CM

## 2024-04-05 DIAGNOSIS — G89.29 POST-COVID CHRONIC HEADACHE: ICD-10-CM

## 2024-04-05 DIAGNOSIS — U09.9 POST-COVID CHRONIC HEADACHE: ICD-10-CM

## 2024-04-05 DIAGNOSIS — T39.95XA ANALGESIC REBOUND HEADACHE: ICD-10-CM

## 2024-04-05 DIAGNOSIS — R51.9 POST-COVID CHRONIC HEADACHE: ICD-10-CM

## 2024-04-05 DIAGNOSIS — G44.40 ANALGESIC REBOUND HEADACHE: ICD-10-CM

## 2024-04-05 RX ORDER — RIBOFLAVIN (VITAMIN B2) 100 MG
200 TABLET ORAL 2 TIMES DAILY
Qty: 360 TABLET | Refills: 1 | Status: SHIPPED | OUTPATIENT
Start: 2024-04-05

## 2024-04-05 RX ORDER — TOPIRAMATE 100 MG/1
100 TABLET, FILM COATED ORAL
Qty: 90 TABLET | Refills: 1 | Status: SHIPPED | OUTPATIENT
Start: 2024-04-05

## 2024-04-05 RX ORDER — SUMATRIPTAN 100 MG/1
100 TABLET, FILM COATED ORAL PRN
Qty: 9 TABLET | Refills: 5 | Status: SHIPPED | OUTPATIENT
Start: 2024-04-05

## 2024-04-05 NOTE — PROGRESS NOTES
Solange Myers MD       Rehana Dong is a 53 y.o. female presenting as a follow patient for a   Chief Complaint   Patient presents with    Follow-up    Headache      Chronicity: labor day 2023, after covid infection      Onset: started after covid infection in 2023    Progression since onset: headaches better   Stopped all otc medications    Topamax- hard a time tolerating. But gotten used to it  Minimal tingling   Had severe nausea/dizziness/diarrhea  Had lethargy. So took it earlier in pm  Metallic taste.   Side effects better .  Tolerating 100 mg qhs now without side effects.      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   Severity 6-8/10  Intensity improving  achiness  Pain worsened by : Light: yes, Noise: yes, cough : no, straining : no, exercise : no, smells : no  Nausea : no  Vomiting:no  Dizziness: no  Visual aura: no  Sensory aura : none            Prior treatment tried : b2 200 mg bid, mag   On prozac  On calcium channel blocker  Topamax 100 mg qhs      Eye appt- non revealing  Having dental appt         Mri brain:few non specific white matter changes.          Past Medical History:   Diagnosis Date    Abdominal pain     Anxiety state, unspecified 03/08/2016    Asthma     no inhalers    Breast fibrocystic disorder     Diabetes mellitus (HCC)     Essential hypertension 04/25/2016    Headache With covid early September    Hyperlipidemia     Irritable bowel syndrome     Left ovarian cyst     Migraine With covid early September 2023    Morbid obesity (HCC) 04/25/2016    Neuropathy     Osteoarthritis     Pharyngoesophageal dysphagia 06/14/2016    Sleep apnea     Bi pap     Past Surgical History:   Procedure Laterality Date    ABDOMEN SURGERY Left 03/15/2019    INCISION AND DRAINAGE LEFT LABIAL ABSCESS performed by Vivek

## 2024-04-11 ENCOUNTER — PROCEDURE VISIT (OUTPATIENT)
Dept: PODIATRY | Age: 54
End: 2024-04-11
Payer: COMMERCIAL

## 2024-04-11 VITALS — BODY MASS INDEX: 37.93 KG/M2 | WEIGHT: 236 LBS | HEIGHT: 66 IN

## 2024-04-11 DIAGNOSIS — G60.8 HEREDITARY SENSORY NEUROPATHY: ICD-10-CM

## 2024-04-11 DIAGNOSIS — L84 CORNS AND CALLOSITIES: ICD-10-CM

## 2024-04-11 DIAGNOSIS — E11.9 TYPE 2 DIABETES MELLITUS WITHOUT COMPLICATION, UNSPECIFIED WHETHER LONG TERM INSULIN USE (HCC): Primary | ICD-10-CM

## 2024-04-11 DIAGNOSIS — B35.1 TINEA UNGUIUM: ICD-10-CM

## 2024-04-11 DIAGNOSIS — I10 ESSENTIAL HYPERTENSION: ICD-10-CM

## 2024-04-11 PROCEDURE — 11056 PARNG/CUTG B9 HYPRKR LES 2-4: CPT | Performed by: PODIATRIST

## 2024-04-11 PROCEDURE — 11721 DEBRIDE NAIL 6 OR MORE: CPT | Performed by: PODIATRIST

## 2024-04-11 RX ORDER — AMLODIPINE BESYLATE 5 MG/1
5 TABLET ORAL DAILY
Qty: 90 TABLET | Refills: 1 | OUTPATIENT
Start: 2024-04-11

## 2024-04-11 NOTE — TELEPHONE ENCOUNTER
Spoke with pharmacist at [facility] to confirm they have script on file from Feb. States they do have script and will get it ready for patient. VM message left for patient to notify of same.

## 2024-04-11 NOTE — PROGRESS NOTES
Rehana Dong is here today for a diabetic foot exam and nail care. her PCP is Luc Rock MD last OV was 02/08/2024.     CC:   Follow-up diabetic foot ankle exam    HPI:   Follow-up diabetic foot ankle exam.  Denies any open wounds.  Has been using a different lotion over-the-counter has noticed improvement.  Denies any recent injuries or trauma.  Denies foot or ankle pain today.    ROS:  Const: Denies constitutional symptoms  Musculo: Denies symptoms other than stated above  Skin: Denies symptoms other than stated above      Current Outpatient Medications:     SUMAtriptan (IMITREX) 100 MG tablet, Take 1 tablet by mouth as needed for Migraine If headache persists repeat a dose in 2 hours. Max use 2 days a week, Disp: 9 tablet, Rfl: 5    topiramate (TOPAMAX) 100 MG tablet, Take 1 tablet by mouth nightly, Disp: 90 tablet, Rfl: 1    vitamin B-2 (RIBOFLAVIN) 100 MG TABS tablet, Take 2 tablets by mouth in the morning and at bedtime, Disp: 360 tablet, Rfl: 1    Dulaglutide (TRULICITY) 4.5 MG/0.5ML SOPN, INJECT 4.5MG INTO THE SKIN ONCE A WEEK, Disp: 2 mL, Rfl: 2    amLODIPine (NORVASC) 5 MG tablet, Take 1 tablet by mouth daily, Disp: 90 tablet, Rfl: 1    pravastatin (PRAVACHOL) 80 MG tablet, TAKE ONE TABLET BY MOUTH EVERY DAY IN THE EVENING, Disp: 90 tablet, Rfl: 1    Icosapent Ethyl (VASCEPA) 1 g CAPS capsule, Take 2 capsules by mouth 2 times daily, Disp: 360 capsule, Rfl: 1    hydroCHLOROthiazide (HYDRODIURIL) 25 MG tablet, TAKE ONE TABLET BY MOUTH EVERY DAY IN THE MORNING, Disp: 90 tablet, Rfl: 1    FLUoxetine (PROZAC) 20 MG capsule, TAKE ONE CAPSULE BY MOUTH DAILY, Disp: 90 capsule, Rfl: 1    magnesium cl-calcium carbonate (NU-MAG) 71.5-119 MG TBEC tablet, TAKE TWO TABLETS BY MOUTH EVERY DAY, Disp: 180 tablet, Rfl: 1    metFORMIN (GLUCOPHAGE) 1000 MG tablet, TAKE ONE TABLET BY MOUTH TWICE A DAY WITH MEALS, Disp: 180 tablet, Rfl: 1    Misc. Devices MISC, CPAP Replacement Supplies- Tubing and Mask.   Dx: CARLENE,

## 2024-04-29 DIAGNOSIS — E11.65 TYPE 2 DIABETES MELLITUS WITH HYPERGLYCEMIA, WITHOUT LONG-TERM CURRENT USE OF INSULIN (HCC): ICD-10-CM

## 2024-04-30 NOTE — TELEPHONE ENCOUNTER
Last Appointment:  2/8/2024  Future Appointments   Date Time Provider Department Center   5/23/2024  1:15 PM Luc Rock MD [facility] IM [facility]   6/13/2024 10:15 AM Alvin Thornton DPM Col Podiatry [facility]   10/4/2024 11:10 AM Solange Myers MD [facility] NEURO Neurology -

## 2024-05-01 ENCOUNTER — TELEPHONE (OUTPATIENT)
Dept: INTERNAL MEDICINE | Age: 54
End: 2024-05-01

## 2024-05-01 NOTE — TELEPHONE ENCOUNTER
Pt called office to report pharmacy is out of her Trulicity 4.5mg/0.5 ml dose. Pt did call other pharmacies who are also out of the medication. Per pt, pharmacist suggest possible change to Ozempic. This nurse did call hospital pharmacy and they are also out of stock for this medication. Pt took her dose yesterday and next dose due on 5-7-24.  Please advise.

## 2024-05-02 ENCOUNTER — TELEPHONE (OUTPATIENT)
Dept: INTERNAL MEDICINE | Age: 54
End: 2024-05-02

## 2024-05-02 NOTE — TELEPHONE ENCOUNTER
Pt called with update from previous cough she experienced after starting Lisinopril. Pt stopped this med 3-11-24 & follow up call on 3-19-24 pt reported:  \"cough and breathing has \"improved significantly\" since stopping Lisinopril. . States still has a little bit of cough but \"hoping for this to be all the way gone soon.\" Pt states will definitely let office know of any negative changes.     Pt now concerned due to episodes she is having. Pt states the area is \"left side of her throat\", she can \"point to the specific area\". She will \"get a feeling in her throat\", almost like an \"attack\". Her \"eye will water, and she has to drink water immediately\", otherwise she feels \"everything tightens, has high pitchy voice, takes my voice away-feels almost like an asthma attack.\" She will cough almost to the point where she may throw up. Reports this is happening 3-4 times a day.   Pt is concerned if coughing doesn't stop. She has checked her O2 sat when she is having these episodes and it is in the low 90's. Normal O2 sats are in the mid 90's.   Pt asking if she can have an earlier appt to be seen. Pt notified message to be sent to  and also will follow up with him as he is in the office this afternoon  Last Appointment:  2/8/2024  Future Appointments   Date Time Provider Department Center   5/23/2024  1:15 PM Luc Rock MD [facility]   6/13/2024 10:15 AM Alvin Thornton DPM Col Podiatry [facility]   10/4/2024 11:10 AM Solange Myers MD [facility] NEURO Neurology -

## 2024-05-03 ENCOUNTER — HOSPITAL ENCOUNTER (OUTPATIENT)
Age: 54
Discharge: HOME | End: 2024-05-03
Payer: COMMERCIAL

## 2024-05-03 DIAGNOSIS — R80.9 MICROALBUMINURIA DUE TO TYPE 2 DIABETES MELLITUS (HCC): ICD-10-CM

## 2024-05-03 DIAGNOSIS — E11.29 MICROALBUMINURIA DUE TO TYPE 2 DIABETES MELLITUS (HCC): ICD-10-CM

## 2024-05-03 DIAGNOSIS — E78.2 MIXED HYPERLIPIDEMIA: ICD-10-CM

## 2024-05-03 DIAGNOSIS — E11.40 TYPE 2 DIABETES MELLITUS WITH DIABETIC NEUROPATHY, WITHOUT LONG-TERM CURRENT USE OF INSULIN (HCC): ICD-10-CM

## 2024-05-03 DIAGNOSIS — I10 ESSENTIAL HYPERTENSION: ICD-10-CM

## 2024-05-03 LAB
ALBUMIN SERPL-MCNC: 4.7 G/DL (ref 3.5–5.2)
ALP SERPL-CCNC: 61 U/L (ref 35–104)
ALT SERPL-CCNC: 23 U/L (ref 0–32)
ANION GAP SERPL CALCULATED.3IONS-SCNC: 11 MMOL/L (ref 7–16)
AST SERPL-CCNC: 18 U/L (ref 0–31)
BILIRUB DIRECT SERPL-MCNC: <0.2 MG/DL (ref 0–0.3)
BILIRUB INDIRECT SERPL-MCNC: ABNORMAL MG/DL (ref 0–1)
BILIRUB SERPL-MCNC: 0.3 MG/DL (ref 0–1.2)
BUN SERPL-MCNC: 13 MG/DL (ref 6–20)
CALCIUM SERPL-MCNC: 9.5 MG/DL (ref 8.6–10.2)
CHLORIDE SERPL-SCNC: 100 MMOL/L (ref 98–107)
CO2 SERPL-SCNC: 27 MMOL/L (ref 22–29)
CREAT SERPL-MCNC: 0.6 MG/DL (ref 0.5–1)
GFR, ESTIMATED: >90 ML/MIN/1.73M2
GLUCOSE SERPL-MCNC: 125 MG/DL (ref 74–99)
POTASSIUM SERPL-SCNC: 3.7 MMOL/L (ref 3.5–5)
PROT SERPL-MCNC: 7.2 G/DL (ref 6.4–8.3)
SODIUM SERPL-SCNC: 138 MMOL/L (ref 132–146)

## 2024-05-03 PROCEDURE — 82248 BILIRUBIN DIRECT: CPT

## 2024-05-03 PROCEDURE — 80053 COMPREHEN METABOLIC PANEL: CPT

## 2024-05-03 PROCEDURE — 36415 COLL VENOUS BLD VENIPUNCTURE: CPT

## 2024-05-03 SDOH — ECONOMIC STABILITY: FOOD INSECURITY: WITHIN THE PAST 12 MONTHS, THE FOOD YOU BOUGHT JUST DIDN'T LAST AND YOU DIDN'T HAVE MONEY TO GET MORE.: SOMETIMES TRUE

## 2024-05-03 SDOH — ECONOMIC STABILITY: FOOD INSECURITY: WITHIN THE PAST 12 MONTHS, YOU WORRIED THAT YOUR FOOD WOULD RUN OUT BEFORE YOU GOT MONEY TO BUY MORE.: SOMETIMES TRUE

## 2024-05-03 SDOH — ECONOMIC STABILITY: INCOME INSECURITY: HOW HARD IS IT FOR YOU TO PAY FOR THE VERY BASICS LIKE FOOD, HOUSING, MEDICAL CARE, AND HEATING?: NOT VERY HARD

## 2024-05-03 ASSESSMENT — PATIENT HEALTH QUESTIONNAIRE - PHQ9
SUM OF ALL RESPONSES TO PHQ9 QUESTIONS 1 & 2: 0
SUM OF ALL RESPONSES TO PHQ9 QUESTIONS 1 & 2: 0
1. LITTLE INTEREST OR PLEASURE IN DOING THINGS: NOT AT ALL
SUM OF ALL RESPONSES TO PHQ QUESTIONS 1-9: 0
2. FEELING DOWN, DEPRESSED OR HOPELESS: NOT AT ALL
SUM OF ALL RESPONSES TO PHQ QUESTIONS 1-9: 0
SUM OF ALL RESPONSES TO PHQ QUESTIONS 1-9: 0
2. FEELING DOWN, DEPRESSED OR HOPELESS: NOT AT ALL
1. LITTLE INTEREST OR PLEASURE IN DOING THINGS: NOT AT ALL
SUM OF ALL RESPONSES TO PHQ QUESTIONS 1-9: 0

## 2024-05-06 ENCOUNTER — HOSPITAL ENCOUNTER (OUTPATIENT)
Age: 54
Discharge: HOME | End: 2024-05-08
Payer: COMMERCIAL

## 2024-05-06 ENCOUNTER — OFFICE VISIT (OUTPATIENT)
Dept: INTERNAL MEDICINE | Age: 54
End: 2024-05-06
Payer: COMMERCIAL

## 2024-05-06 ENCOUNTER — PATIENT MESSAGE (OUTPATIENT)
Dept: INTERNAL MEDICINE | Age: 54
End: 2024-05-06

## 2024-05-06 ENCOUNTER — HOSPITAL ENCOUNTER (OUTPATIENT)
Dept: GENERAL RADIOLOGY | Age: 54
Discharge: HOME | End: 2024-05-08
Payer: COMMERCIAL

## 2024-05-06 VITALS
TEMPERATURE: 97.2 F | HEART RATE: 83 BPM | WEIGHT: 236 LBS | OXYGEN SATURATION: 97 % | RESPIRATION RATE: 20 BRPM | DIASTOLIC BLOOD PRESSURE: 68 MMHG | HEIGHT: 66 IN | SYSTOLIC BLOOD PRESSURE: 132 MMHG | BODY MASS INDEX: 37.93 KG/M2

## 2024-05-06 DIAGNOSIS — R05.2 SUBACUTE COUGH: ICD-10-CM

## 2024-05-06 DIAGNOSIS — R05.2 SUBACUTE COUGH: Primary | ICD-10-CM

## 2024-05-06 PROCEDURE — 3017F COLORECTAL CA SCREEN DOC REV: CPT | Performed by: INTERNAL MEDICINE

## 2024-05-06 PROCEDURE — G8427 DOCREV CUR MEDS BY ELIG CLIN: HCPCS | Performed by: INTERNAL MEDICINE

## 2024-05-06 PROCEDURE — G8417 CALC BMI ABV UP PARAM F/U: HCPCS | Performed by: INTERNAL MEDICINE

## 2024-05-06 PROCEDURE — 3078F DIAST BP <80 MM HG: CPT | Performed by: INTERNAL MEDICINE

## 2024-05-06 PROCEDURE — 1036F TOBACCO NON-USER: CPT | Performed by: INTERNAL MEDICINE

## 2024-05-06 PROCEDURE — 71046 X-RAY EXAM CHEST 2 VIEWS: CPT

## 2024-05-06 PROCEDURE — 3075F SYST BP GE 130 - 139MM HG: CPT | Performed by: INTERNAL MEDICINE

## 2024-05-06 PROCEDURE — 99213 OFFICE O/P EST LOW 20 MIN: CPT | Performed by: INTERNAL MEDICINE

## 2024-05-06 RX ORDER — ALBUTEROL SULFATE 90 UG/1
2 AEROSOL, METERED RESPIRATORY (INHALATION) 4 TIMES DAILY PRN
Qty: 18 G | Refills: 0 | Status: SHIPPED | OUTPATIENT
Start: 2024-05-06

## 2024-05-06 RX ORDER — FLUTICASONE PROPIONATE 50 MCG
2 SPRAY, SUSPENSION (ML) NASAL DAILY
Qty: 16 G | Refills: 0 | Status: SHIPPED | OUTPATIENT
Start: 2024-05-06

## 2024-05-06 RX ORDER — BENZONATATE 100 MG/1
100 CAPSULE ORAL 2 TIMES DAILY PRN
Qty: 20 CAPSULE | Refills: 0 | Status: SHIPPED | OUTPATIENT
Start: 2024-05-06 | End: 2024-05-13

## 2024-05-06 RX ORDER — AZITHROMYCIN 250 MG/1
TABLET, FILM COATED ORAL
Qty: 6 TABLET | Refills: 0 | Status: SHIPPED | OUTPATIENT
Start: 2024-05-06 | End: 2024-05-16

## 2024-05-06 SDOH — ECONOMIC STABILITY: FOOD INSECURITY: WITHIN THE PAST 12 MONTHS, THE FOOD YOU BOUGHT JUST DIDN'T LAST AND YOU DIDN'T HAVE MONEY TO GET MORE.: NEVER TRUE

## 2024-05-06 SDOH — ECONOMIC STABILITY: FOOD INSECURITY: WITHIN THE PAST 12 MONTHS, YOU WORRIED THAT YOUR FOOD WOULD RUN OUT BEFORE YOU GOT MONEY TO BUY MORE.: NEVER TRUE

## 2024-05-06 SDOH — ECONOMIC STABILITY: INCOME INSECURITY: HOW HARD IS IT FOR YOU TO PAY FOR THE VERY BASICS LIKE FOOD, HOUSING, MEDICAL CARE, AND HEATING?: NOT HARD AT ALL

## 2024-05-06 ASSESSMENT — ENCOUNTER SYMPTOMS
SHORTNESS OF BREATH: 0
COUGH: 1
HEMOPTYSIS: 0

## 2024-05-06 NOTE — PROGRESS NOTES
Rehana Dong (:  1970) is a 53 y.o. female,Established patient, here for evaluation of the following chief complaint(s):  Cough (Insists she is not sick, states it is a side effect of a med she is no longer on it)      Assessment & Plan   1. Subacute cough  -     albuterol sulfate HFA (VENTOLIN HFA) 108 (90 Base) MCG/ACT inhaler; Inhale 2 puffs into the lungs 4 times daily as needed for Wheezing, Disp-18 g, R-0Normal  -     XR CHEST STANDARD (2 VW); Future  -     Elian Vo MD, Otolaryngology, [facility] ([place])  -     benzonatate (TESSALON) 100 MG capsule; Take 1 capsule by mouth 2 times daily as needed for Cough, Disp-20 capsule, R-0Normal  -     fluticasone (FLONASE) 50 MCG/ACT nasal spray; 2 sprays by Each Nostril route daily, Disp-16 g, R-0Normal  -     azithromycin (ZITHROMAX) 250 MG tablet; 500mg on day 1 followed by 250mg on days 2 - 5, Disp-6 tablet, R-0Normal     ? Etiology of cough- concern for laryngospastic component vs. UACS vs. Less likely concern of Pertussis given the nature of symptoms    Discussed options and management; oxygen saturation stable   Will need ENT assessment as discussed and potential laryngoscope    CXR to be completed    Start rescue inhaler and cough suppression   Will empirically treat for Pertussis coverage and follow CXR results    Initiate management for UACS should there be a component   Consider systemic antihistamine use as noted   Likely initial cough after use of ACEI is coincidental given findings at this time.  However- will keep as listed allergy   Long discussion on signs/symptoms that would necessitate immediate re-evaluation.     Maintain Scheduled APPT        Subjective     Presents for subacute cough- > 3 weeks- initially thought to be related to ACEI initiation. It initially appeared to improve with discontinuation of ACEI. However, recurrence noted. Described of significant coughing spells Trouble catching breath- watering of eyes; difficult to

## 2024-05-06 NOTE — PATIENT INSTRUCTIONS
1) We ordered an inhaler for trial during these spasmic episodes.     2) We also ordered a cough suppressant and nasal spray    3) Please have CXR completed at your convenience.     4) Please follow for immediate ENT follow-up- we will calling their office for an acute appt as able.     5) Please call with any questions- seek care if no improvement or worsening symptoms.

## 2024-05-06 NOTE — TELEPHONE ENCOUNTER
Late entry for May 3, 24. Pt coming in Monday for 's appt. Dr. Rock to review with patient during visit.

## 2024-05-07 ENCOUNTER — PATIENT MESSAGE (OUTPATIENT)
Dept: INTERNAL MEDICINE | Age: 54
End: 2024-05-07

## 2024-05-07 ASSESSMENT — ENCOUNTER SYMPTOMS
VOICE CHANGE: 1
SINUS PAIN: 0
HEARTBURN: 0
TROUBLE SWALLOWING: 0
RHINORRHEA: 0
SORE THROAT: 0
WHEEZING: 0
SINUS PRESSURE: 0

## 2024-05-07 NOTE — TELEPHONE ENCOUNTER
From: Dr. Luc Rock  To: Rehana Dong  Sent: 5/6/2024 9:41 PM EDT  Subject: RE: FOLLOW-UP    Rehana,    I hope you were able to  the as needed inhaler. Any improvement in cough with inhaler use as directed?    I have reviewed your Chest Xray- no findings of Pneumonia.    I have also sent Azithromycin to the pharmacy (Z pack). Although unlikely, I would cover for atypical bacteria such as Pertussis.     I believe this could be a laryngospastic cough as we discussed. Therefore, I will be asking for ENT at [facility] to establish an appointment for you as soon as possible.    Hope this updates you and I would like to follow with your to determine how you have been feeling overall.     Dr. Rock

## 2024-05-23 ENCOUNTER — OFFICE VISIT (OUTPATIENT)
Dept: INTERNAL MEDICINE | Age: 54
End: 2024-05-23
Payer: COMMERCIAL

## 2024-05-23 VITALS
TEMPERATURE: 97.2 F | SYSTOLIC BLOOD PRESSURE: 139 MMHG | HEART RATE: 78 BPM | WEIGHT: 240.6 LBS | HEIGHT: 66 IN | OXYGEN SATURATION: 95 % | DIASTOLIC BLOOD PRESSURE: 82 MMHG | RESPIRATION RATE: 16 BRPM | BODY MASS INDEX: 38.67 KG/M2

## 2024-05-23 DIAGNOSIS — E11.40 TYPE 2 DIABETES MELLITUS WITH DIABETIC NEUROPATHY, WITHOUT LONG-TERM CURRENT USE OF INSULIN (HCC): ICD-10-CM

## 2024-05-23 DIAGNOSIS — J98.4 SCARRING OF LUNG: ICD-10-CM

## 2024-05-23 DIAGNOSIS — R05.2 SUBACUTE COUGH: ICD-10-CM

## 2024-05-23 DIAGNOSIS — I10 ESSENTIAL HYPERTENSION: ICD-10-CM

## 2024-05-23 DIAGNOSIS — R25.2 LEG CRAMPS: ICD-10-CM

## 2024-05-23 DIAGNOSIS — R89.8 EOSINOPHILS INCREASED: ICD-10-CM

## 2024-05-23 DIAGNOSIS — R49.0 HOARSENESS OF VOICE: Primary | ICD-10-CM

## 2024-05-23 DIAGNOSIS — N95.1 POST MENOPAUSAL SYNDROME: ICD-10-CM

## 2024-05-23 PROCEDURE — 3017F COLORECTAL CA SCREEN DOC REV: CPT | Performed by: INTERNAL MEDICINE

## 2024-05-23 PROCEDURE — 2022F DILAT RTA XM EVC RTNOPTHY: CPT | Performed by: INTERNAL MEDICINE

## 2024-05-23 PROCEDURE — 3075F SYST BP GE 130 - 139MM HG: CPT | Performed by: INTERNAL MEDICINE

## 2024-05-23 PROCEDURE — 3051F HG A1C>EQUAL 7.0%<8.0%: CPT | Performed by: INTERNAL MEDICINE

## 2024-05-23 PROCEDURE — G8417 CALC BMI ABV UP PARAM F/U: HCPCS | Performed by: INTERNAL MEDICINE

## 2024-05-23 PROCEDURE — 3079F DIAST BP 80-89 MM HG: CPT | Performed by: INTERNAL MEDICINE

## 2024-05-23 PROCEDURE — G8427 DOCREV CUR MEDS BY ELIG CLIN: HCPCS | Performed by: INTERNAL MEDICINE

## 2024-05-23 PROCEDURE — 99213 OFFICE O/P EST LOW 20 MIN: CPT | Performed by: INTERNAL MEDICINE

## 2024-05-23 PROCEDURE — 99214 OFFICE O/P EST MOD 30 MIN: CPT | Performed by: INTERNAL MEDICINE

## 2024-05-23 PROCEDURE — 1036F TOBACCO NON-USER: CPT | Performed by: INTERNAL MEDICINE

## 2024-05-23 RX ORDER — HYDROCHLOROTHIAZIDE 25 MG/1
TABLET ORAL
Qty: 90 TABLET | Refills: 0 | Status: SHIPPED | OUTPATIENT
Start: 2024-05-23

## 2024-05-23 RX ORDER — FLUTICASONE PROPIONATE 50 MCG
2 SPRAY, SUSPENSION (ML) NASAL DAILY
Qty: 16 G | Refills: 5 | Status: SHIPPED | OUTPATIENT
Start: 2024-05-23

## 2024-05-23 RX ORDER — FLUTICASONE PROPIONATE 44 UG/1
1 AEROSOL, METERED RESPIRATORY (INHALATION) 2 TIMES DAILY
Qty: 1 EACH | Refills: 1 | Status: SHIPPED | OUTPATIENT
Start: 2024-05-23

## 2024-05-23 RX ORDER — ALBUTEROL SULFATE 90 UG/1
2 AEROSOL, METERED RESPIRATORY (INHALATION) 4 TIMES DAILY PRN
Qty: 18 G | Refills: 1 | Status: SHIPPED | OUTPATIENT
Start: 2024-05-23

## 2024-05-23 RX ORDER — FLUOXETINE HYDROCHLORIDE 20 MG/1
CAPSULE ORAL
Qty: 90 CAPSULE | Refills: 0 | Status: SHIPPED | OUTPATIENT
Start: 2024-05-23

## 2024-05-23 RX ORDER — MAGNESIUM CHLORIDE 71.5 MG
TABLET, DELAYED RELEASE (ENTERIC COATED) ORAL
Qty: 180 TABLET | Refills: 0 | Status: SHIPPED | OUTPATIENT
Start: 2024-05-23

## 2024-05-23 ASSESSMENT — ENCOUNTER SYMPTOMS
COUGH: 1
SHORTNESS OF BREATH: 1
HEMOPTYSIS: 0
SINUS PAIN: 0
FACIAL SWELLING: 0
VOICE CHANGE: 1
SINUS PRESSURE: 0
TROUBLE SWALLOWING: 0
CHOKING: 0
WHEEZING: 1

## 2024-05-23 NOTE — PROGRESS NOTES
Pt had to leave prior to receiving AVS. Lab script & AVS given to her . He was notified self scheduling message sent to pt's My Chart to schedule follow up appt. Requested they call with any questions or concerns.   
assessment tomorrow   3. Post menopausal syndrome  -     FLUoxetine (PROZAC) 20 MG capsule; TAKE ONE CAPSULE BY MOUTH DAILY, Disp-90 capsule, R-0Normal  4. Essential hypertension  -     hydroCHLOROthiazide (HYDRODIURIL) 25 MG tablet; TAKE ONE TABLET BY MOUTH EVERY DAY IN THE MORNING, Disp-90 tablet, R-0Normal  -     Comprehensive Metabolic Panel with Bilirubin; Future  5. Leg cramps  -     magnesium cl-calcium carbonate (NU-MAG) 71.5-119 MG TBEC tablet; TAKE TWO TABLETS BY MOUTH EVERY DAY, Disp-180 tablet, R-0Normal  6. Scarring of lung  -     CT CHEST WO CONTRAST; Future  7. Eosinophils increased  -     CBC with Auto Differential; Future  8. Type 2 diabetes mellitus with diabetic neuropathy, without long-term current use of insulin (HCC)  -     Semaglutide,0.25 or 0.5MG/DOS, 2 MG/1.5ML SOPN; Inject 0.25 mg into the skin once a week, Disp-4 Adjustable Dose Pre-filled Pen Syringe, R-0Normal  Uncontrolled disease   No GLP1 given  backorder   Will start ozempic after discussing with [facility] pharmacy   Will need to start at lower dose  Repeat at higher dose at 4 week intervals        Return in about 6 weeks (around 7/4/2024).       Subjective     Presents for 2 week follow-up. Acute assessment for subacute cough- managed with inhaler, atbs, and initiation of cough suppression, nasal intracortisteroid. States initially improved but now continued with spells. States inhaler does provide relief. Oxygen assessment at home remains stable and stable in office today.  Denies any thick productive cough. CXR shows JOHANNA scarring without acute infiltrates or additional cardiopulmonary process and no comparison. ENT appt is scheduled for tomorrow and further work-up planned today.   Cough  The current episode started more than 1 month ago. The problem has been unchanged. The cough is Non-productive (states occassional production- infrequent). Associated symptoms include postnasal drip, shortness of breath (mild- significnat

## 2024-05-23 NOTE — PATIENT INSTRUCTIONS
1) maintain ENT appt tomorrow.    2) Start daily inhaled steroid. Make sure to rinse and spit after use.     3) Continue as needed inhaler.    4) Given persistence of symptoms- we have ordered a CT scan of the neck and CT of the chest given the scarring seen on the Xray- we will follow results.    5) We will sent [facility] message with new diabetic medication.     6) Please obtain labs as discussed

## 2024-05-24 ENCOUNTER — TELEPHONE (OUTPATIENT)
Dept: INTERNAL MEDICINE | Age: 54
End: 2024-05-24

## 2024-06-06 ENCOUNTER — HOSPITAL ENCOUNTER (OUTPATIENT)
Dept: CT IMAGING | Age: 54
Discharge: HOME | End: 2024-06-08
Attending: INTERNAL MEDICINE
Payer: COMMERCIAL

## 2024-06-06 DIAGNOSIS — R49.0 HOARSENESS OF VOICE: ICD-10-CM

## 2024-06-06 DIAGNOSIS — J98.4 SCARRING OF LUNG: ICD-10-CM

## 2024-06-06 DIAGNOSIS — R05.2 SUBACUTE COUGH: ICD-10-CM

## 2024-06-06 PROCEDURE — 71250 CT THORAX DX C-: CPT

## 2024-06-06 PROCEDURE — 70490 CT SOFT TISSUE NECK W/O DYE: CPT

## 2024-06-10 ENCOUNTER — PATIENT MESSAGE (OUTPATIENT)
Dept: INTERNAL MEDICINE | Age: 54
End: 2024-06-10

## 2024-06-10 ENCOUNTER — TELEPHONE (OUTPATIENT)
Dept: INTERNAL MEDICINE | Age: 54
End: 2024-06-10

## 2024-06-10 DIAGNOSIS — E11.40 TYPE 2 DIABETES MELLITUS WITH DIABETIC NEUROPATHY, WITHOUT LONG-TERM CURRENT USE OF INSULIN (HCC): ICD-10-CM

## 2024-06-10 DIAGNOSIS — J40 BRONCHITIS: Primary | ICD-10-CM

## 2024-06-10 DIAGNOSIS — R93.89 ABNORMAL CT OF THE CHEST: ICD-10-CM

## 2024-06-10 RX ORDER — DOXYCYCLINE HYCLATE 100 MG
100 TABLET ORAL 2 TIMES DAILY
Qty: 20 TABLET | Refills: 0 | Status: SHIPPED | OUTPATIENT
Start: 2024-06-10 | End: 2024-06-20

## 2024-06-10 RX ORDER — DILTIAZEM HYDROCHLORIDE 60 MG/1
2 TABLET, FILM COATED ORAL 2 TIMES DAILY
Qty: 10.2 G | Refills: 3
Start: 2024-06-10

## 2024-06-10 NOTE — TELEPHONE ENCOUNTER
We discussed changes by phone. Stopping Flovent. Starting Symbicort. Continue Albuterol. Start Doxy management and follow symptoms closely.     HRCT likely to be ordered based on CT results. Note for assessment sent to Pulm.

## 2024-06-10 NOTE — TELEPHONE ENCOUNTER
Pt called and left message regarding her Ozempic and need for new order at increased dose needed for her refill. Initial dose ordered 5-23-24. VM message left for patient to notify of need for discussion prior to any changes in medication to make sure no adverse effects.

## 2024-06-10 NOTE — TELEPHONE ENCOUNTER
Given the findings- added Symbicort for daily use in addition to albuterol inhaler. Message sent to Dr. Dejesus for review and coordination leading to upcoming appt. May consider ordering HRCT in advance.

## 2024-06-11 ENCOUNTER — PATIENT MESSAGE (OUTPATIENT)
Dept: INTERNAL MEDICINE | Age: 54
End: 2024-06-11

## 2024-06-11 NOTE — TELEPHONE ENCOUNTER
Contacted patient directly on 6/10 and discussed all findings and plan. Sent folllow-up my chart message today to clarify. ENT requested trial of GERD management. If did not trial, need to ensure completion. If failure- as discussed- consider repeat assessment vs. ENT 2nd opinion for direct laryngeal assessment via laryngoscope which was noted to be original intent of referral.

## 2024-06-12 ENCOUNTER — TELEPHONE (OUTPATIENT)
Dept: INTERNAL MEDICINE | Age: 54
End: 2024-06-12

## 2024-06-12 NOTE — TELEPHONE ENCOUNTER
Recommendation is to increase after 4 weeks. Initiated on 5/23- refill after 4 weeks of therapy at 0.25 mg/weekly. Reorder at 0.5 mg/weekly for pickup upon completion.

## 2024-06-12 NOTE — TELEPHONE ENCOUNTER
CT  SOFT TISSUE WAS NOT APPROVED AND REQUIRES A PEER TO PEER PER AUTHORIZATION DEPT  CASE # [id number]

## 2024-06-12 NOTE — TELEPHONE ENCOUNTER
Spoke with Dr. Rock regarding pt's Symbicort. Review of chart shows med ordered as No Print. Ok to call in script. Med called into [facility] pharmacy. VM message left for patient to notify of same and of order to continue Ozempic for 4 weeks at the 0.25 mg dose prior to starting the 0.5 mg dose.

## 2024-06-13 ENCOUNTER — PROCEDURE VISIT (OUTPATIENT)
Dept: PODIATRY | Age: 54
End: 2024-06-13
Payer: COMMERCIAL

## 2024-06-13 DIAGNOSIS — G60.8 HEREDITARY SENSORY NEUROPATHY: ICD-10-CM

## 2024-06-13 DIAGNOSIS — E11.9 TYPE 2 DIABETES MELLITUS WITHOUT COMPLICATION, UNSPECIFIED WHETHER LONG TERM INSULIN USE (HCC): Primary | ICD-10-CM

## 2024-06-13 DIAGNOSIS — L84 CORNS AND CALLOSITIES: ICD-10-CM

## 2024-06-13 DIAGNOSIS — B35.1 TINEA UNGUIUM: ICD-10-CM

## 2024-06-13 PROCEDURE — 11056 PARNG/CUTG B9 HYPRKR LES 2-4: CPT | Performed by: PODIATRIST

## 2024-06-13 PROCEDURE — 11721 DEBRIDE NAIL 6 OR MORE: CPT | Performed by: PODIATRIST

## 2024-06-14 NOTE — TELEPHONE ENCOUNTER
Contacted [facility] at this time per peer to peer request with listed case number: called with peer to peer discussion.    Note for order from 5/23- addendum to note clarifying differential diagnosis to be completed today discussing the relevancy and urgency of imaging including rule out vocal cord lesion at this time.     Please see 6/14 addendum.    Take updated note and please complete in appeal for peer to peer case linked via portal at [facility].

## 2024-06-14 NOTE — TELEPHONE ENCOUNTER
Called [facility] for fax number to send updated office note. Spoke with Emelia and information faxed to [phone]. Fax confirmation received. Received return call from Emelia who states this most likely needs faxed to [facility]. Notified information was already faxed. States they will shred faxed information. Call transferred to [phone].   Spoke with Thuy at [facility]. Reviewed case number. New fax number is [phone]. States case number needs to be included on cover sheet. Call reference number is [id number]. Information faxed and fax confirmation received.

## 2024-06-17 RX ORDER — TOPIRAMATE 100 MG/1
100 TABLET, FILM COATED ORAL
Qty: 90 TABLET | Refills: 1 | OUTPATIENT
Start: 2024-06-17

## 2024-06-19 ENCOUNTER — PATIENT MESSAGE (OUTPATIENT)
Dept: INTERNAL MEDICINE CLINIC | Age: 54
End: 2024-06-19

## 2024-07-25 ENCOUNTER — OFFICE VISIT (OUTPATIENT)
Dept: INTERNAL MEDICINE | Age: 54
End: 2024-07-25
Payer: COMMERCIAL

## 2024-07-25 VITALS
DIASTOLIC BLOOD PRESSURE: 71 MMHG | WEIGHT: 233 LBS | RESPIRATION RATE: 14 BRPM | HEART RATE: 78 BPM | OXYGEN SATURATION: 97 % | TEMPERATURE: 96.6 F | BODY MASS INDEX: 37.45 KG/M2 | HEIGHT: 66 IN | SYSTOLIC BLOOD PRESSURE: 111 MMHG

## 2024-07-25 DIAGNOSIS — R80.9 MICROALBUMINURIA DUE TO TYPE 2 DIABETES MELLITUS (HCC): ICD-10-CM

## 2024-07-25 DIAGNOSIS — E66.01 CLASS 3 SEVERE OBESITY DUE TO EXCESS CALORIES WITH SERIOUS COMORBIDITY AND BODY MASS INDEX (BMI) OF 40.0 TO 44.9 IN ADULT (HCC): ICD-10-CM

## 2024-07-25 DIAGNOSIS — E11.29 MICROALBUMINURIA DUE TO TYPE 2 DIABETES MELLITUS (HCC): ICD-10-CM

## 2024-07-25 DIAGNOSIS — G47.33 OSA (OBSTRUCTIVE SLEEP APNEA): ICD-10-CM

## 2024-07-25 DIAGNOSIS — R05.2 SUBACUTE COUGH: Primary | ICD-10-CM

## 2024-07-25 DIAGNOSIS — I10 ESSENTIAL HYPERTENSION: ICD-10-CM

## 2024-07-25 DIAGNOSIS — E11.40 TYPE 2 DIABETES MELLITUS WITH DIABETIC NEUROPATHY, WITHOUT LONG-TERM CURRENT USE OF INSULIN (HCC): ICD-10-CM

## 2024-07-25 PROCEDURE — G8427 DOCREV CUR MEDS BY ELIG CLIN: HCPCS | Performed by: INTERNAL MEDICINE

## 2024-07-25 PROCEDURE — 2022F DILAT RTA XM EVC RTNOPTHY: CPT | Performed by: INTERNAL MEDICINE

## 2024-07-25 PROCEDURE — 99212 OFFICE O/P EST SF 10 MIN: CPT | Performed by: INTERNAL MEDICINE

## 2024-07-25 PROCEDURE — 3051F HG A1C>EQUAL 7.0%<8.0%: CPT | Performed by: INTERNAL MEDICINE

## 2024-07-25 PROCEDURE — 3074F SYST BP LT 130 MM HG: CPT | Performed by: INTERNAL MEDICINE

## 2024-07-25 PROCEDURE — 1036F TOBACCO NON-USER: CPT | Performed by: INTERNAL MEDICINE

## 2024-07-25 PROCEDURE — 3078F DIAST BP <80 MM HG: CPT | Performed by: INTERNAL MEDICINE

## 2024-07-25 PROCEDURE — G8417 CALC BMI ABV UP PARAM F/U: HCPCS | Performed by: INTERNAL MEDICINE

## 2024-07-25 PROCEDURE — 3017F COLORECTAL CA SCREEN DOC REV: CPT | Performed by: INTERNAL MEDICINE

## 2024-07-25 PROCEDURE — 99214 OFFICE O/P EST MOD 30 MIN: CPT | Performed by: INTERNAL MEDICINE

## 2024-07-25 RX ORDER — ALBUTEROL SULFATE 90 UG/1
2 AEROSOL, METERED RESPIRATORY (INHALATION) 4 TIMES DAILY PRN
Qty: 18 G | Refills: 1 | Status: SHIPPED | OUTPATIENT
Start: 2024-07-25

## 2024-07-25 ASSESSMENT — ENCOUNTER SYMPTOMS
COUGH: 1
CONSTIPATION: 0
TROUBLE SWALLOWING: 0
DIARRHEA: 0
SHORTNESS OF BREATH: 0
WHEEZING: 0
VOICE CHANGE: 0
NAUSEA: 0
ABDOMINAL PAIN: 0
VOMITING: 0
ABDOMINAL DISTENTION: 0

## 2024-07-25 NOTE — PROGRESS NOTES
Discharge instructions, lab scripts, Blood sugar log sheets & AVS given to patient.   
management   -     Lipid Panel; Future  -     Comprehensive Metabolic Panel with Bilirubin; Future  -     CBC with Auto Differential; Future  5. Class 3 severe obesity due to excess calories with serious comorbidity and body mass index (BMI) of 40.0 to 44.9 in adult ([facility])  -     CBC with Auto Differential; Future  7 lb active weight loss since last appt  Tolerating increase in Ozempic  Will further increase to 1 mg/weekly and follow   6. Type 2 diabetes mellitus with diabetic neuropathy, without long-term current use of insulin ([facility])- glucose improving based on glycemic log present today   -     Semaglutide, 1 MG/DOSE, (OZEMPIC) 4 MG/3ML SOPN sc injection; Inject 1 mg into the skin every 7 days, Disp-4 Adjustable Dose Pre-filled Pen Syringe, R-0Normal  -     Hemoglobin A1C; Future  -     Lipid Panel; Future  -     Comprehensive Metabolic Panel with Bilirubin; Future   Improving glucose control   Repeat A1c in 3 months   Increase Ozempic to 1 mg/weekly     Return in about 3 months (around 10/25/2024).       Subjective   HPI    Presents for follow-up appt. Doing much improved compared to previous appts. Cough has improved significantly. No significant bronchospastic concerns. No further hoarseness of voice.  Has not yet seen the Pulmonologist but care has been coordinated and no role for HRCT at this time. Currently using, Symbicort, PPI, and completed atbs. PRN rescue inhaler use is reduced significantly.  Not using prn rescue inhaler daily at this time.      Glucose log present today- since starting Ozempic- increased to 0.5 mg/weekly dosing and tolerating well. Notes no side effects. Reduced her Vascepa to 1 tablet twice daily and stomach \"feels better.\" Glucose log reviewed. 7 lb active weight loss since last appt- states lifestyle changes, increased activity and improved diet and feels improved.    Overall- doing much better today.     Review of Systems   Constitutional:  Negative for activity change, appetite

## 2024-07-25 NOTE — ASSESSMENT & PLAN NOTE
With suspected laryngeal spasm  Initiated empirically on PPI  Seen in office by ENT   CT neck completed with CT chest:  IMPRESSION:  1. Prominent, nonspecific lymph node present in superficial aspect of the  right parotid gland.  This lymph node may be physiologic or possibly reactive  in etiology.  2. Otherwise, no evidence of neck soft tissue mass, lymphadenopathy, or focal  inflammatory changes.  3. No evidence of vocal cord paralysis.  CT CHEST:    IMPRESSION:  1. No acute airspace disease or consolidation.  2. Mild bronchial wall thickening of potential bronchitis or small airways  disease with no significant mucous plugging. Further evaluation for air  trapping or small airways disease may be performed with high-resolution chest  CT no dominant nodule or mass lesion.  3. Atelectasis and scarring in the mid lungs anteriorly. Further evaluation  for air trapping or small airways disease may be performed with  high-resolution chest CT no dominant nodule or mass lesion.    Pulmonary assessed CT chest and do not believe follow-up HRCT is necessary  Follow-up of parotid gland lymph node may be necessary   Completed atbs x 2   Continue albuterol inhaler  Pulmonary follow-up in September

## 2024-07-25 NOTE — PATIENT INSTRUCTIONS
1) Labs to be done prior to next appt in 3 months.    2) Maintain appt- with Dr. Dejesus in September.     3) Increase Ozempic to 1 mg/weekly upon completion of current dose.

## 2024-08-15 ENCOUNTER — PROCEDURE VISIT (OUTPATIENT)
Dept: PODIATRY | Age: 54
End: 2024-08-15
Payer: COMMERCIAL

## 2024-08-15 VITALS — WEIGHT: 233 LBS | HEIGHT: 66 IN | BODY MASS INDEX: 37.45 KG/M2

## 2024-08-15 DIAGNOSIS — L84 CORNS AND CALLOSITIES: ICD-10-CM

## 2024-08-15 DIAGNOSIS — B35.1 TINEA UNGUIUM: ICD-10-CM

## 2024-08-15 DIAGNOSIS — G60.8 HEREDITARY SENSORY NEUROPATHY: ICD-10-CM

## 2024-08-15 DIAGNOSIS — E11.9 TYPE 2 DIABETES MELLITUS WITHOUT COMPLICATION, UNSPECIFIED WHETHER LONG TERM INSULIN USE (HCC): Primary | ICD-10-CM

## 2024-08-15 PROCEDURE — 11056 PARNG/CUTG B9 HYPRKR LES 2-4: CPT | Performed by: PODIATRIST

## 2024-08-15 PROCEDURE — 11721 DEBRIDE NAIL 6 OR MORE: CPT | Performed by: PODIATRIST

## 2024-08-15 NOTE — PROGRESS NOTES
tablet, Take 2 tablets by mouth in the morning and at bedtime, Disp: 360 tablet, Rfl: 1    amLODIPine (NORVASC) 5 MG tablet, Take 1 tablet by mouth daily, Disp: 90 tablet, Rfl: 1    pravastatin (PRAVACHOL) 80 MG tablet, TAKE ONE TABLET BY MOUTH EVERY DAY IN THE EVENING, Disp: 90 tablet, Rfl: 1    Icosapent Ethyl (VASCEPA) 1 g CAPS capsule, Take 2 capsules by mouth 2 times daily, Disp: 360 capsule, Rfl: 1    Misc. Devices MISC, CPAP Replacement Supplies- Tubing and Mask.   Dx: CARLENE, Disp: 1 each, Rfl: 0    mometasone (ELOCON) 0.1 % cream, Apply topically daily., Disp: 50 g, Rfl: 0    Lancets MISC, Inject 1 each into the skin daily, Disp: 100 each, Rfl: 5    blood glucose monitor strips, Test 2 times a day & as needed for symptoms of irregular blood glucose. Dispense sufficient amount for indicated testing frequency plus additional to accommodate PRN testing needs., Disp: 100 strip, Rfl: 5    clotrimazole-betamethasone (LOTRISONE) 1-0.05 % cream, Apply topically 2 times daily., Disp: 1 each, Rfl: 0    blood glucose monitor strips, by Other route daily, Disp: 100 strip, Rfl: 5    Blood Glucose Monitoring Suppl (TRUE METRIX METER) w/Device KIT, 1 Device by Does not apply route daily, Disp: 1 kit, Rfl: 0    Alpha-Lipoic Acid 600 MG TABS, Take 600 mg by mouth daily, Disp: , Rfl:     b complex vitamins capsule, Take 1 capsule by mouth daily, Disp: , Rfl:   Allergies   Allergen Reactions    Lisinopril Cough    Bactrim [Sulfamethoxazole-Trimethoprim] Rash       Past Medical History:   Diagnosis Date    Abdominal pain     Anxiety state, unspecified 03/08/2016    Asthma     no inhalers    Breast fibrocystic disorder     Diabetes mellitus (HCC)     Essential hypertension 04/25/2016    Headache With covid early September    Hyperlipidemia     Irritable bowel syndrome     Left ovarian cyst     Migraine With covid early September 2023    Morbid obesity (HCC) 04/25/2016    Neuropathy     Osteoarthritis     Pharyngoesophageal

## 2024-08-27 DIAGNOSIS — E78.2 MIXED HYPERLIPIDEMIA: ICD-10-CM

## 2024-08-27 DIAGNOSIS — E11.40 TYPE 2 DIABETES MELLITUS WITH DIABETIC NEUROPATHY, WITHOUT LONG-TERM CURRENT USE OF INSULIN (HCC): ICD-10-CM

## 2024-08-27 DIAGNOSIS — N95.1 POST MENOPAUSAL SYNDROME: ICD-10-CM

## 2024-08-27 RX ORDER — PRAVASTATIN SODIUM 80 MG/1
TABLET ORAL
Qty: 90 TABLET | Refills: 1 | Status: SHIPPED | OUTPATIENT
Start: 2024-08-27

## 2024-09-16 DIAGNOSIS — I10 ESSENTIAL HYPERTENSION: ICD-10-CM

## 2024-09-16 RX ORDER — HYDROCHLOROTHIAZIDE 25 MG/1
TABLET ORAL
Qty: 90 TABLET | Refills: 0 | Status: SHIPPED | OUTPATIENT
Start: 2024-09-16

## 2024-09-17 ENCOUNTER — TELEPHONE (OUTPATIENT)
Dept: ADMINISTRATIVE | Age: 54
End: 2024-09-17

## 2024-09-20 ENCOUNTER — TELEPHONE (OUTPATIENT)
Dept: INTERNAL MEDICINE | Age: 54
End: 2024-09-20

## 2024-09-20 DIAGNOSIS — E11.9 TYPE 2 DIABETES MELLITUS WITHOUT COMPLICATION, UNSPECIFIED WHETHER LONG TERM INSULIN USE (HCC): Primary | ICD-10-CM

## 2024-09-20 DIAGNOSIS — L84 CORNS AND CALLOSITIES: ICD-10-CM

## 2024-09-20 DIAGNOSIS — M20.42 HAMMER TOES OF BOTH FEET: ICD-10-CM

## 2024-09-20 DIAGNOSIS — M20.41 HAMMER TOES OF BOTH FEET: ICD-10-CM

## 2024-09-20 NOTE — TELEPHONE ENCOUNTER
Author called and spoke to the patient in regards to evaluation of gastric contents using point of care ultrasound.  We discussed that we are using the results for a study using ultrasound.  We discussed the benefits and risks of the procedure.  Patient verbalized understanding and medication changes in reference to their diabetes regimen.  We discussed that they would remain NPO at least hours with no solid food and 2 hours with no liquids. Medications were reviewed.They verablized understanding and further changes to their medications would be dicussed at that visit.  They will shantanu to normal medication dosages and frequency after evaluation with ultrasound.  We discussed that they would come to the Internal Medicine Office on 9/25 at 10:00 am for POC US of gastric contents. A consent form has been mailed to their address for their perusal today.  Consent form will be confirmed as signed prior to POC US in this office.  All questions answered.

## 2024-09-24 NOTE — TELEPHONE ENCOUNTER
Patient chart evaluated.  Agree with antidiabetic medication changes for the procedure.      Electronically signed by Ulises Alston Jr, DO on 9/24/2024 at 3:42 PM

## 2024-09-25 ENCOUNTER — CLINICAL DOCUMENTATION (OUTPATIENT)
Dept: GENERAL RADIOLOGY | Age: 54
End: 2024-09-25

## 2024-09-25 ENCOUNTER — HOSPITAL ENCOUNTER (OUTPATIENT)
Dept: GENERAL RADIOLOGY | Age: 54
Discharge: HOME | End: 2024-09-27
Payer: COMMERCIAL

## 2024-09-25 VITALS — WEIGHT: 225 LBS | BODY MASS INDEX: 36.32 KG/M2

## 2024-09-25 DIAGNOSIS — Z12.31 VISIT FOR SCREENING MAMMOGRAM: ICD-10-CM

## 2024-09-25 PROCEDURE — 77063 BREAST TOMOSYNTHESIS BI: CPT

## 2024-10-07 ENCOUNTER — OFFICE VISIT (OUTPATIENT)
Dept: PODIATRY | Age: 54
End: 2024-10-07

## 2024-10-07 ENCOUNTER — TELEPHONE (OUTPATIENT)
Dept: PODIATRY | Age: 54
End: 2024-10-07

## 2024-10-07 VITALS — WEIGHT: 225 LBS | HEIGHT: 66 IN | BODY MASS INDEX: 36.16 KG/M2

## 2024-10-07 DIAGNOSIS — S90.851D FOREIGN BODY IN RIGHT FOOT, SUBSEQUENT ENCOUNTER: ICD-10-CM

## 2024-10-07 DIAGNOSIS — E11.9 TYPE 2 DIABETES MELLITUS WITHOUT COMPLICATION, UNSPECIFIED WHETHER LONG TERM INSULIN USE (HCC): Primary | ICD-10-CM

## 2024-10-07 DIAGNOSIS — L84 CORNS AND CALLOSITIES: ICD-10-CM

## 2024-10-07 NOTE — PROGRESS NOTES
DPM      Document was created using voice recognition software.  Note was reviewed however may contain grammatical errors.

## 2024-10-07 NOTE — TELEPHONE ENCOUNTER
Pt called in to [facility] an appt, she stepped on glass over the week. She think there might still be a piece of glass of stuck in her foot, she was wondering if doctor could take a look at it for her, Is first avail appropriate? Pt is diabetic. Please, advise. Rehana can be reached at [phone]

## 2024-10-09 DIAGNOSIS — I10 ESSENTIAL HYPERTENSION: ICD-10-CM

## 2024-10-09 RX ORDER — AMLODIPINE BESYLATE 5 MG/1
5 TABLET ORAL DAILY
Qty: 90 TABLET | Refills: 1 | Status: SHIPPED | OUTPATIENT
Start: 2024-10-09

## 2024-10-09 NOTE — TELEPHONE ENCOUNTER
Last Appointment:  7/25/2024  Future Appointments   Date Time Provider Department Center   10/15/2024 10:50 AM Solange Myers MD [facility] NEURO Neurology -   10/18/2024  9:15 AM Alvin Thornton DPM Col Podiatry [facility]   10/24/2024  1:45 PM Luc Rock MD [facility] ECC DEP

## 2024-10-14 ENCOUNTER — PATIENT MESSAGE (OUTPATIENT)
Dept: INTERNAL MEDICINE | Age: 54
End: 2024-10-14

## 2024-10-14 DIAGNOSIS — E11.40 TYPE 2 DIABETES MELLITUS WITH DIABETIC NEUROPATHY, WITHOUT LONG-TERM CURRENT USE OF INSULIN (HCC): ICD-10-CM

## 2024-10-15 ENCOUNTER — OFFICE VISIT (OUTPATIENT)
Age: 54
End: 2024-10-15
Payer: COMMERCIAL

## 2024-10-15 VITALS
DIASTOLIC BLOOD PRESSURE: 85 MMHG | HEART RATE: 72 BPM | SYSTOLIC BLOOD PRESSURE: 139 MMHG | HEIGHT: 66 IN | BODY MASS INDEX: 37.12 KG/M2 | WEIGHT: 231 LBS

## 2024-10-15 DIAGNOSIS — G43.711 INTRACTABLE CHRONIC MIGRAINE WITHOUT AURA AND WITH STATUS MIGRAINOSUS: Primary | ICD-10-CM

## 2024-10-15 DIAGNOSIS — T39.95XA ANALGESIC REBOUND HEADACHE: ICD-10-CM

## 2024-10-15 DIAGNOSIS — G44.40 ANALGESIC REBOUND HEADACHE: ICD-10-CM

## 2024-10-15 PROCEDURE — G8417 CALC BMI ABV UP PARAM F/U: HCPCS | Performed by: PSYCHIATRY & NEUROLOGY

## 2024-10-15 PROCEDURE — 3075F SYST BP GE 130 - 139MM HG: CPT | Performed by: PSYCHIATRY & NEUROLOGY

## 2024-10-15 PROCEDURE — 3017F COLORECTAL CA SCREEN DOC REV: CPT | Performed by: PSYCHIATRY & NEUROLOGY

## 2024-10-15 PROCEDURE — G8427 DOCREV CUR MEDS BY ELIG CLIN: HCPCS | Performed by: PSYCHIATRY & NEUROLOGY

## 2024-10-15 PROCEDURE — G8482 FLU IMMUNIZE ORDER/ADMIN: HCPCS | Performed by: PSYCHIATRY & NEUROLOGY

## 2024-10-15 PROCEDURE — 1036F TOBACCO NON-USER: CPT | Performed by: PSYCHIATRY & NEUROLOGY

## 2024-10-15 PROCEDURE — 99214 OFFICE O/P EST MOD 30 MIN: CPT | Performed by: PSYCHIATRY & NEUROLOGY

## 2024-10-15 PROCEDURE — 3079F DIAST BP 80-89 MM HG: CPT | Performed by: PSYCHIATRY & NEUROLOGY

## 2024-10-15 RX ORDER — TOPIRAMATE 25 MG/1
25 TABLET, FILM COATED ORAL NIGHTLY
Qty: 30 TABLET | Refills: 0 | Status: SHIPPED | OUTPATIENT
Start: 2024-11-15

## 2024-10-15 RX ORDER — TOPIRAMATE 50 MG/1
50 TABLET, FILM COATED ORAL
Qty: 30 TABLET | Refills: 0 | Status: SHIPPED | OUTPATIENT
Start: 2024-10-15

## 2024-10-15 RX ORDER — MAGNESIUM CHLORIDE 71.5 MG
TABLET, DELAYED RELEASE (ENTERIC COATED) ORAL
Qty: 180 TABLET | Refills: 1 | Status: SHIPPED | OUTPATIENT
Start: 2024-10-15

## 2024-10-15 RX ORDER — RIBOFLAVIN (VITAMIN B2) 100 MG
200 TABLET ORAL 2 TIMES DAILY
Qty: 360 TABLET | Refills: 1 | Status: SHIPPED | OUTPATIENT
Start: 2024-10-15

## 2024-10-15 NOTE — PROGRESS NOTES
Solange Myers MD       Rehana Dong is a 54 y.o. female presenting as a follow patient for a   Chief Complaint   Patient presents with    Follow-up    Headache      Chronicity: labor day 2023, after covid infection      Onset: started after covid infection in 2023     Progression since onset: headaches better   Stopped all otc medications     Topamax- hard a time tolerating. But gotten used to it  Minimal tingling   Had severe nausea/dizziness/diarrhea  Had lethargy. So took it earlier in pm  Metallic taste.   Side effects better .  Tolerating 100 mg qhs now without side effects.      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      Frequency: no migraines in last 6 months     Much better   Not used imitrex      Pain location : robinson parietal, right > left   Radiation:radiates to robinson eyes, occipital in location.   Severity 6-8/10  Intensity improving  achiness  Pain worsened by : Light: yes, Noise: yes, cough : no, straining : no, exercise : no, smells : no  Nausea : no  Vomiting:no  Dizziness: no  Visual aura: no  Sensory aura : none            Prior treatment tried : b2 200 mg bid,   mag   On prozac  On calcium channel blocker- norvasc   Topamax 100 mg qhs   Imitrex      Eye appt- non revealing  Having dental appt         Mri brain:few non specific white matter changes.           Past Medical History:   Diagnosis Date    Abdominal pain     Anxiety state, unspecified 03/08/2016    Asthma     no inhalers    Breast fibrocystic disorder     Diabetes mellitus (HCC)     Essential hypertension 04/25/2016    Headache With covid early September    Hyperlipidemia     Irritable bowel syndrome     Left ovarian cyst     Migraine With covid early September 2023    Morbid obesity 04/25/2016    Neuropathy     Osteoarthritis     Pharyngoesophageal dysphagia 06/14/2016    Sleep apnea     Bi pap     Past Surgical History:   Procedure Laterality Date    ABDOMEN SURGERY Left 03/15/2019

## 2024-10-15 NOTE — TELEPHONE ENCOUNTER
Spoke with pt via phone. Pt notified of order sent to pharmacy. Pt instructed to call with any questions or concerns.

## 2024-10-16 DIAGNOSIS — E11.40 TYPE 2 DIABETES MELLITUS WITH DIABETIC NEUROPATHY, WITHOUT LONG-TERM CURRENT USE OF INSULIN (HCC): Primary | ICD-10-CM

## 2024-10-16 RX ORDER — SEMAGLUTIDE 2.68 MG/ML
2 INJECTION, SOLUTION SUBCUTANEOUS
Qty: 3 ML | Refills: 5 | Status: SHIPPED | OUTPATIENT
Start: 2024-10-16

## 2024-10-16 NOTE — TELEPHONE ENCOUNTER
Per in basket, pt's ozempic needs a prior auth. Called and spoke with Pharmacy tech at [facility] order needs changed as pt is ordered 2 mg weekly. Pt would need the 8 mg/3 ml dose as pen is good for 1 month.

## 2024-10-18 ENCOUNTER — OFFICE VISIT (OUTPATIENT)
Dept: PODIATRY | Age: 54
End: 2024-10-18
Payer: COMMERCIAL

## 2024-10-18 VITALS — HEIGHT: 66 IN | BODY MASS INDEX: 37.12 KG/M2 | WEIGHT: 231 LBS

## 2024-10-18 DIAGNOSIS — B35.1 TINEA UNGUIUM: ICD-10-CM

## 2024-10-18 DIAGNOSIS — S90.851D FOREIGN BODY IN RIGHT FOOT, SUBSEQUENT ENCOUNTER: ICD-10-CM

## 2024-10-18 DIAGNOSIS — E11.9 TYPE 2 DIABETES MELLITUS WITHOUT COMPLICATION, UNSPECIFIED WHETHER LONG TERM INSULIN USE (HCC): Primary | ICD-10-CM

## 2024-10-18 DIAGNOSIS — L84 CORNS AND CALLOSITIES: ICD-10-CM

## 2024-10-18 DIAGNOSIS — G60.8 HEREDITARY SENSORY NEUROPATHY: ICD-10-CM

## 2024-10-18 PROCEDURE — 11056 PARNG/CUTG B9 HYPRKR LES 2-4: CPT | Performed by: PODIATRIST

## 2024-10-18 PROCEDURE — 99999 PR OFFICE/OUTPT VISIT,PROCEDURE ONLY: CPT | Performed by: PODIATRIST

## 2024-10-18 PROCEDURE — 11721 DEBRIDE NAIL 6 OR MORE: CPT | Performed by: PODIATRIST

## 2024-10-18 NOTE — PROGRESS NOTES
Patient in today for nail care as well as follow-up on when she has glass removed. Patient does not have any complaints of pain at this time.   Type 2 diabetic.  Patient's PCP is Shweta, Luc Reilly MD and date of last ov 7/25/2024.            Alvin Thornton DPM     CC:   Follow-up foreign body right foot.  Diabetic foot ankle exam      HPI:   Rehana is seen today diabetic foot ankle exam.  History of foreign body with last right foot.  No open wounds.  No recent injury or trauma.  Denies any foot or ankle pain today.  No open wounds.    ROS:  Const: Denies constitutional symptoms  Musculo: Denies symptoms other than stated above  Skin: Denies symptoms other than stated above      Current Outpatient Medications:     semaglutide, 2 MG/DOSE, (OZEMPIC, 2 MG/DOSE,) 8 MG/3ML SOPN sc injection, Inject 2 mg into the skin every 7 days, Disp: 3 mL, Rfl: 5    topiramate (TOPAMAX) 50 MG tablet, Take 1 tablet by mouth nightly, Disp: 30 tablet, Rfl: 0    [START ON 11/15/2024] topiramate (TOPAMAX) 25 MG tablet, Take 1 tablet by mouth nightly For a month then d/c (Patient not taking: Reported on 10/24/2024), Disp: 30 tablet, Rfl: 0    magnesium cl-calcium carbonate (NU-MAG) 71.5-119 MG TBEC tablet, TAKE TWO TABLETS BY MOUTH EVERY DAY, Disp: 180 tablet, Rfl: 1    vitamin B-2 (RIBOFLAVIN) 100 MG TABS tablet, Take 2 tablets by mouth in the morning and at bedtime, Disp: 360 tablet, Rfl: 1    amLODIPine (NORVASC) 5 MG tablet, Take 1 tablet by mouth daily, Disp: 90 tablet, Rfl: 1    hydroCHLOROthiazide (HYDRODIURIL) 25 MG tablet, TAKE ONE TABLET BY MOUTH EVERY DAY IN THE MORNING, Disp: 90 tablet, Rfl: 0    pravastatin (PRAVACHOL) 80 MG tablet, TAKE ONE TABLET BY MOUTH EVERY DAY IN THE EVENING, Disp: 90 tablet, Rfl: 1    FLUoxetine (PROZAC) 20 MG capsule, TAKE ONE CAPSULE BY MOUTH DAILY, Disp: 90 capsule, Rfl: 0    albuterol sulfate HFA (VENTOLIN HFA) 108 (90 Base) MCG/ACT inhaler, Inhale 2 puffs into the lungs 4 times daily as

## 2024-10-23 ENCOUNTER — HOSPITAL ENCOUNTER (OUTPATIENT)
Age: 54
Discharge: HOME | End: 2024-10-23
Payer: COMMERCIAL

## 2024-10-23 DIAGNOSIS — I10 ESSENTIAL HYPERTENSION: ICD-10-CM

## 2024-10-23 DIAGNOSIS — E11.40 TYPE 2 DIABETES MELLITUS WITH DIABETIC NEUROPATHY, WITHOUT LONG-TERM CURRENT USE OF INSULIN (HCC): ICD-10-CM

## 2024-10-23 DIAGNOSIS — E66.813 CLASS 3 SEVERE OBESITY DUE TO EXCESS CALORIES WITH SERIOUS COMORBIDITY AND BODY MASS INDEX (BMI) OF 40.0 TO 44.9 IN ADULT: ICD-10-CM

## 2024-10-23 DIAGNOSIS — E66.01 CLASS 3 SEVERE OBESITY DUE TO EXCESS CALORIES WITH SERIOUS COMORBIDITY AND BODY MASS INDEX (BMI) OF 40.0 TO 44.9 IN ADULT: ICD-10-CM

## 2024-10-23 DIAGNOSIS — R05.2 SUBACUTE COUGH: ICD-10-CM

## 2024-10-23 DIAGNOSIS — G47.33 OSA (OBSTRUCTIVE SLEEP APNEA): ICD-10-CM

## 2024-10-23 DIAGNOSIS — R89.8 EOSINOPHILS INCREASED: ICD-10-CM

## 2024-10-23 LAB
ALBUMIN SERPL-MCNC: 4.2 G/DL (ref 3.5–5.2)
ALP SERPL-CCNC: 65 U/L (ref 35–104)
ALT SERPL-CCNC: 27 U/L (ref 0–32)
ANION GAP SERPL CALCULATED.3IONS-SCNC: 12 MMOL/L (ref 7–16)
AST SERPL-CCNC: 20 U/L (ref 0–31)
BASOPHILS # BLD: 0.04 K/UL (ref 0–0.2)
BASOPHILS NFR BLD: 1 % (ref 0–2)
BILIRUB DIRECT SERPL-MCNC: <0.2 MG/DL (ref 0–0.3)
BILIRUB INDIRECT SERPL-MCNC: ABNORMAL MG/DL (ref 0–1)
BILIRUB SERPL-MCNC: 0.3 MG/DL (ref 0–1.2)
BUN SERPL-MCNC: 11 MG/DL (ref 6–20)
CALCIUM SERPL-MCNC: 9.2 MG/DL (ref 8.6–10.2)
CHLORIDE SERPL-SCNC: 97 MMOL/L (ref 98–107)
CHOLEST SERPL-MCNC: 166 MG/DL
CO2 SERPL-SCNC: 26 MMOL/L (ref 22–29)
CREAT SERPL-MCNC: 0.7 MG/DL (ref 0.5–1)
CRP SERPL HS-MCNC: 17 MG/L (ref 0–5)
EOSINOPHIL # BLD: 0.44 K/UL (ref 0.05–0.5)
EOSINOPHILS RELATIVE PERCENT: 6 % (ref 0–6)
ERYTHROCYTE [DISTWIDTH] IN BLOOD BY AUTOMATED COUNT: 12.5 % (ref 11.5–15)
GFR, ESTIMATED: >90 ML/MIN/1.73M2
GLUCOSE SERPL-MCNC: 167 MG/DL (ref 74–99)
HBA1C MFR BLD: 7.4 % (ref 4–5.6)
HCT VFR BLD AUTO: 41.4 % (ref 34–48)
HDLC SERPL-MCNC: 46 MG/DL
HGB BLD-MCNC: 13.7 G/DL (ref 11.5–15.5)
IMM GRANULOCYTES # BLD AUTO: 0.03 K/UL (ref 0–0.58)
IMM GRANULOCYTES NFR BLD: 0 % (ref 0–5)
LDLC SERPL CALC-MCNC: 70 MG/DL
LYMPHOCYTES NFR BLD: 2.11 K/UL (ref 1.5–4)
LYMPHOCYTES RELATIVE PERCENT: 27 % (ref 20–42)
MCH RBC QN AUTO: 29.1 PG (ref 26–35)
MCHC RBC AUTO-ENTMCNC: 33.1 G/DL (ref 32–34.5)
MCV RBC AUTO: 87.9 FL (ref 80–99.9)
MONOCYTES NFR BLD: 0.68 K/UL (ref 0.1–0.95)
MONOCYTES NFR BLD: 9 % (ref 2–12)
NEUTROPHILS NFR BLD: 58 % (ref 43–80)
NEUTS SEG NFR BLD: 4.56 K/UL (ref 1.8–7.3)
PLATELET # BLD AUTO: 462 K/UL (ref 130–450)
PMV BLD AUTO: 9.1 FL (ref 7–12)
POTASSIUM SERPL-SCNC: 3.7 MMOL/L (ref 3.5–5)
PROT SERPL-MCNC: 6.5 G/DL (ref 6.4–8.3)
RBC # BLD AUTO: 4.71 M/UL (ref 3.5–5.5)
SODIUM SERPL-SCNC: 135 MMOL/L (ref 132–146)
TRIGL SERPL-MCNC: 252 MG/DL
VLDLC SERPL CALC-MCNC: 50 MG/DL
WBC OTHER # BLD: 7.9 K/UL (ref 4.5–11.5)

## 2024-10-23 PROCEDURE — 83036 HEMOGLOBIN GLYCOSYLATED A1C: CPT

## 2024-10-23 PROCEDURE — 85025 COMPLETE CBC W/AUTO DIFF WBC: CPT

## 2024-10-23 PROCEDURE — 80061 LIPID PANEL: CPT

## 2024-10-23 PROCEDURE — 86140 C-REACTIVE PROTEIN: CPT

## 2024-10-23 PROCEDURE — 80053 COMPREHEN METABOLIC PANEL: CPT

## 2024-10-23 PROCEDURE — 82248 BILIRUBIN DIRECT: CPT

## 2024-10-23 PROCEDURE — 36415 COLL VENOUS BLD VENIPUNCTURE: CPT

## 2024-10-23 NOTE — ASSESSMENT & PLAN NOTE
Chronic, at goal (stable), continue current treatment plan and lifestyle modifications recommended    Orders:    Microalbumin, Ur; Future

## 2024-10-23 NOTE — ASSESSMENT & PLAN NOTE
Currently in process of moving   Discuss walking program at next visit   Will follow- weight currently

## 2024-10-23 NOTE — ASSESSMENT & PLAN NOTE
Following with Neurology  Most recently seen and HAs resolved  Encouraged to continue B2 and Magnesium   Wean of Topamax ongoing

## 2024-10-23 NOTE — ASSESSMENT & PLAN NOTE
Chronic, at goal (stable), continue current treatment plan  Will continue to reinforce goals of active weight loss to improve metabolic profile   7.5% total body weight loss goal to be established

## 2024-10-24 ENCOUNTER — OFFICE VISIT (OUTPATIENT)
Dept: INTERNAL MEDICINE | Age: 54
End: 2024-10-24
Payer: COMMERCIAL

## 2024-10-24 VITALS
HEIGHT: 66 IN | HEART RATE: 70 BPM | BODY MASS INDEX: 36.96 KG/M2 | TEMPERATURE: 97.7 F | WEIGHT: 230 LBS | OXYGEN SATURATION: 95 % | DIASTOLIC BLOOD PRESSURE: 75 MMHG | SYSTOLIC BLOOD PRESSURE: 117 MMHG | RESPIRATION RATE: 14 BRPM

## 2024-10-24 DIAGNOSIS — E66.813 CLASS 3 SEVERE OBESITY DUE TO EXCESS CALORIES WITH SERIOUS COMORBIDITY AND BODY MASS INDEX (BMI) OF 40.0 TO 44.9 IN ADULT: ICD-10-CM

## 2024-10-24 DIAGNOSIS — G43.709 CHRONIC MIGRAINE W/O AURA W/O STATUS MIGRAINOSUS, NOT INTRACTABLE: Primary | ICD-10-CM

## 2024-10-24 DIAGNOSIS — E11.29 MICROALBUMINURIA DUE TO TYPE 2 DIABETES MELLITUS (HCC): ICD-10-CM

## 2024-10-24 DIAGNOSIS — E66.01 CLASS 3 SEVERE OBESITY DUE TO EXCESS CALORIES WITH SERIOUS COMORBIDITY AND BODY MASS INDEX (BMI) OF 40.0 TO 44.9 IN ADULT: ICD-10-CM

## 2024-10-24 DIAGNOSIS — R80.9 MICROALBUMINURIA DUE TO TYPE 2 DIABETES MELLITUS (HCC): ICD-10-CM

## 2024-10-24 DIAGNOSIS — E88.810 METABOLIC SYNDROME: ICD-10-CM

## 2024-10-24 DIAGNOSIS — E11.40 TYPE 2 DIABETES MELLITUS WITH DIABETIC NEUROPATHY, WITHOUT LONG-TERM CURRENT USE OF INSULIN (HCC): ICD-10-CM

## 2024-10-24 DIAGNOSIS — I10 ESSENTIAL HYPERTENSION: ICD-10-CM

## 2024-10-24 LAB
CREATININE URINE: 93.4 MG/DL (ref 29–226)
MICROALBUMIN/CREAT 24H UR: 20 MG/L (ref 0–19)
MICROALBUMIN/CREAT UR-RTO: 21 MCG/MG CREAT (ref 0–30)

## 2024-10-24 PROCEDURE — 3074F SYST BP LT 130 MM HG: CPT | Performed by: INTERNAL MEDICINE

## 2024-10-24 PROCEDURE — G8427 DOCREV CUR MEDS BY ELIG CLIN: HCPCS | Performed by: INTERNAL MEDICINE

## 2024-10-24 PROCEDURE — 1036F TOBACCO NON-USER: CPT | Performed by: INTERNAL MEDICINE

## 2024-10-24 PROCEDURE — G8482 FLU IMMUNIZE ORDER/ADMIN: HCPCS | Performed by: INTERNAL MEDICINE

## 2024-10-24 PROCEDURE — 99212 OFFICE O/P EST SF 10 MIN: CPT | Performed by: INTERNAL MEDICINE

## 2024-10-24 PROCEDURE — 3017F COLORECTAL CA SCREEN DOC REV: CPT | Performed by: INTERNAL MEDICINE

## 2024-10-24 PROCEDURE — 99214 OFFICE O/P EST MOD 30 MIN: CPT | Performed by: INTERNAL MEDICINE

## 2024-10-24 PROCEDURE — 2022F DILAT RTA XM EVC RTNOPTHY: CPT | Performed by: INTERNAL MEDICINE

## 2024-10-24 PROCEDURE — G8417 CALC BMI ABV UP PARAM F/U: HCPCS | Performed by: INTERNAL MEDICINE

## 2024-10-24 PROCEDURE — 3051F HG A1C>EQUAL 7.0%<8.0%: CPT | Performed by: INTERNAL MEDICINE

## 2024-10-24 PROCEDURE — 3078F DIAST BP <80 MM HG: CPT | Performed by: INTERNAL MEDICINE

## 2024-10-24 ASSESSMENT — ENCOUNTER SYMPTOMS
CONSTIPATION: 0
WHEEZING: 0
DIARRHEA: 0
SHORTNESS OF BREATH: 0
VOMITING: 0
TROUBLE SWALLOWING: 0
VOICE CHANGE: 0
ABDOMINAL PAIN: 0
NAUSEA: 0
COUGH: 0
ABDOMINAL DISTENTION: 0

## 2024-10-24 NOTE — PROGRESS NOTES
Discharge instructions reviewed with patient. Patient verbalizes understanding. AVS given to patient. Pt notified My Chart scheduling ticket sent to her to scheduled 3 month follow up appt.     UA lab specimen tubed to lab.   
at ankles with dark red rash anterior- bilateral medial malleolus; New diabetic shoes are pending from [facility].     No additional complaints today.     Review of Systems   Constitutional:  Negative for chills and fever.   HENT:  Negative for trouble swallowing and voice change.    Eyes:  Negative for visual disturbance.   Respiratory:  Negative for cough, shortness of breath and wheezing.    Cardiovascular:  Positive for leg swelling. Negative for chest pain.   Gastrointestinal:  Negative for abdominal distention, abdominal pain, constipation, diarrhea, nausea and vomiting.   Genitourinary:  Negative for dysuria and hematuria.   Neurological:  Negative for dizziness, syncope and light-headedness.          Objective   Vitals:    10/24/24 1325   BP: 117/75   Site: Right Upper Arm   Position: Sitting   Cuff Size: Large Adult   Pulse: 70   Resp: 14   Temp: 97.7 °F (36.5 °C)   TempSrc: Oral   SpO2: 95%   Weight: 104.3 kg (230 lb)   Height: 1.676 m (5' 6\")       Physical Exam  Constitutional:       Appearance: She is obese.   Cardiovascular:      Rate and Rhythm: Normal rate and regular rhythm.      Heart sounds: No murmur heard.  Pulmonary:      Effort: Pulmonary effort is normal.      Breath sounds: Normal breath sounds. No wheezing, rhonchi or rales.   Abdominal:      General: Bowel sounds are normal.      Palpations: Abdomen is soft.      Tenderness: There is no abdominal tenderness. There is no guarding.   Skin:     Findings: Rash (bilateral medial malleoli- dry and reddish- will monitor) present.          Neurological:      General: No focal deficit present.      Mental Status: She is alert.   Psychiatric:         Mood and Affect: Mood normal.            An electronic signature was used to authenticate this note.    --Luc Rock MD, FACP   
97

## 2024-10-24 NOTE — PATIENT INSTRUCTIONS
1) Stop the Symbicort. Monitor use of Albuterol inhaler. If using more than 2 times weekly, will consider resuming.     2) Will recheck A1c at 3 months     3) Start to monitor with food diary and initiate stable walking program upon completion of move! Good luck with the remainder of your move!    4) Let us know if the rash on lower legs doesn't improve.    Spinal    Diagnosis: right knee pain  Patient location during procedure: OR  Start time: 7/28/2020 7:27 AM  Timeout: 7/28/2020 7:25 AM  End time: 7/28/2020 7:31 AM    Staffing  Authorizing Provider: Jenifer David MD  Performing Provider: Jenifer David MD    Preanesthetic Checklist  Completed: patient identified, surgical consent, pre-op evaluation, timeout performed, IV checked, risks and benefits discussed and monitors and equipment checked  Spinal Block  Patient position: sitting  Prep: ChloraPrep  Patient monitoring: heart rate and frequent blood pressure checks  Approach: midline  Location: L3-4  Injection technique: single shot  CSF Fluid: clear free-flowing CSF  Needle  Needle type: Dangelo   Needle gauge: 25 G  Needle length: 3.5 in  Needle localization: anatomical landmarks  Assessment  Sensory level: T8   Dermatomal levels determined by pinch or prick  Ease of block: easy  Patient's tolerance of the procedure: comfortable throughout block

## 2024-11-06 ENCOUNTER — OFFICE VISIT (OUTPATIENT)
Dept: OBGYN | Age: 54
End: 2024-11-06

## 2024-11-06 VITALS
DIASTOLIC BLOOD PRESSURE: 60 MMHG | HEART RATE: 72 BPM | WEIGHT: 230.4 LBS | HEIGHT: 66 IN | SYSTOLIC BLOOD PRESSURE: 120 MMHG | BODY MASS INDEX: 37.03 KG/M2

## 2024-11-06 DIAGNOSIS — Z01.419 WELL WOMAN EXAM WITH ROUTINE GYNECOLOGICAL EXAM: Primary | ICD-10-CM

## 2024-11-06 NOTE — PROGRESS NOTES
Annual Exam (54 y.o. female,  here for gyn exam. Date of last Cervical Cancer screen (HPV or PAP): 11/10/2022 , Date of last Mammogram: 9/25/2024. Patient is requesting Pap Smear today.)      No LMP recorded. Patient is postmenopausal.     POC testing performed this visit:  [] YES  [x]NO    Provider notified of the following results: n/a    Did provider order testing:   [x] YES  []NO    SPECIMENS COLLECTED: Type:  Pap smear    Did provider order Clinic Admin. Meds:   [] YES  [x]NO       Next office visit scheduled:  [x] YES  []NO    Scheduled date: 11/6/2025     Discharge instructions have been discussed with the patient. Patient advised to call our office with any questions or concerns.   Voiced understanding.     
Assisted with pelvic exam, pap smear obtained, labeled  and delivered to lab.   
 Comment:  Dorion-SEB  2016: COLONOSCOPY  No date: TONSILLECTOMY      Comment:  Age 9  No date: TUBAL LIGATION  2014: UPPER GASTROINTESTINAL ENDOSCOPY      Comment:  Negative for Hpylosmani  2016: UPPER GASTROINTESTINAL ENDOSCOPY      Comment:  Richard  2016: UPPER GASTROINTESTINAL ENDOSCOPY      Social History    Tobacco Use      Smoking status: Former        Packs/day: 0.00        Years: 0.7 packs/day for 4.5 years (3.0 ttl pk-yrs)        Types: Cigarettes        Start date:         Quit date:         Years since quittin.8      Smokeless tobacco: Never      Tobacco comments: In mid to late twenties       Order Specific Question:   Collection Type     Answer:   Thin Prep     Order Specific Question:   Prior Abnormal Pap Test     Answer:   No     Order Specific Question:   Screening or Diagnostic     Answer:   Screening     Order Specific Question:   Additional STD Testing     Answer:   N/A     Order Specific Question:   HPV Requested?     Answer:   Yes     Order Specific Question:   High Risk Patient     Answer:   N/A      Approximately 20 minutes spent with patient   Electronically signed by Margaret Mueller MD on 24

## 2024-11-07 LAB
HPV SAMPLE: NORMAL
HPV SOURCE: NORMAL
HPV, GENOTYPE 16: NORMAL
HPV, GENOTYPE 18: NORMAL
HPV, HIGH RISK OTHER: NORMAL
HPV, INTERPRETATION: NORMAL

## 2024-11-08 DIAGNOSIS — E11.65 TYPE 2 DIABETES MELLITUS WITH HYPERGLYCEMIA, WITHOUT LONG-TERM CURRENT USE OF INSULIN (HCC): ICD-10-CM

## 2024-11-12 LAB
GYNECOLOGY CYTOLOGY REPORT: NORMAL
HPV SAMPLE: NORMAL
HPV SOURCE: NORMAL
HPV, GENOTYPE 16: NOT DETECTED
HPV, GENOTYPE 18: NOT DETECTED
HPV, HIGH RISK OTHER: NOT DETECTED
HPV, INTERPRETATION: NORMAL

## 2024-11-22 ENCOUNTER — TELEPHONE (OUTPATIENT)
Dept: INTERNAL MEDICINE | Age: 54
End: 2024-11-22

## 2024-11-22 ENCOUNTER — APPOINTMENT (OUTPATIENT)
Dept: CT IMAGING | Age: 54
End: 2024-11-22
Payer: COMMERCIAL

## 2024-11-22 ENCOUNTER — HOSPITAL ENCOUNTER (EMERGENCY)
Age: 54
Discharge: HOME | End: 2024-11-22
Attending: EMERGENCY MEDICINE
Payer: COMMERCIAL

## 2024-11-22 VITALS
RESPIRATION RATE: 16 BRPM | TEMPERATURE: 97.6 F | BODY MASS INDEX: 36.96 KG/M2 | HEART RATE: 82 BPM | WEIGHT: 230 LBS | OXYGEN SATURATION: 97 % | HEIGHT: 66 IN | SYSTOLIC BLOOD PRESSURE: 145 MMHG | DIASTOLIC BLOOD PRESSURE: 89 MMHG

## 2024-11-22 DIAGNOSIS — R10.10 PAIN OF UPPER ABDOMEN: ICD-10-CM

## 2024-11-22 DIAGNOSIS — R25.2 LEG CRAMPS: Primary | ICD-10-CM

## 2024-11-22 DIAGNOSIS — M43.16 ANTEROLISTHESIS OF LUMBAR SPINE: ICD-10-CM

## 2024-11-22 DIAGNOSIS — R19.7 DIARRHEA, UNSPECIFIED TYPE: ICD-10-CM

## 2024-11-22 LAB
ALBUMIN SERPL-MCNC: 4.1 G/DL (ref 3.5–5.2)
ALP SERPL-CCNC: 74 U/L (ref 35–104)
ALT SERPL-CCNC: 16 U/L (ref 0–32)
ANION GAP SERPL CALCULATED.3IONS-SCNC: 11 MMOL/L (ref 7–16)
AST SERPL-CCNC: 15 U/L (ref 0–31)
ATYPICAL LYMPHOCYTE ABSOLUTE COUNT: 0.09 K/UL (ref 0–0.46)
ATYPICAL LYMPHOCYTES: 1 % (ref 0–4)
BASOPHILS # BLD: 0 K/UL (ref 0–0.2)
BASOPHILS NFR BLD: 0 % (ref 0–2)
BILIRUB SERPL-MCNC: 0.3 MG/DL (ref 0–1.2)
BUN SERPL-MCNC: 15 MG/DL (ref 6–20)
CALCIUM SERPL-MCNC: 9.1 MG/DL (ref 8.6–10.2)
CHLORIDE SERPL-SCNC: 100 MMOL/L (ref 98–107)
CO2 SERPL-SCNC: 25 MMOL/L (ref 22–29)
CREAT SERPL-MCNC: 0.7 MG/DL (ref 0.5–1)
EOSINOPHIL # BLD: 4.27 K/UL (ref 0.05–0.5)
EOSINOPHILS RELATIVE PERCENT: 39 % (ref 0–6)
ERYTHROCYTE [DISTWIDTH] IN BLOOD BY AUTOMATED COUNT: 12.7 % (ref 11.5–15)
GFR, ESTIMATED: >90 ML/MIN/1.73M2
GLUCOSE SERPL-MCNC: 169 MG/DL (ref 74–99)
HCT VFR BLD AUTO: 39.9 % (ref 34–48)
HGB BLD-MCNC: 13.2 G/DL (ref 11.5–15.5)
LIPASE SERPL-CCNC: 45 U/L (ref 13–60)
LYMPHOCYTES NFR BLD: 1.52 K/UL (ref 1.5–4)
LYMPHOCYTES RELATIVE PERCENT: 14 % (ref 20–42)
MAGNESIUM SERPL-MCNC: 1.8 MG/DL (ref 1.6–2.6)
MCH RBC QN AUTO: 28.9 PG (ref 26–35)
MCHC RBC AUTO-ENTMCNC: 33.1 G/DL (ref 32–34.5)
MCV RBC AUTO: 87.5 FL (ref 80–99.9)
METAMYELOCYTES ABSOLUTE COUNT: 0.09 K/UL (ref 0–0.12)
METAMYELOCYTES: 1 % (ref 0–1)
MONOCYTES NFR BLD: 0.28 K/UL (ref 0.1–0.95)
MONOCYTES NFR BLD: 3 % (ref 2–12)
NEUTROPHILS NFR BLD: 43 % (ref 43–80)
NEUTS SEG NFR BLD: 4.74 K/UL (ref 1.8–7.3)
PLATELET # BLD AUTO: 431 K/UL (ref 130–450)
PMV BLD AUTO: 9.5 FL (ref 7–12)
POTASSIUM SERPL-SCNC: 3.9 MMOL/L (ref 3.5–5)
PROT SERPL-MCNC: 6.6 G/DL (ref 6.4–8.3)
RBC # BLD AUTO: 4.56 M/UL (ref 3.5–5.5)
RBC # BLD: NORMAL 10*6/UL
SODIUM SERPL-SCNC: 136 MMOL/L (ref 132–146)
TROPONIN I SERPL HS-MCNC: 14 NG/L (ref 0–9)
TROPONIN I SERPL HS-MCNC: 14 NG/L (ref 0–9)
WBC OTHER # BLD: 11 K/UL (ref 4.5–11.5)

## 2024-11-22 PROCEDURE — 6360000004 HC RX CONTRAST MEDICATION: Performed by: RADIOLOGY

## 2024-11-22 PROCEDURE — 83735 ASSAY OF MAGNESIUM: CPT

## 2024-11-22 PROCEDURE — 96374 THER/PROPH/DIAG INJ IV PUSH: CPT

## 2024-11-22 PROCEDURE — 85025 COMPLETE CBC W/AUTO DIFF WBC: CPT

## 2024-11-22 PROCEDURE — 84484 ASSAY OF TROPONIN QUANT: CPT

## 2024-11-22 PROCEDURE — 6370000000 HC RX 637 (ALT 250 FOR IP)

## 2024-11-22 PROCEDURE — 2580000003 HC RX 258

## 2024-11-22 PROCEDURE — 80053 COMPREHEN METABOLIC PANEL: CPT

## 2024-11-22 PROCEDURE — 99285 EMERGENCY DEPT VISIT HI MDM: CPT

## 2024-11-22 PROCEDURE — 6360000002 HC RX W HCPCS

## 2024-11-22 PROCEDURE — 96372 THER/PROPH/DIAG INJ SC/IM: CPT

## 2024-11-22 PROCEDURE — 83690 ASSAY OF LIPASE: CPT

## 2024-11-22 PROCEDURE — 74177 CT ABD & PELVIS W/CONTRAST: CPT

## 2024-11-22 RX ORDER — DICYCLOMINE HYDROCHLORIDE 10 MG/1
10 CAPSULE ORAL
Qty: 120 CAPSULE | Refills: 0 | Status: SHIPPED | OUTPATIENT
Start: 2024-11-22

## 2024-11-22 RX ORDER — LANOLIN ALCOHOL/MO/W.PET/CERES
400 CREAM (GRAM) TOPICAL
Status: COMPLETED | OUTPATIENT
Start: 2024-11-22 | End: 2024-11-22

## 2024-11-22 RX ORDER — ORPHENADRINE CITRATE 30 MG/ML
60 INJECTION INTRAMUSCULAR; INTRAVENOUS ONCE
Status: COMPLETED | OUTPATIENT
Start: 2024-11-22 | End: 2024-11-22

## 2024-11-22 RX ORDER — DICYCLOMINE HYDROCHLORIDE 10 MG/1
10 CAPSULE ORAL ONCE
Status: COMPLETED | OUTPATIENT
Start: 2024-11-22 | End: 2024-11-22

## 2024-11-22 RX ORDER — ONDANSETRON 4 MG/1
4 TABLET, FILM COATED ORAL EVERY 8 HOURS PRN
Qty: 20 TABLET | Refills: 0 | Status: SHIPPED | OUTPATIENT
Start: 2024-11-22

## 2024-11-22 RX ORDER — IOPAMIDOL 755 MG/ML
75 INJECTION, SOLUTION INTRAVASCULAR
Status: COMPLETED | OUTPATIENT
Start: 2024-11-22 | End: 2024-11-22

## 2024-11-22 RX ORDER — KETOROLAC TROMETHAMINE 30 MG/ML
15 INJECTION, SOLUTION INTRAMUSCULAR; INTRAVENOUS ONCE
Status: COMPLETED | OUTPATIENT
Start: 2024-11-22 | End: 2024-11-22

## 2024-11-22 RX ORDER — SODIUM CHLORIDE, SODIUM LACTATE, POTASSIUM CHLORIDE, AND CALCIUM CHLORIDE .6; .31; .03; .02 G/100ML; G/100ML; G/100ML; G/100ML
1000 INJECTION, SOLUTION INTRAVENOUS ONCE
Status: COMPLETED | OUTPATIENT
Start: 2024-11-22 | End: 2024-11-22

## 2024-11-22 RX ORDER — FAMOTIDINE 20 MG/1
20 TABLET, FILM COATED ORAL 2 TIMES DAILY
Qty: 60 TABLET | Refills: 0 | Status: SHIPPED | OUTPATIENT
Start: 2024-11-22

## 2024-11-22 RX ADMIN — ORPHENADRINE CITRATE 60 MG: 30 INJECTION, SOLUTION INTRAMUSCULAR; INTRAVENOUS at 05:35

## 2024-11-22 RX ADMIN — Medication 400 MG: at 05:32

## 2024-11-22 RX ADMIN — SODIUM CHLORIDE, POTASSIUM CHLORIDE, SODIUM LACTATE AND CALCIUM CHLORIDE 1000 ML: 600; 310; 30; 20 INJECTION, SOLUTION INTRAVENOUS at 04:26

## 2024-11-22 RX ADMIN — IOPAMIDOL 75 ML: 755 INJECTION, SOLUTION INTRAVENOUS at 04:57

## 2024-11-22 RX ADMIN — DICYCLOMINE HYDROCHLORIDE 10 MG: 10 CAPSULE ORAL at 05:32

## 2024-11-22 RX ADMIN — KETOROLAC TROMETHAMINE 15 MG: 30 INJECTION, SOLUTION INTRAMUSCULAR at 05:31

## 2024-11-22 ASSESSMENT — PAIN SCALES - GENERAL: PAINLEVEL_OUTOF10: 9

## 2024-11-22 ASSESSMENT — PAIN - FUNCTIONAL ASSESSMENT: PAIN_FUNCTIONAL_ASSESSMENT: PREVENTS OR INTERFERES SOME ACTIVE ACTIVITIES AND ADLS

## 2024-11-22 ASSESSMENT — ENCOUNTER SYMPTOMS
DIARRHEA: 1
VOMITING: 1
SHORTNESS OF BREATH: 0
RECTAL PAIN: 0
ABDOMINAL PAIN: 1
NAUSEA: 1
BLOOD IN STOOL: 0

## 2024-11-22 ASSESSMENT — LIFESTYLE VARIABLES
HOW MANY STANDARD DRINKS CONTAINING ALCOHOL DO YOU HAVE ON A TYPICAL DAY: PATIENT DOES NOT DRINK
HOW OFTEN DO YOU HAVE A DRINK CONTAINING ALCOHOL: NEVER

## 2024-11-22 ASSESSMENT — PAIN DESCRIPTION - DESCRIPTORS: DESCRIPTORS: CRAMPING;ACHING

## 2024-11-22 ASSESSMENT — PAIN DESCRIPTION - LOCATION: LOCATION: LEG

## 2024-11-22 ASSESSMENT — PAIN DESCRIPTION - ORIENTATION: ORIENTATION: RIGHT;LEFT

## 2024-11-22 NOTE — TELEPHONE ENCOUNTER
Pt called office to report she was seen in ED yesterday. Pt under impression that muscle relaxer to be called in.   Chart reviewed. Pt notified of meds that were called in and use of medications. Pt reminded of need to schedule follow up appt with surgery as instructed. Notified would call her back next week with date and time of ER F/U appt.

## 2024-11-22 NOTE — DISCHARGE INSTRUCTIONS
DISCHARGE INSTRUCTIONS    Even though you have been discharged from the Emergency Department, there are several things that you should do to ensure that you receive proper care:    1. DO READ your discharge instructions as these contain important information concerning your medical care.    2. If medication has been prescribed for your condition, fill the prescription as soon as possible and follow the directions on the medication.    3. RETURN AT ONCE TO THE EMERGENCY DEPARTMENT if you have any problems or concerns. These include but are not limited to fever, worsening pain(belly, chest, head, etc...), worsening shortness of breath, uncontrollable bleeding, inability to tolerate food and water, or any condition that makes you question your well-being. Also, if your symptoms do not improve in the next 12-24 hours, return to the ER or seek medical care immediately.     4. Be sure to follow up with your regular physician or specialist as instructed at discharge as this is the best way to ensure that you receive the very best of care. If you do not have a primary care physician, please contact a physician group and make an appointment.    5. Please visit [employer] for coupons regarding your prescriptions. It is a free service for you to use and can help reduce the cost of your medication.    We would like to thank you for coming today and our hope is that we served you and your family well during your stay

## 2024-11-22 NOTE — ED NOTES
Radiology Procedure Waiver   Name: Rehana Dong  : 1970  MRN: [id number]    Date:  24    Time: 3:57 AM EST    Benefits of immediately proceeding with Radiology exam(s) without pre-testing outweigh the risks or are not indicated as specified below and therefore the following is/are being waived:    [] Pregnancy test   [] Patients LMP on-time and regular.   [] Patient had Tubal Ligation or has other Contraception Device.   [] Patient  is Menopausal or Premenarcheal.    [] Patient had Full or Partial Hysterectomy.    [] Protocol for Iodine allergy    [] MRI Questionnaire     [x] BUN/Creatinine   [x] Patient age w/no hx of renal dysfunction.   [] Patient on Dialysis.   [] Recent Normal Labs.  Electronically signed by Vivek Hanson DO on 24 at 3:57 AM EST

## 2024-11-22 NOTE — ED PROVIDER NOTES
[facility]  Department of Emergency Medicine   EM Physician Rehana Lau 54 y.o. female PMHx of CARLENE, hyperlipidemia, diabetes, obesity, hyperlipidemia presents to the ED c/o leg cramping, jaw cramping, and diarrhea and lópez colored stools. Onset: For the past week. Location/Radiation: Gastric pain. Duration: Intermittent, comes and goes in waves. Characterization: Sharp achy at times. Aggravating Factors: Eating, no other aggravating factors reported by the patient. Relieving Factors: None, has not take anything, seems to come and go. Severity: Mild to moderate.  Patient was reports cramping sensation in her legs and her jaw that comes and goes.  Patient does not report a specific aggravating relieving factors.  She reports that this comes and goes in waves randomly.  She reports being on one for a week.  Reports that the cramping sensation in the bilateral legs has worsened and then also cramping sensation into her drawl region.  She denies illicit drug use, alcohol use, tobacco use..       Associated Symptoms: Cramping sensation, decreased appetite, nausea without vomiting, nonbloody diarrhea, change in stool color.                 Review of Systems   Constitutional:  Positive for activity change and appetite change.   Respiratory:  Negative for shortness of breath.    Cardiovascular:  Negative for chest pain, palpitations and leg swelling.   Gastrointestinal:  Positive for abdominal pain, diarrhea, nausea and vomiting. Negative for blood in stool and rectal pain.        Physical Exam  Constitutional:       Appearance: Normal appearance. She is obese. She is not ill-appearing.   HENT:      Right Ear: External ear normal.      Left Ear: External ear normal.      Nose: Nose normal.      Mouth/Throat:      Mouth: Mucous membranes are moist.      Pharynx: Oropharynx is clear.   Cardiovascular:      Rate and Rhythm: Normal rate and regular rhythm.      Pulses: Normal

## 2024-11-25 ENCOUNTER — TELEPHONE (OUTPATIENT)
Age: 54
End: 2024-11-25

## 2024-11-25 ENCOUNTER — OFFICE VISIT (OUTPATIENT)
Dept: INTERNAL MEDICINE | Age: 54
End: 2024-11-25
Payer: COMMERCIAL

## 2024-11-25 VITALS
TEMPERATURE: 97.9 F | BODY MASS INDEX: 36 KG/M2 | HEIGHT: 66 IN | SYSTOLIC BLOOD PRESSURE: 116 MMHG | DIASTOLIC BLOOD PRESSURE: 75 MMHG | RESPIRATION RATE: 14 BRPM | OXYGEN SATURATION: 95 % | WEIGHT: 224 LBS | HEART RATE: 82 BPM

## 2024-11-25 DIAGNOSIS — R19.7 DIARRHEA, UNSPECIFIED TYPE: ICD-10-CM

## 2024-11-25 DIAGNOSIS — R10.13 EPIGASTRIC PAIN: Primary | ICD-10-CM

## 2024-11-25 DIAGNOSIS — R25.2 BILATERAL LEG CRAMPS: ICD-10-CM

## 2024-11-25 DIAGNOSIS — D72.10 EOSINOPHILIA, UNSPECIFIED TYPE: ICD-10-CM

## 2024-11-25 LAB
EKG ATRIAL RATE: 68 BPM
EKG P AXIS: 15 DEGREES
EKG P-R INTERVAL: 140 MS
EKG Q-T INTERVAL: 410 MS
EKG QRS DURATION: 74 MS
EKG QTC CALCULATION (BAZETT): 435 MS
EKG R AXIS: 13 DEGREES
EKG T AXIS: 32 DEGREES
EKG VENTRICULAR RATE: 68 BPM

## 2024-11-25 PROCEDURE — G8417 CALC BMI ABV UP PARAM F/U: HCPCS | Performed by: INTERNAL MEDICINE

## 2024-11-25 PROCEDURE — 1036F TOBACCO NON-USER: CPT | Performed by: INTERNAL MEDICINE

## 2024-11-25 PROCEDURE — G8427 DOCREV CUR MEDS BY ELIG CLIN: HCPCS | Performed by: INTERNAL MEDICINE

## 2024-11-25 PROCEDURE — G8482 FLU IMMUNIZE ORDER/ADMIN: HCPCS | Performed by: INTERNAL MEDICINE

## 2024-11-25 PROCEDURE — 99214 OFFICE O/P EST MOD 30 MIN: CPT | Performed by: INTERNAL MEDICINE

## 2024-11-25 PROCEDURE — 99213 OFFICE O/P EST LOW 20 MIN: CPT | Performed by: INTERNAL MEDICINE

## 2024-11-25 PROCEDURE — 3078F DIAST BP <80 MM HG: CPT | Performed by: INTERNAL MEDICINE

## 2024-11-25 PROCEDURE — 3017F COLORECTAL CA SCREEN DOC REV: CPT | Performed by: INTERNAL MEDICINE

## 2024-11-25 PROCEDURE — 3074F SYST BP LT 130 MM HG: CPT | Performed by: INTERNAL MEDICINE

## 2024-11-25 ASSESSMENT — ENCOUNTER SYMPTOMS
ABDOMINAL DISTENTION: 0
NAUSEA: 1
BLOOD IN STOOL: 0
WHEEZING: 0
ANAL BLEEDING: 0
HEMATOCHEZIA: 0
CONSTIPATION: 0
VOMITING: 0
DIARRHEA: 1
ABDOMINAL PAIN: 1
SHORTNESS OF BREATH: 0

## 2024-11-25 NOTE — PATIENT INSTRUCTIONS
1) Labs have been ordered including stool sample.    2) keep current med regimen- after results- will discuss adding PPI (Pantoprazole) or Carafate- meds added to instructions for review. For now keep Pepcid and Bentyl use.     3) If blood level (Eosinophils) remain elevated- will need to see an Allergist/Immunologist.     4) We will ask Dr. Talbert to re-establish for relatively urgent re-assessment for EGD/CSCOPE

## 2024-11-25 NOTE — PROGRESS NOTES
Rehana Dong (:  1970) is a 54 y.o. female,Established patient, here for evaluation of the following chief complaint(s):  ED Follow-up (Abd pain- pain slowing improving. Still taking Bentyl and Pepcid. Zofran as needed)         Assessment & Plan  Epigastric pain   Acute condition, new, unclear etiology- ? Concern with gastritis vs. Other etiology - initial presentation was consistent with viral gastroenteritis   Combination of Bentyl and Pepcid have improved symptoms overall  Last EGD/CSCOPE reviewed from - unremarkable   ? Eosinophilia related  No current alarm findings on exam or with symptoms   Further work-up is needed at this time  H/H was stable; mild CRP elevation prior to event; Eosinophilia- new  Gen Surgery assessment   Consider adding Carafate and PPI   H pylori Stool antigen  Repeat labs including CBC, Eosinophils, IgE level   Follow closely   Orders:    CBC with Auto Differential; Future    H. Pylori Antigen, Stool; Future    Mati Nick MD, [facility] [place]    C-Reactive Protein; Future    Sedimentation Rate; Future    Bilateral leg cramps    Currently resolved  ? Dehydration vs. Electrolyte abnormality   Continue good oral hydration        Diarrhea, unspecified type    Gen Surgery assessment     Orders:    Mati Nick MD, [facility]    C-Reactive Protein; Future    Sedimentation Rate; Future    Eosinophilia, unspecified type   Acute condition, new, Additional work-up to be pursued  Repeat Eos, IgE, Urine Eos, U/A  If still elevated- will need Allergy/Immunology assessment  Will also consider anti-histamine trial   EGD/CSCOPE   Orders:    CBC with Auto Differential; Future    Eosinophil Count; Future    Eosinophil Smear, Urine; Future    IgE; Future    C-Reactive Protein; Future    Sedimentation Rate; Future    Urinalysis with Microscopic; Future      Maintain previously scheduled appt        Subjective   Presents for ED follow-up at

## 2024-11-25 NOTE — TELEPHONE ENCOUNTER
Called and spoke w/ patient to clarify Topamax rx. Advised her that per dr's last note, she was to discontinue after finishing the 1 month of 25 mg.    Pt gave verbal understanding

## 2024-11-25 NOTE — PROGRESS NOTES
Spoke with surgery scheduling department. Appt scheduled for 12/9/24 at 3 PM. Pt notified of same.    Pt given supplies to collect stool specimen. Pt teaching done regarding how to obtain. Pt verbalizes understanding. Updated VIS given to patient. Lab scripts given to patient per Dr. Rock.

## 2024-11-26 ENCOUNTER — HOSPITAL ENCOUNTER (OUTPATIENT)
Age: 54
Discharge: HOME | End: 2024-11-26
Payer: COMMERCIAL

## 2024-11-26 ENCOUNTER — TELEPHONE (OUTPATIENT)
Dept: INTERNAL MEDICINE | Age: 54
End: 2024-11-26

## 2024-11-26 DIAGNOSIS — D72.10 EOSINOPHILIA, UNSPECIFIED TYPE: ICD-10-CM

## 2024-11-26 DIAGNOSIS — R10.13 EPIGASTRIC PAIN: ICD-10-CM

## 2024-11-26 DIAGNOSIS — D72.10 EOSINOPHILIA, UNSPECIFIED TYPE: Primary | ICD-10-CM

## 2024-11-26 DIAGNOSIS — R19.7 DIARRHEA, UNSPECIFIED TYPE: ICD-10-CM

## 2024-11-26 LAB
BASOPHILS # BLD: 0 K/UL (ref 0–0.2)
BASOPHILS NFR BLD: 0 % (ref 0–2)
BILIRUB UR QL STRIP: NEGATIVE
CLARITY UR: CLEAR
COLOR UR: YELLOW
CRP SERPL HS-MCNC: 21 MG/L (ref 0–5)
EOSINOPHIL # BLD: 2.83 K/UL (ref 0.05–0.5)
EOSINOPHIL # BLD: 4150 /UL (ref 50–250)
EOSINOPHILS RELATIVE PERCENT: 23 % (ref 0–6)
ERYTHROCYTE [DISTWIDTH] IN BLOOD BY AUTOMATED COUNT: 13 % (ref 11.5–15)
ERYTHROCYTE [SEDIMENTATION RATE] IN BLOOD BY WESTERGREN METHOD: 28 MM/HR (ref 0–20)
GLUCOSE UR STRIP-MCNC: NEGATIVE MG/DL
HCT VFR BLD AUTO: 42.7 % (ref 34–48)
HGB BLD-MCNC: 13.7 G/DL (ref 11.5–15.5)
HGB UR QL STRIP.AUTO: NEGATIVE
KETONES UR STRIP-MCNC: NEGATIVE MG/DL
LEUKOCYTE ESTERASE UR QL STRIP: NEGATIVE
LYMPHOCYTES NFR BLD: 2.28 K/UL (ref 1.5–4)
LYMPHOCYTES RELATIVE PERCENT: 18 % (ref 20–42)
MCH RBC QN AUTO: 29.2 PG (ref 26–35)
MCHC RBC AUTO-ENTMCNC: 32.1 G/DL (ref 32–34.5)
MCV RBC AUTO: 91 FL (ref 80–99.9)
MONOCYTES NFR BLD: 0.76 K/UL (ref 0.1–0.95)
MONOCYTES NFR BLD: 6 % (ref 2–12)
NEUTROPHILS NFR BLD: 53 % (ref 43–80)
NEUTS SEG NFR BLD: 6.63 K/UL (ref 1.8–7.3)
NITRITE UR QL STRIP: NEGATIVE
PH UR STRIP: 6 [PH] (ref 5–9)
PLATELET # BLD AUTO: 488 K/UL (ref 130–450)
PMV BLD AUTO: 9.5 FL (ref 7–12)
PROT UR STRIP-MCNC: NEGATIVE MG/DL
RBC # BLD AUTO: 4.69 M/UL (ref 3.5–5.5)
RBC # BLD: NORMAL 10*6/UL
RBC #/AREA URNS HPF: ABNORMAL /HPF
SP GR UR STRIP: <1.005 (ref 1–1.03)
UROBILINOGEN UR STRIP-ACNC: 0.2 EU/DL (ref 0–1)
WBC #/AREA URNS HPF: ABNORMAL /HPF
WBC OTHER # BLD: 12.5 K/UL (ref 4.5–11.5)

## 2024-11-26 PROCEDURE — 86140 C-REACTIVE PROTEIN: CPT

## 2024-11-26 PROCEDURE — 87338 HPYLORI STOOL AG IA: CPT

## 2024-11-26 PROCEDURE — 81001 URINALYSIS AUTO W/SCOPE: CPT

## 2024-11-26 PROCEDURE — 87177 OVA AND PARASITES SMEARS: CPT

## 2024-11-26 PROCEDURE — 85652 RBC SED RATE AUTOMATED: CPT

## 2024-11-26 PROCEDURE — 87427 SHIGA-LIKE TOXIN AG IA: CPT

## 2024-11-26 PROCEDURE — 85048 AUTOMATED LEUKOCYTE COUNT: CPT

## 2024-11-26 PROCEDURE — 82785 ASSAY OF IGE: CPT

## 2024-11-26 PROCEDURE — 87046 STOOL CULTR AEROBIC BACT EA: CPT

## 2024-11-26 PROCEDURE — 89190 NASAL SMEAR FOR EOSINOPHILS: CPT

## 2024-11-26 PROCEDURE — 36415 COLL VENOUS BLD VENIPUNCTURE: CPT

## 2024-11-26 PROCEDURE — 87209 SMEAR COMPLEX STAIN: CPT

## 2024-11-26 PROCEDURE — 87205 SMEAR GRAM STAIN: CPT

## 2024-11-26 PROCEDURE — 87045 FECES CULTURE AEROBIC BACT: CPT

## 2024-11-26 PROCEDURE — 85025 COMPLETE CBC W/AUTO DIFF WBC: CPT

## 2024-11-26 NOTE — TELEPHONE ENCOUNTER
Called patient with results completed today. Still pending- IgE level; h pylori stool antigen; urine eos.     Given findings- discussed case with Dr. Dejesus- deeply appreciate input. Will proceed with parasitic testing and hematology urgent assessment.    Explained to patient at length the elements being reviewed and circumstances that would necessitate immediate re-evaluation including admission to hospital.     Will follow closely and all questions answered. Orders noted at this time.     Extensive review of record- in October during routine visit- noted a mild bilateral rash on legs- this was not present on Podiatry assessment in November and Eos count was normal- total count on differential (0.44). Now with acute rise- additional work-up needed as discussed in detail.     Patient will need stool collection for lab.

## 2024-11-26 NOTE — TELEPHONE ENCOUNTER
Spoke with Zuleyma in Microbiology lab dept. Stool specimen received from [facility] and had not be sent out yet for the previously ordered testing. Will add on stool labs requested today.

## 2024-11-27 ENCOUNTER — TELEPHONE (OUTPATIENT)
Dept: INTERNAL MEDICINE | Age: 54
End: 2024-11-27

## 2024-11-27 DIAGNOSIS — B96.81 HELICOBACTER PYLORI GASTRITIS: Primary | ICD-10-CM

## 2024-11-27 DIAGNOSIS — K29.70 HELICOBACTER PYLORI GASTRITIS: Primary | ICD-10-CM

## 2024-11-27 LAB
EOSINOPHIL # BLD: NORMAL 10*3/UL
EOSINOPHILS PERCENT, URINE: 0 % (ref 0–1)
IGE SERPL-ACNC: 771 IU/ML (ref 0–100)
MICROORGANISM/AGENT SPEC: POSITIVE
SPECIMEN DESCRIPTION: ABNORMAL

## 2024-11-27 RX ORDER — AMOXICILLIN 500 MG/1
1000 CAPSULE ORAL 2 TIMES DAILY
Qty: 56 CAPSULE | Refills: 0 | Status: SHIPPED | OUTPATIENT
Start: 2024-11-27 | End: 2024-12-11

## 2024-11-27 RX ORDER — PANTOPRAZOLE SODIUM 20 MG/1
20 TABLET, DELAYED RELEASE ORAL
Qty: 28 TABLET | Refills: 0 | Status: SHIPPED | OUTPATIENT
Start: 2024-11-27 | End: 2024-12-11

## 2024-11-27 RX ORDER — CLARITHROMYCIN 500 MG/1
500 TABLET ORAL 2 TIMES DAILY
Qty: 28 TABLET | Refills: 0 | Status: SHIPPED | OUTPATIENT
Start: 2024-11-27 | End: 2024-12-11

## 2024-11-27 NOTE — TELEPHONE ENCOUNTER
Contacted patient with results of stool testing. H pylori stool antigen is positive suggesting H pylori gastritis.     Review of the literature notes a case report with noted peripheral eosinophilia and IgE elevation- all elements of current findings.     We discussed given acuity of findings- we are compelled to start therapy immediately. Triple therapy eradication discussed. NO PCN allergy. QTc stable for macrolide use.     Will plan to treat. [employer] message to be sent with information further. Stop Pepcid and initiate PPI as discussed.  Will need verification of eradication via h pylori stool antigen 30 days post treatment as counseled. Will still need to clarify additional studies and further work-up at this time.  Will follow on Monday for current symptoms with initiation of treatment and needs.  Anticipatory guidance maintained at this time.

## 2024-11-28 LAB
MICROORGANISM SPEC CULT: NORMAL
MICROORGANISM SPEC CULT: NORMAL
SPECIMEN DESCRIPTION: NORMAL

## 2024-11-29 DIAGNOSIS — E78.2 MIXED HYPERLIPIDEMIA: ICD-10-CM

## 2024-11-29 RX ORDER — ICOSAPENT ETHYL 1 G/1
CAPSULE ORAL
Qty: 360 CAPSULE | Refills: 0 | OUTPATIENT
Start: 2024-11-29

## 2024-11-29 NOTE — TELEPHONE ENCOUNTER
Name of Medication(s) Requested:  Requested Prescriptions     Pending Prescriptions Disp Refills    Icosapent Ethyl (VASCEPA) 1 g CAPS capsule [Pharmacy Med Name: Icosapent Ethyl Oral Capsule 1 GM] 360 capsule 0     Sig: TAKE TWO CAPSULES BY MOUTH TWO TIMES A DAY       Medication is on current medication list Yes    Dosage and directions were verified? Yes    Quantity verified: 30 day supply     Pharmacy Verified?  No    Last Appointment:  11/25/2024    Future appts:  Future Appointments   Date Time Provider Department Center   12/6/2024  8:00 AM Long Vasquez MD [facility] MED ONC [facility]   12/6/2024  8:00 AM [facility] MED ONC FAST TRACK 2 [facility] Med Onc [facility]   12/9/2024  3:00 PM Ambrocio Doty MD [facility] Surgical [facility]   12/27/2024  9:45 AM Alvin Thornton DPM Col Podiatry [facility]   2/13/2025 12:45 PM Luc Rock MD [facility] ECC DEP   4/17/2025  9:40 AM Solange Myers MD [facility] NEURO Neurology -   11/6/2025  9:45 AM Margaret Mueller MD [facility] Womens [facility]        (If no appt send self scheduling link. .REFILLAPPT)  Scheduling request sent?     [] Yes  [x] No    Does patient need updated?  [] Yes  [x] No

## 2024-12-02 LAB — O+P STL MICRO: NEGATIVE

## 2024-12-06 ENCOUNTER — HOSPITAL ENCOUNTER (OUTPATIENT)
Dept: INFUSION THERAPY | Age: 54
Discharge: HOME | End: 2024-12-06

## 2024-12-06 ENCOUNTER — HOSPITAL ENCOUNTER (OUTPATIENT)
Age: 54
Discharge: HOME | End: 2024-12-06
Payer: COMMERCIAL

## 2024-12-06 ENCOUNTER — OFFICE VISIT (OUTPATIENT)
Dept: ONCOLOGY | Age: 54
End: 2024-12-06
Payer: COMMERCIAL

## 2024-12-06 VITALS
TEMPERATURE: 97.6 F | OXYGEN SATURATION: 100 % | WEIGHT: 224.6 LBS | DIASTOLIC BLOOD PRESSURE: 94 MMHG | SYSTOLIC BLOOD PRESSURE: 137 MMHG | HEIGHT: 66 IN | HEART RATE: 70 BPM | BODY MASS INDEX: 36.1 KG/M2 | RESPIRATION RATE: 16 BRPM

## 2024-12-06 DIAGNOSIS — D72.10 EOSINOPHILIA, UNSPECIFIED TYPE: ICD-10-CM

## 2024-12-06 DIAGNOSIS — D72.10 EOSINOPHILIA, UNSPECIFIED TYPE: Primary | ICD-10-CM

## 2024-12-06 LAB
ALBUMIN SERPL-MCNC: 4.1 G/DL (ref 3.5–5.2)
ALP SERPL-CCNC: 68 U/L (ref 35–104)
ALT SERPL-CCNC: 22 U/L (ref 0–32)
ANION GAP SERPL CALCULATED.3IONS-SCNC: 9 MMOL/L (ref 7–16)
AST SERPL-CCNC: 15 U/L (ref 0–31)
BASOPHILS # BLD: 0.04 K/UL (ref 0–0.2)
BASOPHILS NFR BLD: 1 % (ref 0–2)
BILIRUB SERPL-MCNC: 0.3 MG/DL (ref 0–1.2)
BUN SERPL-MCNC: 9 MG/DL (ref 6–20)
CALCIUM SERPL-MCNC: 9.2 MG/DL (ref 8.6–10.2)
CHLORIDE SERPL-SCNC: 105 MMOL/L (ref 98–107)
CO2 SERPL-SCNC: 26 MMOL/L (ref 22–29)
CREAT SERPL-MCNC: 0.6 MG/DL (ref 0.5–1)
EOSINOPHIL # BLD: 1.12 K/UL (ref 0.05–0.5)
EOSINOPHILS RELATIVE PERCENT: 16 % (ref 0–6)
ERYTHROCYTE [DISTWIDTH] IN BLOOD BY AUTOMATED COUNT: 13 % (ref 11.5–15)
GFR, ESTIMATED: >90 ML/MIN/1.73M2
GLUCOSE SERPL-MCNC: 135 MG/DL (ref 74–99)
HCT VFR BLD AUTO: 40 % (ref 34–48)
HGB BLD-MCNC: 13.1 G/DL (ref 11.5–15.5)
IMM GRANULOCYTES # BLD AUTO: <0.03 K/UL (ref 0–0.58)
IMM GRANULOCYTES NFR BLD: 0 % (ref 0–5)
LYMPHOCYTES NFR BLD: 2.6 K/UL (ref 1.5–4)
LYMPHOCYTES RELATIVE PERCENT: 38 % (ref 20–42)
MCH RBC QN AUTO: 28.7 PG (ref 26–35)
MCHC RBC AUTO-ENTMCNC: 32.8 G/DL (ref 32–34.5)
MCV RBC AUTO: 87.5 FL (ref 80–99.9)
MONOCYTES NFR BLD: 0.42 K/UL (ref 0.1–0.95)
MONOCYTES NFR BLD: 6 % (ref 2–12)
NEUTROPHILS NFR BLD: 39 % (ref 43–80)
NEUTS SEG NFR BLD: 2.64 K/UL (ref 1.8–7.3)
PLATELET # BLD AUTO: 500 K/UL (ref 130–450)
PMV BLD AUTO: 9.1 FL (ref 7–12)
POTASSIUM SERPL-SCNC: 3.9 MMOL/L (ref 3.5–5)
PROT SERPL-MCNC: 6.6 G/DL (ref 6.4–8.3)
RBC # BLD AUTO: 4.57 M/UL (ref 3.5–5.5)
SEND OUT REPORT: NORMAL
SODIUM SERPL-SCNC: 140 MMOL/L (ref 132–146)
TEST NAME: NORMAL
VIT B12 SERPL-MCNC: 263 PG/ML (ref 211–946)
WBC OTHER # BLD: 6.8 K/UL (ref 4.5–11.5)

## 2024-12-06 PROCEDURE — G8482 FLU IMMUNIZE ORDER/ADMIN: HCPCS | Performed by: INTERNAL MEDICINE

## 2024-12-06 PROCEDURE — 80053 COMPREHEN METABOLIC PANEL: CPT

## 2024-12-06 PROCEDURE — 83521 IG LIGHT CHAINS FREE EACH: CPT

## 2024-12-06 PROCEDURE — 83516 IMMUNOASSAY NONANTIBODY: CPT

## 2024-12-06 PROCEDURE — 82607 VITAMIN B-12: CPT

## 2024-12-06 PROCEDURE — 3017F COLORECTAL CA SCREEN DOC REV: CPT | Performed by: INTERNAL MEDICINE

## 2024-12-06 PROCEDURE — G8427 DOCREV CUR MEDS BY ELIG CLIN: HCPCS | Performed by: INTERNAL MEDICINE

## 2024-12-06 PROCEDURE — 99204 OFFICE O/P NEW MOD 45 MIN: CPT | Performed by: INTERNAL MEDICINE

## 2024-12-06 PROCEDURE — 86682 HELMINTH ANTIBODY: CPT

## 2024-12-06 PROCEDURE — 3075F SYST BP GE 130 - 139MM HG: CPT | Performed by: INTERNAL MEDICINE

## 2024-12-06 PROCEDURE — 84165 PROTEIN E-PHORESIS SERUM: CPT

## 2024-12-06 PROCEDURE — 83520 IMMUNOASSAY QUANT NOS NONAB: CPT

## 2024-12-06 PROCEDURE — 3080F DIAST BP >= 90 MM HG: CPT | Performed by: INTERNAL MEDICINE

## 2024-12-06 PROCEDURE — G8417 CALC BMI ABV UP PARAM F/U: HCPCS | Performed by: INTERNAL MEDICINE

## 2024-12-06 PROCEDURE — 36415 COLL VENOUS BLD VENIPUNCTURE: CPT

## 2024-12-06 PROCEDURE — 84155 ASSAY OF PROTEIN SERUM: CPT

## 2024-12-06 PROCEDURE — 85025 COMPLETE CBC W/AUTO DIFF WBC: CPT

## 2024-12-06 PROCEDURE — 1036F TOBACCO NON-USER: CPT | Performed by: INTERNAL MEDICINE

## 2024-12-06 NOTE — PROGRESS NOTES
[facility] [place] [facility] MEDICAL ONCOLOGY  [address] [address]  Dept: [phone]  Loc: [phone]    Clinic Consultation Note      Date of Encounter: 12/06/2024     Referring Provider:  Luc Rock MD      Reason for Visit:   eosinophilia        PCP:  Luc Rock MD    Demographics: 54 y.o. female    Chief Complaint   Patient presents with    New Patient         History of Present Illness of Initial Consultation :  The patient is a 54 y.o. female with hx of abdominal pain, anxiety, asthma, breast fibrocystic disorder, htn, dm, headache, IBS, mirgraine, osteoarthritis, who is presenting as a referral due to eosinophilia on recent labs.   Patient is feeling well today without new complaints.  She has been having issues with diarrhea last month that required her to go to the ER to get her with pain in the epigastric area, she ended up being tested for H. pylori and started on H. pylori treatment and she is feeling better from that standpoint currently, she is not having as much pain in the epigastric area anymore and the diarrhea has resolved.  She declined any shortness of breath, she has been having chronic cough for which she so pulmonary and that is why she was not on inhaler.  But she declined any shortness of breath with walking and going up stairs.  No fevers, no chills, no night sweats.  She has been losing some weight while taking Ozempic, no lymphadenopathy.  No personal history of cancer or clots.  She had a small rash on the facial area which is a chronic.  No leg swelling and no chest pain.  No new medications except the medication she is taking right now for H. pylori and no steroids.  Family history of cancer in her grandparents.  She does not smoke and no alcohol drinking and no IV drug use or any other drugs except as prescribed  She is up-to-date with her cancer screening and she will continue to do self  Said 
Patient refused AVS, Has My Chart  
risk identification.   Total the numbers circled and assign a fall risk score from Table 2.  Reassess patient at a minimum every 12 weeks or with status change.    Assessment   Date  12/6/2024     1.  Mental Ability: confusion/cognitively impaired No - 0       2.  Elimination Issues: incontinence, frequency No - 0       3.  Ambulatory: use of assistive devices (walker, cane, off-loading devices), attached to equipment (IV pole, oxygen) No - 0     4.  Sensory Limitations: dizziness, vertigo, impaired vision No - 0       5.  Age Less than 65 years - 0       6.  Medication: diuretics, strong analgesics, hypnotics, sedatives, antihypertensive agents   No - 0   7.  Falls:  recent history of falls within the last 3 months (not to include slipping or tripping)   No - 0   TOTAL 0    If score of 4 or greater was education given? No       TABLE 2   Risk Score Risk Level Plan of Care   0-3 Little or  No Risk 1.  Provide assistance as indicated for ambulation activities  2.  Reorient confused/cognitively impaired patient  3.  Call-light/bell within patient's reach  4.  Chair/bed in low position, stretcher/bed with siderails up except when performing patient care activities  5.  Educate patient/family/caregiver on falls prevention  6.  Reassess in 12 weeks or with any noted change in patient condition which places them at a risk for a fall   4-6 Moderate Risk 1.  Provide assistance as indicated for ambulation activities  2.  Reorient confused/cognitively impaired patient  3.  Call-light/bell within patient's reach  4.  Chair/bed in low position, stretcher/bed with siderails up except when performing patient care activities  5.  Educate patient/family/caregiver on falls prevention  6.  Falls risk precaution (Yellow sticker Level II) placed on patient chart   7 or   Higher High Risk 1.  Place patient in easily observable treatment room  2.  Patient attended at all times by family member or staff  3.  Provide assistance as

## 2024-12-07 DIAGNOSIS — E78.2 MIXED HYPERLIPIDEMIA: ICD-10-CM

## 2024-12-08 DIAGNOSIS — N95.1 POST MENOPAUSAL SYNDROME: ICD-10-CM

## 2024-12-08 DIAGNOSIS — E78.2 MIXED HYPERLIPIDEMIA: ICD-10-CM

## 2024-12-08 LAB
FREE KAPPA/LAMBDA RATIO: 0.97 (ref 0.22–1.74)
KAPPA LC FREE SER-MCNC: 28.8 MG/L
LAMBDA LC FREE SERPL-MCNC: 29.6 MG/L (ref 4.2–27.7)

## 2024-12-09 ENCOUNTER — OFFICE VISIT (OUTPATIENT)
Dept: SURGERY | Age: 54
End: 2024-12-09
Payer: COMMERCIAL

## 2024-12-09 VITALS
HEART RATE: 85 BPM | WEIGHT: 226 LBS | BODY MASS INDEX: 36.48 KG/M2 | DIASTOLIC BLOOD PRESSURE: 77 MMHG | TEMPERATURE: 96.4 F | OXYGEN SATURATION: 96 % | SYSTOLIC BLOOD PRESSURE: 133 MMHG | RESPIRATION RATE: 14 BRPM

## 2024-12-09 DIAGNOSIS — R19.7 DIARRHEA, UNSPECIFIED TYPE: ICD-10-CM

## 2024-12-09 DIAGNOSIS — Z80.0 FAMILY HX OF COLON CANCER: ICD-10-CM

## 2024-12-09 DIAGNOSIS — R12 HEARTBURN: ICD-10-CM

## 2024-12-09 DIAGNOSIS — R11.0 NAUSEA: ICD-10-CM

## 2024-12-09 DIAGNOSIS — E78.2 MIXED HYPERLIPIDEMIA: ICD-10-CM

## 2024-12-09 DIAGNOSIS — A04.8 H. PYLORI INFECTION: ICD-10-CM

## 2024-12-09 DIAGNOSIS — R10.13 EPIGASTRIC PAIN: Primary | ICD-10-CM

## 2024-12-09 LAB — PATH REV BLD -IMP: NORMAL

## 2024-12-09 PROCEDURE — 99202 OFFICE O/P NEW SF 15 MIN: CPT | Performed by: SURGERY

## 2024-12-09 PROCEDURE — 1036F TOBACCO NON-USER: CPT | Performed by: SURGERY

## 2024-12-09 PROCEDURE — 3017F COLORECTAL CA SCREEN DOC REV: CPT | Performed by: SURGERY

## 2024-12-09 PROCEDURE — 3078F DIAST BP <80 MM HG: CPT | Performed by: SURGERY

## 2024-12-09 PROCEDURE — 3075F SYST BP GE 130 - 139MM HG: CPT | Performed by: SURGERY

## 2024-12-09 PROCEDURE — 99204 OFFICE O/P NEW MOD 45 MIN: CPT | Performed by: SURGERY

## 2024-12-09 PROCEDURE — G8482 FLU IMMUNIZE ORDER/ADMIN: HCPCS | Performed by: SURGERY

## 2024-12-09 PROCEDURE — G8417 CALC BMI ABV UP PARAM F/U: HCPCS | Performed by: SURGERY

## 2024-12-09 PROCEDURE — G8427 DOCREV CUR MEDS BY ELIG CLIN: HCPCS | Performed by: SURGERY

## 2024-12-09 RX ORDER — ICOSAPENT ETHYL 1 G/1
2 CAPSULE ORAL 2 TIMES DAILY
Qty: 360 CAPSULE | Refills: 1 | OUTPATIENT
Start: 2024-12-09

## 2024-12-09 RX ORDER — ICOSAPENT ETHYL 1 G/1
CAPSULE ORAL
Qty: 360 CAPSULE | Refills: 1 | Status: SHIPPED | OUTPATIENT
Start: 2024-12-09

## 2024-12-09 RX ORDER — ICOSAPENT ETHYL 1 G/1
CAPSULE ORAL
Qty: 360 CAPSULE | Refills: 0 | OUTPATIENT
Start: 2024-12-09

## 2024-12-09 RX ORDER — SODIUM PICOSULFATE, MAGNESIUM OXIDE, AND ANHYDROUS CITRIC ACID 12; 3.5; 1 G/175ML; G/175ML; MG/175ML
175 LIQUID ORAL ONCE
Qty: 2 EACH | Refills: 0 | Status: SHIPPED | OUTPATIENT
Start: 2024-12-09 | End: 2024-12-09

## 2024-12-09 NOTE — PROGRESS NOTES
DAY IN THE MORNING 90 tablet 0    pravastatin (PRAVACHOL) 80 MG tablet TAKE ONE TABLET BY MOUTH EVERY DAY IN THE EVENING 90 tablet 1    albuterol sulfate HFA (VENTOLIN HFA) 108 (90 Base) MCG/ACT inhaler Inhale 2 puffs into the lungs 4 times daily as needed for Wheezing 18 g 1    fluticasone (FLONASE) 50 MCG/ACT nasal spray 2 sprays by Each Nostril route daily 16 g 5    SUMAtriptan (IMITREX) 100 MG tablet Take 1 tablet by mouth as needed for Migraine If headache persists repeat a dose in 2 hours. Max use 2 days a week 9 tablet 5    mometasone (ELOCON) 0.1 % cream Apply topically daily. 50 g 0    Lancets MISC Inject 1 each into the skin daily 100 each 5    blood glucose monitor strips Test 2 times a day & as needed for symptoms of irregular blood glucose. Dispense sufficient amount for indicated testing frequency plus additional to accommodate PRN testing needs. 100 strip 5    clotrimazole-betamethasone (LOTRISONE) 1-0.05 % cream Apply topically 2 times daily. 1 each 0    Blood Glucose Monitoring Suppl (TRUE METRIX METER) w/Device KIT 1 Device by Does not apply route daily 1 kit 0    Alpha-Lipoic Acid 600 MG TABS Take 600 mg by mouth daily      b complex vitamins capsule Take 1 capsule by mouth daily      topiramate (TOPAMAX) 50 MG tablet Take 1 tablet by mouth nightly (Patient not taking: Reported on 11/25/2024) 30 tablet 0    topiramate (TOPAMAX) 25 MG tablet Take 1 tablet by mouth nightly For a month then d/c (Patient not taking: Reported on 10/24/2024) 30 tablet 0     No current facility-administered medications for this visit.       Allergies   Allergen Reactions    Lisinopril Cough    Bactrim [Sulfamethoxazole-Trimethoprim] Rash       REVIEW OF SYSTEMS:    Constitutional: negative  Eyes: negative  Ears, nose, mouth, throat, and face: negative  Respiratory: negative  Cardiovascular: negative  Gastrointestinal: negative  Genitourinary:negative  Integument/breast: negative  Hematologic/lymphatic:

## 2024-12-09 NOTE — PATIENT INSTRUCTIONS
Call [phone] for any questions/concerns.    Patient Information and Instructions for  Upper GI Endoscopy or Esophagogastroduodenoscopy [EGD])         Definition Upper GI Endoscopy or Esophagogastroduodenoscopy [EGD])  This is a test that uses a fiberoptic scope to examine the esophagus (throat), stomach, and upper part of the small intestines.     Upper GI endoscopy may be recommended if you have:   Abdominal pain   Severe heartburn   Persistent nausea and vomiting   Difficulty swallowing   Blood in stool or vomit   Abnormal x-ray or other examinations of the gastrointestinal tract     Conditions that can be diagnosed with upper GI endoscopy include:   Ulcers   Tumors   Polyps   Abnormal narrowing   Inflammation     Possible Complications   Complications are rare, but no procedure is completely free of risk. If you are planning to have upper GI endoscopy, your doctor will review a list of possible complications, which may include:   Bleeding   Damage to the esophagus, stomach, or intestine   Infection   Respiratory depression (reduced breathing rate and/or depth)   Reaction to sedatives or anesthesia causing your blood pressure to drop    Some factors that may increase the risk of complications include:   Age: 60 or older   Pregnancy   Obesity   Smoking , alcoholism , or drug use   Malnutrition   Recent illness   Diabetes   Heart or lung problems   Bleeding disorders   Use of certain medicines     Be sure to discuss these risks with your doctor before the test.     What to Expect   Prior to test   Leading up to the test:   Arrange for a ride home after the test. Also, arrange for help at home.   The night before, eat a light meal.  Do not eat or drink anything after midnight the night before the test.   Talk to your doctor about your medicines. You may be asked to stop taking some medicines up to one week before the procedure, like:   Anti-inflammatory drugs (e.g., aspirin )   Blood thinners, like clopidogrel

## 2024-12-09 NOTE — TELEPHONE ENCOUNTER
Name of Medication(s) Requested:  Requested Prescriptions     Pending Prescriptions Disp Refills    FLUoxetine (PROZAC) 20 MG capsule 90 capsule 0     Sig: TAKE ONE CAPSULE BY MOUTH DAILY       Medication is on current medication list Yes    Dosage and directions were verified? Yes    Quantity verified: 90 day supply     Pharmacy Verified?  Yes    Last Appointment:  11/25/2024    Future appts:  Future Appointments   Date Time Provider Department Center   12/9/2024  3:00 PM Ambrocio Doty MD [facility]   12/23/2024  3:45 PM Lam Cheema MD [facility] MED ONC [facility]   12/27/2024  9:45 AM Alvin Thornton DPM Col Podiat[facility]   2/13/2025 12:45 PM Luc Rock MD [facility] DEP   4/17/2025  9:40 AM Solange Myers MD [facility] NEURO Neurology -   11/6/2025  9:45 AM Margaret Mueller MD [facility]        (If no appt send self scheduling link. .REFILLAPPT)  Scheduling request sent?     [] Yes  [x] No    Does patient need updated?  [] Yes  [x] No

## 2024-12-10 ENCOUNTER — PREP FOR PROCEDURE (OUTPATIENT)
Dept: SURGERY | Age: 54
End: 2024-12-10

## 2024-12-10 ENCOUNTER — TELEPHONE (OUTPATIENT)
Dept: SURGERY | Age: 54
End: 2024-12-10

## 2024-12-10 DIAGNOSIS — R12 HEARTBURN: ICD-10-CM

## 2024-12-10 DIAGNOSIS — E78.2 MIXED HYPERLIPIDEMIA: ICD-10-CM

## 2024-12-10 PROBLEM — R19.7 DIARRHEA: Status: ACTIVE | Noted: 2024-12-10

## 2024-12-10 LAB
ALBUMIN SERPL-MCNC: 3.4 G/DL (ref 3.5–4.7)
ALPHA1 GLOB SERPL ELPH-MCNC: 0.3 G/DL (ref 0.2–0.4)
ALPHA2 GLOB SERPL ELPH-MCNC: 1 G/DL (ref 0.5–1)
B-GLOBULIN SERPL ELPH-MCNC: 0.9 G/DL (ref 0.8–1.3)
GAMMA GLOB SERPL ELPH-MCNC: 0.7 G/DL (ref 0.7–1.6)
PATHOLOGIST: ABNORMAL
PROT PATTERN SERPL ELPH-IMP: ABNORMAL
PROT SERPL-MCNC: 6.3 G/DL
SEND OUT REPORT: NORMAL
TEST NAME: NORMAL
TRYPTASE SERPL-MCNC: 6.2 UG/L

## 2024-12-10 RX ORDER — ICOSAPENT ETHYL 1 G/1
CAPSULE ORAL
Qty: 360 CAPSULE | Refills: 1 | OUTPATIENT
Start: 2024-12-10

## 2024-12-10 NOTE — TELEPHONE ENCOUNTER
Scheduled pt for EGD & COLONOSCOPY 2/13/25 at 10:30AM. Pt needs to arrive at [facility] at 9:30AM. Patient confirmed date and time for procedure. Address, directions, and prep instructions given in office. Patient verbalized full understanding.      Electronically signed by Massiel Duff MA on 12/10/2024 at 9:10 AM

## 2024-12-11 ENCOUNTER — HOSPITAL ENCOUNTER (OUTPATIENT)
Age: 54
Setting detail: SPECIMEN
Discharge: HOME | End: 2024-12-11
Payer: COMMERCIAL

## 2024-12-11 LAB
DEPRECATED S PNEUM 1 IGG SER-MCNC: 0 AU/ML (ref 0–19)
SERINE PROTEASE 3, IGG: 0 AU/ML (ref 0–19)
STRONGYLOIDES IGG SER IA-ACNC: 0 IV

## 2024-12-11 PROCEDURE — 87427 SHIGA-LIKE TOXIN AG IA: CPT

## 2024-12-11 PROCEDURE — 87045 FECES CULTURE AEROBIC BACT: CPT

## 2024-12-11 PROCEDURE — 87329 GIARDIA AG IA: CPT

## 2024-12-11 PROCEDURE — 87046 STOOL CULTR AEROBIC BACT EA: CPT

## 2024-12-11 PROCEDURE — 89190 NASAL SMEAR FOR EOSINOPHILS: CPT

## 2024-12-12 LAB
EOSINOPHIL # BLD: NORMAL 10*3/UL
G LAMBLIA AG STL QL IA: NEGATIVE
SEND OUT REPORT: NORMAL
SOURCE: NORMAL
SPECIMEN DESCRIPTION: NORMAL
TEST NAME: NORMAL

## 2024-12-14 LAB
MICROORGANISM SPEC CULT: ABNORMAL
MICROORGANISM SPEC CULT: ABNORMAL
SEND OUT REPORT: NORMAL
SPECIMEN DESCRIPTION: ABNORMAL
TEST NAME: NORMAL

## 2024-12-19 ENCOUNTER — TELEPHONE (OUTPATIENT)
Dept: INTERNAL MEDICINE | Age: 54
End: 2024-12-19

## 2024-12-19 NOTE — TELEPHONE ENCOUNTER
Vascepa not currently covered.    Prior auth/denial unable to proceed. Will need to hold for 3 months and repeat TGs- if above 500- will re-submit. Please advise.

## 2024-12-20 ENCOUNTER — SCHEDULED TELEPHONE ENCOUNTER (OUTPATIENT)
Dept: ONCOLOGY | Age: 54
End: 2024-12-20
Payer: COMMERCIAL

## 2024-12-20 DIAGNOSIS — D75.839 THROMBOCYTOSIS: Primary | ICD-10-CM

## 2024-12-20 PROCEDURE — 99441 PR PHYS/QHP TELEPHONE EVALUATION 5-10 MIN: CPT | Performed by: INTERNAL MEDICINE

## 2024-12-20 NOTE — TELEPHONE ENCOUNTER
Voicemail message left for patient to notify her of note per Dr. Rock. Requested pt call back with any questions or concerns.

## 2024-12-20 NOTE — ASSESSMENT & PLAN NOTE
Monitored by specialist- no acute findings meriting change in the plan    Orders:    CBC with Auto Differential; Future

## 2024-12-20 NOTE — PROGRESS NOTES
she does not seem currently to have endorgan damage or symptoms, at the current time would repeat the labs and check if the eosinophilia is consistent, and check additional testing.  If eosinophilia continues or trending up or becoming symptomatic then additional testing can be considered with ruling out other allergic and infectious causes. We discussed that significant new symptoms would need work up and ER visit.   - she is getting infectious and allergic work up with pcp and will be following with GI soon as well.   - she is to follow up back with pulmonary as well if consistently elevated IgE.       today 12/20/2024:   - discussed test results  - no mutation detected in Jak2 with reflex  - BCR-ABL FISH negative/normal  - smear with thrombocytosis and without immature forms  - SPEP without M spike, kappa and lambda increased but normal ratio ( no cloncal disease, inflammation?)  - flow cytometry showed relative increase in eosinophils.   -The last CBC showed a trending down number of eosinophils 1100 on last check      I discussed with the patient as she is feeling better and her numbers are trending down we can repeat CBC differential next month and see if her numbers are still trending down, otherwise if they are worsening or new symptoms we can proceed with additional testing       PLAN      Repeat cbc/diff next month            Subjective   Patient is feeling well without any new complaints, declined any fevers or chills  She said her diarrhea has stopped f since 5 days totally      Review of Systems    Negative except as mentioned above  Objective   No physical exam as this was a phone encounter    Long Vasquez MD  [facility]

## 2024-12-27 ENCOUNTER — PROCEDURE VISIT (OUTPATIENT)
Dept: PODIATRY | Age: 54
End: 2024-12-27

## 2024-12-27 VITALS — HEIGHT: 66 IN | BODY MASS INDEX: 36.32 KG/M2 | WEIGHT: 226 LBS

## 2024-12-27 DIAGNOSIS — G60.8 HEREDITARY SENSORY NEUROPATHY: ICD-10-CM

## 2024-12-27 DIAGNOSIS — E11.9 TYPE 2 DIABETES MELLITUS WITHOUT COMPLICATION, UNSPECIFIED WHETHER LONG TERM INSULIN USE (HCC): Primary | ICD-10-CM

## 2024-12-27 DIAGNOSIS — L84 CORNS AND CALLOSITIES: ICD-10-CM

## 2024-12-27 DIAGNOSIS — B35.1 TINEA UNGUIUM: ICD-10-CM

## 2024-12-27 NOTE — PROGRESS NOTES
Patient in today for nail care as well as callus care. Patient does not have any complaints of pain at this time.   Type 2 diabetic.  Patient's PCP is Luc Rock MD and date of last ov 11/25/2024.            Alvin Thornton DPM       CC:   Follow-up diabetic foot ankle exam    HPI:   Follow-up diabetic foot ankle exam.  History metformin.  Callus tissue both feet.  No open wounds.  No recent injury or trauma.  Very pleased with progression.    ROS:  Const: Denies constitutional symptoms  Musculo: Denies symptoms other than stated above  Skin: Denies symptoms other than stated above      Current Outpatient Medications:     Icosapent Ethyl (VASCEPA) 1 g CAPS capsule, TAKE TWO CAPSULES BY MOUTH TWO TIMES A DAY, Disp: 360 capsule, Rfl: 1    FLUoxetine (PROZAC) 20 MG capsule, TAKE ONE CAPSULE BY MOUTH DAILY, Disp: 90 capsule, Rfl: 0    dicyclomine (BENTYL) 10 MG capsule, Take 1 capsule by mouth 4 times daily (before meals and nightly), Disp: 120 capsule, Rfl: 0    ondansetron (ZOFRAN) 4 MG tablet, Take 1 tablet by mouth every 8 hours as needed for Nausea or Vomiting, Disp: 20 tablet, Rfl: 0    metFORMIN (GLUCOPHAGE) 1000 MG tablet, TAKE ONE TABLET BY MOUTH TWICE A DAY WITH MEALS, Disp: 180 tablet, Rfl: 1    semaglutide, 2 MG/DOSE, (OZEMPIC, 2 MG/DOSE,) 8 MG/3ML SOPN sc injection, Inject 2 mg into the skin every 7 days, Disp: 3 mL, Rfl: 5    topiramate (TOPAMAX) 50 MG tablet, Take 1 tablet by mouth nightly, Disp: 30 tablet, Rfl: 0    topiramate (TOPAMAX) 25 MG tablet, Take 1 tablet by mouth nightly For a month then d/c, Disp: 30 tablet, Rfl: 0    magnesium cl-calcium carbonate (NU-MAG) 71.5-119 MG TBEC tablet, TAKE TWO TABLETS BY MOUTH EVERY DAY, Disp: 180 tablet, Rfl: 1    vitamin B-2 (RIBOFLAVIN) 100 MG TABS tablet, Take 2 tablets by mouth in the morning and at bedtime, Disp: 360 tablet, Rfl: 1    amLODIPine (NORVASC) 5 MG tablet, Take 1 tablet by mouth daily, Disp: 90 tablet, Rfl: 1    hydroCHLOROthiazide

## 2025-01-06 DIAGNOSIS — I10 ESSENTIAL HYPERTENSION: ICD-10-CM

## 2025-01-06 RX ORDER — HYDROCHLOROTHIAZIDE 25 MG/1
TABLET ORAL
Qty: 90 TABLET | Refills: 0 | Status: SHIPPED | OUTPATIENT
Start: 2025-01-06

## 2025-01-06 RX ORDER — AMLODIPINE BESYLATE 5 MG/1
5 TABLET ORAL DAILY
Qty: 90 TABLET | Refills: 1 | OUTPATIENT
Start: 2025-01-06

## 2025-01-06 NOTE — TELEPHONE ENCOUNTER
Name of Medication(s) Requested:  Requested Prescriptions     Pending Prescriptions Disp Refills    hydroCHLOROthiazide (HYDRODIURIL) 25 MG tablet 90 tablet 0     Sig: TAKE ONE TABLET BY MOUTH EVERY DAY IN THE MORNING     Refused Prescriptions Disp Refills    amLODIPine (NORVASC) 5 MG tablet 90 tablet 1     Sig: Take 1 tablet by mouth daily       Medication is on current medication list Yes    Dosage and directions were verified? Yes    Quantity verified: 90 day supply     Pharmacy Verified?  Yes    Last Appointment:  11/25/2024    Future appts:  Future Appointments   Date Time Provider Department Center   1/23/2025  3:00 PM Long Vasquez MD [facility] MED ONC [facility]   2/20/2025  2:45 PM Luc Rock MD [facility] DEP   3/7/2025  9:45 AM Alvin Thornton DPM Col Podiatry [facility]   4/17/2025  9:40 AM Solange Myers MD [facility] NEURO Neurology -   11/6/2025  9:45 AM Margaret Mueller MD [facility]        (If no appt send self scheduling link. .REFILLAPPT)  Scheduling request sent?     [] Yes  [x] No    Does patient need updated?  [] Yes  [x] No

## 2025-01-08 ENCOUNTER — TELEPHONE (OUTPATIENT)
Dept: SURGERY | Age: 55
End: 2025-01-08

## 2025-01-08 NOTE — TELEPHONE ENCOUNTER
MA recheduled pt for colonoscopy with Dr. Doty on 1/16/24 at 8:30am. Pt needs to arrive at [facility] at 7:30am. Patient confirmed date and time for procedure. Address, directions, and prep instructions given via phone. Patient verbalized full understanding.      Electronically signed by Massiel Duff MA on 1/8/2025 at 10:24 AM

## 2025-01-10 NOTE — PROGRESS NOTES
[facility] PRE-ADMISSION TESTING   ENDOSCOPY AND COLONOSCOPY INSTRUCTIONS  PAT- Phone Number: [phone]    ENDOSCOPY AND COLONOSCOPY INSTRUCTIONS:     [x] Bowel Prep instructions reviewed - any questions regarding your prep, please contact surgeon's office.  [x] Colonoscopy: 1-2 days prior: No solid foods. Clear liquids only - nothing red or purple in color.  [x] Antibacterial Soap Shower Night before AND the morning of procedure.  [x] No solid food after midnight. You may have SIPS of clear liquids up until 2 hours before your arrival time to the hospital.   [x] Do not wear makeup, lotions, powders, deodorant.   [x] No tobacco products, illegal drugs, or alcohol within 24 hours of your surgery.  [x] Jewelry or valuables should not be brought to the hospital. All body and/or tongue piercing's must be removed prior to arriving to hospital. No contact lens or hair pins.   [x] Arrange transportation with a responsible adult  to and from the hospital. If you do not have a responsible adult  to transport you, you will need to make arrangements with a medical transportation company. Arrange for someone to be with you for the remainder of the day and for 24 hours after your procedure due to having had anesthesia.    -Who will be your  for transportation?   -Who will be staying with you for 24 hrs after your procedure?   [x] Bring insurance card and photo ID.    PARKING INSTRUCTIONS:     [x] ARRIVAL DATE & TIME: 1/16 at 0715  [x] Times are subject to change. We will contact you the business day before surgery if that were to occur.  [x] Enter into the [facility] of Early Entrance. Two people may accompany you. Masks are not required.  [x] Parking Lot \"I\" is where you will park. It is located on the corner of [address] and [address]. The entrance is on [address].   Only one vehicle - per patient, is permitted in parking lot.   Upon entering the  parking lot, a voucher ticket will print.    MEDICATION INSTRUCTIONS:    [x] Bring a complete list of your medications, please write the last time you took the medicine, give this list to the nurse in Pre-Op.  [x] Take ONLY the following medications the morning of surgery: prozac  [x] Stop all herbal supplements and vitamins 5 days before surgery.   [x] Stop all NSAIDS 7 days before surgery.  [x] DO NOT take any diabetic medicine the morning of surgery.  Follow instructions for insulin the day before surgery.  [x] If you are diabetic and your blood sugar is low or you feel symptomatic, you may drink 1-2 ounces of apple juice or take a glucose tablet.            -The morning of your procedure, you may call the pre-op area if you have concerns about your blood sugar [phone].  [x] Use your inhalers the morning of surgery.  Bring your emergency inhaler with you day of surgery.  [x] Follow physician instructions regarding any blood thinners you may be taking.    WHAT TO EXPECT:    [x] The day of your procedure you will be greeted and checked in by the [facility] .  In addition, you will be registered in the [facility] by a Patient Access Representative. Please bring your photo ID and insurance card. A nurse will greet you in accordance to the time you are needed in the pre-op area to prepare you for surgery. Please do not be discouraged if you are not greeted in the order you arrive as there are many variables that are involved in patient preparation. Your patience is greatly appreciated as you wait for your nurse.  [x] Delays may occur. Staff will make a sincere effort to keep you informed of delays. If any delays occur with your procedure, we apologize ahead of time for your inconvenience as we recognize the value of your time.

## 2025-01-15 ENCOUNTER — ANESTHESIA EVENT (OUTPATIENT)
Dept: ENDOSCOPY | Age: 55
End: 2025-01-15
Payer: COMMERCIAL

## 2025-01-15 ASSESSMENT — LIFESTYLE VARIABLES: SMOKING_STATUS: 0

## 2025-01-16 ENCOUNTER — ANESTHESIA (OUTPATIENT)
Dept: ENDOSCOPY | Age: 55
End: 2025-01-16
Payer: COMMERCIAL

## 2025-01-16 ENCOUNTER — TELEPHONE (OUTPATIENT)
Dept: ONCOLOGY | Age: 55
End: 2025-01-16

## 2025-01-16 ENCOUNTER — HOSPITAL ENCOUNTER (OUTPATIENT)
Age: 55
Setting detail: OUTPATIENT SURGERY
Discharge: HOME | End: 2025-01-16
Attending: SURGERY | Admitting: SURGERY
Payer: COMMERCIAL

## 2025-01-16 VITALS
SYSTOLIC BLOOD PRESSURE: 124 MMHG | HEIGHT: 66 IN | OXYGEN SATURATION: 96 % | HEART RATE: 76 BPM | RESPIRATION RATE: 18 BRPM | WEIGHT: 226 LBS | DIASTOLIC BLOOD PRESSURE: 83 MMHG | TEMPERATURE: 98 F | BODY MASS INDEX: 36.32 KG/M2

## 2025-01-16 DIAGNOSIS — R19.7 DIARRHEA: ICD-10-CM

## 2025-01-16 DIAGNOSIS — Z01.812 PRE-OPERATIVE LABORATORY EXAMINATION: Primary | ICD-10-CM

## 2025-01-16 DIAGNOSIS — R12 HEARTBURN: ICD-10-CM

## 2025-01-16 PROBLEM — Z80.0 FAMILY HX OF COLON CANCER: Status: ACTIVE | Noted: 2025-01-16

## 2025-01-16 LAB — GLUCOSE BLD-MCNC: 138 MG/DL (ref 74–99)

## 2025-01-16 PROCEDURE — 2500000003 HC RX 250 WO HCPCS

## 2025-01-16 PROCEDURE — 45378 DIAGNOSTIC COLONOSCOPY: CPT | Performed by: SURGERY

## 2025-01-16 PROCEDURE — 7100000001 HC PACU RECOVERY - ADDTL 15 MIN: Performed by: SURGERY

## 2025-01-16 PROCEDURE — 88342 IMHCHEM/IMCYTCHM 1ST ANTB: CPT

## 2025-01-16 PROCEDURE — 43239 EGD BIOPSY SINGLE/MULTIPLE: CPT | Performed by: SURGERY

## 2025-01-16 PROCEDURE — 2580000003 HC RX 258

## 2025-01-16 PROCEDURE — 3700000000 HC ANESTHESIA ATTENDED CARE: Performed by: SURGERY

## 2025-01-16 PROCEDURE — 88305 TISSUE EXAM BY PATHOLOGIST: CPT

## 2025-01-16 PROCEDURE — 3609017100 HC EGD: Performed by: SURGERY

## 2025-01-16 PROCEDURE — 6360000002 HC RX W HCPCS

## 2025-01-16 PROCEDURE — 2709999900 HC NON-CHARGEABLE SUPPLY: Performed by: SURGERY

## 2025-01-16 PROCEDURE — 7100000000 HC PACU RECOVERY - FIRST 15 MIN: Performed by: SURGERY

## 2025-01-16 PROCEDURE — 3609027000 HC COLONOSCOPY: Performed by: SURGERY

## 2025-01-16 PROCEDURE — 82962 GLUCOSE BLOOD TEST: CPT

## 2025-01-16 PROCEDURE — 3700000001 HC ADD 15 MINUTES (ANESTHESIA): Performed by: SURGERY

## 2025-01-16 RX ORDER — SODIUM CHLORIDE 0.9 % (FLUSH) 0.9 %
5-40 SYRINGE (ML) INJECTION PRN
Status: DISCONTINUED | OUTPATIENT
Start: 2025-01-16 | End: 2025-01-16 | Stop reason: HOSPADM

## 2025-01-16 RX ORDER — SODIUM CHLORIDE 9 MG/ML
INJECTION, SOLUTION INTRAVENOUS
Status: DISCONTINUED | OUTPATIENT
Start: 2025-01-16 | End: 2025-01-16 | Stop reason: SDUPTHER

## 2025-01-16 RX ORDER — ONDANSETRON 2 MG/ML
4 INJECTION INTRAMUSCULAR; INTRAVENOUS
Status: CANCELLED | OUTPATIENT
Start: 2025-01-16 | End: 2025-01-17

## 2025-01-16 RX ORDER — SODIUM CHLORIDE 9 MG/ML
INJECTION, SOLUTION INTRAVENOUS CONTINUOUS
Status: DISCONTINUED | OUTPATIENT
Start: 2025-01-16 | End: 2025-01-16 | Stop reason: HOSPADM

## 2025-01-16 RX ORDER — SODIUM CHLORIDE 0.9 % (FLUSH) 0.9 %
5-40 SYRINGE (ML) INJECTION EVERY 12 HOURS SCHEDULED
Status: DISCONTINUED | OUTPATIENT
Start: 2025-01-16 | End: 2025-01-16 | Stop reason: HOSPADM

## 2025-01-16 RX ORDER — SODIUM CHLORIDE 9 MG/ML
INJECTION, SOLUTION INTRAVENOUS PRN
Status: DISCONTINUED | OUTPATIENT
Start: 2025-01-16 | End: 2025-01-16 | Stop reason: HOSPADM

## 2025-01-16 RX ORDER — NALOXONE HYDROCHLORIDE 0.4 MG/ML
INJECTION, SOLUTION INTRAMUSCULAR; INTRAVENOUS; SUBCUTANEOUS PRN
Status: CANCELLED | OUTPATIENT
Start: 2025-01-16

## 2025-01-16 RX ORDER — SODIUM CHLORIDE 0.9 % (FLUSH) 0.9 %
5-40 SYRINGE (ML) INJECTION PRN
Status: CANCELLED | OUTPATIENT
Start: 2025-01-16

## 2025-01-16 RX ORDER — DEXMEDETOMIDINE HYDROCHLORIDE 100 UG/ML
INJECTION, SOLUTION, CONCENTRATE INTRAVENOUS
Status: DISCONTINUED | OUTPATIENT
Start: 2025-01-16 | End: 2025-01-16 | Stop reason: SDUPTHER

## 2025-01-16 RX ORDER — SODIUM CHLORIDE 0.9 % (FLUSH) 0.9 %
5-40 SYRINGE (ML) INJECTION EVERY 12 HOURS SCHEDULED
Status: CANCELLED | OUTPATIENT
Start: 2025-01-16

## 2025-01-16 RX ORDER — FENTANYL CITRATE 50 UG/ML
25 INJECTION, SOLUTION INTRAMUSCULAR; INTRAVENOUS EVERY 5 MIN PRN
Status: CANCELLED | OUTPATIENT
Start: 2025-01-16

## 2025-01-16 RX ORDER — PROPOFOL 10 MG/ML
INJECTION, EMULSION INTRAVENOUS
Status: DISCONTINUED | OUTPATIENT
Start: 2025-01-16 | End: 2025-01-16 | Stop reason: SDUPTHER

## 2025-01-16 RX ORDER — SODIUM CHLORIDE 9 MG/ML
INJECTION, SOLUTION INTRAVENOUS PRN
Status: CANCELLED | OUTPATIENT
Start: 2025-01-16

## 2025-01-16 RX ADMIN — SODIUM CHLORIDE: 0.9 INJECTION, SOLUTION INTRAVENOUS at 08:10

## 2025-01-16 RX ADMIN — DEXMEDETOMIDINE HYDROCHLORIDE 12 MCG: 100 INJECTION, SOLUTION, CONCENTRATE INTRAVENOUS at 08:29

## 2025-01-16 RX ADMIN — PROPOFOL 210 MG: 10 INJECTION, EMULSION INTRAVENOUS at 08:29

## 2025-01-16 ASSESSMENT — PAIN - FUNCTIONAL ASSESSMENT: PAIN_FUNCTIONAL_ASSESSMENT: NONE - DENIES PAIN

## 2025-01-16 NOTE — ANESTHESIA POSTPROCEDURE EVALUATION
Department of Anesthesiology  Postprocedure Note    Patient: Rehana Dong  MRN: [id number]  YOB: 1970  Date of evaluation: 1/16/2025    Procedure Summary       Date: 01/16/25 Room / Location: [place] [id number] / [facility]    Anesthesia Start: 0824 Anesthesia Stop: 0859    Procedures:       ESOPHAGOGASTRODUODENOSCOPY      COLONOSCOPY DIAGNOSTIC Diagnosis:       Heartburn      Diarrhea      (Heartburn [R12])      (Diarrhea [R19.7])    Surgeons: Ambrocio Doty MD Responsible Provider:     Anesthesia Type: MAC ASA Status: 3            Anesthesia Type: No value filed.    Geoffrey Phase I: Geoffrey Score: 10    Geoffrey Phase II:      Anesthesia Post Evaluation    Patient location during evaluation: PACU  Patient participation: complete - patient participated  Level of consciousness: awake and alert  Pain score: 2  Airway patency: patent  Nausea & Vomiting: no nausea and no vomiting  Cardiovascular status: blood pressure returned to baseline  Respiratory status: acceptable  Hydration status: euvolemic  Pain management: adequate    No notable events documented.

## 2025-01-16 NOTE — DISCHARGE INSTRUCTIONS
Call [phone] for any questions/concerns.    Minimal irritation of stomach seen--biopsies taken--letter will be sent with results.  Script for omeprazole sent to your pharmacy.    Normal colon seen--repeat colonoscopy in 5 years.         Upper GI Endoscopy: What to Expect at Home  Your Recovery  You had an upper GI endoscopy. Your doctor used a thin, lighted tube that bends to look at the inside of your esophagus, your stomach, and the first part of the small intestine, called the duodenum.  After you have an endoscopy, you will stay at the hospital or clinic for 1 to 2 hours. This will allow the medicine to wear off. You will be able to go home after your doctor or nurse checks to make sure that you're not having any problems.  You may have to stay overnight if you had treatment during the test. You may have a sore throat for a day or two after the test.  This care sheet gives you a general idea about what to expect after the test.  How can you care for yourself at home?  Activity   Rest as much as you need to after you go home.  You should be able to go back to your usual activities the day after the test.  Diet   Follow your doctor's directions for eating after the test.  Drink plenty of fluids (unless your doctor has told you not to).  Medications   If you have a sore throat the day after the test, use an over-the-counter spray to numb your throat.  Follow-up care is a key part of your treatment and safety. Be sure to make and go to all appointments, and call your doctor if you are having problems. It's also a good idea to know your test results and keep a list of the medicines you take.  When should you call for help?   Call [phone] anytime you think you may need emergency care. For example, call if:    You passed out (lost consciousness).     You have trouble breathing.     You pass maroon or bloody stools.   Call your doctor now or seek immediate medical care if:    You have pain that does not get better

## 2025-01-16 NOTE — ANESTHESIA PRE PROCEDURE
Department of Anesthesiology  Preprocedure Note       Name:  Rehana Dong   Age:  54 y.o.  :  1970                                          MRN:  [id number]         Date:  1/15/2025      Surgeon: Surgeon(s):  Ambrocio Doty MD    Procedure: ESOPHAGOGASTRODUODENOSCOPY BIOPSY  COLONOSCOPY DIAGNOSTIC    Medications prior to admission:   Prior to Admission medications    Medication Sig Start Date End Date Taking? Authorizing Provider   semaglutide, 2 MG/DOSE, (OZEMPIC, 2 MG/DOSE,) 8 MG/3ML SOPN sc injection Inject 2 mg into the skin every 7 days  Patient taking differently: Inject 2 mg into the skin every 7 days tuesdays 10/16/24  Yes Luc Rock MD   hydroCHLOROthiazide (HYDRODIURIL) 25 MG tablet TAKE ONE TABLET BY MOUTH EVERY DAY IN THE MORNING 25   Luc Rock MD   FLUoxetine (PROZAC) 20 MG capsule TAKE ONE CAPSULE BY MOUTH DAILY 24   Luc Rock MD   dicyclomine (BENTYL) 10 MG capsule Take 1 capsule by mouth 4 times daily (before meals and nightly) 24   Vivek Hanson DO   metFORMIN (GLUCOPHAGE) 1000 MG tablet TAKE ONE TABLET BY MOUTH TWICE A DAY WITH MEALS 24   Luc Rock MD   magnesium cl-calcium carbonate (NU-MAG) 71.5-119 MG TBEC tablet TAKE TWO TABLETS BY MOUTH EVERY DAY 10/15/24   Solange Myers MD   vitamin B-2 (RIBOFLAVIN) 100 MG TABS tablet Take 2 tablets by mouth in the morning and at bedtime 10/15/24   Solange Myers MD   amLODIPine (NORVASC) 5 MG tablet Take 1 tablet by mouth daily  Patient taking differently: Take 1 tablet by mouth Daily with supper 10/9/24   Luc Rock MD   pravastatin (PRAVACHOL) 80 MG tablet TAKE ONE TABLET BY MOUTH EVERY DAY IN THE EVENING 24   Luc Rock MD   albuterol sulfate HFA (VENTOLIN HFA) 108 (90 Base) MCG/ACT inhaler Inhale 2 puffs into the lungs 4 times daily as needed for Wheezing 24   Luc Rock MD   fluticasone (FLONASE) 50 MCG/ACT nasal spray 2

## 2025-01-16 NOTE — H&P
[facility]  History and Physical     Patient's Name/Date of Birth: Rehana Dong / 1970 (54 y.o.)     PCP:  Dr. Rock     Chief Complaint:  abd pain; diarrhea     History of Present Illness:  54 yr old female referred by Dr. Rock for upper abd pain and diarrhea.  Pt reports she had epigastric abd pain--does not radiate anywhere.  Causes nausea.  Increased pain after eating--no particular foods.  Complains of heartburn/indigestion.  Reports recent episodes of diarrhea.  Denies blood in stool.  Denies unintentional weight loss.  Reports paternal grandmother with hx of colon cancer.  Stool for H.pylori returned positive--was started on treatment by Dr. Rock and symptoms seem to be improving.     Past Medical History        Past Medical History:   Diagnosis Date    Abdominal pain      Anxiety state, unspecified 2016    Asthma       no inhalers    Breast fibrocystic disorder      Diabetes mellitus (HCC)      Essential hypertension 2016    Headache With covid early September    Hyperlipidemia      Irritable bowel syndrome      Left ovarian cyst      Migraine With covid early 2023    Morbid obesity 2016    Neuropathy      Osteoarthritis      Pharyngoesophageal dysphagia 2016    Sleep apnea       Bi pap    Type 2 diabetes mellitus without complication (HCC)              Past Surgical History         Past Surgical History:   Procedure Laterality Date    ABDOMEN SURGERY Left 03/15/2019     INCISION AND DRAINAGE LEFT LABIAL ABSCESS performed by Vivek MARTIN MD at [facility] OR    APPENDECTOMY         Age 12    BREAST BIOPSY         SECTION   2012     3 total    COLONOSCOPY   2016     [facility]    COLONOSCOPY   2016    TONSILLECTOMY         Age 9    TUBAL LIGATION        UPPER GASTROINTESTINAL ENDOSCOPY        Negative for Hpylori    UPPER GASTROINTESTINAL ENDOSCOPY   2016     [facility]    UPPER GASTROINTESTINAL ENDOSCOPY

## 2025-01-21 LAB — SURGICAL PATHOLOGY REPORT: NORMAL

## 2025-01-22 ENCOUNTER — HOSPITAL ENCOUNTER (OUTPATIENT)
Age: 55
Discharge: HOME | End: 2025-01-22
Payer: COMMERCIAL

## 2025-01-22 DIAGNOSIS — D75.839 THROMBOCYTOSIS: ICD-10-CM

## 2025-01-22 LAB
BASOPHILS # BLD: 0.07 K/UL (ref 0–0.2)
BASOPHILS NFR BLD: 1 % (ref 0–2)
EOSINOPHIL # BLD: 0.27 K/UL (ref 0.05–0.5)
EOSINOPHILS RELATIVE PERCENT: 4 % (ref 0–6)
ERYTHROCYTE [DISTWIDTH] IN BLOOD BY AUTOMATED COUNT: 12.1 % (ref 11.5–15)
HCT VFR BLD AUTO: 45.2 % (ref 34–48)
HGB BLD-MCNC: 14.9 G/DL (ref 11.5–15.5)
IMM GRANULOCYTES # BLD AUTO: <0.03 K/UL (ref 0–0.58)
IMM GRANULOCYTES NFR BLD: 0 % (ref 0–5)
LYMPHOCYTES NFR BLD: 2.56 K/UL (ref 1.5–4)
LYMPHOCYTES RELATIVE PERCENT: 38 % (ref 20–42)
MCH RBC QN AUTO: 29.2 PG (ref 26–35)
MCHC RBC AUTO-ENTMCNC: 33 G/DL (ref 32–34.5)
MCV RBC AUTO: 88.6 FL (ref 80–99.9)
MONOCYTES NFR BLD: 0.52 K/UL (ref 0.1–0.95)
MONOCYTES NFR BLD: 8 % (ref 2–12)
NEUTROPHILS NFR BLD: 50 % (ref 43–80)
NEUTS SEG NFR BLD: 3.37 K/UL (ref 1.8–7.3)
PLATELET # BLD AUTO: 510 K/UL (ref 130–450)
PMV BLD AUTO: 9.1 FL (ref 7–12)
RBC # BLD AUTO: 5.1 M/UL (ref 3.5–5.5)
WBC OTHER # BLD: 6.8 K/UL (ref 4.5–11.5)

## 2025-01-22 PROCEDURE — 36415 COLL VENOUS BLD VENIPUNCTURE: CPT

## 2025-01-22 PROCEDURE — 85025 COMPLETE CBC W/AUTO DIFF WBC: CPT

## 2025-01-23 ENCOUNTER — SCHEDULED TELEPHONE ENCOUNTER (OUTPATIENT)
Dept: ONCOLOGY | Age: 55
End: 2025-01-23

## 2025-01-23 DIAGNOSIS — D72.10 EOSINOPHILIA, UNSPECIFIED TYPE: Primary | ICD-10-CM

## 2025-01-23 NOTE — PROGRESS NOTES
Total Time: minutes: 5-10 minutes     Rehana Dong was evaluated through a synchronous (real-time) audio encounter. Patient identification was verified at the start of the visit. She (or guardian if applicable) is aware that this is a billable service, which includes applicable co-pays. This visit was conducted with the patient's (and/or legal guardian's) verbal consent. She has not had a related appointment within my department in the past 7 days or scheduled within the next 24 hours.   The patient was located at Home: [place] [id number].  The provider was located at Facility (Appt Dept): [address] [place] [address].    Note: not billable if this call serves to triage the patient into an appointment for the relevant concern  Yes, I confirm.   Rehana Dong is a 54 y.o. female evaluated via telephone on 1/23/2025 for No chief complaint on file.  .      Assessment & Plan    No follow-ups on file.      ASSESSMENT      Eosinophilia:   On two occasions noted last month with Absolute eosinophils number around 4000 in both.   - had intermittent mild thrombocytosis noted on labs from prior, normal other cell lines most of the time.   - no hepatosplenomegaly noted on CT scan 11/22/2024  - had ER encounter due to diarrhea and is currently being treated for H pylori.   - had cardiac work up in ER with very mild elevation on troponin which stable with no EKG concerns.   - diarrhea has resolved and she is currently asymptomatic.   - no new medications. And no B symptoms.      Discussed with the patient possible causes of the elevation of eosinophils ranging from transient causes the primary blood disorders.  Discussed possible causes including medications, allergic reactions, infections, rheumatologic disorders, and primary blood disorders.  We discussed that she does not seem currently to have endorgan damage or symptoms, at the current time would repeat the labs and check if the eosinophilia is

## 2025-01-27 ENCOUNTER — PATIENT MESSAGE (OUTPATIENT)
Dept: INTERNAL MEDICINE | Age: 55
End: 2025-01-27

## 2025-01-27 NOTE — TELEPHONE ENCOUNTER
Please contact patient to discuss the repeat labs- IgE and total Eos count still pending. These should be drawn to complete the work-up for the prior concerns with H pylori infection.    Please advise- mychart message was generated to patient.

## 2025-02-07 ENCOUNTER — HOSPITAL ENCOUNTER (OUTPATIENT)
Age: 55
Discharge: HOME | End: 2025-02-07
Payer: COMMERCIAL

## 2025-02-07 DIAGNOSIS — R76.8 ELEVATED IGE LEVEL: ICD-10-CM

## 2025-02-07 DIAGNOSIS — D72.10 EOSINOPHILIA, UNSPECIFIED TYPE: ICD-10-CM

## 2025-02-07 LAB — EOSINOPHIL # BLD: 190 /UL (ref 50–250)

## 2025-02-07 PROCEDURE — 36415 COLL VENOUS BLD VENIPUNCTURE: CPT

## 2025-02-07 PROCEDURE — 82785 ASSAY OF IGE: CPT

## 2025-02-07 PROCEDURE — 85048 AUTOMATED LEUKOCYTE COUNT: CPT

## 2025-02-09 LAB — IGE SERPL-ACNC: 68 IU/ML (ref 0–100)

## 2025-02-17 SDOH — ECONOMIC STABILITY: FOOD INSECURITY: WITHIN THE PAST 12 MONTHS, YOU WORRIED THAT YOUR FOOD WOULD RUN OUT BEFORE YOU GOT MONEY TO BUY MORE.: NEVER TRUE

## 2025-02-17 SDOH — ECONOMIC STABILITY: INCOME INSECURITY: IN THE LAST 12 MONTHS, WAS THERE A TIME WHEN YOU WERE NOT ABLE TO PAY THE MORTGAGE OR RENT ON TIME?: NO

## 2025-02-17 SDOH — ECONOMIC STABILITY: FOOD INSECURITY: WITHIN THE PAST 12 MONTHS, THE FOOD YOU BOUGHT JUST DIDN'T LAST AND YOU DIDN'T HAVE MONEY TO GET MORE.: NEVER TRUE

## 2025-02-17 ASSESSMENT — PATIENT HEALTH QUESTIONNAIRE - PHQ9
SUM OF ALL RESPONSES TO PHQ QUESTIONS 1-9: 1
2. FEELING DOWN, DEPRESSED OR HOPELESS: SEVERAL DAYS
1. LITTLE INTEREST OR PLEASURE IN DOING THINGS: NOT AT ALL
SUM OF ALL RESPONSES TO PHQ9 QUESTIONS 1 & 2: 1
SUM OF ALL RESPONSES TO PHQ QUESTIONS 1-9: 1
1. LITTLE INTEREST OR PLEASURE IN DOING THINGS: NOT AT ALL
2. FEELING DOWN, DEPRESSED OR HOPELESS: SEVERAL DAYS
SUM OF ALL RESPONSES TO PHQ QUESTIONS 1-9: 1
SUM OF ALL RESPONSES TO PHQ9 QUESTIONS 1 & 2: 1
SUM OF ALL RESPONSES TO PHQ QUESTIONS 1-9: 1

## 2025-02-20 ENCOUNTER — OFFICE VISIT (OUTPATIENT)
Dept: INTERNAL MEDICINE | Age: 55
End: 2025-02-20
Payer: COMMERCIAL

## 2025-02-20 VITALS
BODY MASS INDEX: 36.8 KG/M2 | SYSTOLIC BLOOD PRESSURE: 124 MMHG | RESPIRATION RATE: 16 BRPM | HEART RATE: 77 BPM | DIASTOLIC BLOOD PRESSURE: 74 MMHG | HEIGHT: 66 IN | OXYGEN SATURATION: 96 % | WEIGHT: 229 LBS | TEMPERATURE: 97.5 F

## 2025-02-20 DIAGNOSIS — I10 ESSENTIAL HYPERTENSION: ICD-10-CM

## 2025-02-20 DIAGNOSIS — D72.19 OTHER EOSINOPHILIA: ICD-10-CM

## 2025-02-20 DIAGNOSIS — N95.1 POST MENOPAUSAL SYNDROME: ICD-10-CM

## 2025-02-20 DIAGNOSIS — J45.20 MILD INTERMITTENT ASTHMA WITHOUT COMPLICATION: ICD-10-CM

## 2025-02-20 DIAGNOSIS — B96.81 HELICOBACTER PYLORI GASTRITIS: Primary | ICD-10-CM

## 2025-02-20 DIAGNOSIS — R76.8 ELEVATED IGE LEVEL: ICD-10-CM

## 2025-02-20 DIAGNOSIS — K29.70 HELICOBACTER PYLORI GASTRITIS: Primary | ICD-10-CM

## 2025-02-20 DIAGNOSIS — E11.40 TYPE 2 DIABETES MELLITUS WITH DIABETIC NEUROPATHY, WITHOUT LONG-TERM CURRENT USE OF INSULIN (HCC): ICD-10-CM

## 2025-02-20 DIAGNOSIS — E78.2 MIXED HYPERLIPIDEMIA: ICD-10-CM

## 2025-02-20 PROCEDURE — 99212 OFFICE O/P EST SF 10 MIN: CPT | Performed by: INTERNAL MEDICINE

## 2025-02-20 PROCEDURE — 3046F HEMOGLOBIN A1C LEVEL >9.0%: CPT | Performed by: INTERNAL MEDICINE

## 2025-02-20 PROCEDURE — 3017F COLORECTAL CA SCREEN DOC REV: CPT | Performed by: INTERNAL MEDICINE

## 2025-02-20 PROCEDURE — 3078F DIAST BP <80 MM HG: CPT | Performed by: INTERNAL MEDICINE

## 2025-02-20 PROCEDURE — G8427 DOCREV CUR MEDS BY ELIG CLIN: HCPCS | Performed by: INTERNAL MEDICINE

## 2025-02-20 PROCEDURE — G8417 CALC BMI ABV UP PARAM F/U: HCPCS | Performed by: INTERNAL MEDICINE

## 2025-02-20 PROCEDURE — 1036F TOBACCO NON-USER: CPT | Performed by: INTERNAL MEDICINE

## 2025-02-20 PROCEDURE — 2022F DILAT RTA XM EVC RTNOPTHY: CPT | Performed by: INTERNAL MEDICINE

## 2025-02-20 PROCEDURE — 99213 OFFICE O/P EST LOW 20 MIN: CPT | Performed by: INTERNAL MEDICINE

## 2025-02-20 PROCEDURE — 3074F SYST BP LT 130 MM HG: CPT | Performed by: INTERNAL MEDICINE

## 2025-02-20 RX ORDER — PRAVASTATIN SODIUM 80 MG/1
TABLET ORAL
Qty: 90 TABLET | Refills: 1 | Status: SHIPPED | OUTPATIENT
Start: 2025-02-20

## 2025-02-20 RX ORDER — HYDROCHLOROTHIAZIDE 25 MG/1
TABLET ORAL
Qty: 90 TABLET | Refills: 0 | Status: SHIPPED | OUTPATIENT
Start: 2025-02-20

## 2025-02-20 NOTE — PROGRESS NOTES
Rehana Dong (:  1970) is a 54 y.o. female,Established patient, here for evaluation of the following chief complaint(s):  Diabetes         Assessment & Plan  Helicobacter pylori gastritis    Resolved with confirmed eradication on EGD biopsy   Completed regimen and feeling well without any recurrent symptoms  Case is linked to systemic reaction with elevated IgE levels and Peripheral Eosinophilia which have also all resolved- review of the literature with otherwise extensive negative work-up- correlates appropriately  Continue follow-up with Hem/Onc and Pulm to follow  Completed Gen Surgery follow-up and surveillance cscope will be planned in         Elevated IgE level    Resolved- as noted above          Other eosinophilia    Resolved- as noted above          Type 2 diabetes mellitus with diabetic neuropathy, without long-term current use of insulin (HCC)   Chronic, not at goal (unstable), continue current treatment plan, medication adherence emphasized, and lifestyle modifications recommended  A1c last less than 7.5%   Continue glucose monitoring  Repeat A1c at next appt   Continue to monitor weights  Continue full dose GLP1 therapy   Consider adjustment to trial of combined GIP/GLP1 given leveling of weight loss  Consider Medical Weight loss repeat referral        Mixed hyperlipidemia    Continue statin therapy          Essential hypertension   Chronic, at goal (stable), continue current treatment plan and lifestyle modifications recommended         Post menopausal syndrome    Symptoms stable          Mild intermittent asthma without complication   Chronic, at goal (stable), No wheezing on exam; no recurrent bronchospastic concerns; no recurrence hoarseness of voice           Return in about 3 months (around 2025).       Subjective   HPI    Presents for follow-up appt. Since last appt, doing well. All abdominal pain symptoms, Nausea, emesis, and diarrhea have resolved. In addition, no further

## 2025-02-20 NOTE — ASSESSMENT & PLAN NOTE
Chronic, not at goal (unstable), continue current treatment plan, medication adherence emphasized, and lifestyle modifications recommended  A1c last less than 7.5%   Continue glucose monitoring  Repeat A1c at next appt   Continue to monitor weights  Continue full dose GLP1 therapy   Consider adjustment to trial of combined GIP/GLP1 given leveling of weight loss  Consider Medical Weight loss repeat referral

## 2025-02-21 PROBLEM — J45.20 MILD INTERMITTENT ASTHMA WITHOUT COMPLICATION: Status: ACTIVE | Noted: 2025-02-21

## 2025-02-21 ASSESSMENT — ENCOUNTER SYMPTOMS
NAUSEA: 0
COUGH: 0
BLOOD IN STOOL: 0
WHEEZING: 0
ABDOMINAL DISTENTION: 0
CHEST TIGHTNESS: 0
VOMITING: 0
SHORTNESS OF BREATH: 0
CONSTIPATION: 0
DIARRHEA: 0
ABDOMINAL PAIN: 0

## 2025-02-21 NOTE — ASSESSMENT & PLAN NOTE
Chronic, at goal (stable), No wheezing on exam; no recurrent bronchospastic concerns; no recurrence hoarseness of voice

## 2025-02-27 DIAGNOSIS — R21 RASH: ICD-10-CM

## 2025-02-27 RX ORDER — CLOTRIMAZOLE AND BETAMETHASONE DIPROPIONATE 10; .64 MG/G; MG/G
CREAM TOPICAL
Qty: 1 EACH | Refills: 0 | Status: SHIPPED | OUTPATIENT
Start: 2025-02-27

## 2025-02-27 NOTE — PROGRESS NOTES
Patient present with  for his appt. Notes rash on neck- recurrent. Reviewed notes- similar in appearance from 2023. Looks fungal likely in nature. Will treat again as 2023 with Lotrisone- patient tolerated.    Order sent to pharmacy.

## 2025-02-28 ENCOUNTER — TELEPHONE (OUTPATIENT)
Dept: ADMINISTRATIVE | Age: 55
End: 2025-02-28

## 2025-03-07 ENCOUNTER — PROCEDURE VISIT (OUTPATIENT)
Dept: PODIATRY | Age: 55
End: 2025-03-07

## 2025-03-07 VITALS
OXYGEN SATURATION: 100 % | WEIGHT: 229 LBS | TEMPERATURE: 98.1 F | DIASTOLIC BLOOD PRESSURE: 74 MMHG | SYSTOLIC BLOOD PRESSURE: 124 MMHG | BODY MASS INDEX: 36.96 KG/M2

## 2025-03-07 DIAGNOSIS — E11.9 TYPE 2 DIABETES MELLITUS WITHOUT COMPLICATION, UNSPECIFIED WHETHER LONG TERM INSULIN USE (HCC): Primary | ICD-10-CM

## 2025-03-07 DIAGNOSIS — B35.1 TINEA UNGUIUM: ICD-10-CM

## 2025-03-07 DIAGNOSIS — L84 CORNS AND CALLOSITIES: ICD-10-CM

## 2025-03-07 DIAGNOSIS — G60.8 HEREDITARY SENSORY NEUROPATHY: ICD-10-CM

## 2025-03-07 NOTE — PROGRESS NOTES
Patient in today for nail care as well as callus care. Patient does not have any complaints of pain at this time.   Type 2 diabetic.  Patient's PCP is Luc Rock MD and date of last ov 11/25/2024.            Alvin Thornton DPM       CC:   Follow-up diabetic foot ankle exam    HPI:   Follow-up diabetic foot ankle exam.  No open wounds.  History metformin.  Denies calf pain.  No additional pedal complaints.      ROS:  Const: Denies constitutional symptoms  Musculo: Denies symptoms other than stated above  Skin: Denies symptoms other than stated above      Current Outpatient Medications:     clotrimazole-betamethasone (LOTRISONE) 1-0.05 % cream, Apply topically 2 times daily., Disp: 1 each, Rfl: 0    pravastatin (PRAVACHOL) 80 MG tablet, TAKE ONE TABLET BY MOUTH EVERY DAY IN THE EVENING, Disp: 90 tablet, Rfl: 1    hydroCHLOROthiazide (HYDRODIURIL) 25 MG tablet, TAKE ONE TABLET BY MOUTH EVERY DAY IN THE MORNING, Disp: 90 tablet, Rfl: 0    FLUoxetine (PROZAC) 20 MG capsule, TAKE ONE CAPSULE BY MOUTH DAILY, Disp: 90 capsule, Rfl: 0    omeprazole (PRILOSEC) 20 MG delayed release capsule, Take 1 capsule by mouth every morning (before breakfast), Disp: 90 capsule, Rfl: 1    dicyclomine (BENTYL) 10 MG capsule, Take 1 capsule by mouth 4 times daily (before meals and nightly), Disp: 120 capsule, Rfl: 0    metFORMIN (GLUCOPHAGE) 1000 MG tablet, TAKE ONE TABLET BY MOUTH TWICE A DAY WITH MEALS, Disp: 180 tablet, Rfl: 1    semaglutide, 2 MG/DOSE, (OZEMPIC, 2 MG/DOSE,) 8 MG/3ML SOPN sc injection, Inject 2 mg into the skin every 7 days (Patient taking differently: Inject 2 mg into the skin every 7 days tuesdays), Disp: 3 mL, Rfl: 5    magnesium cl-calcium carbonate (NU-MAG) 71.5-119 MG TBEC tablet, TAKE TWO TABLETS BY MOUTH EVERY DAY, Disp: 180 tablet, Rfl: 1    vitamin B-2 (RIBOFLAVIN) 100 MG TABS tablet, Take 2 tablets by mouth in the morning and at bedtime, Disp: 360 tablet, Rfl: 1    amLODIPine (NORVASC) 5 MG

## 2025-03-21 DIAGNOSIS — N95.1 POST MENOPAUSAL SYNDROME: ICD-10-CM

## 2025-04-03 LAB — DIABETIC RETINOPATHY: NEGATIVE

## 2025-04-04 DIAGNOSIS — E11.40 TYPE 2 DIABETES MELLITUS WITH DIABETIC NEUROPATHY, WITHOUT LONG-TERM CURRENT USE OF INSULIN (HCC): ICD-10-CM

## 2025-04-04 RX ORDER — SEMAGLUTIDE 2.68 MG/ML
INJECTION, SOLUTION SUBCUTANEOUS
Qty: 3 ML | Refills: 0 | Status: SHIPPED | OUTPATIENT
Start: 2025-04-04

## 2025-04-04 NOTE — TELEPHONE ENCOUNTER
Name of Medication(s) Requested:  Requested Prescriptions     Pending Prescriptions Disp Refills    OZEMPIC, 2 MG/DOSE, 8 MG/3ML SOPN sc injection [Pharmacy Med Name: Ozempic (2 MG/DOSE) Subcutaneous Solution Pen-injector 8 MG/3ML] 3 mL 0     Sig: INJECT 2MG INTO THE SKIN EVERY 7 DAYS       Medication is on current medication list Yes    Dosage and directions were verified? Yes    Quantity verified: 90 day supply     Pharmacy Verified?  Yes    Last Appointment:  2/20/2025    Future appts:  Future Appointments   Date Time Provider Department Center   5/29/2025  2:15 PM Luc Rock MD [facility]  [facility] DEP   6/6/2025  8:45 AM Alvin Thornton DPM Col Podiatry [facility]   11/6/2025  9:45 AM Margaret Mueller MD [facility]        (If no appt send self scheduling link. .REFILLAPPT)  Scheduling request sent?     [] Yes  [x] No    Does patient need updated?  [] Yes  [x] No

## 2025-04-10 NOTE — PROGRESS NOTES
route 2 times daily, Disp: 300 each, Rfl: 1    blood glucose monitor strips, by Other route daily, Disp: 100 strip, Rfl: 5    Alpha-Lipoic Acid 600 MG TABS, Take 600 mg by mouth daily, Disp: , Rfl:     b complex vitamins capsule, Take 1 capsule by mouth daily, Disp: , Rfl:     Misc. Devices KIT, Blood pressure cuff- Ambulatory monitoring- check every other day and maintain blood pressure log, Disp: 1 kit, Rfl: 0  Allergies   Allergen Reactions    Bactrim [Sulfamethoxazole-Trimethoprim] Rash       Past Medical History:   Diagnosis Date    Abdominal pain     Anxiety state, unspecified 3/8/2016    Asthma     no inhalers    Breast fibrocystic disorder     Diabetes mellitus ([facility] [address]     Essential hypertension 4/25/2016    Hyperlipidemia     Irritable bowel syndrome     Left ovarian cyst     Morbid obesity ([facility] [address] 4/25/2016    Osteoarthritis     Pharyngoesophageal dysphagia 6/14/2016       There were no vitals filed for this visit. Focused Lower Extremity Physical Exam:      Neurovascular examination:    Dorsalis Pedis palpable bilateral.  Posterior tibialis palpable bilateral.    Capillary Refill Time:  Immediate return  Hair growth:  Symmetrical and bilateral   Skin:  Not atrophic  Edema: Mild edema bilateral feet. Mild edema bilateral ankles. Neurologic:  Light touch diminished bilateral.  Warm to coolness bilateral distal toes  Decreased epicritic sensation     Musculoskeletal/ Orthopedic examination:    Equinis: present bilateral  Dorsiflexion, plantarflexion, inversion, eversion bilateral 5 out of 5 muscle strength  Wiggling toes  Negative Homans  Flexible hammertoes 2 through 5 bilateral.  No pain to palpation. No pain bilateral great toe. Dermatology examination:    Toenails 1-5 right and left thickened, elongated, dystrophic, mycotic with subungual debris. Webspaces 1-4 bilateral clean, dry and intact.    Hyperkeratotic tissue IPJ great toe bilateral and plantar first metatarsal bilateral.  No
Yes

## 2025-04-11 ENCOUNTER — TELEPHONE (OUTPATIENT)
Dept: INTERNAL MEDICINE | Age: 55
End: 2025-04-11

## 2025-04-11 ENCOUNTER — RESULTS FOLLOW-UP (OUTPATIENT)
Dept: FAMILY MEDICINE CLINIC | Age: 55
End: 2025-04-11

## 2025-04-11 ENCOUNTER — OFFICE VISIT (OUTPATIENT)
Dept: FAMILY MEDICINE CLINIC | Age: 55
End: 2025-04-11

## 2025-04-11 VITALS
DIASTOLIC BLOOD PRESSURE: 72 MMHG | WEIGHT: 233 LBS | OXYGEN SATURATION: 97 % | BODY MASS INDEX: 37.45 KG/M2 | HEART RATE: 79 BPM | TEMPERATURE: 97.8 F | RESPIRATION RATE: 18 BRPM | HEIGHT: 66 IN | SYSTOLIC BLOOD PRESSURE: 120 MMHG

## 2025-04-11 DIAGNOSIS — S61.201A OPEN WOUND OF LEFT INDEX FINGER: Primary | ICD-10-CM

## 2025-04-11 RX ORDER — DOXYCYCLINE HYCLATE 100 MG
100 TABLET ORAL 2 TIMES DAILY
Qty: 20 TABLET | Refills: 0 | Status: SHIPPED | OUTPATIENT
Start: 2025-04-11 | End: 2025-04-21

## 2025-04-11 RX ORDER — CETIRIZINE HYDROCHLORIDE 10 MG/1
TABLET ORAL
COMMUNITY
Start: 2025-03-27

## 2025-04-11 RX ORDER — CEFDINIR 300 MG/1
300 CAPSULE ORAL 2 TIMES DAILY
Qty: 20 CAPSULE | Refills: 0 | Status: SHIPPED | OUTPATIENT
Start: 2025-04-11 | End: 2025-04-21

## 2025-04-11 RX ORDER — MUPIROCIN 20 MG/G
OINTMENT TOPICAL
Qty: 30 G | Refills: 0 | Status: SHIPPED | OUTPATIENT
Start: 2025-04-11

## 2025-04-11 NOTE — PROGRESS NOTES
VIEWS); Future  -     APPLY FINGER SPLINT,STATIC    Other orders  -     doxycycline hyclate (VIBRA-TABS) 100 MG tablet; Take 1 tablet by mouth 2 times daily for 10 days  -     cefdinir (OMNICEF) 300 MG capsule; Take 1 capsule by mouth 2 times daily for 10 days  -     mupirocin (BACTROBAN) 2 % ointment; Apply topically 3 times daily.        The patient is to call for any concerns or return if any of the signs or symptoms worsen. The patient is to follow-up with PCP in the next 2-3 days for repeat evaluation repeat assessment or go directly to the emergency department.     SIGNATURE: Gideon Santos III, PA-C    This document may have been prepared at least partially through the use of voice recognition software. Although effort is taken to assure the accuracy ofthis document, it is possible that grammatical, syntax, or spelling errors may occur.     **This report was transcribed using voice recognition software. The patient (or guardian, if applicable) and other individuals in attendance with the patient were advised that if Artificial Intelligence would be utilized during this visit to record and process the conversation to generate a clinical note they would be informed prior to start of the visit. The patient (or guardian, if applicable) and other individuals in attendance at the appointment consented to the use of AI if was utilized, including the recording.  Every effort was made to ensure accuracy; however, inadvertent computerized transcription errors may be present.

## 2025-04-11 NOTE — TELEPHONE ENCOUNTER
Pt called and left message regarding left index finger. States feels it is like a splinter in her finger, Area is swollen-up to her knuckle. Has been going on for 3-4 days.     Called pt back and she states she just left [facility] walk in site. She is on antibiotics. Xray did not show any foreign bodies.

## 2025-04-16 ENCOUNTER — PATIENT MESSAGE (OUTPATIENT)
Dept: INTERNAL MEDICINE | Age: 55
End: 2025-04-16

## 2025-04-25 DIAGNOSIS — I10 ESSENTIAL HYPERTENSION: ICD-10-CM

## 2025-04-25 RX ORDER — AMLODIPINE BESYLATE 5 MG/1
5 TABLET ORAL DAILY
Qty: 90 TABLET | Refills: 1 | Status: SHIPPED | OUTPATIENT
Start: 2025-04-25

## 2025-04-25 RX ORDER — AMLODIPINE BESYLATE 5 MG/1
5 TABLET ORAL DAILY
Qty: 90 TABLET | Refills: 0 | OUTPATIENT
Start: 2025-04-25

## 2025-04-25 RX ORDER — AMLODIPINE BESYLATE 5 MG/1
5 TABLET ORAL DAILY
Qty: 90 TABLET | Refills: 1 | Status: CANCELLED | OUTPATIENT
Start: 2025-04-25

## 2025-04-25 NOTE — TELEPHONE ENCOUNTER
Name of Medication(s) Requested:  Requested Prescriptions     Pending Prescriptions Disp Refills    amLODIPine (NORVASC) 5 MG tablet 90 tablet 1     Sig: Take 1 tablet by mouth daily       Medication is on current medication list Yes    Dosage and directions were verified? Yes    Quantity verified: 90 day supply     Pharmacy Verified?  Yes    Last Appointment:  2/20/2025    Future appts:  Future Appointments   Date Time Provider Department Center   5/29/2025  2:15 PM Luc Rock MD [facility] E[facility] DEP   6/6/2025  8:45 AM Alvin Thornton DPM Col Podiatry [facility]   11/6/2025  9:45 AM Margaret Mueller MD [facility]        (If no appt send self scheduling link. .REFILLAPPT)  Scheduling request sent?     [] Yes  [x] No    Does patient need updated?  [] Yes  [x] No

## 2025-04-28 ENCOUNTER — TELEPHONE (OUTPATIENT)
Age: 55
End: 2025-04-28

## 2025-04-28 NOTE — TELEPHONE ENCOUNTER
PT. CALLED IN REQUESTING REFILLS ON HER MEDS. I CALLED HER BACK AND GOT THE VM, I LEFT HER A MSG LETTING HER KNOW SHE WOULD NEED TO BE SEEN BY  SINCE SHE WAS LAST SEEN IN 10/2024 AND HAS NO UPCOMING APPTS.

## 2025-05-16 DIAGNOSIS — E11.65 TYPE 2 DIABETES MELLITUS WITH HYPERGLYCEMIA, WITHOUT LONG-TERM CURRENT USE OF INSULIN (HCC): ICD-10-CM

## 2025-05-19 NOTE — TELEPHONE ENCOUNTER
Name of Medication(s) Requested:  Requested Prescriptions     Pending Prescriptions Disp Refills    metFORMIN (GLUCOPHAGE) 1000 MG tablet 180 tablet 1     Sig: TAKE ONE TABLET BY MOUTH TWICE A DAY WITH MEALS       Medication is on current medication list Yes    Dosage and directions were verified? Yes    Quantity verified: 90 day supply     Pharmacy Verified?  Yes    Last Appointment:  2/20/2025    Future appts:  Future Appointments   Date Time Provider Department Center   5/29/2025  2:15 PM Luc Rock MD [facility]   6/6/2025  8:45 AM Alvin Thornton DPM Col Podiatry [facility]   11/6/2025  9:45 AM Margaret [facility]        (If no appt send self scheduling link. .REFILLAPPT)  Scheduling request sent?     [] Yes  [x] No    Does patient need updated?  [] Yes  [x] No

## 2025-05-27 ENCOUNTER — PATIENT MESSAGE (OUTPATIENT)
Age: 55
End: 2025-05-27

## 2025-05-27 DIAGNOSIS — E11.40 TYPE 2 DIABETES MELLITUS WITH DIABETIC NEUROPATHY, WITHOUT LONG-TERM CURRENT USE OF INSULIN (HCC): Primary | ICD-10-CM

## 2025-05-27 NOTE — TELEPHONE ENCOUNTER
Ozempic was not covered despite appeal. Patient previously has tolerated Trulicity. Will reduce dose for toleration and plan for 4 week titration to maximum dosing.     Follow for toleration and any potential needs- coordination completed via [employer].

## 2025-05-29 ENCOUNTER — OFFICE VISIT (OUTPATIENT)
Age: 55
End: 2025-05-29
Payer: COMMERCIAL

## 2025-05-29 VITALS
BODY MASS INDEX: 36.82 KG/M2 | HEIGHT: 66 IN | TEMPERATURE: 97.9 F | OXYGEN SATURATION: 97 % | WEIGHT: 229.1 LBS | SYSTOLIC BLOOD PRESSURE: 128 MMHG | DIASTOLIC BLOOD PRESSURE: 75 MMHG | HEART RATE: 79 BPM | RESPIRATION RATE: 18 BRPM

## 2025-05-29 DIAGNOSIS — I10 ESSENTIAL HYPERTENSION: ICD-10-CM

## 2025-05-29 DIAGNOSIS — R25.2 BILATERAL LEG CRAMPS: Primary | ICD-10-CM

## 2025-05-29 DIAGNOSIS — N95.1 POST MENOPAUSAL SYNDROME: ICD-10-CM

## 2025-05-29 DIAGNOSIS — E11.40 TYPE 2 DIABETES MELLITUS WITH DIABETIC NEUROPATHY, WITHOUT LONG-TERM CURRENT USE OF INSULIN (HCC): ICD-10-CM

## 2025-05-29 DIAGNOSIS — Z71.89 DNR (DO NOT RESUSCITATE) DISCUSSION: ICD-10-CM

## 2025-05-29 DIAGNOSIS — E53.8 B12 DEFICIENCY: ICD-10-CM

## 2025-05-29 DIAGNOSIS — E88.810 METABOLIC SYNDROME: ICD-10-CM

## 2025-05-29 DIAGNOSIS — G47.33 OSA (OBSTRUCTIVE SLEEP APNEA): ICD-10-CM

## 2025-05-29 PROCEDURE — 99214 OFFICE O/P EST MOD 30 MIN: CPT | Performed by: INTERNAL MEDICINE

## 2025-05-29 PROCEDURE — 2022F DILAT RTA XM EVC RTNOPTHY: CPT | Performed by: INTERNAL MEDICINE

## 2025-05-29 PROCEDURE — G8417 CALC BMI ABV UP PARAM F/U: HCPCS | Performed by: INTERNAL MEDICINE

## 2025-05-29 PROCEDURE — 3046F HEMOGLOBIN A1C LEVEL >9.0%: CPT | Performed by: INTERNAL MEDICINE

## 2025-05-29 PROCEDURE — 1036F TOBACCO NON-USER: CPT | Performed by: INTERNAL MEDICINE

## 2025-05-29 PROCEDURE — 99213 OFFICE O/P EST LOW 20 MIN: CPT | Performed by: INTERNAL MEDICINE

## 2025-05-29 PROCEDURE — 3017F COLORECTAL CA SCREEN DOC REV: CPT | Performed by: INTERNAL MEDICINE

## 2025-05-29 PROCEDURE — G8427 DOCREV CUR MEDS BY ELIG CLIN: HCPCS | Performed by: INTERNAL MEDICINE

## 2025-05-29 PROCEDURE — 3078F DIAST BP <80 MM HG: CPT | Performed by: INTERNAL MEDICINE

## 2025-05-29 PROCEDURE — 3074F SYST BP LT 130 MM HG: CPT | Performed by: INTERNAL MEDICINE

## 2025-05-29 RX ORDER — HYDROCHLOROTHIAZIDE 25 MG/1
TABLET ORAL
Qty: 90 TABLET | Refills: 0 | Status: SHIPPED | OUTPATIENT
Start: 2025-05-29

## 2025-05-29 ASSESSMENT — ENCOUNTER SYMPTOMS
SHORTNESS OF BREATH: 0
WHEEZING: 0
ABDOMINAL PAIN: 0
NAUSEA: 0
DIARRHEA: 0
BLOOD IN STOOL: 0
ABDOMINAL DISTENTION: 0
CONSTIPATION: 0
VOMITING: 0

## 2025-05-29 NOTE — ASSESSMENT & PLAN NOTE
Chronic, at goal (stable), continue current treatment plan    Orders:    CBC with Auto Differential; Future    Comprehensive Metabolic Panel with Bilirubin; Future

## 2025-05-29 NOTE — PROGRESS NOTES
Rehana Dong (:  1970) is a 55 y.o. female,Established patient, here for evaluation of the following chief complaint(s):  Follow-up, Restless Leg(s), and Leg Pain (Bilateral leg cramping)         Assessment & Plan  Bilateral leg cramps    Worsening cramps at night   ? Cramping vs. Restless Leg Syndrome   Check labs including Mg, B12, Iron levels   Replace as appropriate   Consider trial of Requip- info given for review at home   Orders:    Vitamin B12; Future    Magnesium; Future    Iron and TIBC; Future    Ferritin; Future    CBC with Auto Differential; Future    Comprehensive Metabolic Panel with Bilirubin; Future    Essential hypertension   Chronic, at goal (stable), continue current treatment plan, medication adherence emphasized, and lifestyle modifications recommended    Orders:    hydroCHLOROthiazide (HYDRODIURIL) 25 MG tablet; TAKE ONE TABLET BY MOUTH EVERY DAY IN THE MORNING    B12 deficiency    Currently receiving replacement- likely secondary to metformin use   Follow results   Orders:    Vitamin B12; Future    Metabolic syndrome    Ongoing risk factor stratification for reduction in major cardiovascular/cerebrovascular events   Monitor liver function and consider additional testing  Continue to improve modifiable risk factors including weight loss goals, diabetes control, lipid control, and HTN control        Type 2 diabetes mellitus with diabetic neuropathy, without long-term current use of insulin (HCC)   Chronic, not at goal (unstable),  , medication adherence emphasized, and lifestyle modifications recommended    Orders:    Comprehensive Metabolic Panel with Bilirubin; Future    Hemoglobin A1C; Future    CARLENE (obstructive sleep apnea)   Chronic, at goal (stable), continue current treatment plan    Orders:    CBC with Auto Differential; Future    Comprehensive Metabolic Panel with Bilirubin; Future    DNR (do not resuscitate) discussion    Long discussion on goals of care including patient

## 2025-05-29 NOTE — ASSESSMENT & PLAN NOTE
Chronic, not at goal (unstable),  , medication adherence emphasized, and lifestyle modifications recommended    Orders:    Comprehensive Metabolic Panel with Bilirubin; Future    Hemoglobin A1C; Future

## 2025-05-29 NOTE — PATIENT INSTRUCTIONS
1) labs to be completed to assess leg cramps. If normal values, will look for med trial of Requip (Ropinirole).     2) Refills sent to complete.     2) Do Not Resuscitate Paperwork discussed and completed. Living Will paperwork for review. Strongly encourage completion- any questions we have our LSW- Jessica Montiel help in assistance.

## 2025-05-29 NOTE — ASSESSMENT & PLAN NOTE
Ongoing risk factor stratification for reduction in major cardiovascular/cerebrovascular events   Monitor liver function and consider additional testing  Continue to improve modifiable risk factors including weight loss goals, diabetes control, lipid control, and HTN control

## 2025-05-29 NOTE — ASSESSMENT & PLAN NOTE
Chronic, at goal (stable), continue current treatment plan, medication adherence emphasized, and lifestyle modifications recommended    Orders:    hydroCHLOROthiazide (HYDRODIURIL) 25 MG tablet; TAKE ONE TABLET BY MOUTH EVERY DAY IN THE MORNING

## 2025-05-30 ENCOUNTER — HOSPITAL ENCOUNTER (OUTPATIENT)
Age: 55
Discharge: HOME | End: 2025-05-30
Payer: COMMERCIAL

## 2025-05-30 ENCOUNTER — PATIENT MESSAGE (OUTPATIENT)
Age: 55
End: 2025-05-30

## 2025-05-30 DIAGNOSIS — E53.8 B12 DEFICIENCY: ICD-10-CM

## 2025-05-30 DIAGNOSIS — R25.2 BILATERAL LEG CRAMPS: ICD-10-CM

## 2025-05-30 DIAGNOSIS — E11.40 TYPE 2 DIABETES MELLITUS WITH DIABETIC NEUROPATHY, WITHOUT LONG-TERM CURRENT USE OF INSULIN (HCC): ICD-10-CM

## 2025-05-30 DIAGNOSIS — G47.33 OSA (OBSTRUCTIVE SLEEP APNEA): ICD-10-CM

## 2025-05-30 LAB
ALBUMIN SERPL-MCNC: 4.2 G/DL (ref 3.5–5.2)
ALP SERPL-CCNC: 69 U/L (ref 35–104)
ALT SERPL-CCNC: 21 U/L (ref 0–32)
ANION GAP SERPL CALCULATED.3IONS-SCNC: 12 MMOL/L (ref 7–16)
AST SERPL-CCNC: 14 U/L (ref 0–31)
BASOPHILS # BLD: 0.06 K/UL (ref 0–0.2)
BASOPHILS NFR BLD: 1 % (ref 0–2)
BILIRUB DIRECT SERPL-MCNC: <0.2 MG/DL (ref 0–0.3)
BILIRUB INDIRECT SERPL-MCNC: ABNORMAL MG/DL (ref 0–1)
BILIRUB SERPL-MCNC: 0.3 MG/DL (ref 0–1.2)
BUN SERPL-MCNC: 11 MG/DL (ref 6–20)
CALCIUM SERPL-MCNC: 9.2 MG/DL (ref 8.6–10.2)
CHLORIDE SERPL-SCNC: 95 MMOL/L (ref 98–107)
CO2 SERPL-SCNC: 26 MMOL/L (ref 22–29)
CREAT SERPL-MCNC: 0.6 MG/DL (ref 0.5–1)
EOSINOPHIL # BLD: 0.34 K/UL (ref 0.05–0.5)
EOSINOPHILS RELATIVE PERCENT: 5 % (ref 0–6)
ERYTHROCYTE [DISTWIDTH] IN BLOOD BY AUTOMATED COUNT: 12.3 % (ref 11.5–15)
FERRITIN SERPL-MCNC: 72 NG/ML
GFR, ESTIMATED: >90 ML/MIN/1.73M2
GLUCOSE SERPL-MCNC: 138 MG/DL (ref 74–99)
HBA1C MFR BLD: 6.9 % (ref 4–5.6)
HCT VFR BLD AUTO: 40.3 % (ref 34–48)
HGB BLD-MCNC: 13.7 G/DL (ref 11.5–15.5)
IMM GRANULOCYTES # BLD AUTO: 0.03 K/UL (ref 0–0.58)
IMM GRANULOCYTES NFR BLD: 0 % (ref 0–5)
IRON SATN MFR SERPL: 26 % (ref 15–50)
IRON SERPL-MCNC: 74 UG/DL (ref 37–145)
LYMPHOCYTES NFR BLD: 2.67 K/UL (ref 1.5–4)
LYMPHOCYTES RELATIVE PERCENT: 37 % (ref 20–42)
MAGNESIUM SERPL-MCNC: 1.7 MG/DL (ref 1.6–2.6)
MCH RBC QN AUTO: 29.1 PG (ref 26–35)
MCHC RBC AUTO-ENTMCNC: 34 G/DL (ref 32–34.5)
MCV RBC AUTO: 85.6 FL (ref 80–99.9)
MONOCYTES NFR BLD: 0.62 K/UL (ref 0.1–0.95)
MONOCYTES NFR BLD: 9 % (ref 2–12)
NEUTROPHILS NFR BLD: 49 % (ref 43–80)
NEUTS SEG NFR BLD: 3.55 K/UL (ref 1.8–7.3)
PLATELET # BLD AUTO: 474 K/UL (ref 130–450)
PMV BLD AUTO: 9.1 FL (ref 7–12)
POTASSIUM SERPL-SCNC: 3.9 MMOL/L (ref 3.5–5)
PROT SERPL-MCNC: 6.9 G/DL (ref 6.4–8.3)
RBC # BLD AUTO: 4.71 M/UL (ref 3.5–5.5)
SODIUM SERPL-SCNC: 133 MMOL/L (ref 132–146)
TIBC SERPL-MCNC: 281 UG/DL (ref 250–450)
VIT B12 SERPL-MCNC: 296 PG/ML (ref 232–1245)
WBC OTHER # BLD: 7.3 K/UL (ref 4.5–11.5)

## 2025-05-30 PROCEDURE — 85025 COMPLETE CBC W/AUTO DIFF WBC: CPT

## 2025-05-30 PROCEDURE — 80053 COMPREHEN METABOLIC PANEL: CPT

## 2025-05-30 PROCEDURE — 83540 ASSAY OF IRON: CPT

## 2025-05-30 PROCEDURE — 82728 ASSAY OF FERRITIN: CPT

## 2025-05-30 PROCEDURE — 82607 VITAMIN B-12: CPT

## 2025-05-30 PROCEDURE — 83550 IRON BINDING TEST: CPT

## 2025-05-30 PROCEDURE — 36415 COLL VENOUS BLD VENIPUNCTURE: CPT

## 2025-05-30 PROCEDURE — 82248 BILIRUBIN DIRECT: CPT

## 2025-05-30 PROCEDURE — 83036 HEMOGLOBIN GLYCOSYLATED A1C: CPT

## 2025-05-30 PROCEDURE — 83735 ASSAY OF MAGNESIUM: CPT

## 2025-06-06 ENCOUNTER — PROCEDURE VISIT (OUTPATIENT)
Dept: PODIATRY | Age: 55
End: 2025-06-06

## 2025-06-06 VITALS — WEIGHT: 229 LBS | BODY MASS INDEX: 36.96 KG/M2

## 2025-06-06 DIAGNOSIS — L84 CORNS AND CALLOSITIES: ICD-10-CM

## 2025-06-06 DIAGNOSIS — E11.9 TYPE 2 DIABETES MELLITUS WITHOUT COMPLICATION, UNSPECIFIED WHETHER LONG TERM INSULIN USE (HCC): Primary | ICD-10-CM

## 2025-06-06 DIAGNOSIS — B35.1 TINEA UNGUIUM: ICD-10-CM

## 2025-06-06 DIAGNOSIS — G60.8 HEREDITARY SENSORY NEUROPATHY: ICD-10-CM

## 2025-06-06 NOTE — PROGRESS NOTES
Patient in for nail and callous care  Type 2 diabetic  Brine, Luc Reilly MD  LOV 5/29/25      Electronically signed by Debbie De Leon LPN on 6/6/2025 at 8:22 AM      CC:   Follow-up diabetic foot ankle exam    HPI:   Follow-up diabetic foot ankle exam.  Callus tissue both feet.  No recent injury or trauma.  No open wounds.  No additional pedal complaints today.    ROS:  Const: Denies constitutional symptoms  Musculo: Denies symptoms other than stated above  Skin: Denies symptoms other than stated above      Current Outpatient Medications:     FLUoxetine (PROZAC) 20 MG capsule, TAKE ONE CAPSULE BY MOUTH DAILY, Disp: 90 capsule, Rfl: 0    hydroCHLOROthiazide (HYDRODIURIL) 25 MG tablet, TAKE ONE TABLET BY MOUTH EVERY DAY IN THE MORNING, Disp: 90 tablet, Rfl: 0    dulaglutide (TRULICITY) 1.5 MG/0.5ML SC injection, Inject 0.5 mLs into the skin every 7 days, Disp: 2 mL, Rfl: 0    metFORMIN (GLUCOPHAGE) 1000 MG tablet, TAKE ONE TABLET BY MOUTH TWICE A DAY WITH MEALS, Disp: 180 tablet, Rfl: 1    amLODIPine (NORVASC) 5 MG tablet, Take 1 tablet by mouth daily, Disp: 90 tablet, Rfl: 1    cetirizine (ZYRTEC) 10 MG tablet, TAKE ONE TABLET BY MOUTH EVERY DAY for 14 days, Disp: , Rfl:     mupirocin (BACTROBAN) 2 % ointment, Apply topically 3 times daily., Disp: 30 g, Rfl: 0    clotrimazole-betamethasone (LOTRISONE) 1-0.05 % cream, Apply topically 2 times daily., Disp: 1 each, Rfl: 0    pravastatin (PRAVACHOL) 80 MG tablet, TAKE ONE TABLET BY MOUTH EVERY DAY IN THE EVENING, Disp: 90 tablet, Rfl: 1    omeprazole (PRILOSEC) 20 MG delayed release capsule, Take 1 capsule by mouth every morning (before breakfast), Disp: 90 capsule, Rfl: 1    magnesium cl-calcium carbonate (NU-MAG) 71.5-119 MG TBEC tablet, TAKE TWO TABLETS BY MOUTH EVERY DAY, Disp: 180 tablet, Rfl: 1    vitamin B-2 (RIBOFLAVIN) 100 MG TABS tablet, Take 2 tablets by mouth in the morning and at bedtime, Disp: 360 tablet, Rfl: 1    albuterol sulfate HFA (VENTOLIN

## 2025-06-20 DIAGNOSIS — N95.1 POST MENOPAUSAL SYNDROME: ICD-10-CM

## 2025-06-20 DIAGNOSIS — I10 ESSENTIAL HYPERTENSION: ICD-10-CM

## 2025-06-20 RX ORDER — AMLODIPINE BESYLATE 5 MG/1
5 TABLET ORAL DAILY
Qty: 90 TABLET | Refills: 1 | OUTPATIENT
Start: 2025-06-20

## 2025-06-20 RX ORDER — HYDROCHLOROTHIAZIDE 25 MG/1
TABLET ORAL
Qty: 90 TABLET | Refills: 0 | OUTPATIENT
Start: 2025-06-20

## 2025-07-02 DIAGNOSIS — E11.40 TYPE 2 DIABETES MELLITUS WITH DIABETIC NEUROPATHY, WITHOUT LONG-TERM CURRENT USE OF INSULIN (HCC): ICD-10-CM

## 2025-07-23 RX ORDER — OMEPRAZOLE 20 MG/1
CAPSULE, DELAYED RELEASE ORAL
Qty: 90 CAPSULE | Refills: 0 | Status: SHIPPED | OUTPATIENT
Start: 2025-07-23

## 2025-07-24 DIAGNOSIS — I10 ESSENTIAL HYPERTENSION: ICD-10-CM

## 2025-07-24 DIAGNOSIS — E11.40 TYPE 2 DIABETES MELLITUS WITH DIABETIC NEUROPATHY, WITHOUT LONG-TERM CURRENT USE OF INSULIN (HCC): ICD-10-CM

## 2025-07-24 RX ORDER — HYDROCHLOROTHIAZIDE 25 MG/1
TABLET ORAL
Qty: 90 TABLET | Refills: 0 | Status: SHIPPED | OUTPATIENT
Start: 2025-07-24

## 2025-07-24 NOTE — TELEPHONE ENCOUNTER
Name of Medication(s) Requested:  Requested Prescriptions     Pending Prescriptions Disp Refills    hydroCHLOROthiazide (HYDRODIURIL) 25 MG tablet 90 tablet 0     Sig: TAKE ONE TABLET BY MOUTH EVERY DAY IN THE MORNING    Dulaglutide 3 MG/0.5ML SOAJ 2 mL 0     Sig: Inject 3 mg into the skin every 7 days       Medication is on current medication list Yes    Dosage and directions were verified? Yes    Quantity verified: 30 day supply     Pharmacy Verified?  Yes    Last Appointment:  5/29/2025    Future appts:  Future Appointments   Date Time Provider Department Center   8/14/2025  8:15 AM Alvin Thornton DPM Col Podiatry [facility]   9/11/2025  2:15 PM Luc Rock MD [facility] ECC DEP   11/6/2025  9:45 AM Margaret Mueller MD [facility]        (If no appt send self scheduling link. .REFILLAPPT) has appt 9-11-25  Scheduling request sent?     [] Yes  [x] No    Does patient need updated?  [] Yes  [x] No

## 2025-08-14 ENCOUNTER — PROCEDURE VISIT (OUTPATIENT)
Dept: PODIATRY | Age: 55
End: 2025-08-14
Payer: COMMERCIAL

## 2025-08-14 VITALS — BODY MASS INDEX: 36.96 KG/M2 | WEIGHT: 229 LBS

## 2025-08-14 DIAGNOSIS — E11.9 TYPE 2 DIABETES MELLITUS WITHOUT COMPLICATION, UNSPECIFIED WHETHER LONG TERM INSULIN USE (HCC): Primary | ICD-10-CM

## 2025-08-14 DIAGNOSIS — L84 CORNS AND CALLOSITIES: ICD-10-CM

## 2025-08-14 DIAGNOSIS — B35.1 TINEA UNGUIUM: ICD-10-CM

## 2025-08-14 DIAGNOSIS — G60.8 HEREDITARY SENSORY NEUROPATHY: ICD-10-CM

## 2025-08-14 PROCEDURE — 11056 PARNG/CUTG B9 HYPRKR LES 2-4: CPT | Performed by: PODIATRIST

## 2025-08-14 PROCEDURE — 11721 DEBRIDE NAIL 6 OR MORE: CPT | Performed by: PODIATRIST

## 2025-08-21 ENCOUNTER — OFFICE VISIT (OUTPATIENT)
Age: 55
End: 2025-08-21
Payer: COMMERCIAL

## 2025-08-21 VITALS
WEIGHT: 237.5 LBS | TEMPERATURE: 96.8 F | SYSTOLIC BLOOD PRESSURE: 130 MMHG | OXYGEN SATURATION: 97 % | HEART RATE: 72 BPM | DIASTOLIC BLOOD PRESSURE: 73 MMHG | BODY MASS INDEX: 38.17 KG/M2 | HEIGHT: 66 IN | RESPIRATION RATE: 16 BRPM

## 2025-08-21 DIAGNOSIS — R39.15 URINARY URGENCY: ICD-10-CM

## 2025-08-21 DIAGNOSIS — R80.9 MICROALBUMINURIA DUE TO TYPE 2 DIABETES MELLITUS (HCC): ICD-10-CM

## 2025-08-21 DIAGNOSIS — E78.2 MIXED HYPERLIPIDEMIA: ICD-10-CM

## 2025-08-21 DIAGNOSIS — I10 ESSENTIAL HYPERTENSION: ICD-10-CM

## 2025-08-21 DIAGNOSIS — E11.29 MICROALBUMINURIA DUE TO TYPE 2 DIABETES MELLITUS (HCC): ICD-10-CM

## 2025-08-21 DIAGNOSIS — E11.40 TYPE 2 DIABETES MELLITUS WITH DIABETIC NEUROPATHY, WITHOUT LONG-TERM CURRENT USE OF INSULIN (HCC): ICD-10-CM

## 2025-08-21 DIAGNOSIS — T63.441A BEE STING REACTION, ACCIDENTAL OR UNINTENTIONAL, INITIAL ENCOUNTER: Primary | ICD-10-CM

## 2025-08-21 DIAGNOSIS — G47.33 OSA (OBSTRUCTIVE SLEEP APNEA): ICD-10-CM

## 2025-08-21 LAB
BILIRUBIN, POC: NORMAL
BILIRUBIN, URINE: NEGATIVE
BLOOD URINE, POC: NORMAL
CLARITY, POC: CLEAR
COLOR, POC: NORMAL
COLOR, UA: YELLOW
GLUCOSE URINE, POC: NORMAL MG/DL
GLUCOSE URINE: NEGATIVE MG/DL
KETONES, POC: NORMAL MG/DL
KETONES, URINE: NEGATIVE MG/DL
LEUKOCYTE EST, POC: NORMAL
LEUKOCYTE ESTERASE, URINE: NEGATIVE
NITRITE, POC: NORMAL
NITRITE, URINE: NEGATIVE
PH, POC: 5.5
PH, URINE: 6 (ref 5–8)
PROTEIN UA: NEGATIVE MG/DL
PROTEIN, POC: NORMAL MG/DL
RBC UA: ABNORMAL /HPF
SPECIFIC GRAVITY UA: <1.005 (ref 1–1.03)
SPECIFIC GRAVITY, POC: 1.01
TURBIDITY: CLEAR
URINE HGB: NEGATIVE
UROBILINOGEN, POC: 0.2 MG/DL
UROBILINOGEN, URINE: 0.2 EU/DL (ref 0–1)
WBC UA: ABNORMAL /HPF

## 2025-08-21 PROCEDURE — 2022F DILAT RTA XM EVC RTNOPTHY: CPT | Performed by: INTERNAL MEDICINE

## 2025-08-21 PROCEDURE — 1036F TOBACCO NON-USER: CPT | Performed by: INTERNAL MEDICINE

## 2025-08-21 PROCEDURE — PBSHW POCT URINALYSIS DIPSTICK: Performed by: INTERNAL MEDICINE

## 2025-08-21 PROCEDURE — G8417 CALC BMI ABV UP PARAM F/U: HCPCS | Performed by: INTERNAL MEDICINE

## 2025-08-21 PROCEDURE — 99214 OFFICE O/P EST MOD 30 MIN: CPT | Performed by: INTERNAL MEDICINE

## 2025-08-21 PROCEDURE — 99213 OFFICE O/P EST LOW 20 MIN: CPT | Performed by: INTERNAL MEDICINE

## 2025-08-21 PROCEDURE — 3075F SYST BP GE 130 - 139MM HG: CPT | Performed by: INTERNAL MEDICINE

## 2025-08-21 PROCEDURE — 3044F HG A1C LEVEL LT 7.0%: CPT | Performed by: INTERNAL MEDICINE

## 2025-08-21 PROCEDURE — G8427 DOCREV CUR MEDS BY ELIG CLIN: HCPCS | Performed by: INTERNAL MEDICINE

## 2025-08-21 PROCEDURE — 3078F DIAST BP <80 MM HG: CPT | Performed by: INTERNAL MEDICINE

## 2025-08-21 PROCEDURE — 81002 URINALYSIS NONAUTO W/O SCOPE: CPT | Performed by: INTERNAL MEDICINE

## 2025-08-21 PROCEDURE — 3017F COLORECTAL CA SCREEN DOC REV: CPT | Performed by: INTERNAL MEDICINE

## 2025-08-21 RX ORDER — AMLODIPINE BESYLATE 5 MG/1
5 TABLET ORAL DAILY
Qty: 90 TABLET | Refills: 1 | Status: SHIPPED | OUTPATIENT
Start: 2025-08-21

## 2025-08-21 ASSESSMENT — ENCOUNTER SYMPTOMS
VOICE CHANGE: 0
COUGH: 0
DIARRHEA: 0
CONSTIPATION: 0
NAUSEA: 0
WHEEZING: 0
TROUBLE SWALLOWING: 0
VOMITING: 0
SHORTNESS OF BREATH: 0
ABDOMINAL PAIN: 0
ABDOMINAL DISTENTION: 0
BLOOD IN STOOL: 0

## 2025-08-22 ENCOUNTER — PATIENT MESSAGE (OUTPATIENT)
Dept: INTERNAL MEDICINE CLINIC | Age: 55
End: 2025-08-22

## 2025-08-22 LAB
CULTURE: ABNORMAL
CULTURE: ABNORMAL
SPECIMEN DESCRIPTION: ABNORMAL

## 2025-08-24 ENCOUNTER — PATIENT MESSAGE (OUTPATIENT)
Age: 55
End: 2025-08-24

## 2025-08-24 DIAGNOSIS — R39.15 URINARY URGENCY: ICD-10-CM

## 2025-08-24 DIAGNOSIS — L03.116 CELLULITIS OF LEFT LOWER EXTREMITY: Primary | ICD-10-CM

## (undated) DEVICE — TOWEL,OR,DSP,ST,BLUE,STD,6/PK,12PK/CS: Brand: MEDLINE

## (undated) DEVICE — TOTAL TRAY, DB, 100% SILI FOLEY, 16FR 10: Brand: MEDLINE

## (undated) DEVICE — GAUZE,SPONGE,4"X4",8PLY,STRL,LF,10/TRAY: Brand: MEDLINE

## (undated) DEVICE — ENDOSCOPIC KIT 1.1+ OP4 NO CP DE

## (undated) DEVICE — SPONGE,LAP,4"X18",XR,ST,5/PK,40PK/CS: Brand: MEDLINE INDUSTRIES, INC.

## (undated) DEVICE — PAD,ABDOMINAL,5"X9",ST,LF,25/BX: Brand: MEDLINE INDUSTRIES, INC.

## (undated) DEVICE — GOWN,SIRUS,FABRNF,XL,20/CS: Brand: MEDLINE

## (undated) DEVICE — TRAY,VAG PREP,2PR VNYL GLV,4 C: Brand: MEDLINE INDUSTRIES, INC.

## (undated) DEVICE — DRAIN PENROSE L12IN 3/8IN PROMOTE DRNAGE IN OPN SURG WND

## (undated) DEVICE — BITEBLOCK 54FR W/ DENT RIM BLOX

## (undated) DEVICE — PATIENT RETURN ELECTRODE, SINGLE-USE, CONTACT QUALITY MONITORING, ADULT, WITH 9FT CORD, FOR PATIENTS WEIGING OVER 33LBS. (15KG): Brand: MEGADYNE

## (undated) DEVICE — CONNECTOR IRRIGATION AUXILIARY H2O JET W/ PRT MTL THRD HYDR

## (undated) DEVICE — DEFENDO AIR WATER SUCTION AND BIOPSY VALVE KIT FOR  OLYMPUS: Brand: DEFENDO AIR/WATER/SUCTION AND BIOPSY VALVE

## (undated) DEVICE — LEGGINGS, PAIR, 31X48, STERILE: Brand: MEDLINE

## (undated) DEVICE — BAG SPECIMEN BIOHAZARD 6IN X 9IN

## (undated) DEVICE — PACK PROCEDURE SURG GEN CUST

## (undated) DEVICE — TRAP SPEC MUCUS FOR SUCT

## (undated) DEVICE — TUBING SUCT 12FR MAL ALUM SHFT FN CAP VENT UNIV CONN W/ OBT

## (undated) DEVICE — AIR/WATER CLEANING ADAPTER FOR OLYMPUS® GI ENDOSCOPE: Brand: BULLDOG®

## (undated) DEVICE — INTENDED FOR TISSUE SEPARATION, AND OTHER PROCEDURES THAT REQUIRE A SHARP SURGICAL BLADE TO PUNCTURE OR CUT.: Brand: BARD-PARKER ® STAINLESS STEEL BLADES

## (undated) DEVICE — SWAB SPEC COLL SHFT L5.25IN POLYUR FOAM TIP SFT DBL MEDIA

## (undated) DEVICE — SHEET, T, LAPAROTOMY, STERILE: Brand: MEDLINE

## (undated) DEVICE — UNDERPANTS INCONT XL 45-70IN KNIT SEAMLESS DSGN COLOR-CODED

## (undated) DEVICE — CONTAINER VACUTAINER ANAER CULTURE SWAB

## (undated) DEVICE — DRESSING HYDRCOLL W2XL45CM W/ STRENGTHENING FBR RBBN

## (undated) DEVICE — 4-PORT MANIFOLD: Brand: NEPTUNE 2